# Patient Record
Sex: MALE | Race: WHITE | NOT HISPANIC OR LATINO | Employment: OTHER | URBAN - METROPOLITAN AREA
[De-identification: names, ages, dates, MRNs, and addresses within clinical notes are randomized per-mention and may not be internally consistent; named-entity substitution may affect disease eponyms.]

---

## 2017-01-12 ENCOUNTER — OFFICE VISIT (OUTPATIENT)
Dept: FAMILY MEDICINE CLINIC | Facility: CLINIC | Age: 72
End: 2017-01-12

## 2017-01-12 VITALS
WEIGHT: 187 LBS | TEMPERATURE: 97.9 F | OXYGEN SATURATION: 97 % | SYSTOLIC BLOOD PRESSURE: 112 MMHG | BODY MASS INDEX: 28.34 KG/M2 | HEIGHT: 68 IN | HEART RATE: 70 BPM | DIASTOLIC BLOOD PRESSURE: 70 MMHG

## 2017-01-12 DIAGNOSIS — I10 BENIGN ESSENTIAL HYPERTENSION: Primary | ICD-10-CM

## 2017-01-12 DIAGNOSIS — F32.89 OTHER DEPRESSION: ICD-10-CM

## 2017-01-12 DIAGNOSIS — N18.2 CKD (CHRONIC KIDNEY DISEASE) STAGE 2, GFR 60-89 ML/MIN: ICD-10-CM

## 2017-01-12 DIAGNOSIS — L24.2 IRRITANT CONTACT DERMATITIS DUE TO SOLVENT: ICD-10-CM

## 2017-01-12 DIAGNOSIS — E78.5 DYSLIPIDEMIA: ICD-10-CM

## 2017-01-12 DIAGNOSIS — F41.9 ANXIETY: ICD-10-CM

## 2017-01-12 PROCEDURE — 99214 OFFICE O/P EST MOD 30 MIN: CPT | Performed by: FAMILY MEDICINE

## 2017-01-12 RX ORDER — FLUOXETINE 10 MG/1
TABLET, FILM COATED ORAL
Qty: 30 TABLET | Refills: 0 | Status: SHIPPED | OUTPATIENT
Start: 2017-01-12 | End: 2017-03-10

## 2017-01-12 RX ORDER — TRIAMCINOLONE ACETONIDE 1 MG/G
CREAM TOPICAL 2 TIMES DAILY
Qty: 60 G | Refills: 2 | Status: SHIPPED | OUTPATIENT
Start: 2017-01-12 | End: 2017-03-10 | Stop reason: SDUPTHER

## 2017-01-12 RX ORDER — PRAVASTATIN SODIUM 20 MG
20 TABLET ORAL
Qty: 90 TABLET | Refills: 3 | Status: SHIPPED | OUTPATIENT
Start: 2017-01-12 | End: 2018-02-27

## 2017-01-12 RX ORDER — CLONAZEPAM 1 MG/1
TABLET ORAL
Qty: 135 TABLET | Refills: 1 | Status: SHIPPED | OUTPATIENT
Start: 2017-01-12 | End: 2017-03-10 | Stop reason: SDUPTHER

## 2017-01-12 NOTE — PROGRESS NOTES
Subjective   Kp Hernandez is a 71 y.o. male. Presents today for   Chief Complaint   Patient presents with   • Hypertension     pt is here for 6 month med review and labs. he said he has been having dry skin on his hands.       Rash   This is a new problem. Episode onset: 1 month ago. The affected locations include the left hand and right hand. The rash is characterized by dryness. Associated with: exposed to cement recently and fingers started to crack. Pertinent negatives include no fever or shortness of breath. Past treatments include moisturizer. The treatment provided no relief.   Hypertension   This is a chronic problem. The current episode started more than 1 year ago. The problem is unchanged. The problem is controlled. Pertinent negatives include no chest pain, orthopnea, palpitations, peripheral edema, PND or shortness of breath. There are no associated agents to hypertension. Risk factors for coronary artery disease include dyslipidemia and male gender. Past treatments include beta blockers. The current treatment provides moderate improvement. There are no compliance problems.    Hyperlipidemia   This is a chronic problem. The current episode started more than 1 year ago. The problem is controlled. Recent lipid tests were reviewed and are normal. Factors aggravating his hyperlipidemia include beta blockers. Pertinent negatives include no chest pain, focal sensory loss, focal weakness, leg pain, myalgias or shortness of breath. Current antihyperlipidemic treatment includes statins. The current treatment provides moderate improvement of lipids. There are no compliance problems.  Risk factors for coronary artery disease include hypertension, male sex, dyslipidemia and post-menopausal.   Benign Prostatic Hypertrophy   This is a chronic problem. The current episode started more than 1 year ago. The problem is unchanged. Irritative symptoms do not include nocturia. Obstructive symptoms do not include an  intermittent stream, a slower stream or straining. Past treatments include finasteride. The treatment provided significant relief. He has been using treatment for 2 or more years.   Hx of mild CKD, due for recheck;   On finasteride for quite sometime, no issues with wondering about stopping.    On fluoxetine for several years, would like stop as no depression.  Upon further questioning does have anxiety with irritability and is on clonazepam long-term.  WE discussed long-term risks of this medication and recommend go off.  Will try weaning prozac 1st, then clonazepam if able.  We will have follow-up closely and discuss other medications for anxiety.    Review of Systems   Constitutional: Negative for fever.   Respiratory: Negative for shortness of breath.    Cardiovascular: Negative for chest pain, palpitations, orthopnea and PND.   Genitourinary: Negative for nocturia.   Musculoskeletal: Negative for myalgias.   Skin: Positive for rash.   Neurological: Negative for focal weakness.       The following portions of the patient's history were reviewed and updated as appropriate: allergies, current medications, past medical history and problem list.    Patient Active Problem List   Diagnosis   • Benign essential hypertension   • Benign prostatic hypertrophy without urinary obstruction   • CKD (chronic kidney disease) stage 2, GFR 60-89 ml/min   • Chronic obstructive pulmonary disease   • Depression   • Dyslipidemia   • Ganglion   • Gastroesophageal reflux disease without esophagitis   • Hearing loss   • Osteoarthritis   • Impacted cerumen       No Known Allergies    Current Outpatient Prescriptions on File Prior to Visit   Medication Sig Dispense Refill   • clonazePAM (KlonoPIN) 1 MG tablet 1/2 po qam and 1 po qbedtime 135 tablet 1   • finasteride (PROSCAR) 5 MG tablet TAKE ONE TABLET BY MOUTH ONCE DAILY AS DIRECTED 90 tablet 3   • FLUoxetine (PROzac) 20 MG capsule Take 1 capsule by mouth 1 (one) time daily. 90 capsule  "3   • fluticasone (FLONASE) 50 MCG/ACT nasal spray 2 sprays into each nostril daily. 1 each 3   • hydrochlorothiazide (HYDRODIURIL) 12.5 MG tablet Take 1 tablet by mouth daily. 90 tablet 3   • HYDROcodone-acetaminophen (NORCO)  MG per tablet Take 1 tablet by mouth Every 6 (Six) Hours As Needed for moderate pain (4-6) (1 every 6-8 hours). 100 tablet 0   • HYDROcodone-acetaminophen (NORCO)  MG per tablet Take 1 tablet by mouth Every 6 (Six) Hours As Needed for moderate pain (4-6). 100 tablet 0   • metoprolol succinate XL (TOPROL-XL) 25 MG 24 hr tablet Take 1 tablet by mouth daily. 90 tablet 3   • pravastatin (PRAVACHOL) 20 MG tablet Take 20 mg by mouth once daily.     • ranitidine (ZANTAC) 150 MG tablet Take 150 mg by mouth every 12 (twelve) hours.     • SPIRIVA HANDIHALER 18 MCG per inhalation capsule      • [DISCONTINUED] finasteride (PROSCAR) 5 MG tablet Take by mouth daily.     • [DISCONTINUED] INCRUSE ELLIPTA 62.5 MCG/INH aerosol powder         No current facility-administered medications on file prior to visit.        Objective   Vitals:    01/12/17 0946   BP: 112/70   BP Location: Right arm   Patient Position: Sitting   Cuff Size: Large Adult   Pulse: 70   Temp: 97.9 °F (36.6 °C)   TempSrc: Oral   SpO2: 97%   Weight: 187 lb (84.8 kg)   Height: 68\" (172.7 cm)       Physical Exam   Constitutional: He appears well-developed and well-nourished.   HENT:   Head: Normocephalic and atraumatic.   Neck: Neck supple. No JVD present. No thyromegaly present.   Cardiovascular: Normal rate, regular rhythm and normal heart sounds.  Exam reveals no gallop and no friction rub.    No murmur heard.  Pulmonary/Chest: Effort normal and breath sounds normal. No respiratory distress. He has no wheezes. He has no rales.   Abdominal: Soft. Bowel sounds are normal. He exhibits no distension. There is no tenderness. There is no rebound and no guarding.   Musculoskeletal: He exhibits no edema.   Neurological: He is alert. "   Skin: Skin is warm and dry.   B/l hands, finger, peeling and cracking.   Psychiatric: He has a normal mood and affect. His behavior is normal.   Nursing note and vitals reviewed.      Assessment/Plan   Kp was seen today for hypertension.    Diagnoses and all orders for this visit:    Benign essential hypertension  -     Comprehensive Metabolic Panel    CKD (chronic kidney disease) stage 2, GFR 60-89 ml/min  -     Comprehensive Metabolic Panel    Dyslipidemia  -     Comprehensive Metabolic Panel  -     Lipid Panel  -     pravastatin (PRAVACHOL) 20 MG tablet; Take 1 tablet by mouth Daily.    Other depression  -     FLUoxetine (PROzac) 10 MG tablet; 1 po daily x 7 days, 1/2 po daily 7 days, if necessary go to 1/2 every other days 7 to 14 more days.    Anxiety  -     clonazePAM (KlonoPIN) 1 MG tablet; 1/2 po qam and 1 po qbedtime    Irritant contact dermatitis due to solvent  -     triamcinolone (KENALOG) 0.1 % cream; Apply  topically 2 (Two) Times a Day.    -hypertension - controlled, continue medications  -HLD - continue statin, recheck lipids.  -d/w bph and ses of finasteride, has been stable and would like to continue  -as above d/w bzd at length and interested in getting off this medication.  Will wean off of prozac 1st and see how he does.         -Follow up: 2 months       Current Outpatient Prescriptions:   •  clonazePAM (KlonoPIN) 1 MG tablet, 1/2 po qam and 1 po qbedtime, Disp: 135 tablet, Rfl: 1  •  finasteride (PROSCAR) 5 MG tablet, TAKE ONE TABLET BY MOUTH ONCE DAILY AS DIRECTED, Disp: 90 tablet, Rfl: 3  •  FLUoxetine (PROzac) 20 MG capsule, Take 1 capsule by mouth 1 (one) time daily., Disp: 90 capsule, Rfl: 3  •  fluticasone (FLONASE) 50 MCG/ACT nasal spray, 2 sprays into each nostril daily., Disp: 1 each, Rfl: 3  •  hydrochlorothiazide (HYDRODIURIL) 12.5 MG tablet, Take 1 tablet by mouth daily., Disp: 90 tablet, Rfl: 3  •  HYDROcodone-acetaminophen (NORCO)  MG per tablet, Take 1 tablet by  mouth Every 6 (Six) Hours As Needed for moderate pain (4-6) (1 every 6-8 hours)., Disp: 100 tablet, Rfl: 0  •  HYDROcodone-acetaminophen (NORCO)  MG per tablet, Take 1 tablet by mouth Every 6 (Six) Hours As Needed for moderate pain (4-6)., Disp: 100 tablet, Rfl: 0  •  metoprolol succinate XL (TOPROL-XL) 25 MG 24 hr tablet, Take 1 tablet by mouth daily., Disp: 90 tablet, Rfl: 3  •  pravastatin (PRAVACHOL) 20 MG tablet, Take 20 mg by mouth once daily., Disp: , Rfl:   •  ranitidine (ZANTAC) 150 MG tablet, Take 150 mg by mouth every 12 (twelve) hours., Disp: , Rfl:   •  SPIRIVA HANDIHALER 18 MCG per inhalation capsule, , Disp: , Rfl:

## 2017-01-12 NOTE — MR AVS SNAPSHOT
Kp Hernandez   1/12/2017 9:00 AM   Office Visit    Provider:  Murphy Felix DO   Department:  [facility]   Dept Phone:  [phone]                Your Full Care Plan              Today's Medication Changes          These changes are accurate as of: 1/12/17 10:35 AM.  If you have any questions, ask your nurse or doctor.               Medication(s)that have changed:     finasteride 5 MG tablet   Commonly known as:  PROSCAR   TAKE ONE TABLET BY MOUTH ONCE DAILY AS DIRECTED   What changed:  Another medication with the same name was removed. Continue taking this medication, and follow the directions you see here.   Changed by:  Murphy Felix DO         Stop taking medication(s)listed here:     INCRUSE ELLIPTA 62.5 MCG/INH aerosol powder    Generic drug:  Umeclidinium Bromide   Stopped by:  Murphy Felix DO                      Your Updated Medication List          This list is accurate as of: 1/12/17 10:35 AM.  Always use your most recent med list.                clonazePAM 1 MG tablet   Commonly known as:  KlonoPIN   1/2 po qam and 1 po qbedtime       finasteride 5 MG tablet   Commonly known as:  PROSCAR   TAKE ONE TABLET BY MOUTH ONCE DAILY AS DIRECTED       FLUoxetine 20 MG capsule   Commonly known as:  PROzac   Take 1 capsule by mouth 1 (one) time daily.       fluticasone 50 MCG/ACT nasal spray   Commonly known as:  FLONASE   2 sprays into each nostril daily.       hydrochlorothiazide 12.5 MG tablet   Commonly known as:  HYDRODIURIL   Take 1 tablet by mouth daily.       * HYDROcodone-acetaminophen  MG per tablet   Commonly known as:  NORCO   Take 1 tablet by mouth Every 6 (Six) Hours As Needed for moderate pain (4-6) (1 every 6-8 hours).       * HYDROcodone-acetaminophen  MG per tablet   Commonly known as:  NORCO   Take 1 tablet by mouth Every 6 (Six) Hours As Needed for moderate pain (4-6).       metoprolol succinate XL 25 MG 24 hr  "tablet   Commonly known as:  TOPROL-XL   Take 1 tablet by mouth daily.       pravastatin 20 MG tablet   Commonly known as:  PRAVACHOL       raNITIdine 150 MG tablet   Commonly known as:  ZANTAC       SPIRIVA HANDIHALER 18 MCG per inhalation capsule   Generic drug:  tiotropium       * Notice:  This list has 2 medication(s) that are the same as other medications prescribed for you. Read the directions carefully, and ask your doctor or other care provider to review them with you.            You Were Diagnosed With        Codes Comments    Benign essential hypertension    -  Primary ICD-10-CM: I10  ICD-9-CM: 401.1     CKD (chronic kidney disease) stage 2, GFR 60-89 ml/min     ICD-10-CM: N18.2  ICD-9-CM: 585.2     Dyslipidemia     ICD-10-CM: E78.5  ICD-9-CM: 272.4     Other depression     ICD-10-CM: F32.89       Instructions     None    Patient Instructions History      MyChart Signup     Our records indicate that you have declined [employer] signup. If you would like to sign up for [employer], please email [email or web address] or call [phone] to obtain an activation code.             Other Info from Your Visit           Allergies     No Known Allergies      Reason for Visit     Hypertension pt is here for 6 month med review and labs. he said he has been having dry skin on his hands.      Vital Signs     Blood Pressure Pulse Temperature Height Weight Oxygen Saturation    112/70 (BP Location: Right arm, Patient Position: Sitting, Cuff Size: Large Adult) 70 97.9 °F (36.6 °C) (Oral) 68\" (172.7 cm) 187 lb (84.8 kg) 97%    Body Mass Index Smoking Status                28.43 kg/m2 Never Smoker          Problems and Diagnoses Noted     Benign essential hypertension    CKD (chronic kidney disease) stage 2, GFR 60-89 ml/min    Depression    Dyslipidemia      "

## 2017-01-13 LAB
ALBUMIN SERPL-MCNC: 4.4 G/DL (ref 3.5–5.2)
ALBUMIN/GLOB SERPL: 1.7 G/DL
ALP SERPL-CCNC: 58 U/L (ref 39–117)
ALT SERPL-CCNC: 28 U/L (ref 1–41)
AST SERPL-CCNC: 22 U/L (ref 1–40)
BILIRUB SERPL-MCNC: 0.7 MG/DL (ref 0.1–1.2)
BUN SERPL-MCNC: 22 MG/DL (ref 8–23)
BUN/CREAT SERPL: 17.5 (ref 7–25)
CALCIUM SERPL-MCNC: 9.5 MG/DL (ref 8.6–10.5)
CHLORIDE SERPL-SCNC: 102 MMOL/L (ref 98–107)
CHOLEST SERPL-MCNC: 189 MG/DL (ref 0–200)
CO2 SERPL-SCNC: 27.5 MMOL/L (ref 22–29)
CREAT SERPL-MCNC: 1.26 MG/DL (ref 0.76–1.27)
GLOBULIN SER CALC-MCNC: 2.6 GM/DL
GLUCOSE SERPL-MCNC: 89 MG/DL (ref 65–99)
HDLC SERPL-MCNC: 50 MG/DL (ref 40–60)
LDLC SERPL CALC-MCNC: 109 MG/DL (ref 0–100)
POTASSIUM SERPL-SCNC: 4.7 MMOL/L (ref 3.5–5.2)
PROT SERPL-MCNC: 7 G/DL (ref 6–8.5)
SODIUM SERPL-SCNC: 144 MMOL/L (ref 136–145)
TRIGL SERPL-MCNC: 150 MG/DL (ref 0–150)
VLDLC SERPL CALC-MCNC: 30 MG/DL (ref 5–40)

## 2017-02-13 ENCOUNTER — TELEPHONE (OUTPATIENT)
Dept: FAMILY MEDICINE CLINIC | Facility: CLINIC | Age: 72
End: 2017-02-13

## 2017-02-13 RX ORDER — HYDROCODONE BITARTRATE AND ACETAMINOPHEN 10; 325 MG/1; MG/1
1 TABLET ORAL EVERY 6 HOURS PRN
Qty: 100 TABLET | Refills: 0 | Status: SHIPPED | OUTPATIENT
Start: 2017-02-13 | End: 2017-03-28 | Stop reason: SDUPTHER

## 2017-03-10 ENCOUNTER — OFFICE VISIT (OUTPATIENT)
Dept: FAMILY MEDICINE CLINIC | Facility: CLINIC | Age: 72
End: 2017-03-10

## 2017-03-10 VITALS
TEMPERATURE: 97.9 F | BODY MASS INDEX: 28.59 KG/M2 | WEIGHT: 188 LBS | OXYGEN SATURATION: 98 % | DIASTOLIC BLOOD PRESSURE: 70 MMHG | SYSTOLIC BLOOD PRESSURE: 140 MMHG | HEART RATE: 81 BPM

## 2017-03-10 DIAGNOSIS — F32.89 OTHER DEPRESSION: ICD-10-CM

## 2017-03-10 DIAGNOSIS — L24.2 IRRITANT CONTACT DERMATITIS DUE TO SOLVENT: ICD-10-CM

## 2017-03-10 DIAGNOSIS — F41.9 ANXIETY: Primary | ICD-10-CM

## 2017-03-10 PROCEDURE — 99213 OFFICE O/P EST LOW 20 MIN: CPT | Performed by: FAMILY MEDICINE

## 2017-03-10 RX ORDER — BUSPIRONE HYDROCHLORIDE 7.5 MG/1
7.5 TABLET ORAL 2 TIMES DAILY
Qty: 60 TABLET | Refills: 12 | Status: SHIPPED | OUTPATIENT
Start: 2017-03-10 | End: 2017-06-13 | Stop reason: SDUPTHER

## 2017-03-10 RX ORDER — TRIAMCINOLONE ACETONIDE 1 MG/G
CREAM TOPICAL 2 TIMES DAILY
Qty: 60 G | Refills: 5 | Status: SHIPPED | OUTPATIENT
Start: 2017-03-10 | End: 2017-12-18

## 2017-03-10 RX ORDER — TRIAMCINOLONE ACETONIDE 1 MG/G
CREAM TOPICAL 2 TIMES DAILY
Qty: 60 G | Refills: 5 | Status: SHIPPED | OUTPATIENT
Start: 2017-03-10 | End: 2017-03-10 | Stop reason: SDUPTHER

## 2017-03-10 RX ORDER — CLONAZEPAM 0.5 MG/1
TABLET ORAL
Qty: 70 TABLET | Refills: 0 | Status: SHIPPED | OUTPATIENT
Start: 2017-03-10 | End: 2017-05-23

## 2017-03-10 NOTE — PROGRESS NOTES
Subjective   Kp Hernandez is a 71 y.o. male. Presents today for   Chief Complaint   Patient presents with   • Follow-up     weaned off prozac and talking bout clonazepam       Anxiety   Presents for follow-up visit. Symptoms include irritability and nervous/anxious behavior. Patient reports no depressed mood. Primary symptoms comment: Weaned off prozac as no further depression.  Ready to wean off clonazepam, but would like something else for anxiety.. Symptoms occur occasionally. The severity of symptoms is mild.     Compliance with medications is %.     Rash on hands improved, needs refills of TAC.    Review of Systems   Constitutional: Positive for irritability.   Psychiatric/Behavioral: The patient is nervous/anxious.        The following portions of the patient's history were reviewed and updated as appropriate: allergies, current medications, past medical history and problem list.    Patient Active Problem List   Diagnosis   • Benign essential hypertension   • Benign prostatic hypertrophy without urinary obstruction   • CKD (chronic kidney disease) stage 2, GFR 60-89 ml/min   • Chronic obstructive pulmonary disease   • Depression   • Dyslipidemia   • Ganglion   • Gastroesophageal reflux disease without esophagitis   • Hearing loss   • Osteoarthritis   • Impacted cerumen       No Known Allergies    Current Outpatient Prescriptions on File Prior to Visit   Medication Sig Dispense Refill   • finasteride (PROSCAR) 5 MG tablet TAKE ONE TABLET BY MOUTH ONCE DAILY AS DIRECTED 90 tablet 3   • fluticasone (FLONASE) 50 MCG/ACT nasal spray 2 sprays into each nostril daily. 1 each 3   • hydrochlorothiazide (HYDRODIURIL) 12.5 MG tablet Take 1 tablet by mouth daily. 90 tablet 3   • HYDROcodone-acetaminophen (NORCO)  MG per tablet Take 1 tablet by mouth Every 6 (Six) Hours As Needed for moderate pain (4-6) (1 every 6-8 hours). 100 tablet 0   • HYDROcodone-acetaminophen (NORCO)  MG per tablet Take 1 tablet by  mouth Every 6 (Six) Hours As Needed for moderate pain (4-6). 100 tablet 0   • metoprolol succinate XL (TOPROL-XL) 25 MG 24 hr tablet Take 1 tablet by mouth daily. 90 tablet 3   • pravastatin (PRAVACHOL) 20 MG tablet Take 1 tablet by mouth Daily. 90 tablet 3   • ranitidine (ZANTAC) 150 MG tablet Take 150 mg by mouth every 12 (twelve) hours.     • SPIRIVA HANDIHALER 18 MCG per inhalation capsule      • [DISCONTINUED] clonazePAM (KlonoPIN) 1 MG tablet 1/2 po qam and 1 po qbedtime 135 tablet 1   • [DISCONTINUED] FLUoxetine (PROzac) 10 MG tablet 1 po daily x 7 days, 1/2 po daily 7 days, if necessary go to 1/2 every other days 7 to 14 more days. 30 tablet 0   • [DISCONTINUED] FLUoxetine (PROzac) 20 MG capsule Take 1 capsule by mouth 1 (one) time daily. 90 capsule 3   • [DISCONTINUED] triamcinolone (KENALOG) 0.1 % cream Apply  topically 2 (Two) Times a Day. 60 g 2     No current facility-administered medications on file prior to visit.        Objective   Vitals:    03/10/17 0852   BP: 140/70   Pulse: 81   Temp: 97.9 °F (36.6 °C)   SpO2: 98%   Weight: 188 lb (85.3 kg)       Physical Exam   Constitutional: He appears well-developed and well-nourished.   Neck: Neck supple.   Neurological: He is alert.   Skin: Skin is warm and dry.   Psychiatric: He has a normal mood and affect. His behavior is normal.   Nursing note and vitals reviewed.      Assessment/Plan   pK was seen today for follow-up.    Diagnoses and all orders for this visit:    Anxiety  -     busPIRone (BUSPAR) 7.5 MG tablet; Take 1 tablet by mouth 2 (Two) Times a Day.  -     clonazePAM (KlonoPIN) 0.5 MG tablet; 1 po 2x daily x 14 days, 1/2 am and 1 pm x 14 days, 1 po pm x 14 days, then 1/2 po pm x 7 days, then 1/2 every other night x 7 days    Other depression    Irritant contact dermatitis due to solvent  -     triamcinolone (KENALOG) 0.1 % cream; Apply  topically 2 (Two) Times a Day.    -will wean slowly off clonazepam and start buspar as safer  alternative         -Follow up: 3 months       Current Outpatient Prescriptions:   •  clonazePAM (KlonoPIN) 0.5 MG tablet, 1 po 2x daily x 14 days, 1/2 am and 1 pm x 14 days, 1 po pm x 14 days, then 1/2 po pm x 7 days, then 1/2 every other night x 7 days, Disp: 70 tablet, Rfl: 0  •  finasteride (PROSCAR) 5 MG tablet, TAKE ONE TABLET BY MOUTH ONCE DAILY AS DIRECTED, Disp: 90 tablet, Rfl: 3  •  fluticasone (FLONASE) 50 MCG/ACT nasal spray, 2 sprays into each nostril daily., Disp: 1 each, Rfl: 3  •  hydrochlorothiazide (HYDRODIURIL) 12.5 MG tablet, Take 1 tablet by mouth daily., Disp: 90 tablet, Rfl: 3  •  HYDROcodone-acetaminophen (NORCO)  MG per tablet, Take 1 tablet by mouth Every 6 (Six) Hours As Needed for moderate pain (4-6) (1 every 6-8 hours)., Disp: 100 tablet, Rfl: 0  •  HYDROcodone-acetaminophen (NORCO)  MG per tablet, Take 1 tablet by mouth Every 6 (Six) Hours As Needed for moderate pain (4-6)., Disp: 100 tablet, Rfl: 0  •  metoprolol succinate XL (TOPROL-XL) 25 MG 24 hr tablet, Take 1 tablet by mouth daily., Disp: 90 tablet, Rfl: 3  •  pravastatin (PRAVACHOL) 20 MG tablet, Take 1 tablet by mouth Daily., Disp: 90 tablet, Rfl: 3  •  ranitidine (ZANTAC) 150 MG tablet, Take 150 mg by mouth every 12 (twelve) hours., Disp: , Rfl:   •  SPIRIVA HANDIHALER 18 MCG per inhalation capsule, , Disp: , Rfl:   •  triamcinolone (KENALOG) 0.1 % cream, Apply  topically 2 (Two) Times a Day., Disp: 60 g, Rfl: 5  •  busPIRone (BUSPAR) 7.5 MG tablet, Take 1 tablet by mouth 2 (Two) Times a Day., Disp: 60 tablet, Rfl: 12

## 2017-03-13 ENCOUNTER — TELEPHONE (OUTPATIENT)
Dept: FAMILY MEDICINE CLINIC | Facility: CLINIC | Age: 72
End: 2017-03-13

## 2017-03-13 NOTE — TELEPHONE ENCOUNTER
He wanted to know if he should start the buspar now, or wait until he is finished weaning off clonazepam. I told him to go ahead and start the buspar while he is weaning off. If this is not correct, pls let me know.

## 2017-03-29 RX ORDER — HYDROCODONE BITARTRATE AND ACETAMINOPHEN 10; 325 MG/1; MG/1
1 TABLET ORAL EVERY 6 HOURS PRN
Qty: 100 TABLET | Refills: 0 | Status: SHIPPED | OUTPATIENT
Start: 2017-03-29 | End: 2017-06-16 | Stop reason: SDUPTHER

## 2017-04-18 ENCOUNTER — TELEPHONE (OUTPATIENT)
Dept: FAMILY MEDICINE CLINIC | Facility: CLINIC | Age: 72
End: 2017-04-18

## 2017-04-18 DIAGNOSIS — Z79.899 DRUG THERAPY: Primary | ICD-10-CM

## 2017-04-24 LAB
DRUGS UR: NORMAL
REPORT: NORMAL

## 2017-04-24 RX ORDER — FLUTICASONE PROPIONATE 50 MCG
SPRAY, SUSPENSION (ML) NASAL
Qty: 16 G | Refills: 3 | Status: SHIPPED | OUTPATIENT
Start: 2017-04-24 | End: 2017-09-13 | Stop reason: SDUPTHER

## 2017-05-16 RX ORDER — FINASTERIDE 5 MG/1
TABLET, FILM COATED ORAL
Qty: 90 TABLET | Refills: 1 | Status: SHIPPED | OUTPATIENT
Start: 2017-05-16 | End: 2017-11-13 | Stop reason: SDUPTHER

## 2017-05-23 ENCOUNTER — OFFICE VISIT (OUTPATIENT)
Dept: FAMILY MEDICINE CLINIC | Facility: CLINIC | Age: 72
End: 2017-05-23

## 2017-05-23 VITALS
WEIGHT: 185 LBS | OXYGEN SATURATION: 98 % | DIASTOLIC BLOOD PRESSURE: 76 MMHG | BODY MASS INDEX: 28.13 KG/M2 | SYSTOLIC BLOOD PRESSURE: 130 MMHG | TEMPERATURE: 98.3 F | HEART RATE: 85 BPM

## 2017-05-23 DIAGNOSIS — G47.09 OTHER INSOMNIA: ICD-10-CM

## 2017-05-23 DIAGNOSIS — M54.2 NECK PAIN: Primary | ICD-10-CM

## 2017-05-23 PROCEDURE — 99213 OFFICE O/P EST LOW 20 MIN: CPT | Performed by: FAMILY MEDICINE

## 2017-05-23 RX ORDER — TIZANIDINE 4 MG/1
4 TABLET ORAL EVERY 8 HOURS PRN
Qty: 90 TABLET | Refills: 5 | Status: SHIPPED | OUTPATIENT
Start: 2017-05-23 | End: 2018-08-23 | Stop reason: SDUPTHER

## 2017-05-23 RX ORDER — TRAZODONE HYDROCHLORIDE 50 MG/1
50 TABLET ORAL NIGHTLY PRN
Qty: 30 TABLET | Refills: 5 | Status: SHIPPED | OUTPATIENT
Start: 2017-05-23 | End: 2017-08-18

## 2017-06-09 ENCOUNTER — TELEPHONE (OUTPATIENT)
Dept: FAMILY MEDICINE CLINIC | Facility: CLINIC | Age: 72
End: 2017-06-09

## 2017-06-13 ENCOUNTER — OFFICE VISIT (OUTPATIENT)
Dept: FAMILY MEDICINE CLINIC | Facility: CLINIC | Age: 72
End: 2017-06-13

## 2017-06-13 VITALS
HEIGHT: 68 IN | DIASTOLIC BLOOD PRESSURE: 80 MMHG | BODY MASS INDEX: 27.74 KG/M2 | WEIGHT: 183 LBS | SYSTOLIC BLOOD PRESSURE: 140 MMHG | HEART RATE: 76 BPM | TEMPERATURE: 97.6 F | OXYGEN SATURATION: 98 %

## 2017-06-13 DIAGNOSIS — L30.8 OTHER ECZEMA: ICD-10-CM

## 2017-06-13 DIAGNOSIS — I10 BENIGN ESSENTIAL HYPERTENSION: ICD-10-CM

## 2017-06-13 DIAGNOSIS — F41.9 ANXIETY: Primary | ICD-10-CM

## 2017-06-13 PROCEDURE — 99214 OFFICE O/P EST MOD 30 MIN: CPT | Performed by: FAMILY MEDICINE

## 2017-06-13 RX ORDER — BUSPIRONE HYDROCHLORIDE 15 MG/1
15 TABLET ORAL 2 TIMES DAILY
Qty: 60 TABLET | Refills: 12 | Status: SHIPPED | OUTPATIENT
Start: 2017-06-13 | End: 2017-06-26 | Stop reason: SDUPTHER

## 2017-06-13 RX ORDER — SALICYLIC ACID 6 G/100G
AEROSOL, FOAM TOPICAL
Qty: 70 G | Refills: 2 | Status: SHIPPED | OUTPATIENT
Start: 2017-06-13 | End: 2017-12-18
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
## 2017-06-14 ENCOUNTER — TELEPHONE (OUTPATIENT)
Dept: FAMILY MEDICINE CLINIC | Facility: CLINIC | Age: 72
End: 2017-06-14

## 2017-06-16 DIAGNOSIS — M15.9 GENERALIZED OSTEOARTHRITIS OF MULTIPLE SITES: Primary | ICD-10-CM

## 2017-06-16 DIAGNOSIS — R23.4 SKIN THICKENING: Primary | ICD-10-CM

## 2017-06-16 RX ORDER — SALICYLIC ACID 60 MG/G
GEL TOPICAL DAILY
Qty: 100 G | Refills: 5 | Status: SHIPPED | OUTPATIENT
Start: 2017-06-16 | End: 2017-12-18

## 2017-06-17 RX ORDER — HYDROCODONE BITARTRATE AND ACETAMINOPHEN 10; 325 MG/1; MG/1
1 TABLET ORAL EVERY 6 HOURS PRN
Qty: 100 TABLET | Refills: 0 | Status: SHIPPED | OUTPATIENT
Start: 2017-06-17 | End: 2017-06-26

## 2017-06-17 RX ORDER — HYDROCODONE BITARTRATE AND ACETAMINOPHEN 10; 325 MG/1; MG/1
1 TABLET ORAL EVERY 6 HOURS PRN
Qty: 100 TABLET | Refills: 0 | Status: SHIPPED | OUTPATIENT
Start: 2017-06-17 | End: 2017-08-18 | Stop reason: SDUPTHER

## 2017-06-20 NOTE — TELEPHONE ENCOUNTER
He should come off pain meds slowly as will get withdrawal symptoms and pain will rapidly increase until we get him off.  I would suggest start by decreasing hydrocodone to 1 every 8 hours for now and start gabapentin.  Next month we can try twice daily.  But have him keep me posted so we can do this right and not have him feel awful.    RRJ

## 2017-06-26 ENCOUNTER — OFFICE VISIT (OUTPATIENT)
Dept: FAMILY MEDICINE CLINIC | Facility: CLINIC | Age: 72
End: 2017-06-26

## 2017-06-26 VITALS
HEART RATE: 84 BPM | WEIGHT: 180 LBS | OXYGEN SATURATION: 98 % | BODY MASS INDEX: 27.37 KG/M2 | DIASTOLIC BLOOD PRESSURE: 94 MMHG | SYSTOLIC BLOOD PRESSURE: 180 MMHG

## 2017-06-26 DIAGNOSIS — F41.9 ANXIETY: ICD-10-CM

## 2017-06-26 DIAGNOSIS — Z00.00 MEDICARE ANNUAL WELLNESS VISIT, INITIAL: Primary | ICD-10-CM

## 2017-06-26 DIAGNOSIS — E78.5 DYSLIPIDEMIA: ICD-10-CM

## 2017-06-26 DIAGNOSIS — I10 BENIGN ESSENTIAL HYPERTENSION: ICD-10-CM

## 2017-06-26 LAB
ALBUMIN SERPL-MCNC: 4.8 G/DL (ref 3.5–5.2)
ALBUMIN/GLOB SERPL: 1.7 G/DL
ALP SERPL-CCNC: 61 U/L (ref 39–117)
ALT SERPL-CCNC: 28 U/L (ref 1–41)
AST SERPL-CCNC: 25 U/L (ref 1–40)
BILIRUB SERPL-MCNC: 0.9 MG/DL (ref 0.1–1.2)
BUN SERPL-MCNC: 22 MG/DL (ref 8–23)
BUN/CREAT SERPL: 17.6 (ref 7–25)
CALCIUM SERPL-MCNC: 10.3 MG/DL (ref 8.6–10.5)
CHLORIDE SERPL-SCNC: 102 MMOL/L (ref 98–107)
CHOLEST SERPL-MCNC: 177 MG/DL (ref 0–200)
CO2 SERPL-SCNC: 25.9 MMOL/L (ref 22–29)
CREAT SERPL-MCNC: 1.25 MG/DL (ref 0.76–1.27)
GLOBULIN SER CALC-MCNC: 2.9 GM/DL
GLUCOSE SERPL-MCNC: 100 MG/DL (ref 65–99)
HDLC SERPL-MCNC: 58 MG/DL (ref 40–60)
LDLC SERPL CALC-MCNC: 91 MG/DL (ref 0–100)
POTASSIUM SERPL-SCNC: 4.8 MMOL/L (ref 3.5–5.2)
PROT SERPL-MCNC: 7.7 G/DL (ref 6–8.5)
SODIUM SERPL-SCNC: 144 MMOL/L (ref 136–145)
TRIGL SERPL-MCNC: 138 MG/DL (ref 0–150)
VLDLC SERPL CALC-MCNC: 27.6 MG/DL (ref 5–40)

## 2017-06-26 PROCEDURE — G0438 PPPS, INITIAL VISIT: HCPCS | Performed by: FAMILY MEDICINE

## 2017-06-26 RX ORDER — BUSPIRONE HYDROCHLORIDE 15 MG/1
15 TABLET ORAL 3 TIMES DAILY
Qty: 90 TABLET | Refills: 12 | Status: SHIPPED | OUTPATIENT
Start: 2017-06-26 | End: 2017-07-24 | Stop reason: SDUPTHER

## 2017-06-26 NOTE — PATIENT INSTRUCTIONS
Medicare Wellness  Personal Prevention Plan of Service     Date of Office Visit:  2017  Encounter Provider:  Murphy Felix DO  Place of Service:  [facility]  Patient Name: Kp Hernandez  :  1945    As part of the Medicare Wellness portion of your visit today, we are providing you with this personalized preventive plan of services (PPPS). This plan is based upon recommendations of the [place] Preventive Services Task Force (USPSTF) and the Advisory Committee on Immunization Practices (ACIP).    This lists the preventive care services that should be considered, and provides dates of when you are due. Items listed as completed are up-to-date and do not require any further intervention.    Health Maintenance   Topic Date Due   • INFLUENZA VACCINE  2017   • MEDICARE ANNUAL WELLNESS  2018   • COLONOSCOPY  2018   • TDAP/TD VACCINES (3 - Td) 2027   • HEPATITIS C SCREENING  Addressed   • PNEUMOCOCCAL VACCINES (65+ LOW/MEDIUM RISK)  Addressed   • ZOSTER VACCINE  Completed       No orders of the defined types were placed in this encounter.      Return in about 6 months (around 2017), or if symptoms worsen or fail to improve.

## 2017-06-26 NOTE — PROGRESS NOTES
QUICK REFERENCE INFORMATION:  The ABCs of the Annual Wellness Visit    Initial Medicare Wellness Visit    HEALTH RISK ASSESSMENT    1945    Recent Hospitalizations:  No hospitalization(s) within the last year..        Current Medical Providers:  Patient Care Team:  Murphy Felix DO as PCP - General  Murphy Felix DO as PCP - Claims Attributed        Smoking Status:  History   Smoking Status   • Never Smoker   Smokeless Tobacco   • Never Used       Alcohol Consumption:  History   Alcohol Use No       Depression Screen:   PHQ-9 Depression Screening 6/26/2017   Little interest or pleasure in doing things 1   Feeling down, depressed, or hopeless 1   Trouble falling or staying asleep, or sleeping too much 2   Feeling tired or having little energy 0   Poor appetite or overeating 0   Feeling bad about yourself - or that you are a failure or have let yourself or your family down 0   Trouble concentrating on things, such as reading the newspaper or watching television 0   Moving or speaking so slowly that other people could have noticed. Or the opposite - being so fidgety or restless that you have been moving around a lot more than usual 0   Thoughts that you would be better off dead, or of hurting yourself in some way 0   PHQ-9 Total Score 4   If you checked off any problems, how difficult have these problems made it for you to do your work, take care of things at home, or get along with other people? Somewhat difficult       Health Habits and Functional and Cognitive Screening:  Functional & Cognitive Status 6/26/2017   Do you have difficulty preparing food and eating? No   Do you have difficulty bathing yourself? No   Do you have difficulty getting dressed? No   Do you have difficulty using the toilet? No   Do you have difficulty moving around from place to place? No   In the past year have you fallen or experienced a near fall? No   Do you need help using the phone?  No   Are you deaf or do you have serious  difficulty hearing?  No   Do you need help with transportation? No   Do you need help shopping? No   Do you need help preparing meals?  No   Do you need help with housework?  No   Do you need help with laundry? No   Do you need help taking your medications? No   Do you need help managing money? No   Do you have difficulty concentrating, remembering or making decisions? No       Health Habits  Current Diet: Well Balanced Diet  Dental Exam: Up to date  Eye Exam: Up to date  Exercise (times per week): 7 times per week  Current Exercise Activities Include: Weightlifting          Does the patient have evidence of cognitive impairment? No    Asiprin use counseling: Taking ASA appropriately as indicated      Recent Lab Results:    Visual Acuity:  No exam data present    Age-appropriate Screening Schedule:  Refer to the list below for future screening recommendations based on patient's age, sex and/or medical conditions. Orders for these recommended tests are listed in the plan section. The patient has been provided with a written plan.    Health Maintenance   Topic Date Due   • INFLUENZA VACCINE  08/01/2017   • COLONOSCOPY  07/19/2018   • TDAP/TD VACCINES (3 - Td) 01/12/2027   • PNEUMOCOCCAL VACCINES (65+ LOW/MEDIUM RISK)  Addressed   • ZOSTER VACCINE  Completed        Subjective   History of Present Illness    Kp Hernandez is a 71 y.o. male who presents for an Annual Wellness Visit.    The following portions of the patient's history were reviewed and updated as appropriate: allergies, current medications, past family history, past medical history, past social history, past surgical history and problem list.    Outpatient Medications Prior to Visit   Medication Sig Dispense Refill   • busPIRone (BUSPAR) 15 MG tablet Take 1 tablet by mouth 2 (Two) Times a Day. 60 tablet 12   • finasteride (PROSCAR) 5 MG tablet TAKE ONE TABLET BY MOUTH ONCE DAILY AS DIRECTED 90 tablet 1   • fluticasone (FLONASE) 50 MCG/ACT nasal spray USE  TWO SPRAY(S) IN EACH NOSTRIL ONCE DAILY 16 g 3   • hydrochlorothiazide (HYDRODIURIL) 12.5 MG tablet Take 1 tablet by mouth daily. 90 tablet 3   • HYDROcodone-acetaminophen (NORCO)  MG per tablet Take 1 tablet by mouth Every 6 (Six) Hours As Needed for Moderate Pain (4-6). 100 tablet 0   • metoprolol succinate XL (TOPROL-XL) 25 MG 24 hr tablet Take 1 tablet by mouth daily. 90 tablet 3   • pravastatin (PRAVACHOL) 20 MG tablet Take 1 tablet by mouth Daily. 90 tablet 3   • ranitidine (ZANTAC) 150 MG tablet Take 150 mg by mouth every 12 (twelve) hours.     • Salicylic Acid (SALVAX) 6 % foam Apply twice daily to palm of hands 70 g 2   • salicylic acid 6 % gel Apply  topically Daily. To hands 100 g 5   • SPIRIVA HANDIHALER 18 MCG per inhalation capsule      • tiZANidine (ZANAFLEX) 4 MG tablet Take 1 tablet by mouth Every 8 (Eight) Hours As Needed for Muscle Spasms. 90 tablet 5   • traZODone (DESYREL) 50 MG tablet Take 1 tablet by mouth At Night As Needed for Sleep. 30 tablet 5   • triamcinolone (KENALOG) 0.1 % cream Apply  topically 2 (Two) Times a Day. 60 g 5   • HYDROcodone-acetaminophen (NORCO)  MG per tablet Take 1 tablet by mouth Every 6 (Six) Hours As Needed for Moderate Pain (4-6) (1 every 6-8 hours). 100 tablet 0     No facility-administered medications prior to visit.        Patient Active Problem List   Diagnosis   • Benign essential hypertension   • Benign prostatic hypertrophy without urinary obstruction   • CKD (chronic kidney disease) stage 2, GFR 60-89 ml/min   • Chronic obstructive pulmonary disease   • Depression   • Dyslipidemia   • Ganglion   • Gastroesophageal reflux disease without esophagitis   • Hearing loss   • Osteoarthritis   • Impacted cerumen       Advance Care Planning:  has an advance directive - a copy HAS NOT been provided. Have asked the patient to send this to us to add to record.    Identification of Risk Factors:  Risk factors include: Exercises a lot;  D/w mood and mental  health.    Review of Systems   Respiratory: Negative for shortness of breath.    Cardiovascular: Negative for chest pain.   Gastrointestinal: Negative for abdominal pain, nausea and vomiting.   Neurological: Negative for syncope.   Psychiatric/Behavioral: Positive for sleep disturbance (Wakes up with hot flashes.). The patient is nervous/anxious.        Compared to one year ago, the patient feels his physical health is better.  Compared to one year ago, the patient feels his mental health is the same.    Objective     Physical Exam   Constitutional: He appears well-developed and well-nourished.   HENT:   Head: Normocephalic and atraumatic.   Neck: Neck supple. No JVD present. No thyromegaly present.   Cardiovascular: Normal rate, regular rhythm and normal heart sounds.  Exam reveals no gallop and no friction rub.    No murmur heard.  Pulmonary/Chest: Effort normal and breath sounds normal. No respiratory distress. He has no wheezes. He has no rales.   Abdominal: Soft. Bowel sounds are normal. He exhibits no distension. There is no tenderness. There is no rebound and no guarding.   Musculoskeletal: He exhibits no edema.   Neurological: He is alert.   Skin: Skin is warm and dry.   Psychiatric: He has a normal mood and affect. His behavior is normal.   Nursing note and vitals reviewed.      Vitals:    06/26/17 1100   BP: 180/94   Pulse: 84   SpO2: 98%   Weight: 180 lb (81.6 kg)       Body mass index is 27.37 kg/(m^2).  Discussed the patient's BMI with him. The BMI is in the acceptable range.    Assessment/Plan   Patient Self-Management and Personalized Health Advice  The patient has been provided with information about: diet, exercise and doing well, exercisng and losing weight.  d/w mental health issues. and preventive services including:   · Advance directive.    Visit Diagnoses:    ICD-10-CM ICD-9-CM   1. Medicare annual wellness visit, initial Z00.00 V70.0       No orders of the defined types were placed in this  encounter.      Outpatient Encounter Prescriptions as of 6/26/2017   Medication Sig Dispense Refill   • busPIRone (BUSPAR) 15 MG tablet Take 1 tablet by mouth 2 (Two) Times a Day. 60 tablet 12   • finasteride (PROSCAR) 5 MG tablet TAKE ONE TABLET BY MOUTH ONCE DAILY AS DIRECTED 90 tablet 1   • fluticasone (FLONASE) 50 MCG/ACT nasal spray USE TWO SPRAY(S) IN EACH NOSTRIL ONCE DAILY 16 g 3   • hydrochlorothiazide (HYDRODIURIL) 12.5 MG tablet Take 1 tablet by mouth daily. 90 tablet 3   • HYDROcodone-acetaminophen (NORCO)  MG per tablet Take 1 tablet by mouth Every 6 (Six) Hours As Needed for Moderate Pain (4-6). 100 tablet 0   • metoprolol succinate XL (TOPROL-XL) 25 MG 24 hr tablet Take 1 tablet by mouth daily. 90 tablet 3   • pravastatin (PRAVACHOL) 20 MG tablet Take 1 tablet by mouth Daily. 90 tablet 3   • ranitidine (ZANTAC) 150 MG tablet Take 150 mg by mouth every 12 (twelve) hours.     • Salicylic Acid (SALVAX) 6 % foam Apply twice daily to palm of hands 70 g 2   • salicylic acid 6 % gel Apply  topically Daily. To hands 100 g 5   • SPIRIVA HANDIHALER 18 MCG per inhalation capsule      • tiZANidine (ZANAFLEX) 4 MG tablet Take 1 tablet by mouth Every 8 (Eight) Hours As Needed for Muscle Spasms. 90 tablet 5   • traZODone (DESYREL) 50 MG tablet Take 1 tablet by mouth At Night As Needed for Sleep. 30 tablet 5   • triamcinolone (KENALOG) 0.1 % cream Apply  topically 2 (Two) Times a Day. 60 g 5   • [DISCONTINUED] HYDROcodone-acetaminophen (NORCO)  MG per tablet Take 1 tablet by mouth Every 6 (Six) Hours As Needed for Moderate Pain (4-6) (1 every 6-8 hours). 100 tablet 0     No facility-administered encounter medications on file as of 6/26/2017.        Reviewed use of high risk medication in the elderly: yes  Reviewed for potential of harmful drug interactions in the elderly: yes    Follow Up:  No Follow-up on file.     An After Visit Summary and PPPS with all of these plans were given to the patient.   Has  had pneumococcal vaccines per patient.

## 2017-07-24 ENCOUNTER — OFFICE VISIT (OUTPATIENT)
Dept: FAMILY MEDICINE CLINIC | Facility: CLINIC | Age: 72
End: 2017-07-24

## 2017-07-24 VITALS
DIASTOLIC BLOOD PRESSURE: 92 MMHG | SYSTOLIC BLOOD PRESSURE: 148 MMHG | BODY MASS INDEX: 27.98 KG/M2 | HEART RATE: 85 BPM | OXYGEN SATURATION: 99 % | WEIGHT: 184 LBS

## 2017-07-24 DIAGNOSIS — F41.9 ANXIETY: Primary | ICD-10-CM

## 2017-07-24 DIAGNOSIS — R20.2 PARESTHESIAS: ICD-10-CM

## 2017-07-24 DIAGNOSIS — R53.83 OTHER FATIGUE: ICD-10-CM

## 2017-07-24 DIAGNOSIS — F41.0 PANIC DISORDER: ICD-10-CM

## 2017-07-24 DIAGNOSIS — R25.2 MUSCLE CRAMPS: ICD-10-CM

## 2017-07-24 PROCEDURE — 99213 OFFICE O/P EST LOW 20 MIN: CPT | Performed by: FAMILY MEDICINE

## 2017-07-24 RX ORDER — ESCITALOPRAM OXALATE 10 MG/1
TABLET ORAL
Qty: 30 TABLET | Refills: 5 | Status: SHIPPED | OUTPATIENT
Start: 2017-07-24 | End: 2018-01-02 | Stop reason: SDUPTHER

## 2017-07-24 RX ORDER — BUSPIRONE HYDROCHLORIDE 30 MG/1
15 TABLET ORAL 3 TIMES DAILY
Qty: 90 TABLET | Refills: 2 | Status: SHIPPED | OUTPATIENT
Start: 2017-07-24 | End: 2017-08-16 | Stop reason: DRUGHIGH

## 2017-07-24 NOTE — PROGRESS NOTES
Subjective   Kp Hernandez is a 71 y.o. male. Presents today for   Chief Complaint   Patient presents with   • Follow-up     weaning off meds and anxiety getting worse and not sleeping.       Anxiety   Presents for follow-up visit. Symptoms include excessive worry, insomnia, irritability, nervous/anxious behavior, panic and restlessness. Patient reports no chest pain, depressed mood, palpitations, shortness of breath or suicidal ideas. Primary symptoms comment: patient weaned off prozac and clonazepam.  Taking buspar bid with some relief, but by the time 2nd dose notes more anxiety.. Symptoms occur most days. The severity of symptoms is moderate. The quality of sleep is fair. Nighttime awakenings: several.     Compliance with medications is %.     Patient also reports having occly tingling sensation, fatigue as well as occly muscle cramps.      Review of Systems   Constitutional: Positive for fatigue and irritability.   Respiratory: Negative for shortness of breath.    Cardiovascular: Negative for chest pain and palpitations.   Neurological: Positive for numbness.   Psychiatric/Behavioral: Negative for suicidal ideas. The patient is nervous/anxious and has insomnia.        The following portions of the patient's history were reviewed and updated as appropriate: allergies, current medications, past medical history and problem list.    Patient Active Problem List   Diagnosis   • Benign essential hypertension   • Benign prostatic hypertrophy without urinary obstruction   • CKD (chronic kidney disease) stage 2, GFR 60-89 ml/min   • Chronic obstructive pulmonary disease   • Depression   • Dyslipidemia   • Ganglion   • Gastroesophageal reflux disease without esophagitis   • Hearing loss   • Osteoarthritis   • Impacted cerumen       No Known Allergies    Current Outpatient Prescriptions on File Prior to Visit   Medication Sig Dispense Refill   • finasteride (PROSCAR) 5 MG tablet TAKE ONE TABLET BY MOUTH ONCE DAILY  AS DIRECTED 90 tablet 1   • fluticasone (FLONASE) 50 MCG/ACT nasal spray USE TWO SPRAY(S) IN EACH NOSTRIL ONCE DAILY 16 g 3   • hydrochlorothiazide (HYDRODIURIL) 12.5 MG tablet Take 1 tablet by mouth daily. 90 tablet 3   • HYDROcodone-acetaminophen (NORCO)  MG per tablet Take 1 tablet by mouth Every 6 (Six) Hours As Needed for Moderate Pain (4-6). 100 tablet 0   • metoprolol succinate XL (TOPROL-XL) 25 MG 24 hr tablet Take 1 tablet by mouth daily. 90 tablet 3   • pravastatin (PRAVACHOL) 20 MG tablet Take 1 tablet by mouth Daily. 90 tablet 3   • ranitidine (ZANTAC) 150 MG tablet Take 150 mg by mouth every 12 (twelve) hours.     • Salicylic Acid (SALVAX) 6 % foam Apply twice daily to palm of hands 70 g 2   • salicylic acid 6 % gel Apply  topically Daily. To hands 100 g 5   • SPIRIVA HANDIHALER 18 MCG per inhalation capsule      • tiZANidine (ZANAFLEX) 4 MG tablet Take 1 tablet by mouth Every 8 (Eight) Hours As Needed for Muscle Spasms. 90 tablet 5   • traZODone (DESYREL) 50 MG tablet Take 1 tablet by mouth At Night As Needed for Sleep. 30 tablet 5   • triamcinolone (KENALOG) 0.1 % cream Apply  topically 2 (Two) Times a Day. 60 g 5   • [DISCONTINUED] busPIRone (BUSPAR) 15 MG tablet Take 1 tablet by mouth 3 (Three) Times a Day. 90 tablet 12     No current facility-administered medications on file prior to visit.        Objective   Vitals:    07/24/17 1014   BP: 148/92   Pulse: 85   SpO2: 99%   Weight: 184 lb (83.5 kg)       Physical Exam   Constitutional: He appears well-developed and well-nourished.   Neck: Neck supple.   Neurological: He is alert.   Skin: Skin is warm and dry.   Psychiatric: His behavior is normal. His mood appears anxious.   Nursing note and vitals reviewed.      Assessment/Plan   Kp was seen today for follow-up.    Diagnoses and all orders for this visit:    Anxiety  -     escitalopram (LEXAPRO) 10 MG tablet; 1/2 po daily x 7 days then 1 po daily  -     busPIRone (BUSPAR) 30 MG tablet;  Take 0.5 tablets by mouth 3 (Three) Times a Day.    Paresthesias  -     TSH  -     T4, Free  -     T3, Free  -     Vitamin B12    Muscle cramps  -     CBC & Differential  -     Comprehensive Metabolic Panel  -     TSH  -     T4, Free  -     T3, Free  -     Magnesium    Panic disorder    Other fatigue  -     CBC & Differential  -     Comprehensive Metabolic Panel  -     TSH  -     T4, Free  -     T3, Free  -     Vitamin B12    -will increase dose of buspar and start lexapro, warned may notice more anxiety when 1st starts lexapro.  Better off in long-term staying away from BZ[facility]  -will check additional labs as indicated above.         -Follow up: 6 weeks       Current Outpatient Prescriptions:   •  busPIRone (BUSPAR) 30 MG tablet, Take 0.5 tablets by mouth 3 (Three) Times a Day., Disp: 90 tablet, Rfl: 2  •  escitalopram (LEXAPRO) 10 MG tablet, 1/2 po daily x 7 days then 1 po daily, Disp: 30 tablet, Rfl: 5  •  finasteride (PROSCAR) 5 MG tablet, TAKE ONE TABLET BY MOUTH ONCE DAILY AS DIRECTED, Disp: 90 tablet, Rfl: 1  •  fluticasone (FLONASE) 50 MCG/ACT nasal spray, USE TWO SPRAY(S) IN EACH NOSTRIL ONCE DAILY, Disp: 16 g, Rfl: 3  •  hydrochlorothiazide (HYDRODIURIL) 12.5 MG tablet, Take 1 tablet by mouth daily., Disp: 90 tablet, Rfl: 3  •  HYDROcodone-acetaminophen (NORCO)  MG per tablet, Take 1 tablet by mouth Every 6 (Six) Hours As Needed for Moderate Pain (4-6)., Disp: 100 tablet, Rfl: 0  •  metoprolol succinate XL (TOPROL-XL) 25 MG 24 hr tablet, Take 1 tablet by mouth daily., Disp: 90 tablet, Rfl: 3  •  pravastatin (PRAVACHOL) 20 MG tablet, Take 1 tablet by mouth Daily., Disp: 90 tablet, Rfl: 3  •  ranitidine (ZANTAC) 150 MG tablet, Take 150 mg by mouth every 12 (twelve) hours., Disp: , Rfl:   •  Salicylic Acid (SALVAX) 6 % foam, Apply twice daily to palm of hands, Disp: 70 g, Rfl: 2  •  salicylic acid 6 % gel, Apply  topically Daily. To hands, Disp: 100 g, Rfl: 5  •  SPIRIVA HANDIHALER 18 MCG per inhalation  capsule, , Disp: , Rfl:   •  tiZANidine (ZANAFLEX) 4 MG tablet, Take 1 tablet by mouth Every 8 (Eight) Hours As Needed for Muscle Spasms., Disp: 90 tablet, Rfl: 5  •  traZODone (DESYREL) 50 MG tablet, Take 1 tablet by mouth At Night As Needed for Sleep., Disp: 30 tablet, Rfl: 5  •  triamcinolone (KENALOG) 0.1 % cream, Apply  topically 2 (Two) Times a Day., Disp: 60 g, Rfl: 5

## 2017-07-25 LAB
ALBUMIN SERPL-MCNC: 4.9 G/DL (ref 3.5–5.2)
ALBUMIN/GLOB SERPL: 2 G/DL
ALP SERPL-CCNC: 57 U/L (ref 39–117)
ALT SERPL-CCNC: 27 U/L (ref 1–41)
AST SERPL-CCNC: 18 U/L (ref 1–40)
BASOPHILS # BLD AUTO: 0.02 10*3/MM3 (ref 0–0.2)
BASOPHILS NFR BLD AUTO: 0.2 % (ref 0–1.5)
BILIRUB SERPL-MCNC: 0.6 MG/DL (ref 0.1–1.2)
BUN SERPL-MCNC: 20 MG/DL (ref 8–23)
BUN/CREAT SERPL: 16.3 (ref 7–25)
CALCIUM SERPL-MCNC: 9.9 MG/DL (ref 8.6–10.5)
CHLORIDE SERPL-SCNC: 102 MMOL/L (ref 98–107)
CO2 SERPL-SCNC: 25.9 MMOL/L (ref 22–29)
CREAT SERPL-MCNC: 1.23 MG/DL (ref 0.76–1.27)
EOSINOPHIL # BLD AUTO: 0.09 10*3/MM3 (ref 0–0.7)
EOSINOPHIL NFR BLD AUTO: 1 % (ref 0.3–6.2)
ERYTHROCYTE [DISTWIDTH] IN BLOOD BY AUTOMATED COUNT: 13.1 % (ref 11.5–14.5)
GLOBULIN SER CALC-MCNC: 2.5 GM/DL
GLUCOSE SERPL-MCNC: 87 MG/DL (ref 65–99)
HCT VFR BLD AUTO: 46.4 % (ref 40.4–52.2)
HGB BLD-MCNC: 15.6 G/DL (ref 13.7–17.6)
IMM GRANULOCYTES # BLD: 0.03 10*3/MM3 (ref 0–0.03)
IMM GRANULOCYTES NFR BLD: 0.3 % (ref 0–0.5)
LYMPHOCYTES # BLD AUTO: 1.54 10*3/MM3 (ref 0.9–4.8)
LYMPHOCYTES NFR BLD AUTO: 17.2 % (ref 19.6–45.3)
MAGNESIUM SERPL-MCNC: 2.2 MG/DL (ref 1.6–2.4)
MCH RBC QN AUTO: 32.6 PG (ref 27–32.7)
MCHC RBC AUTO-ENTMCNC: 33.6 G/DL (ref 32.6–36.4)
MCV RBC AUTO: 96.9 FL (ref 79.8–96.2)
MONOCYTES # BLD AUTO: 0.96 10*3/MM3 (ref 0.2–1.2)
MONOCYTES NFR BLD AUTO: 10.7 % (ref 5–12)
NEUTROPHILS # BLD AUTO: 6.32 10*3/MM3 (ref 1.9–8.1)
NEUTROPHILS NFR BLD AUTO: 70.6 % (ref 42.7–76)
PLATELET # BLD AUTO: 224 10*3/MM3 (ref 140–500)
POTASSIUM SERPL-SCNC: 4.5 MMOL/L (ref 3.5–5.2)
PROT SERPL-MCNC: 7.4 G/DL (ref 6–8.5)
RBC # BLD AUTO: 4.79 10*6/MM3 (ref 4.6–6)
SODIUM SERPL-SCNC: 144 MMOL/L (ref 136–145)
T3FREE SERPL-MCNC: 3.4 PG/ML (ref 2–4.4)
T4 FREE SERPL-MCNC: 1.3 NG/DL (ref 0.93–1.7)
TSH SERPL DL<=0.005 MIU/L-ACNC: 1.07 MIU/ML (ref 0.27–4.2)
VIT B12 SERPL-MCNC: 671 PG/ML (ref 211–946)
WBC # BLD AUTO: 8.96 10*3/MM3 (ref 4.5–10.7)

## 2017-07-26 NOTE — PROGRESS NOTES
Call and mail copy of results to patient.  Kidney, liver and thyroid function normal  Blood counts normal  B12 normal

## 2017-07-27 ENCOUNTER — TELEPHONE (OUTPATIENT)
Dept: FAMILY MEDICINE CLINIC | Facility: CLINIC | Age: 72
End: 2017-07-27

## 2017-07-27 NOTE — TELEPHONE ENCOUNTER
lexapro should be 10mg 1/2 daily x 7 days then 1 daily  buspar should be 30mg 1 po tid (I increased mg)    rrj

## 2017-08-02 ENCOUNTER — TELEPHONE (OUTPATIENT)
Dept: FAMILY MEDICINE CLINIC | Facility: CLINIC | Age: 72
End: 2017-08-02

## 2017-08-02 NOTE — TELEPHONE ENCOUNTER
He has hit max.  We could temporarily have him try hydroxyzine 10mg 1 po bedtime prn anxiety disp #30 no refills until lexapro kicks in.    RRJ

## 2017-08-02 NOTE — TELEPHONE ENCOUNTER
I started him on lexapro 7/24/17, it can worsen anxiety before helps.  On or around 8/7/17 this should be fading and around 8/24/17 should start noticing an improvement.   Continue and let me know by 8/14 if still anxious, lexapro may not had full affect yet, but could potentially increase further then.

## 2017-08-02 NOTE — TELEPHONE ENCOUNTER
PT WANTS TO KNOW IF YOU CAN INCREASE THE BUSPAR UNTIL [employer] STARTS WORKING. HE SAID AFTER ABOUT 4-5 HOURS IS WHEN THE BUSPAR WEARS OFF, AND HE IS SUPPOSED TO TAKE EVERY 8 HOURS. IT EVEN WAKES HIM FROM HIS SLEEP

## 2017-08-03 RX ORDER — HYDROXYZINE HYDROCHLORIDE 10 MG/1
10 TABLET, FILM COATED ORAL
Qty: 30 TABLET | Refills: 0 | Status: SHIPPED | OUTPATIENT
Start: 2017-08-03 | End: 2017-12-18

## 2017-08-14 ENCOUNTER — TELEPHONE (OUTPATIENT)
Dept: FAMILY MEDICINE CLINIC | Facility: CLINIC | Age: 72
End: 2017-08-14

## 2017-08-15 NOTE — TELEPHONE ENCOUNTER
It actually could be his lexapro we started possibly.  Try going down to 1/2 lexapro daily again and see if feels better.  Unfortunately, could be just opposite and need more.  Try 1/2 daily 1st and let me know if better by 1 week.    RRJ

## 2017-08-15 NOTE — TELEPHONE ENCOUNTER
Pt thinks it's his buspar causing him to feel bad. He said when he takes it, he gets a fever. He took temp after taking buspar yesterday and it was 99.7. Saturday and Sunday he was getting hotflashes all day, about one hour apart. He is worried about decreasing the lexapro, because he knows that it takes several weeks to get therapeutic effect.

## 2017-08-16 RX ORDER — BUSPIRONE HYDROCHLORIDE 10 MG/1
10 TABLET ORAL 3 TIMES DAILY
Qty: 90 TABLET | Refills: 1 | Status: SHIPPED | OUTPATIENT
Start: 2017-08-16 | End: 2017-08-22

## 2017-08-18 DIAGNOSIS — M15.9 GENERALIZED OSTEOARTHRITIS OF MULTIPLE SITES: ICD-10-CM

## 2017-08-18 DIAGNOSIS — G47.09 OTHER INSOMNIA: Primary | ICD-10-CM

## 2017-08-18 RX ORDER — HYDROCODONE BITARTRATE AND ACETAMINOPHEN 10; 325 MG/1; MG/1
1 TABLET ORAL EVERY 6 HOURS PRN
Qty: 100 TABLET | Refills: 0 | Status: SHIPPED | OUTPATIENT
Start: 2017-08-18 | End: 2017-09-07 | Stop reason: SDUPTHER

## 2017-08-18 RX ORDER — MIRTAZAPINE 15 MG/1
15 TABLET, FILM COATED ORAL NIGHTLY
Qty: 30 TABLET | Refills: 3 | Status: SHIPPED | OUTPATIENT
Start: 2017-08-18 | End: 2017-12-04 | Stop reason: SDUPTHER

## 2017-08-18 NOTE — TELEPHONE ENCOUNTER
Ok refill HC;  I called patient back, not sleeping very anxious;  Feels better after going down on buspar, on lexapro just under 3 weeks now, but thinks helping.  Reports tried trazodone for sleep, just one dose but severe nighmares and didn't tolerate.  I discussed and will try adding remeron.  Discussed again lexapro can worsen anxiety when 1st starts, but should start settling and he does note a little difference.  He took his gun and had locked away so no longer access as no thoughts of self harm.  Patient off clonazepam for several weeks, but was on for decades.  Directed call next week to give up date on how doing.    RRJ

## 2017-08-18 NOTE — TELEPHONE ENCOUNTER
Having hot flashes, insomnia and feels horrible.  Waking up confused and took gun out of drawer and doesn't remember doin it.  He took out of night stand away from him.  Still hallucinating and disoriented at night and bearly sleeping .  I told him rx ready for . tatum

## 2017-08-21 RX ORDER — METOPROLOL SUCCINATE 25 MG/1
TABLET, EXTENDED RELEASE ORAL
Qty: 90 TABLET | Refills: 3 | Status: SHIPPED | OUTPATIENT
Start: 2017-08-21 | End: 2018-09-24 | Stop reason: SDUPTHER

## 2017-08-22 ENCOUNTER — TELEPHONE (OUTPATIENT)
Dept: FAMILY MEDICINE CLINIC | Facility: CLINIC | Age: 72
End: 2017-08-22

## 2017-08-22 RX ORDER — BUSPIRONE HYDROCHLORIDE 7.5 MG/1
7.5 TABLET ORAL 3 TIMES DAILY
Qty: 30 TABLET | Refills: 0 | Status: SHIPPED | OUTPATIENT
Start: 2017-08-22 | End: 2017-09-07

## 2017-08-22 NOTE — TELEPHONE ENCOUNTER
Pt informed and taking ranitidine 150 mg bis now and has buspar 15mg and will split the #6 to take 1/2  Tid along with I will send in buspar 7.5mg tid # 33 to make up 2 weeks. jm

## 2017-08-24 ENCOUNTER — TELEPHONE (OUTPATIENT)
Dept: FAMILY MEDICINE CLINIC | Facility: CLINIC | Age: 72
End: 2017-08-24

## 2017-08-24 NOTE — TELEPHONE ENCOUNTER
Pt was to be 1 day short of his buspar 7.5mg and dr harrington said he can take 5mg he has tid until his appt. The next day.  Pt informed jm

## 2017-09-07 ENCOUNTER — OFFICE VISIT (OUTPATIENT)
Dept: FAMILY MEDICINE CLINIC | Facility: CLINIC | Age: 72
End: 2017-09-07

## 2017-09-07 VITALS
WEIGHT: 184 LBS | DIASTOLIC BLOOD PRESSURE: 88 MMHG | BODY MASS INDEX: 27.89 KG/M2 | HEIGHT: 68 IN | SYSTOLIC BLOOD PRESSURE: 152 MMHG | HEART RATE: 81 BPM | OXYGEN SATURATION: 98 % | TEMPERATURE: 98.4 F

## 2017-09-07 DIAGNOSIS — M15.9 GENERALIZED OSTEOARTHRITIS OF MULTIPLE SITES: ICD-10-CM

## 2017-09-07 DIAGNOSIS — F41.9 ANXIETY: Primary | ICD-10-CM

## 2017-09-07 PROCEDURE — 99213 OFFICE O/P EST LOW 20 MIN: CPT | Performed by: FAMILY MEDICINE

## 2017-09-07 RX ORDER — HYDROCODONE BITARTRATE AND ACETAMINOPHEN 10; 325 MG/1; MG/1
1 TABLET ORAL EVERY 6 HOURS PRN
Qty: 120 TABLET | Refills: 0 | Status: SHIPPED | OUTPATIENT
Start: 2017-09-07 | End: 2017-11-09 | Stop reason: SDUPTHER

## 2017-09-07 RX ORDER — BUSPIRONE HYDROCHLORIDE 5 MG/1
TABLET ORAL
Qty: 30 TABLET | Refills: 0 | Status: SHIPPED | OUTPATIENT
Start: 2017-09-07 | End: 2017-12-18

## 2017-09-07 NOTE — PROGRESS NOTES
Subjective   Kp Hernandez is a 71 y.o. male. Presents today for   Chief Complaint   Patient presents with   • Anxiety     PT HERE FOR 6 WEEK FOLLOW UP VISIT.       Anxiety   Presents for follow-up visit. Symptoms include excessive worry, insomnia, nervous/anxious behavior, panic and restlessness. Patient reports no chest pain or suicidal ideas. Primary symptoms comment: FEels like buspirone is causing reactions, as coming down on dose feels better.  started on ssri and does also feel helping.. Symptoms occur most days. The severity of symptoms is severe. The quality of sleep is poor. Nighttime awakenings: several.     Compliance with medications is %.       Review of Systems   Cardiovascular: Negative for chest pain.   Psychiatric/Behavioral: Negative for suicidal ideas. The patient is nervous/anxious and has insomnia.        The following portions of the patient's history were reviewed and updated as appropriate: allergies, current medications, past medical history and problem list.    Patient Active Problem List   Diagnosis   • Benign essential hypertension   • Benign prostatic hypertrophy without urinary obstruction   • CKD (chronic kidney disease) stage 2, GFR 60-89 ml/min   • Chronic obstructive pulmonary disease   • Depression   • Dyslipidemia   • Ganglion   • Gastroesophageal reflux disease without esophagitis   • Hearing loss   • Osteoarthritis   • Impacted cerumen       No Known Allergies    Current Outpatient Prescriptions on File Prior to Visit   Medication Sig Dispense Refill   • escitalopram (LEXAPRO) 10 MG tablet 1/2 po daily x 7 days then 1 po daily 30 tablet 5   • finasteride (PROSCAR) 5 MG tablet TAKE ONE TABLET BY MOUTH ONCE DAILY AS DIRECTED 90 tablet 1   • hydrochlorothiazide (HYDRODIURIL) 12.5 MG tablet Take 1 tablet by mouth daily. 90 tablet 3   • hydrOXYzine (ATARAX) 10 MG tablet Take 1 tablet by mouth every night at bedtime. 30 tablet 0   • metoprolol succinate XL (TOPROL-XL) 25 MG 24  "hr tablet TAKE ONE TABLET BY MOUTH ONCE DAILY 90 tablet 3   • mirtazapine (REMERON) 15 MG tablet Take 1 tablet by mouth Every Night. 30 tablet 3   • pravastatin (PRAVACHOL) 20 MG tablet Take 1 tablet by mouth Daily. 90 tablet 3   • ranitidine (ZANTAC) 150 MG tablet Take 150 mg by mouth every 12 (twelve) hours.     • Salicylic Acid (SALVAX) 6 % foam Apply twice daily to palm of hands 70 g 2   • salicylic acid 6 % gel Apply  topically Daily. To hands 100 g 5   • SPIRIVA HANDIHALER 18 MCG per inhalation capsule      • tiZANidine (ZANAFLEX) 4 MG tablet Take 1 tablet by mouth Every 8 (Eight) Hours As Needed for Muscle Spasms. 90 tablet 5   • triamcinolone (KENALOG) 0.1 % cream Apply  topically 2 (Two) Times a Day. 60 g 5   • [DISCONTINUED] fluticasone (FLONASE) 50 MCG/ACT nasal spray USE TWO SPRAY(S) IN EACH NOSTRIL ONCE DAILY 16 g 3     No current facility-administered medications on file prior to visit.        Objective   Vitals:    09/07/17 1111   BP: 152/88   BP Location: Left arm   Patient Position: Sitting   Cuff Size: Large Adult   Pulse: 81   Temp: 98.4 °F (36.9 °C)   TempSrc: Oral   SpO2: 98%   Weight: 184 lb (83.5 kg)   Height: 68\" (172.7 cm)       Physical Exam   Constitutional: He appears well-developed and well-nourished.   Neck: Neck supple.   Neurological: He is alert.   Skin: Skin is warm and dry.   Psychiatric: His behavior is normal. His mood appears anxious.   Nursing note and vitals reviewed.      Assessment/Plan   Kp was seen today for anxiety.    Diagnoses and all orders for this visit:    Anxiety  -     busPIRone (BUSPAR) 5 MG tablet; 1 po 3 times daily x 5 days, then 1 2 times daily x 5 days, then 1 daily x 5 days then stop.    Generalized osteoarthritis of multiple sites  -     HYDROcodone-acetaminophen (NORCO)  MG per tablet; Take 1 tablet by mouth Every 6 (Six) Hours As Needed for Moderate Pain .    -continue to wean off buspirone.  -Refilled HC, takes for arthritis pain     "     -Follow up: 3 months       Current Outpatient Prescriptions:   •  escitalopram (LEXAPRO) 10 MG tablet, 1/2 po daily x 7 days then 1 po daily, Disp: 30 tablet, Rfl: 5  •  finasteride (PROSCAR) 5 MG tablet, TAKE ONE TABLET BY MOUTH ONCE DAILY AS DIRECTED, Disp: 90 tablet, Rfl: 1  •  hydrochlorothiazide (HYDRODIURIL) 12.5 MG tablet, Take 1 tablet by mouth daily., Disp: 90 tablet, Rfl: 3  •  HYDROcodone-acetaminophen (NORCO)  MG per tablet, Take 1 tablet by mouth Every 6 (Six) Hours As Needed for Moderate Pain ., Disp: 120 tablet, Rfl: 0  •  hydrOXYzine (ATARAX) 10 MG tablet, Take 1 tablet by mouth every night at bedtime., Disp: 30 tablet, Rfl: 0  •  metoprolol succinate XL (TOPROL-XL) 25 MG 24 hr tablet, TAKE ONE TABLET BY MOUTH ONCE DAILY, Disp: 90 tablet, Rfl: 3  •  mirtazapine (REMERON) 15 MG tablet, Take 1 tablet by mouth Every Night., Disp: 30 tablet, Rfl: 3  •  pravastatin (PRAVACHOL) 20 MG tablet, Take 1 tablet by mouth Daily., Disp: 90 tablet, Rfl: 3  •  ranitidine (ZANTAC) 150 MG tablet, Take 150 mg by mouth every 12 (twelve) hours., Disp: , Rfl:   •  Salicylic Acid (SALVAX) 6 % foam, Apply twice daily to palm of hands, Disp: 70 g, Rfl: 2  •  salicylic acid 6 % gel, Apply  topically Daily. To hands, Disp: 100 g, Rfl: 5  •  SPIRIVA HANDIHALER 18 MCG per inhalation capsule, , Disp: , Rfl:   •  tiZANidine (ZANAFLEX) 4 MG tablet, Take 1 tablet by mouth Every 8 (Eight) Hours As Needed for Muscle Spasms., Disp: 90 tablet, Rfl: 5  •  triamcinolone (KENALOG) 0.1 % cream, Apply  topically 2 (Two) Times a Day., Disp: 60 g, Rfl: 5  •  busPIRone (BUSPAR) 5 MG tablet, 1 po 3 times daily x 5 days, then 1 2 times daily x 5 days, then 1 daily x 5 days then stop., Disp: 30 tablet, Rfl: 0  •  fluticasone (FLONASE) 50 MCG/ACT nasal spray, USE TWO SPRAY(S) IN EACH NOSTRIL ONCE DAILY, Disp: 1 bottle, Rfl: 3

## 2017-09-13 RX ORDER — FLUTICASONE PROPIONATE 50 MCG
SPRAY, SUSPENSION (ML) NASAL
Qty: 1 BOTTLE | Refills: 3 | Status: SHIPPED | OUTPATIENT
Start: 2017-09-13 | End: 2018-03-18 | Stop reason: SDUPTHER

## 2017-09-28 RX ORDER — HYDROCHLOROTHIAZIDE 12.5 MG/1
TABLET ORAL
Qty: 90 TABLET | Refills: 3 | Status: SHIPPED | OUTPATIENT
Start: 2017-09-28 | End: 2018-08-06 | Stop reason: SDUPTHER

## 2017-10-10 ENCOUNTER — TELEPHONE (OUTPATIENT)
Dept: FAMILY MEDICINE CLINIC | Facility: CLINIC | Age: 72
End: 2017-10-10

## 2017-11-09 ENCOUNTER — TELEPHONE (OUTPATIENT)
Dept: FAMILY MEDICINE CLINIC | Facility: CLINIC | Age: 72
End: 2017-11-09

## 2017-11-09 DIAGNOSIS — M15.9 GENERALIZED OSTEOARTHRITIS OF MULTIPLE SITES: ICD-10-CM

## 2017-11-09 RX ORDER — HYDROCODONE BITARTRATE AND ACETAMINOPHEN 10; 325 MG/1; MG/1
1 TABLET ORAL EVERY 6 HOURS PRN
Qty: 120 TABLET | Refills: 0 | Status: SHIPPED | OUTPATIENT
Start: 2017-11-09 | End: 2017-12-11 | Stop reason: SDUPTHER

## 2017-11-13 RX ORDER — FINASTERIDE 5 MG/1
TABLET, FILM COATED ORAL
Qty: 90 TABLET | Refills: 1 | Status: SHIPPED | OUTPATIENT
Start: 2017-11-13 | End: 2018-05-20 | Stop reason: SDUPTHER

## 2017-12-04 DIAGNOSIS — G47.09 OTHER INSOMNIA: ICD-10-CM

## 2017-12-04 RX ORDER — MIRTAZAPINE 15 MG/1
TABLET, FILM COATED ORAL
Qty: 30 TABLET | Refills: 3 | Status: SHIPPED | OUTPATIENT
Start: 2017-12-04 | End: 2017-12-18 | Stop reason: SDUPTHER

## 2017-12-11 DIAGNOSIS — M15.9 GENERALIZED OSTEOARTHRITIS OF MULTIPLE SITES: ICD-10-CM

## 2017-12-11 RX ORDER — HYDROCODONE BITARTRATE AND ACETAMINOPHEN 10; 325 MG/1; MG/1
1 TABLET ORAL EVERY 6 HOURS PRN
Qty: 120 TABLET | Refills: 0 | Status: SHIPPED | OUTPATIENT
Start: 2017-12-11 | End: 2018-01-11 | Stop reason: SDUPTHER

## 2017-12-18 ENCOUNTER — OFFICE VISIT (OUTPATIENT)
Dept: FAMILY MEDICINE CLINIC | Facility: CLINIC | Age: 72
End: 2017-12-18

## 2017-12-18 VITALS
HEART RATE: 82 BPM | OXYGEN SATURATION: 98 % | DIASTOLIC BLOOD PRESSURE: 82 MMHG | BODY MASS INDEX: 28.89 KG/M2 | TEMPERATURE: 97.8 F | WEIGHT: 190 LBS | SYSTOLIC BLOOD PRESSURE: 156 MMHG

## 2017-12-18 DIAGNOSIS — G47.09 OTHER INSOMNIA: ICD-10-CM

## 2017-12-18 DIAGNOSIS — E78.5 DYSLIPIDEMIA: ICD-10-CM

## 2017-12-18 DIAGNOSIS — F41.8 DEPRESSION WITH ANXIETY: Primary | ICD-10-CM

## 2017-12-18 DIAGNOSIS — M79.10 MYALGIA: ICD-10-CM

## 2017-12-18 DIAGNOSIS — I10 BENIGN ESSENTIAL HYPERTENSION: ICD-10-CM

## 2017-12-18 LAB
ALBUMIN SERPL-MCNC: 4.6 G/DL (ref 3.5–5.2)
ALBUMIN/GLOB SERPL: 1.6 G/DL
ALP SERPL-CCNC: 71 U/L (ref 39–117)
ALT SERPL-CCNC: 24 U/L (ref 1–41)
AST SERPL-CCNC: 22 U/L (ref 1–40)
BILIRUB SERPL-MCNC: 0.5 MG/DL (ref 0.1–1.2)
BUN SERPL-MCNC: 28 MG/DL (ref 8–23)
BUN/CREAT SERPL: 22.4 (ref 7–25)
CALCIUM SERPL-MCNC: 9.9 MG/DL (ref 8.6–10.5)
CHLORIDE SERPL-SCNC: 99 MMOL/L (ref 98–107)
CHOLEST SERPL-MCNC: 191 MG/DL (ref 0–200)
CO2 SERPL-SCNC: 28.3 MMOL/L (ref 22–29)
CREAT SERPL-MCNC: 1.25 MG/DL (ref 0.76–1.27)
GFR SERPLBLD CREATININE-BSD FMLA CKD-EPI: 57 ML/MIN/1.73
GFR SERPLBLD CREATININE-BSD FMLA CKD-EPI: 69 ML/MIN/1.73
GLOBULIN SER CALC-MCNC: 2.9 GM/DL
GLUCOSE SERPL-MCNC: 89 MG/DL (ref 65–99)
HDLC SERPL-MCNC: 67 MG/DL (ref 40–60)
LDLC SERPL CALC-MCNC: 99 MG/DL (ref 0–100)
POTASSIUM SERPL-SCNC: 4.3 MMOL/L (ref 3.5–5.2)
PROT SERPL-MCNC: 7.5 G/DL (ref 6–8.5)
SODIUM SERPL-SCNC: 141 MMOL/L (ref 136–145)
TRIGL SERPL-MCNC: 125 MG/DL (ref 0–150)
VLDLC SERPL CALC-MCNC: 25 MG/DL (ref 5–40)

## 2017-12-18 PROCEDURE — 99214 OFFICE O/P EST MOD 30 MIN: CPT | Performed by: FAMILY MEDICINE

## 2017-12-18 RX ORDER — MIRTAZAPINE 30 MG/1
30 TABLET, FILM COATED ORAL NIGHTLY
Qty: 90 TABLET | Refills: 3 | Status: SHIPPED | OUTPATIENT
Start: 2017-12-18 | End: 2018-12-15 | Stop reason: SDUPTHER

## 2017-12-18 NOTE — PROGRESS NOTES
Subjective   Kp Hernandez is a 72 y.o. male. Presents today for   Chief Complaint   Patient presents with   • Follow-up     med check and his bilateral great toes hurting in the middle of the night.  Would like to know if his mirtazapine could be increased for sleep.         Anxiety   Presents for follow-up visit. Symptoms include insomnia, muscle tension and nervous/anxious behavior. Patient reports no chest pain, depressed mood, excessive worry, irritability, palpitations, panic or shortness of breath. Primary symptoms comment: Still myalgias nocturnally in legs;  occly arms (has neck issues).  Had normal thyroid, mag, other lytes, cbc as well.  Is on statin.. Symptoms occur occasionally. The quality of sleep is fair (Does wake up 3 to 4 am.). Nighttime awakenings: occasional (wakes up 2ndary to spasms).     Compliance with medications is %.   Hypertension   This is a chronic problem. The current episode started more than 1 year ago. The problem is unchanged. The problem is controlled. Associated symptoms include anxiety. Pertinent negatives include no chest pain, orthopnea, palpitations, peripheral edema, PND or shortness of breath. There are no associated agents to hypertension. Risk factors for coronary artery disease include dyslipidemia. Past treatments include diuretics and beta blockers. The current treatment provides moderate improvement. There are no compliance problems.    Hyperlipidemia   This is a chronic problem. The current episode started more than 1 year ago. The problem is controlled. Recent lipid tests were reviewed and are normal. He has no history of diabetes, hypothyroidism or liver disease. Factors aggravating his hyperlipidemia include beta blockers. Associated symptoms include myalgias. Pertinent negatives include no chest pain, leg pain or shortness of breath. Current antihyperlipidemic treatment includes statins. The current treatment provides moderate improvement of lipids. There  are no compliance problems.  Risk factors for coronary artery disease include dyslipidemia, hypertension and male sex.     Dermatology - saw and gave steroid topical betamethasone;  Has left toe nail white, over nail.  No hx of psoriaiss;  Rash on hands tx by derm started after exposed to cement.  Review of Systems   Constitutional: Negative for irritability.   Respiratory: Negative for shortness of breath.    Cardiovascular: Negative for chest pain, palpitations, orthopnea and PND.   Musculoskeletal: Positive for myalgias.   Psychiatric/Behavioral: The patient is nervous/anxious and has insomnia.        The following portions of the patient's history were reviewed and updated as appropriate: allergies, current medications, past medical history and problem list.    Patient Active Problem List   Diagnosis   • Benign essential hypertension   • Benign prostatic hypertrophy without urinary obstruction   • CKD (chronic kidney disease) stage 2, GFR 60-89 ml/min   • Chronic obstructive pulmonary disease   • Depression with anxiety   • Dyslipidemia   • Ganglion   • Gastroesophageal reflux disease without esophagitis   • Hearing loss   • Osteoarthritis   • Impacted cerumen       No Known Allergies    Current Outpatient Prescriptions on File Prior to Visit   Medication Sig Dispense Refill   • escitalopram (LEXAPRO) 10 MG tablet 1/2 po daily x 7 days then 1 po daily 30 tablet 5   • finasteride (PROSCAR) 5 MG tablet TAKE ONE TABLET BY MOUTH ONCE DAILY AS DIRECTED 90 tablet 1   • fluticasone (FLONASE) 50 MCG/ACT nasal spray USE TWO SPRAY(S) IN EACH NOSTRIL ONCE DAILY 1 bottle 3   • hydrochlorothiazide (HYDRODIURIL) 12.5 MG tablet TAKE ONE TABLET BY MOUTH ONCE DAILY 90 tablet 3   • HYDROcodone-acetaminophen (NORCO)  MG per tablet Take 1 tablet by mouth Every 6 (Six) Hours As Needed for Moderate Pain . 120 tablet 0   • metoprolol succinate XL (TOPROL-XL) 25 MG 24 hr tablet TAKE ONE TABLET BY MOUTH ONCE DAILY 90 tablet 3   •  pravastatin (PRAVACHOL) 20 MG tablet Take 1 tablet by mouth Daily. 90 tablet 3   • ranitidine (ZANTAC) 150 MG tablet Take 150 mg by mouth every 12 (twelve) hours.     • SPIRIVA HANDIHALER 18 MCG per inhalation capsule      • tiZANidine (ZANAFLEX) 4 MG tablet Take 1 tablet by mouth Every 8 (Eight) Hours As Needed for Muscle Spasms. 90 tablet 5   • [DISCONTINUED] mirtazapine (REMERON) 15 MG tablet TAKE ONE TABLET BY MOUTH EVERY NIGHT 30 tablet 3   • [DISCONTINUED] Salicylic Acid (SALVAX) 6 % foam Apply twice daily to palm of hands 70 g 2   • [DISCONTINUED] triamcinolone (KENALOG) 0.1 % cream Apply  topically 2 (Two) Times a Day. 60 g 5   • [DISCONTINUED] busPIRone (BUSPAR) 5 MG tablet 1 po 3 times daily x 5 days, then 1 2 times daily x 5 days, then 1 daily x 5 days then stop. 30 tablet 0   • [DISCONTINUED] hydrOXYzine (ATARAX) 10 MG tablet Take 1 tablet by mouth every night at bedtime. 30 tablet 0   • [DISCONTINUED] salicylic acid 6 % gel Apply  topically Daily. To hands 100 g 5     No current facility-administered medications on file prior to visit.        Objective   Vitals:    12/18/17 0936   BP: 156/82   Pulse: 82   Temp: 97.8 °F (36.6 °C)   SpO2: 98%   Weight: 86.2 kg (190 lb)       Physical Exam   Constitutional: He appears well-developed and well-nourished.   HENT:   Head: Normocephalic and atraumatic.   Neck: Neck supple. No JVD present. No thyromegaly present.   Cardiovascular: Normal rate, regular rhythm and normal heart sounds.  Exam reveals no gallop and no friction rub.    No murmur heard.  Pulmonary/Chest: Effort normal and breath sounds normal. No respiratory distress. He has no wheezes. He has no rales.   Abdominal: Soft. Bowel sounds are normal. He exhibits no distension. There is no tenderness. There is no rebound and no guarding.   Musculoskeletal: He exhibits no edema.   Neurological: He is alert.   Skin: Skin is warm and dry.   Left toenail - white, scaling lateral nail fold, nail.  B/l hands, dry  cracking;   Psychiatric: He has a normal mood and affect. His behavior is normal.   Nursing note and vitals reviewed.      Assessment/Plan   Kp was seen today for follow-up.    Diagnoses and all orders for this visit:    Depression with anxiety    Other insomnia  -     mirtazapine (REMERON) 30 MG tablet; Take 1 tablet by mouth Every Night.    Myalgia    Dyslipidemia  -     Comprehensive Metabolic Panel  -     Lipid Panel    Benign essential hypertension  -     Comprehensive Metabolic Panel  -     Lipid Panel    -mood doing okay - feels like needs higher dose of remeron for mood and sleep; will go up  -myalgias - will have hold statin x 3 weeks see if helps  -on pain meds;  Reviewed bree as due to evaluate  -betamethasone - try on toenail as well as hands  -full labs due for hx of hld and htn  -hypertension - controlled, continue medications             -Follow up: 6 months       Current Outpatient Prescriptions:   •  escitalopram (LEXAPRO) 10 MG tablet, 1/2 po daily x 7 days then 1 po daily, Disp: 30 tablet, Rfl: 5  •  finasteride (PROSCAR) 5 MG tablet, TAKE ONE TABLET BY MOUTH ONCE DAILY AS DIRECTED, Disp: 90 tablet, Rfl: 1  •  fluticasone (FLONASE) 50 MCG/ACT nasal spray, USE TWO SPRAY(S) IN EACH NOSTRIL ONCE DAILY, Disp: 1 bottle, Rfl: 3  •  hydrochlorothiazide (HYDRODIURIL) 12.5 MG tablet, TAKE ONE TABLET BY MOUTH ONCE DAILY, Disp: 90 tablet, Rfl: 3  •  HYDROcodone-acetaminophen (NORCO)  MG per tablet, Take 1 tablet by mouth Every 6 (Six) Hours As Needed for Moderate Pain ., Disp: 120 tablet, Rfl: 0  •  metoprolol succinate XL (TOPROL-XL) 25 MG 24 hr tablet, TAKE ONE TABLET BY MOUTH ONCE DAILY, Disp: 90 tablet, Rfl: 3  •  mirtazapine (REMERON) 30 MG tablet, Take 1 tablet by mouth Every Night., Disp: 90 tablet, Rfl: 3  •  pravastatin (PRAVACHOL) 20 MG tablet, Take 1 tablet by mouth Daily., Disp: 90 tablet, Rfl: 3  •  ranitidine (ZANTAC) 150 MG tablet, Take 150 mg by mouth every 12 (twelve) hours.,  Disp: , Rfl:   •  SPIRIVA HANDIHALER 18 MCG per inhalation capsule, , Disp: , Rfl:   •  tiZANidine (ZANAFLEX) 4 MG tablet, Take 1 tablet by mouth Every 8 (Eight) Hours As Needed for Muscle Spasms., Disp: 90 tablet, Rfl: 5

## 2017-12-25 NOTE — PROGRESS NOTES
Call and mail copy of results to patient.  Cholesterol well controlled  Kidney function stable  Liver function normal

## 2018-01-02 ENCOUNTER — TELEPHONE (OUTPATIENT)
Dept: FAMILY MEDICINE CLINIC | Facility: CLINIC | Age: 73
End: 2018-01-02

## 2018-01-02 DIAGNOSIS — F41.9 ANXIETY: ICD-10-CM

## 2018-01-02 RX ORDER — ESCITALOPRAM OXALATE 10 MG/1
10 TABLET ORAL DAILY
Qty: 90 TABLET | Refills: 1 | Status: SHIPPED | OUTPATIENT
Start: 2018-01-02 | End: 2018-07-09 | Stop reason: SDUPTHER

## 2018-01-11 DIAGNOSIS — M15.9 GENERALIZED OSTEOARTHRITIS OF MULTIPLE SITES: ICD-10-CM

## 2018-01-11 RX ORDER — HYDROCODONE BITARTRATE AND ACETAMINOPHEN 10; 325 MG/1; MG/1
1 TABLET ORAL EVERY 6 HOURS PRN
Qty: 120 TABLET | Refills: 0 | Status: SHIPPED | OUTPATIENT
Start: 2018-01-11 | End: 2018-02-15 | Stop reason: SDUPTHER

## 2018-02-14 ENCOUNTER — TELEPHONE (OUTPATIENT)
Dept: FAMILY MEDICINE CLINIC | Facility: CLINIC | Age: 73
End: 2018-02-14

## 2018-02-15 DIAGNOSIS — M15.9 GENERALIZED OSTEOARTHRITIS OF MULTIPLE SITES: ICD-10-CM

## 2018-02-15 RX ORDER — HYDROCODONE BITARTRATE AND ACETAMINOPHEN 10; 325 MG/1; MG/1
1 TABLET ORAL EVERY 6 HOURS PRN
Qty: 120 TABLET | Refills: 0 | Status: SHIPPED | OUTPATIENT
Start: 2018-02-15 | End: 2018-03-15 | Stop reason: SDUPTHER

## 2018-02-27 ENCOUNTER — OFFICE VISIT (OUTPATIENT)
Dept: FAMILY MEDICINE CLINIC | Facility: CLINIC | Age: 73
End: 2018-02-27

## 2018-02-27 VITALS
TEMPERATURE: 97.9 F | HEART RATE: 89 BPM | SYSTOLIC BLOOD PRESSURE: 160 MMHG | OXYGEN SATURATION: 98 % | DIASTOLIC BLOOD PRESSURE: 90 MMHG | WEIGHT: 194 LBS | BODY MASS INDEX: 29.5 KG/M2

## 2018-02-27 DIAGNOSIS — M25.511 ACUTE PAIN OF RIGHT SHOULDER: Primary | ICD-10-CM

## 2018-02-27 PROCEDURE — 20610 DRAIN/INJ JOINT/BURSA W/O US: CPT | Performed by: FAMILY MEDICINE

## 2018-02-27 RX ORDER — METHYLPREDNISOLONE ACETATE 80 MG/ML
80 INJECTION, SUSPENSION INTRA-ARTICULAR; INTRALESIONAL; INTRAMUSCULAR; SOFT TISSUE ONCE
Status: COMPLETED | OUTPATIENT
Start: 2018-02-27 | End: 2018-02-27

## 2018-02-27 RX ORDER — BUPIVACAINE HYDROCHLORIDE 5 MG/ML
2 INJECTION, SOLUTION EPIDURAL; INTRACAUDAL ONCE
Status: COMPLETED | OUTPATIENT
Start: 2018-02-27 | End: 2018-02-27

## 2018-02-27 RX ADMIN — METHYLPREDNISOLONE ACETATE 80 MG: 80 INJECTION, SUSPENSION INTRA-ARTICULAR; INTRALESIONAL; INTRAMUSCULAR; SOFT TISSUE at 09:38

## 2018-02-27 RX ADMIN — BUPIVACAINE HYDROCHLORIDE 2 ML: 5 INJECTION, SOLUTION EPIDURAL; INTRACAUDAL at 09:38

## 2018-02-27 NOTE — PROGRESS NOTES
Subjective   Kp Hernandez is a 72 y.o. male. Presents today for   Chief Complaint   Patient presents with   • Shoulder Pain     rt shoulder pain after jumping a guard rail       Shoulder Injury    The incident occurred in the street (Jumping over guard rail flung arm out to balance himself and felt pain immediately;  no actual fall or injury). The right shoulder is affected. The incident occurred more than 1 week ago. The injury mechanism was a twisting injury. The pain is moderate. Pertinent negatives include no numbness or tingling. The symptoms are aggravated by movement. He has tried ice and heat (home exercises) for the symptoms. The treatment provided no relief.     Still some muscle spams at night, taking mag and vitamin D;   Stopped statin as directed but no relief.  Review of Systems   Neurological: Negative for tingling and numbness.       The following portions of the patient's history were reviewed and updated as appropriate: allergies, current medications, past medical history and problem list.    Patient Active Problem List   Diagnosis   • Benign essential hypertension   • Benign prostatic hypertrophy without urinary obstruction   • CKD (chronic kidney disease) stage 2, GFR 60-89 ml/min   • Chronic obstructive pulmonary disease   • Depression with anxiety   • Dyslipidemia   • Ganglion   • Gastroesophageal reflux disease without esophagitis   • Hearing loss   • Osteoarthritis   • Impacted cerumen       No Known Allergies    Current Outpatient Prescriptions on File Prior to Visit   Medication Sig Dispense Refill   • escitalopram (LEXAPRO) 10 MG tablet Take 1 tablet by mouth Daily. 90 tablet 1   • finasteride (PROSCAR) 5 MG tablet TAKE ONE TABLET BY MOUTH ONCE DAILY AS DIRECTED 90 tablet 1   • fluticasone (FLONASE) 50 MCG/ACT nasal spray USE TWO SPRAY(S) IN EACH NOSTRIL ONCE DAILY 1 bottle 3   • hydrochlorothiazide (HYDRODIURIL) 12.5 MG tablet TAKE ONE TABLET BY MOUTH ONCE DAILY 90 tablet 3   •  HYDROcodone-acetaminophen (NORCO)  MG per tablet Take 1 tablet by mouth Every 6 (Six) Hours As Needed for Moderate Pain . 120 tablet 0   • metoprolol succinate XL (TOPROL-XL) 25 MG 24 hr tablet TAKE ONE TABLET BY MOUTH ONCE DAILY 90 tablet 3   • mirtazapine (REMERON) 30 MG tablet Take 1 tablet by mouth Every Night. 90 tablet 3   • ranitidine (ZANTAC) 150 MG tablet Take 150 mg by mouth every 12 (twelve) hours.     • SPIRIVA HANDIHALER 18 MCG per inhalation capsule      • tiZANidine (ZANAFLEX) 4 MG tablet Take 1 tablet by mouth Every 8 (Eight) Hours As Needed for Muscle Spasms. 90 tablet 5   • [DISCONTINUED] pravastatin (PRAVACHOL) 20 MG tablet Take 1 tablet by mouth Daily. 90 tablet 3     No current facility-administered medications on file prior to visit.        Objective   Vitals:    02/27/18 0917   BP: 160/90   Pulse: 89   Temp: 97.9 °F (36.6 °C)   SpO2: 98%   Weight: 88 kg (194 lb)       Physical Exam   Constitutional: He appears well-developed and well-nourished.   Neck: Neck supple.   Musculoskeletal:        Right shoulder: He exhibits decreased range of motion and tenderness.   Right shoulder patient hard to assess as stops pushing due to norman nover shoulder;   No drop arm, but will give way with pressure 2ndary to pain.   Neurological: He is alert.   Skin: Skin is warm and dry.   Psychiatric: He has a normal mood and affect. His behavior is normal.   Nursing note and vitals reviewed.  Arthrocentesis  Date/Time: 2/27/2018 9:41 AM  Performed by: KIMBERLY GIBSON  Authorized by: KIMBERLY GIBSON   Consent: Verbal consent obtained.  Risks and benefits: risks, benefits and alternatives were discussed  Consent given by: patient  Patient understanding: patient states understanding of the procedure being performed  Site marked: the operative site was marked  Required items: required blood products, implants, devices, and special equipment available  Patient identity confirmed: verbally with  patient  Indications: pain   Body area: shoulder  Joint: right shoulder  Local anesthesia used: yes    Anesthesia:  Local anesthesia used: yes  Local Anesthetic: topical anesthetic  Preparation: Patient was prepped and draped in the usual sterile fashion.  Needle size: 22 G  Approach: posterior  Methylprednisolone amount: 80 mg  Bupivacaine 0.50% amount: 2 mL  Patient tolerance: Patient tolerated the procedure well with no immediate complications            Assessment/Plan   Kp was seen today for shoulder pain.    Diagnoses and all orders for this visit:    Acute pain of right shoulder  -     methylPREDNISolone acetate (DEPO-medrol) injection 80 mg; Inject 1 mL into the joint 1 (One) Time.  -     bupivacaine (PF) (MARCAINE) 0.5 % injection 2 mL; Inject 2 mL as directed 1 (One) Time.  -     Arthrocentesis    Post-joint injection instructions given, warned may be sore and achy next 2-3 days, recommend ice as directed.         -Follow up: Prn - RTC if worse or no improvement.          Current Outpatient Prescriptions:   •  escitalopram (LEXAPRO) 10 MG tablet, Take 1 tablet by mouth Daily., Disp: 90 tablet, Rfl: 1  •  finasteride (PROSCAR) 5 MG tablet, TAKE ONE TABLET BY MOUTH ONCE DAILY AS DIRECTED, Disp: 90 tablet, Rfl: 1  •  fluticasone (FLONASE) 50 MCG/ACT nasal spray, USE TWO SPRAY(S) IN EACH NOSTRIL ONCE DAILY, Disp: 1 bottle, Rfl: 3  •  hydrochlorothiazide (HYDRODIURIL) 12.5 MG tablet, TAKE ONE TABLET BY MOUTH ONCE DAILY, Disp: 90 tablet, Rfl: 3  •  HYDROcodone-acetaminophen (NORCO)  MG per tablet, Take 1 tablet by mouth Every 6 (Six) Hours As Needed for Moderate Pain ., Disp: 120 tablet, Rfl: 0  •  metoprolol succinate XL (TOPROL-XL) 25 MG 24 hr tablet, TAKE ONE TABLET BY MOUTH ONCE DAILY, Disp: 90 tablet, Rfl: 3  •  mirtazapine (REMERON) 30 MG tablet, Take 1 tablet by mouth Every Night., Disp: 90 tablet, Rfl: 3  •  ranitidine (ZANTAC) 150 MG tablet, Take 150 mg by mouth every 12 (twelve) hours., Disp:  , Rfl:   •  SPIRIVA HANDIHALER 18 MCG per inhalation capsule, , Disp: , Rfl:   •  tiZANidine (ZANAFLEX) 4 MG tablet, Take 1 tablet by mouth Every 8 (Eight) Hours As Needed for Muscle Spasms., Disp: 90 tablet, Rfl: 5  No current facility-administered medications for this visit.

## 2018-03-14 ENCOUNTER — TELEPHONE (OUTPATIENT)
Dept: FAMILY MEDICINE CLINIC | Facility: CLINIC | Age: 73
End: 2018-03-14

## 2018-03-15 DIAGNOSIS — M15.9 GENERALIZED OSTEOARTHRITIS OF MULTIPLE SITES: ICD-10-CM

## 2018-03-15 RX ORDER — HYDROCODONE BITARTRATE AND ACETAMINOPHEN 10; 325 MG/1; MG/1
1 TABLET ORAL EVERY 6 HOURS PRN
Qty: 120 TABLET | Refills: 0 | Status: SHIPPED | OUTPATIENT
Start: 2018-03-15 | End: 2018-04-16 | Stop reason: SDUPTHER

## 2018-03-19 RX ORDER — FLUTICASONE PROPIONATE 50 MCG
SPRAY, SUSPENSION (ML) NASAL
Qty: 16 ML | Refills: 3 | Status: SHIPPED | OUTPATIENT
Start: 2018-03-19 | End: 2020-04-23 | Stop reason: SDUPTHER

## 2018-04-16 ENCOUNTER — TELEPHONE (OUTPATIENT)
Dept: FAMILY MEDICINE CLINIC | Facility: CLINIC | Age: 73
End: 2018-04-16

## 2018-04-16 DIAGNOSIS — M15.9 GENERALIZED OSTEOARTHRITIS OF MULTIPLE SITES: ICD-10-CM

## 2018-04-16 RX ORDER — HYDROCODONE BITARTRATE AND ACETAMINOPHEN 10; 325 MG/1; MG/1
1 TABLET ORAL EVERY 6 HOURS PRN
Qty: 120 TABLET | Refills: 0 | Status: SHIPPED | OUTPATIENT
Start: 2018-04-16 | End: 2018-05-15 | Stop reason: SDUPTHER

## 2018-04-29 DIAGNOSIS — E78.5 DYSLIPIDEMIA: ICD-10-CM

## 2018-04-30 RX ORDER — PRAVASTATIN SODIUM 20 MG
TABLET ORAL
Qty: 90 TABLET | Refills: 3 | Status: SHIPPED | OUTPATIENT
Start: 2018-04-30 | End: 2018-05-03

## 2018-05-03 ENCOUNTER — OFFICE VISIT (OUTPATIENT)
Dept: FAMILY MEDICINE CLINIC | Facility: CLINIC | Age: 73
End: 2018-05-03

## 2018-05-03 VITALS
SYSTOLIC BLOOD PRESSURE: 158 MMHG | BODY MASS INDEX: 30.16 KG/M2 | HEART RATE: 86 BPM | DIASTOLIC BLOOD PRESSURE: 84 MMHG | TEMPERATURE: 98.1 F | OXYGEN SATURATION: 96 % | WEIGHT: 199 LBS | HEIGHT: 68 IN

## 2018-05-03 DIAGNOSIS — M79.605 PAIN IN BOTH LOWER EXTREMITIES: Primary | ICD-10-CM

## 2018-05-03 DIAGNOSIS — M79.604 PAIN IN BOTH LOWER EXTREMITIES: Primary | ICD-10-CM

## 2018-05-03 DIAGNOSIS — M79.10 MYALGIA: ICD-10-CM

## 2018-05-03 LAB
HCT VFR BLDA CALC: 44.9 %
HGB BLDA-MCNC: 15.3 G/DL
MCH, POC: 31.9
MCHC, POC: 34
MCV, POC: 93.6
PLATELET # BLD AUTO: 280 10*3/MM3
PMV BLD: 6.7 FL
RBC, POC: 4.8
RDW, POC: 12.1
WBC # BLD: 11.4 10*3/UL

## 2018-05-03 PROCEDURE — 99213 OFFICE O/P EST LOW 20 MIN: CPT | Performed by: FAMILY MEDICINE

## 2018-05-03 PROCEDURE — 85027 COMPLETE CBC AUTOMATED: CPT | Performed by: FAMILY MEDICINE

## 2018-05-03 RX ORDER — GABAPENTIN 300 MG/1
CAPSULE ORAL
Qty: 60 CAPSULE | Refills: 5 | Status: SHIPPED | OUTPATIENT
Start: 2018-05-03 | End: 2018-08-06 | Stop reason: SDUPTHER

## 2018-05-03 NOTE — PROGRESS NOTES
Subjective   Kp Hernandez is a 72 y.o. male. Presents today for   Chief Complaint   Patient presents with   • Anxiety     pt here for follow up visit.   • Spasms     pt had such a bad dinora horse in his legs that they were sore for 2 days.   • Foot Pain     pt said he is so tired of the daily pain that he sometimes contemplates suicide.       Lower Extremity Issue   This is a chronic problem. The current episode started more than 1 year ago. The problem occurs intermittently. The problem has been gradually worsening. Associated symptoms include arthralgias and myalgias (severe muscle spasms at times). Associated symptoms comments: Has pain in toes feels like in vice. COmes on randomly, is not associated with exertion;  Feels worse when pain meds due.  Does have chronic back pain, but no radiation down legs.. Nothing aggravates the symptoms. Treatments tried: HC - mild relief, MR no relief, tried stopping statin - no relief. The treatment provided mild relief.       Review of Systems   Musculoskeletal: Positive for arthralgias and myalgias (severe muscle spasms at times).       The following portions of the patient's history were reviewed and updated as appropriate: allergies, current medications, past medical history and problem list.    Patient Active Problem List   Diagnosis   • Benign essential hypertension   • Benign prostatic hypertrophy without urinary obstruction   • CKD (chronic kidney disease) stage 2, GFR 60-89 ml/min   • Chronic obstructive pulmonary disease   • Depression with anxiety   • Dyslipidemia   • Ganglion   • Gastroesophageal reflux disease without esophagitis   • Hearing loss   • Osteoarthritis   • Impacted cerumen       No Known Allergies    Current Outpatient Prescriptions on File Prior to Visit   Medication Sig Dispense Refill   • escitalopram (LEXAPRO) 10 MG tablet Take 1 tablet by mouth Daily. 90 tablet 1   • finasteride (PROSCAR) 5 MG tablet TAKE ONE TABLET BY MOUTH ONCE DAILY AS  "DIRECTED 90 tablet 1   • fluticasone (FLONASE) 50 MCG/ACT nasal spray USE TWO SPRAY(S) IN EACH NOSTRIL ONCE DAILY 16 mL 3   • hydrochlorothiazide (HYDRODIURIL) 12.5 MG tablet TAKE ONE TABLET BY MOUTH ONCE DAILY 90 tablet 3   • HYDROcodone-acetaminophen (NORCO)  MG per tablet Take 1 tablet by mouth Every 6 (Six) Hours As Needed for Moderate Pain . 120 tablet 0   • metoprolol succinate XL (TOPROL-XL) 25 MG 24 hr tablet TAKE ONE TABLET BY MOUTH ONCE DAILY 90 tablet 3   • mirtazapine (REMERON) 30 MG tablet Take 1 tablet by mouth Every Night. 90 tablet 3   • ranitidine (ZANTAC) 150 MG tablet Take 150 mg by mouth every 12 (twelve) hours.     • SPIRIVA HANDIHALER 18 MCG per inhalation capsule      • tiZANidine (ZANAFLEX) 4 MG tablet Take 1 tablet by mouth Every 8 (Eight) Hours As Needed for Muscle Spasms. 90 tablet 5   • [DISCONTINUED] pravastatin (PRAVACHOL) 20 MG tablet TAKE ONE TABLET BY MOUTH ONCE DAILY 90 tablet 3     No current facility-administered medications on file prior to visit.        Objective   Vitals:    05/03/18 1503   BP: 158/84   BP Location: Right arm   Patient Position: Sitting   Cuff Size: Large Adult   Pulse: 86   Temp: 98.1 °F (36.7 °C)   TempSrc: Oral   SpO2: 96%   Weight: 90.3 kg (199 lb)   Height: 172.7 cm (67.99\")       Physical Exam   Constitutional: He appears well-developed and well-nourished.   Neck: Neck supple.   Neurological: He is alert.   Skin: Skin is warm and dry.   Psychiatric: He has a normal mood and affect. His behavior is normal.   Nursing note and vitals reviewed.    CBC ordered and reviewed.    Assessment/Plan   Kp was seen today for anxiety, spasms and foot pain.    Diagnoses and all orders for this visit:    Pain in both lower extremities  -     gabapentin (NEURONTIN) 300 MG capsule; 1 po nightly x 7 days, then 1 po bid  -     Comprehensive Metabolic Panel  -     Magnesium  -     POC CBC  -     TSH  -     Vitamin B12    Myalgia  -     gabapentin (NEURONTIN) 300 MG " capsule; 1 po nightly x 7 days, then 1 po bid  -     Comprehensive Metabolic Panel  -     Magnesium  -     POC CBC  -     TSH  -     Vitamin B12    -will check labs and start gabapentin;  Avoid statin for now.         -Follow up: 2 months       Current Outpatient Prescriptions:   •  escitalopram (LEXAPRO) 10 MG tablet, Take 1 tablet by mouth Daily., Disp: 90 tablet, Rfl: 1  •  finasteride (PROSCAR) 5 MG tablet, TAKE ONE TABLET BY MOUTH ONCE DAILY AS DIRECTED, Disp: 90 tablet, Rfl: 1  •  fluticasone (FLONASE) 50 MCG/ACT nasal spray, USE TWO SPRAY(S) IN EACH NOSTRIL ONCE DAILY, Disp: 16 mL, Rfl: 3  •  hydrochlorothiazide (HYDRODIURIL) 12.5 MG tablet, TAKE ONE TABLET BY MOUTH ONCE DAILY, Disp: 90 tablet, Rfl: 3  •  HYDROcodone-acetaminophen (NORCO)  MG per tablet, Take 1 tablet by mouth Every 6 (Six) Hours As Needed for Moderate Pain ., Disp: 120 tablet, Rfl: 0  •  metoprolol succinate XL (TOPROL-XL) 25 MG 24 hr tablet, TAKE ONE TABLET BY MOUTH ONCE DAILY, Disp: 90 tablet, Rfl: 3  •  mirtazapine (REMERON) 30 MG tablet, Take 1 tablet by mouth Every Night., Disp: 90 tablet, Rfl: 3  •  ranitidine (ZANTAC) 150 MG tablet, Take 150 mg by mouth every 12 (twelve) hours., Disp: , Rfl:   •  SPIRIVA HANDIHALER 18 MCG per inhalation capsule, , Disp: , Rfl:   •  tiZANidine (ZANAFLEX) 4 MG tablet, Take 1 tablet by mouth Every 8 (Eight) Hours As Needed for Muscle Spasms., Disp: 90 tablet, Rfl: 5

## 2018-05-04 ENCOUNTER — TELEPHONE (OUTPATIENT)
Dept: FAMILY MEDICINE CLINIC | Facility: CLINIC | Age: 73
End: 2018-05-04

## 2018-05-04 LAB
ALBUMIN SERPL-MCNC: 4.4 G/DL (ref 3.5–4.8)
ALBUMIN/GLOB SERPL: 1.6 {RATIO} (ref 1.2–2.2)
ALP SERPL-CCNC: 66 IU/L (ref 39–117)
ALT SERPL-CCNC: 28 IU/L (ref 0–44)
AST SERPL-CCNC: 27 IU/L (ref 0–40)
BILIRUB SERPL-MCNC: 0.4 MG/DL (ref 0–1.2)
BUN SERPL-MCNC: 16 MG/DL (ref 8–27)
BUN/CREAT SERPL: 11 (ref 10–24)
CALCIUM SERPL-MCNC: 9.5 MG/DL (ref 8.6–10.2)
CHLORIDE SERPL-SCNC: 99 MMOL/L (ref 96–106)
CO2 SERPL-SCNC: 25 MMOL/L (ref 18–29)
CREAT SERPL-MCNC: 1.47 MG/DL (ref 0.76–1.27)
GFR SERPLBLD CREATININE-BSD FMLA CKD-EPI: 47 ML/MIN/1.73
GFR SERPLBLD CREATININE-BSD FMLA CKD-EPI: 55 ML/MIN/1.73
GLOBULIN SER CALC-MCNC: 2.8 G/DL (ref 1.5–4.5)
GLUCOSE SERPL-MCNC: 90 MG/DL (ref 65–99)
MAGNESIUM SERPL-MCNC: 2.3 MG/DL (ref 1.6–2.3)
POTASSIUM SERPL-SCNC: 4.3 MMOL/L (ref 3.5–5.2)
PROT SERPL-MCNC: 7.2 G/DL (ref 6–8.5)
SODIUM SERPL-SCNC: 142 MMOL/L (ref 134–144)
TSH SERPL DL<=0.005 MIU/L-ACNC: 0.97 UIU/ML (ref 0.45–4.5)
VIT B12 SERPL-MCNC: 733 PG/ML (ref 232–1245)

## 2018-05-05 NOTE — PROGRESS NOTES
Call results to patient.  Mild kidney function decline, will monitor  Electrolytes normal  WBC mildly elevated, will recheck at follow-up.  Any fevers?  B12 and thyroid normal.  Gabapentin helping pain?

## 2018-05-15 DIAGNOSIS — M15.9 GENERALIZED OSTEOARTHRITIS OF MULTIPLE SITES: ICD-10-CM

## 2018-05-16 RX ORDER — HYDROCODONE BITARTRATE AND ACETAMINOPHEN 10; 325 MG/1; MG/1
1 TABLET ORAL EVERY 6 HOURS PRN
Qty: 120 TABLET | Refills: 0 | Status: SHIPPED | OUTPATIENT
Start: 2018-05-16 | End: 2018-06-15 | Stop reason: SDUPTHER

## 2018-05-17 DIAGNOSIS — Z79.899 DRUG THERAPY: Primary | ICD-10-CM

## 2018-05-21 LAB — DRUGS UR: NORMAL

## 2018-05-21 RX ORDER — FINASTERIDE 5 MG/1
TABLET, FILM COATED ORAL
Qty: 90 TABLET | Refills: 1 | Status: SHIPPED | OUTPATIENT
Start: 2018-05-21 | End: 2018-11-24 | Stop reason: SDUPTHER

## 2018-06-15 ENCOUNTER — TELEPHONE (OUTPATIENT)
Dept: FAMILY MEDICINE CLINIC | Facility: CLINIC | Age: 73
End: 2018-06-15

## 2018-06-15 DIAGNOSIS — M15.9 GENERALIZED OSTEOARTHRITIS OF MULTIPLE SITES: ICD-10-CM

## 2018-06-17 RX ORDER — HYDROCODONE BITARTRATE AND ACETAMINOPHEN 10; 325 MG/1; MG/1
1 TABLET ORAL EVERY 6 HOURS PRN
Qty: 120 TABLET | Refills: 0 | Status: SHIPPED | OUTPATIENT
Start: 2018-06-17 | End: 2018-07-17 | Stop reason: SDUPTHER

## 2018-07-09 DIAGNOSIS — F41.9 ANXIETY: ICD-10-CM

## 2018-07-10 RX ORDER — ESCITALOPRAM OXALATE 10 MG/1
TABLET ORAL
Qty: 90 TABLET | Refills: 1 | Status: SHIPPED | OUTPATIENT
Start: 2018-07-10 | End: 2018-10-11 | Stop reason: SDUPTHER

## 2018-07-17 ENCOUNTER — TELEPHONE (OUTPATIENT)
Dept: FAMILY MEDICINE CLINIC | Facility: CLINIC | Age: 73
End: 2018-07-17

## 2018-07-17 DIAGNOSIS — M15.9 GENERALIZED OSTEOARTHRITIS OF MULTIPLE SITES: ICD-10-CM

## 2018-07-17 RX ORDER — HYDROCODONE BITARTRATE AND ACETAMINOPHEN 10; 325 MG/1; MG/1
1 TABLET ORAL EVERY 6 HOURS PRN
Qty: 120 TABLET | Refills: 0 | Status: SHIPPED | OUTPATIENT
Start: 2018-07-17 | End: 2018-08-20 | Stop reason: SDUPTHER

## 2018-08-06 ENCOUNTER — OFFICE VISIT (OUTPATIENT)
Dept: FAMILY MEDICINE CLINIC | Facility: CLINIC | Age: 73
End: 2018-08-06

## 2018-08-06 VITALS
TEMPERATURE: 98.6 F | OXYGEN SATURATION: 97 % | DIASTOLIC BLOOD PRESSURE: 85 MMHG | HEART RATE: 82 BPM | SYSTOLIC BLOOD PRESSURE: 150 MMHG | WEIGHT: 214 LBS | BODY MASS INDEX: 32.55 KG/M2

## 2018-08-06 DIAGNOSIS — M15.9 PRIMARY OSTEOARTHRITIS INVOLVING MULTIPLE JOINTS: ICD-10-CM

## 2018-08-06 DIAGNOSIS — M79.604 PAIN IN BOTH LOWER EXTREMITIES: ICD-10-CM

## 2018-08-06 DIAGNOSIS — I10 BENIGN ESSENTIAL HYPERTENSION: Primary | ICD-10-CM

## 2018-08-06 DIAGNOSIS — N18.2 CKD (CHRONIC KIDNEY DISEASE) STAGE 2, GFR 60-89 ML/MIN: ICD-10-CM

## 2018-08-06 DIAGNOSIS — M79.605 PAIN IN BOTH LOWER EXTREMITIES: ICD-10-CM

## 2018-08-06 DIAGNOSIS — E78.5 DYSLIPIDEMIA: ICD-10-CM

## 2018-08-06 DIAGNOSIS — M79.10 MYALGIA: ICD-10-CM

## 2018-08-06 PROCEDURE — 99214 OFFICE O/P EST MOD 30 MIN: CPT | Performed by: FAMILY MEDICINE

## 2018-08-06 RX ORDER — HYDROCHLOROTHIAZIDE 25 MG/1
25 TABLET ORAL DAILY
Qty: 90 TABLET | Refills: 3 | Status: SHIPPED | OUTPATIENT
Start: 2018-08-06 | End: 2019-08-24 | Stop reason: SDUPTHER

## 2018-08-06 RX ORDER — GABAPENTIN 300 MG/1
300 CAPSULE ORAL 3 TIMES DAILY
Qty: 90 CAPSULE | Refills: 5 | Status: SHIPPED | OUTPATIENT
Start: 2018-08-06 | End: 2018-10-23 | Stop reason: SDUPTHER

## 2018-08-06 NOTE — PROGRESS NOTES
Subjective   Kp Hernandez is a 72 y.o. male. Presents today for   Chief Complaint   Patient presents with   • Follow-up     med check blood pressure elevated   • Hypertension   • Hyperlipidemia   • Arthritis   • Chronic Kidney Disease       Hypertension   This is a chronic problem. The current episode started more than 1 year ago. The problem has been waxing and waning since onset. The problem is uncontrolled. Pertinent negatives include no chest pain, orthopnea, palpitations, peripheral edema, PND or shortness of breath. There are no associated agents to hypertension. Risk factors for coronary artery disease include dyslipidemia and male gender. Past treatments include beta blockers and diuretics. Current antihypertension treatment includes diuretics and beta blockers. The current treatment provides moderate improvement. There are no compliance problems.  Hypertensive end-organ damage includes kidney disease (Stage II). Identifiable causes of hypertension include chronic renal disease.   Hyperlipidemia   This is a chronic problem. The current episode started more than 1 year ago. The problem is controlled. Recent lipid tests were reviewed and are normal. Exacerbating diseases include chronic renal disease. Factors aggravating his hyperlipidemia include beta blockers. Pertinent negatives include no chest pain or shortness of breath. Current antihyperlipidemic treatment includes statins (last ov stopped statin as causing myalgias.  Reports restarted and doing okay). The current treatment provides moderate improvement of lipids. Compliance problems include medication side effects.  Risk factors for coronary artery disease include dyslipidemia, hypertension and male sex.   Arthritis   Presents for follow-up visit. He complains of pain. The symptoms have been improving. Affected location: several joints;  pain down right leg and low back;  Associated symptoms include pain while resting. Pertinent negatives include no  weight loss. (Feels gabapentin has helped pain signficantly.) Compliance with total regimen is %. Compliance with medications is %.       Patient's wife having severe back issues, may need surgery.  Review of Systems   Constitutional: Negative for weight loss.   Respiratory: Negative for shortness of breath.    Cardiovascular: Negative for chest pain, palpitations, orthopnea and PND.   Musculoskeletal: Positive for arthritis.       Patient Active Problem List   Diagnosis   • Benign essential hypertension   • Benign prostatic hypertrophy without urinary obstruction   • CKD (chronic kidney disease) stage 2, GFR 60-89 ml/min   • Chronic obstructive pulmonary disease (CMS/HCC)   • Depression with anxiety   • Dyslipidemia   • Ganglion   • Gastroesophageal reflux disease without esophagitis   • Hearing loss   • Osteoarthritis   • Impacted cerumen       Social History     Social History   • Marital status:      Occupational History   • permanent disability      Social History Main Topics   • Smoking status: Never Smoker   • Smokeless tobacco: Never Used   • Alcohol use No   • Drug use: No     Other Topics Concern   • Not on file       No Known Allergies    Current Outpatient Prescriptions on File Prior to Visit   Medication Sig Dispense Refill   • escitalopram (LEXAPRO) 10 MG tablet TAKE ONE TABLET BY MOUTH ONCE DAILY 90 tablet 1   • finasteride (PROSCAR) 5 MG tablet TAKE ONE TABLET BY MOUTH ONCE DAILY AS DIRECTED 90 tablet 1   • fluticasone (FLONASE) 50 MCG/ACT nasal spray USE TWO SPRAY(S) IN EACH NOSTRIL ONCE DAILY 16 mL 3   • gabapentin (NEURONTIN) 300 MG capsule 1 po nightly x 7 days, then 1 po bid 60 capsule 5   • hydrochlorothiazide (HYDRODIURIL) 12.5 MG tablet TAKE ONE TABLET BY MOUTH ONCE DAILY 90 tablet 3   • HYDROcodone-acetaminophen (NORCO)  MG per tablet Take 1 tablet by mouth Every 6 (Six) Hours As Needed for Moderate Pain . 120 tablet 0   • metoprolol succinate XL (TOPROL-XL) 25 MG 24  hr tablet TAKE ONE TABLET BY MOUTH ONCE DAILY 90 tablet 3   • mirtazapine (REMERON) 30 MG tablet Take 1 tablet by mouth Every Night. 90 tablet 3   • ranitidine (ZANTAC) 150 MG tablet Take 150 mg by mouth every 12 (twelve) hours.     • SPIRIVA HANDIHALER 18 MCG per inhalation capsule      • tiZANidine (ZANAFLEX) 4 MG tablet Take 1 tablet by mouth Every 8 (Eight) Hours As Needed for Muscle Spasms. 90 tablet 5     No current facility-administered medications on file prior to visit.        Objective   Vitals:    08/06/18 1355   BP: 170/96   Pulse: 82   Temp: 98.6 °F (37 °C)   SpO2: 97%   Weight: 97.1 kg (214 lb)       Physical Exam   Constitutional: He appears well-developed and well-nourished.   HENT:   Head: Normocephalic and atraumatic.   Neck: Neck supple. No JVD present. No thyromegaly present.   Cardiovascular: Normal rate, regular rhythm and normal heart sounds.  Exam reveals no gallop and no friction rub.    No murmur heard.  Pulmonary/Chest: Effort normal and breath sounds normal. No respiratory distress. He has no wheezes. He has no rales.   Abdominal: Soft. Bowel sounds are normal. He exhibits no distension. There is no tenderness. There is no rebound and no guarding.   Musculoskeletal: He exhibits edema (trace pedal).   Neurological: He is alert.   Skin: Skin is warm and dry.   Psychiatric: He has a normal mood and affect. His behavior is normal.   Nursing note and vitals reviewed.      Assessment/Plan   Problems Addressed this Visit        Cardiovascular and Mediastinum    Benign essential hypertension - Primary    Relevant Orders    Comprehensive Metabolic Panel    Lipid Panel       Musculoskeletal and Integument    Osteoarthritis    Relevant Orders    Comprehensive Metabolic Panel    Lipid Panel       Genitourinary    CKD (chronic kidney disease) stage 2, GFR 60-89 ml/min    Relevant Orders    Comprehensive Metabolic Panel    Lipid Panel       Other    Dyslipidemia    Relevant Orders    Comprehensive  Metabolic Panel    Lipid Panel      Other Visit Diagnoses     Pain in both lower extremities        Relevant Medications    gabapentin (NEURONTIN) 300 MG capsule    Myalgia        Relevant Medications    gabapentin (NEURONTIN) 300 MG capsule        -ckd - tight bp control  -hld - leg cramps better restarted statin  -htn - not goal, will increase hctz to 25mg daily;  MA bp check 6 weeks  -OA with back pain - will increase gabapentin to tid.       -Follow up: 6 months and prn

## 2018-08-07 LAB
ALBUMIN SERPL-MCNC: 4.2 G/DL (ref 3.5–4.8)
ALBUMIN/GLOB SERPL: 1.7 {RATIO} (ref 1.2–2.2)
ALP SERPL-CCNC: 79 IU/L (ref 39–117)
ALT SERPL-CCNC: 28 IU/L (ref 0–44)
AST SERPL-CCNC: 29 IU/L (ref 0–40)
BILIRUB SERPL-MCNC: 0.2 MG/DL (ref 0–1.2)
BUN SERPL-MCNC: 19 MG/DL (ref 8–27)
BUN/CREAT SERPL: 14 (ref 10–24)
CALCIUM SERPL-MCNC: 9.4 MG/DL (ref 8.6–10.2)
CHLORIDE SERPL-SCNC: 104 MMOL/L (ref 96–106)
CHOLEST SERPL-MCNC: 197 MG/DL (ref 100–199)
CO2 SERPL-SCNC: 23 MMOL/L (ref 20–29)
CREAT SERPL-MCNC: 1.33 MG/DL (ref 0.76–1.27)
GLOBULIN SER CALC-MCNC: 2.5 G/DL (ref 1.5–4.5)
GLUCOSE SERPL-MCNC: 91 MG/DL (ref 65–99)
HDLC SERPL-MCNC: 53 MG/DL
LDLC SERPL CALC-MCNC: 102 MG/DL (ref 0–99)
POTASSIUM SERPL-SCNC: 4.2 MMOL/L (ref 3.5–5.2)
PROT SERPL-MCNC: 6.7 G/DL (ref 6–8.5)
SODIUM SERPL-SCNC: 142 MMOL/L (ref 134–144)
TRIGL SERPL-MCNC: 210 MG/DL (ref 0–149)
VLDLC SERPL CALC-MCNC: 42 MG/DL (ref 5–40)

## 2018-08-08 NOTE — PROGRESS NOTES
Call and mail copy of results to patient.  Kidney function decline stable  Liver function normal  Cholesterol adequate control.

## 2018-08-17 ENCOUNTER — TELEPHONE (OUTPATIENT)
Dept: FAMILY MEDICINE CLINIC | Facility: CLINIC | Age: 73
End: 2018-08-17

## 2018-08-20 DIAGNOSIS — M15.9 GENERALIZED OSTEOARTHRITIS OF MULTIPLE SITES: ICD-10-CM

## 2018-08-21 RX ORDER — HYDROCODONE BITARTRATE AND ACETAMINOPHEN 10; 325 MG/1; MG/1
1 TABLET ORAL EVERY 6 HOURS PRN
Qty: 120 TABLET | Refills: 0 | Status: SHIPPED | OUTPATIENT
Start: 2018-08-21 | End: 2018-09-17 | Stop reason: SDUPTHER

## 2018-08-23 ENCOUNTER — TELEPHONE (OUTPATIENT)
Dept: FAMILY MEDICINE CLINIC | Facility: CLINIC | Age: 73
End: 2018-08-23

## 2018-08-23 DIAGNOSIS — M54.2 NECK PAIN: ICD-10-CM

## 2018-08-23 RX ORDER — TIZANIDINE 4 MG/1
4 TABLET ORAL EVERY 8 HOURS PRN
Qty: 90 TABLET | Refills: 5 | Status: SHIPPED | OUTPATIENT
Start: 2018-08-23 | End: 2018-10-23

## 2018-09-17 ENCOUNTER — TELEPHONE (OUTPATIENT)
Dept: FAMILY MEDICINE CLINIC | Facility: CLINIC | Age: 73
End: 2018-09-17

## 2018-09-17 ENCOUNTER — CLINICAL SUPPORT (OUTPATIENT)
Dept: FAMILY MEDICINE CLINIC | Facility: CLINIC | Age: 73
End: 2018-09-17

## 2018-09-17 VITALS
DIASTOLIC BLOOD PRESSURE: 88 MMHG | HEART RATE: 62 BPM | HEIGHT: 67 IN | OXYGEN SATURATION: 96 % | SYSTOLIC BLOOD PRESSURE: 140 MMHG | WEIGHT: 216 LBS | BODY MASS INDEX: 33.9 KG/M2

## 2018-09-17 DIAGNOSIS — M15.9 GENERALIZED OSTEOARTHRITIS OF MULTIPLE SITES: ICD-10-CM

## 2018-09-18 RX ORDER — HYDROCODONE BITARTRATE AND ACETAMINOPHEN 10; 325 MG/1; MG/1
1 TABLET ORAL EVERY 6 HOURS PRN
Qty: 120 TABLET | Refills: 0 | Status: SHIPPED | OUTPATIENT
Start: 2018-09-18 | End: 2018-10-22 | Stop reason: SDUPTHER

## 2018-09-24 RX ORDER — METOPROLOL SUCCINATE 25 MG/1
25 TABLET, EXTENDED RELEASE ORAL DAILY
Qty: 90 TABLET | Refills: 3 | Status: SHIPPED | OUTPATIENT
Start: 2018-09-24 | End: 2019-10-05 | Stop reason: SDUPTHER

## 2018-10-11 DIAGNOSIS — F41.9 ANXIETY: ICD-10-CM

## 2018-10-11 RX ORDER — ESCITALOPRAM OXALATE 10 MG/1
10 TABLET ORAL DAILY
Qty: 90 TABLET | Refills: 1 | Status: SHIPPED | OUTPATIENT
Start: 2018-10-11 | End: 2019-04-20 | Stop reason: SDUPTHER

## 2018-10-19 ENCOUNTER — TELEPHONE (OUTPATIENT)
Dept: FAMILY MEDICINE CLINIC | Facility: CLINIC | Age: 73
End: 2018-10-19

## 2018-10-22 DIAGNOSIS — M15.9 GENERALIZED OSTEOARTHRITIS OF MULTIPLE SITES: ICD-10-CM

## 2018-10-22 RX ORDER — HYDROCODONE BITARTRATE AND ACETAMINOPHEN 10; 325 MG/1; MG/1
1 TABLET ORAL EVERY 6 HOURS PRN
Qty: 120 TABLET | Refills: 0 | Status: SHIPPED | OUTPATIENT
Start: 2018-10-22 | End: 2018-11-20 | Stop reason: SDUPTHER

## 2018-10-23 ENCOUNTER — OFFICE VISIT (OUTPATIENT)
Dept: FAMILY MEDICINE CLINIC | Facility: CLINIC | Age: 73
End: 2018-10-23

## 2018-10-23 VITALS
HEART RATE: 90 BPM | TEMPERATURE: 97.6 F | BODY MASS INDEX: 33.2 KG/M2 | DIASTOLIC BLOOD PRESSURE: 92 MMHG | SYSTOLIC BLOOD PRESSURE: 160 MMHG | OXYGEN SATURATION: 97 % | WEIGHT: 212 LBS

## 2018-10-23 DIAGNOSIS — M79.10 MYALGIA: ICD-10-CM

## 2018-10-23 DIAGNOSIS — M79.605 PAIN IN BOTH LOWER EXTREMITIES: ICD-10-CM

## 2018-10-23 DIAGNOSIS — F41.8 DEPRESSION WITH ANXIETY: ICD-10-CM

## 2018-10-23 DIAGNOSIS — M79.604 PAIN IN BOTH LOWER EXTREMITIES: ICD-10-CM

## 2018-10-23 DIAGNOSIS — M15.9 PRIMARY OSTEOARTHRITIS INVOLVING MULTIPLE JOINTS: Primary | ICD-10-CM

## 2018-10-23 PROCEDURE — 99213 OFFICE O/P EST LOW 20 MIN: CPT | Performed by: FAMILY MEDICINE

## 2018-10-23 RX ORDER — BACLOFEN 10 MG/1
TABLET ORAL
Qty: 90 TABLET | Refills: 3 | Status: SHIPPED | OUTPATIENT
Start: 2018-10-23 | End: 2019-11-26 | Stop reason: SDUPTHER

## 2018-10-23 RX ORDER — GABAPENTIN 300 MG/1
CAPSULE ORAL
Qty: 120 CAPSULE | Refills: 5 | Status: SHIPPED | OUTPATIENT
Start: 2018-10-23 | End: 2019-05-07 | Stop reason: SDUPTHER

## 2018-10-23 NOTE — PROGRESS NOTES
Subjective   Kp Hernandez is a 73 y.o. male. Presents today for   Chief Complaint   Patient presents with   • Follow-up     can't sleep, his legs ache and hurt and his toes and feet cramping.       History of Present Illness  Patient with long standing arthritis, but also lower ext pain especially at night.   Though off medicaiton list, did restart statin.   Gabapentin helps some.  Cannot sleep due to the pain in legs;  Would like try higher dose of gabapentin.      Review of Systems   Musculoskeletal: Positive for arthralgias, back pain and myalgias.       Patient Active Problem List   Diagnosis   • Benign essential hypertension   • Benign prostatic hypertrophy without urinary obstruction   • CKD (chronic kidney disease) stage 2, GFR 60-89 ml/min   • Chronic obstructive pulmonary disease (CMS/HCC)   • Depression with anxiety   • Dyslipidemia   • Ganglion   • Gastroesophageal reflux disease without esophagitis   • Hearing loss   • Osteoarthritis   • Impacted cerumen       Social History     Social History   • Marital status:      Occupational History   • permanent disability      Social History Main Topics   • Smoking status: Never Smoker   • Smokeless tobacco: Never Used   • Alcohol use No   • Drug use: No     Other Topics Concern   • Not on file       No Known Allergies    Current Outpatient Prescriptions on File Prior to Visit   Medication Sig Dispense Refill   • escitalopram (LEXAPRO) 10 MG tablet Take 1 tablet by mouth Daily. 90 tablet 1   • finasteride (PROSCAR) 5 MG tablet TAKE ONE TABLET BY MOUTH ONCE DAILY AS DIRECTED 90 tablet 1   • fluticasone (FLONASE) 50 MCG/ACT nasal spray USE TWO SPRAY(S) IN EACH NOSTRIL ONCE DAILY 16 mL 3   • hydrochlorothiazide (HYDRODIURIL) 25 MG tablet Take 1 tablet by mouth Daily. 90 tablet 3   • HYDROcodone-acetaminophen (NORCO)  MG per tablet Take 1 tablet by mouth Every 6 (Six) Hours As Needed for Moderate Pain . 120 tablet 0   • metoprolol succinate XL  (TOPROL-XL) 25 MG 24 hr tablet Take 1 tablet by mouth Daily. 90 tablet 3   • mirtazapine (REMERON) 30 MG tablet Take 1 tablet by mouth Every Night. 90 tablet 3   • ranitidine (ZANTAC) 150 MG tablet Take 150 mg by mouth every 12 (twelve) hours.     • SPIRIVA HANDIHALER 18 MCG per inhalation capsule      • TWINRIX 720-20 injection      • [DISCONTINUED] gabapentin (NEURONTIN) 300 MG capsule Take 1 capsule by mouth 3 (Three) Times a Day. 1 po nightly x 7 days, then 1 po bid 90 capsule 5   • [DISCONTINUED] tiZANidine (ZANAFLEX) 4 MG tablet Take 1 tablet by mouth Every 8 (Eight) Hours As Needed for Muscle Spasms. 90 tablet 5     No current facility-administered medications on file prior to visit.        Objective   Vitals:    10/23/18 1500   BP: 160/92   Pulse: 90   Temp: 97.6 °F (36.4 °C)   SpO2: 97%   Weight: 96.2 kg (212 lb)       Physical Exam   Constitutional: He appears well-developed and well-nourished.   Neck: Neck supple.   Neurological: He is alert.   Skin: Skin is warm and dry.   Psychiatric: He has a normal mood and affect. His behavior is normal.   Nursing note and vitals reviewed.      Assessment/Plan   Kp was seen today for follow-up.    Diagnoses and all orders for this visit:    Primary osteoarthritis involving multiple joints    Depression with anxiety    Pain in both lower extremities  -     gabapentin (NEURONTIN) 300 MG capsule; 1 po am, 1 noon and 2 nightly  -     baclofen (LIORESAL) 10 MG tablet; 1 po tid prn muscle and leg pain    Myalgia  -     gabapentin (NEURONTIN) 300 MG capsule; 1 po am, 1 noon and 2 nightly  -     baclofen (LIORESAL) 10 MG tablet; 1 po tid prn muscle and leg pain    faxed compound crm  Ok add gabapentin at bedtime and will change tizanidine to baclofen to see if helps         -Follow up: 3 months and prn

## 2018-10-29 ENCOUNTER — OFFICE VISIT (OUTPATIENT)
Dept: FAMILY MEDICINE CLINIC | Facility: CLINIC | Age: 73
End: 2018-10-29

## 2018-10-29 VITALS
TEMPERATURE: 97.5 F | OXYGEN SATURATION: 97 % | DIASTOLIC BLOOD PRESSURE: 76 MMHG | SYSTOLIC BLOOD PRESSURE: 150 MMHG | HEART RATE: 75 BPM

## 2018-10-29 DIAGNOSIS — M10.371 ACUTE GOUT DUE TO RENAL IMPAIRMENT INVOLVING TOE OF RIGHT FOOT: Primary | ICD-10-CM

## 2018-10-29 PROCEDURE — 99213 OFFICE O/P EST LOW 20 MIN: CPT | Performed by: FAMILY MEDICINE

## 2018-10-29 PROCEDURE — 96372 THER/PROPH/DIAG INJ SC/IM: CPT | Performed by: FAMILY MEDICINE

## 2018-10-29 RX ORDER — PREDNISONE 10 MG/1
TABLET ORAL
Qty: 32 TABLET | Refills: 0 | Status: SHIPPED | OUTPATIENT
Start: 2018-10-29 | End: 2019-02-05

## 2018-10-29 RX ORDER — METHYLPREDNISOLONE SODIUM SUCCINATE 125 MG/2ML
62.5 INJECTION, POWDER, LYOPHILIZED, FOR SOLUTION INTRAMUSCULAR; INTRAVENOUS ONCE
Status: COMPLETED | OUTPATIENT
Start: 2018-10-29 | End: 2018-10-29

## 2018-10-29 RX ADMIN — METHYLPREDNISOLONE SODIUM SUCCINATE 62.5 MG: 125 INJECTION, POWDER, LYOPHILIZED, FOR SOLUTION INTRAMUSCULAR; INTRAVENOUS at 15:40

## 2018-10-29 NOTE — PROGRESS NOTES
Subjective   Kp Hernandez is a 73 y.o. male. Presents today for   Chief Complaint   Patient presents with   • Foot Pain     rt foot and toe pain with redness       Lower Extremity Issue   This is a new problem. Episode onset: 3 to 4 days ago right MTP pain, swelling and redness. The problem occurs constantly. The problem has been unchanged. Associated symptoms include arthralgias and joint swelling. The symptoms are aggravated by standing and walking. He has tried acetaminophen for the symptoms. The treatment provided no relief.   Reports brother and father both had gout;  Patient non-etoh.  Father was heavy drinker.  He denies prior gout flares, but relates remembers looking same as dad and brother.    Review of Systems   Musculoskeletal: Positive for arthralgias and joint swelling.       Patient Active Problem List   Diagnosis   • Benign essential hypertension   • Benign prostatic hypertrophy without urinary obstruction   • CKD (chronic kidney disease) stage 2, GFR 60-89 ml/min   • Chronic obstructive pulmonary disease (CMS/HCC)   • Depression with anxiety   • Dyslipidemia   • Ganglion   • Gastroesophageal reflux disease without esophagitis   • Hearing loss   • Osteoarthritis   • Impacted cerumen       Social History     Social History   • Marital status:      Occupational History   • permanent disability      Social History Main Topics   • Smoking status: Never Smoker   • Smokeless tobacco: Never Used   • Alcohol use No   • Drug use: No     Other Topics Concern   • Not on file       No Known Allergies    Current Outpatient Prescriptions on File Prior to Visit   Medication Sig Dispense Refill   • baclofen (LIORESAL) 10 MG tablet 1 po tid prn muscle and leg pain 90 tablet 3   • escitalopram (LEXAPRO) 10 MG tablet Take 1 tablet by mouth Daily. 90 tablet 1   • finasteride (PROSCAR) 5 MG tablet TAKE ONE TABLET BY MOUTH ONCE DAILY AS DIRECTED 90 tablet 1   • fluticasone (FLONASE) 50 MCG/ACT nasal spray USE TWO  SPRAY(S) IN EACH NOSTRIL ONCE DAILY 16 mL 3   • gabapentin (NEURONTIN) 300 MG capsule 1 po am, 1 noon and 2 nightly 120 capsule 5   • hydrochlorothiazide (HYDRODIURIL) 25 MG tablet Take 1 tablet by mouth Daily. 90 tablet 3   • HYDROcodone-acetaminophen (NORCO)  MG per tablet Take 1 tablet by mouth Every 6 (Six) Hours As Needed for Moderate Pain . 120 tablet 0   • metoprolol succinate XL (TOPROL-XL) 25 MG 24 hr tablet Take 1 tablet by mouth Daily. 90 tablet 3   • mirtazapine (REMERON) 30 MG tablet Take 1 tablet by mouth Every Night. 90 tablet 3   • ranitidine (ZANTAC) 150 MG tablet Take 150 mg by mouth every 12 (twelve) hours.     • SPIRIVA HANDIHALER 18 MCG per inhalation capsule      • TWINRIX 720-20 injection        No current facility-administered medications on file prior to visit.        Objective   Vitals:    10/29/18 1504   BP: 150/76   Pulse: 75   Temp: 97.5 °F (36.4 °C)   SpO2: 97%       Physical Exam   Constitutional: He appears well-developed and well-nourished.   Neck: Neck supple.   Musculoskeletal:        Right foot: There is tenderness and swelling.        Neurological: He is alert.   Skin: Skin is warm and dry.   Psychiatric: He has a normal mood and affect. His behavior is normal.   Nursing note and vitals reviewed.      Assessment/Plan   Kp was seen today for foot pain.    Diagnoses and all orders for this visit:    Acute gout due to renal impairment involving toe of right foot  -     methylPREDNISolone sodium succinate (SOLU-Medrol) injection 62.5 mg; Inject 1 mL into the appropriate muscle as directed by prescriber 1 (One) Time.  -     predniSONE (DELTASONE) 10 MG tablet; 6 tabs po qd x 2 days, 4 tabs po qd x 2 days, 3 tabs po qd x 2 days, 2 tabs po qd x 2 days, 1 tab po qd x 2 days  -     Uric Acid    has CKD stage II, will give steroids;  Check uric acid;  D/w allopurinol but would wait as can exacerbate a flare;  Check also uric acid level prior.  Patient in agreement with plan.          -Follow up: Prn - RTC if worse or no improvement.

## 2018-10-30 LAB — URATE SERPL-MCNC: 7.7 MG/DL (ref 3.7–8.6)

## 2018-10-31 DIAGNOSIS — M10.071 ACUTE IDIOPATHIC GOUT INVOLVING TOE OF RIGHT FOOT: Primary | ICD-10-CM

## 2018-10-31 NOTE — PROGRESS NOTES
Call results to patient.  Uric acid level is elevated, in 2 weeks start allopurinol 100mg 1 po daily (wait until flare over).  In 8 weeks check uric acid level dx idiopathic gout.  I placed order.

## 2018-11-01 RX ORDER — ALLOPURINOL 100 MG/1
100 TABLET ORAL DAILY
Qty: 90 TABLET | Refills: 1 | Status: SHIPPED | OUTPATIENT
Start: 2018-11-01 | End: 2019-03-09 | Stop reason: SDUPTHER

## 2018-11-20 ENCOUNTER — TELEPHONE (OUTPATIENT)
Dept: FAMILY MEDICINE CLINIC | Facility: CLINIC | Age: 73
End: 2018-11-20

## 2018-11-20 DIAGNOSIS — M15.9 GENERALIZED OSTEOARTHRITIS OF MULTIPLE SITES: ICD-10-CM

## 2018-11-20 RX ORDER — HYDROCODONE BITARTRATE AND ACETAMINOPHEN 10; 325 MG/1; MG/1
1 TABLET ORAL EVERY 6 HOURS PRN
Qty: 120 TABLET | Refills: 0 | Status: SHIPPED | OUTPATIENT
Start: 2018-11-20 | End: 2018-12-20 | Stop reason: SDUPTHER

## 2018-11-26 RX ORDER — FINASTERIDE 5 MG/1
TABLET, FILM COATED ORAL
Qty: 90 TABLET | Refills: 1 | Status: SHIPPED | OUTPATIENT
Start: 2018-11-26 | End: 2019-05-11 | Stop reason: SDUPTHER

## 2018-12-15 DIAGNOSIS — G47.09 OTHER INSOMNIA: ICD-10-CM

## 2018-12-17 RX ORDER — MIRTAZAPINE 30 MG/1
TABLET, FILM COATED ORAL
Qty: 90 TABLET | Refills: 3 | Status: SHIPPED | OUTPATIENT
Start: 2018-12-17 | End: 2019-12-13 | Stop reason: SDUPTHER

## 2018-12-20 ENCOUNTER — TELEPHONE (OUTPATIENT)
Dept: FAMILY MEDICINE CLINIC | Facility: CLINIC | Age: 73
End: 2018-12-20

## 2018-12-20 DIAGNOSIS — M15.9 GENERALIZED OSTEOARTHRITIS OF MULTIPLE SITES: ICD-10-CM

## 2018-12-21 RX ORDER — HYDROCODONE BITARTRATE AND ACETAMINOPHEN 10; 325 MG/1; MG/1
1 TABLET ORAL EVERY 6 HOURS PRN
Qty: 120 TABLET | Refills: 0 | Status: SHIPPED | OUTPATIENT
Start: 2018-12-21 | End: 2019-01-22 | Stop reason: SDUPTHER

## 2019-01-16 ENCOUNTER — TELEPHONE (OUTPATIENT)
Dept: FAMILY MEDICINE CLINIC | Facility: CLINIC | Age: 74
End: 2019-01-16

## 2019-01-17 RX ORDER — METHYLPREDNISOLONE 4 MG/1
TABLET ORAL
Qty: 21 EACH | Refills: 0 | Status: SHIPPED | OUTPATIENT
Start: 2019-01-17 | End: 2019-02-05

## 2019-01-22 ENCOUNTER — TELEPHONE (OUTPATIENT)
Dept: FAMILY MEDICINE CLINIC | Facility: CLINIC | Age: 74
End: 2019-01-22

## 2019-01-22 DIAGNOSIS — M15.9 GENERALIZED OSTEOARTHRITIS OF MULTIPLE SITES: ICD-10-CM

## 2019-01-22 RX ORDER — HYDROCODONE BITARTRATE AND ACETAMINOPHEN 10; 325 MG/1; MG/1
1 TABLET ORAL EVERY 6 HOURS PRN
Qty: 120 TABLET | Refills: 0 | Status: SHIPPED | OUTPATIENT
Start: 2019-01-22 | End: 2019-02-25 | Stop reason: SDUPTHER

## 2019-02-05 ENCOUNTER — OFFICE VISIT (OUTPATIENT)
Dept: FAMILY MEDICINE CLINIC | Facility: CLINIC | Age: 74
End: 2019-02-05

## 2019-02-05 VITALS
TEMPERATURE: 97.4 F | OXYGEN SATURATION: 96 % | HEART RATE: 72 BPM | SYSTOLIC BLOOD PRESSURE: 140 MMHG | DIASTOLIC BLOOD PRESSURE: 70 MMHG | BODY MASS INDEX: 34.3 KG/M2 | WEIGHT: 219 LBS

## 2019-02-05 DIAGNOSIS — M15.9 PRIMARY OSTEOARTHRITIS INVOLVING MULTIPLE JOINTS: ICD-10-CM

## 2019-02-05 DIAGNOSIS — I10 BENIGN ESSENTIAL HYPERTENSION: ICD-10-CM

## 2019-02-05 DIAGNOSIS — N18.2 CKD (CHRONIC KIDNEY DISEASE) STAGE 2, GFR 60-89 ML/MIN: ICD-10-CM

## 2019-02-05 DIAGNOSIS — E79.0 HYPERURICEMIA: ICD-10-CM

## 2019-02-05 DIAGNOSIS — Z00.00 MEDICARE ANNUAL WELLNESS VISIT, SUBSEQUENT: Primary | ICD-10-CM

## 2019-02-05 PROCEDURE — 99214 OFFICE O/P EST MOD 30 MIN: CPT | Performed by: FAMILY MEDICINE

## 2019-02-05 PROCEDURE — G0439 PPPS, SUBSEQ VISIT: HCPCS | Performed by: FAMILY MEDICINE

## 2019-02-05 NOTE — PROGRESS NOTES
QUICK REFERENCE INFORMATION:  The ABCs of the Annual Wellness Visit    Subsequent Medicare Wellness Visit    HEALTH RISK ASSESSMENT    1945    Recent Hospitalizations:  No hospitalization(s) within the last year..        Current Medical Providers:  Patient Care Team:  Murphy Felix DO as PCP - General  Murphy Felix DO as PCP - Claims Attributed        Smoking Status:  Social History     Tobacco Use   Smoking Status Never Smoker   Smokeless Tobacco Never Used       Alcohol Consumption:  Social History     Substance and Sexual Activity   Alcohol Use No       Depression Screen:   PHQ-2/PHQ-9 Depression Screening 2/5/2019   Little interest or pleasure in doing things 0   Feeling down, depressed, or hopeless 0   Trouble falling or staying asleep, or sleeping too much 1   Feeling tired or having little energy 1   Poor appetite or overeating 0   Feeling bad about yourself - or that you are a failure or have let yourself or your family down 0   Trouble concentrating on things, such as reading the newspaper or watching television 0   Moving or speaking so slowly that other people could have noticed. Or the opposite - being so fidgety or restless that you have been moving around a lot more than usual 0   Thoughts that you would be better off dead, or of hurting yourself in some way 0   Total Score 2   If you checked off any problems, how difficult have these problems made it for you to do your work, take care of things at home, or get along with other people? Somewhat difficult       Health Habits and Functional and Cognitive Screening:  Functional & Cognitive Status 2/5/2019   Do you have difficulty preparing food and eating? No   Do you have difficulty bathing yourself, getting dressed or grooming yourself? No   Do you have difficulty using the toilet? No   Do you have difficulty moving around from place to place? No   Do you have trouble with steps or getting out of a bed or a chair? No   In the past year  have you fallen or experienced a near fall? No   Current Diet Limited Junk Food   Dental Exam Up to date   Eye Exam Up to date   Exercise (times per week) 5 times per week   Current Exercise Activities Include Weightlifting   Do you need help using the phone?  No   Are you deaf or do you have serious difficulty hearing?  No   Do you need help with transportation? No   Do you need help shopping? No   Do you need help preparing meals?  No   Do you need help with housework?  No   Do you need help with laundry? No   Do you need help taking your medications? No   Do you need help managing money? No   Do you ever drive or ride in a car without wearing a seat belt? No   Have you felt unusual stress, anger or loneliness in the last month? No   Who do you live with? Spouse   If you need help, do you have trouble finding someone available to you? No   Have you been bothered in the last four weeks by sexual problems? No   Do you have difficulty concentrating, remembering or making decisions? Yes           Does the patient have evidence of cognitive impairment? No    Aspirin use counseling: Does not need ASA (and currently is not on it)      Recent Lab Results:  CMP:  Lab Results   Component Value Date    GLU 91 08/06/2018    BUN 19 08/06/2018    CREATININE 1.33 (H) 08/06/2018    EGFRIFNONA 53 (L) 08/06/2018    EGFRIFAFRI 62 08/06/2018    BCR 14 08/06/2018     08/06/2018    K 4.2 08/06/2018    CO2 23 08/06/2018    CALCIUM 9.4 08/06/2018    PROTENTOTREF 6.7 08/06/2018    ALBUMIN 4.2 08/06/2018    LABGLOBREF 2.5 08/06/2018    LABIL2 1.7 08/06/2018    BILITOT 0.2 08/06/2018    ALKPHOS 79 08/06/2018    AST 29 08/06/2018    ALT 28 08/06/2018     Lipid Panel:  Lab Results   Component Value Date    TRIG 210 (H) 08/06/2018    HDL 53 08/06/2018    VLDL 42 (H) 08/06/2018     HbA1c:       Visual Acuity:  No exam data present    Age-appropriate Screening Schedule:  Refer to the list below for future screening recommendations based on  patient's age, sex and/or medical conditions. Orders for these recommended tests are listed in the plan section. The patient has been provided with a written plan.    Health Maintenance   Topic Date Due   • ZOSTER VACCINE (2 of 3) 03/16/2015   • COLONOSCOPY  07/19/2018   • TDAP/TD VACCINES (3 - Td) 01/12/2027   • INFLUENZA VACCINE  Completed   • PNEUMOCOCCAL VACCINES (65+ LOW/MEDIUM RISK)  Addressed        Subjective   History of Present Illness    Kp Hernandez is a 73 y.o. male who presents for an Subsequent Wellness Visit.    The following portions of the patient's history were reviewed and updated as appropriate: allergies, current medications, past family history, past medical history, past social history, past surgical history and problem list.    Outpatient Medications Prior to Visit   Medication Sig Dispense Refill   • allopurinol (ZYLOPRIM) 100 MG tablet Take 1 tablet by mouth Daily. 90 tablet 1   • baclofen (LIORESAL) 10 MG tablet 1 po tid prn muscle and leg pain 90 tablet 3   • escitalopram (LEXAPRO) 10 MG tablet Take 1 tablet by mouth Daily. 90 tablet 1   • finasteride (PROSCAR) 5 MG tablet TAKE 1 TABLET BY MOUTH ONCE DAILY AS DIRECTED 90 tablet 1   • fluticasone (FLONASE) 50 MCG/ACT nasal spray USE TWO SPRAY(S) IN EACH NOSTRIL ONCE DAILY 16 mL 3   • gabapentin (NEURONTIN) 300 MG capsule 1 po am, 1 noon and 2 nightly 120 capsule 5   • hydrochlorothiazide (HYDRODIURIL) 25 MG tablet Take 1 tablet by mouth Daily. 90 tablet 3   • HYDROcodone-acetaminophen (NORCO)  MG per tablet Take 1 tablet by mouth Every 6 (Six) Hours As Needed for Moderate Pain . 120 tablet 0   • metoprolol succinate XL (TOPROL-XL) 25 MG 24 hr tablet Take 1 tablet by mouth Daily. 90 tablet 3   • mirtazapine (REMERON) 30 MG tablet TAKE ONE TABLET BY MOUTH EVERY NIGHT 90 tablet 3   • ranitidine (ZANTAC) 150 MG tablet Take 150 mg by mouth every 12 (twelve) hours.     • SPIRIVA HANDIHALER 18 MCG per inhalation capsule      • TWINRIX  720-20 injection      • MethylPREDNISolone (MEDROL, SHUKRI,) 4 MG tablet Take as directed on package instructions. 21 each 0   • predniSONE (DELTASONE) 10 MG tablet 6 tabs po qd x 2 days, 4 tabs po qd x 2 days, 3 tabs po qd x 2 days, 2 tabs po qd x 2 days, 1 tab po qd x 2 days 32 tablet 0     No facility-administered medications prior to visit.        Patient Active Problem List   Diagnosis   • Benign essential hypertension   • Benign prostatic hypertrophy without urinary obstruction   • CKD (chronic kidney disease) stage 2, GFR 60-89 ml/min   • Chronic obstructive pulmonary disease (CMS/HCC)   • Depression with anxiety   • Dyslipidemia   • Ganglion   • Gastroesophageal reflux disease without esophagitis   • Hearing loss   • Osteoarthritis   • Impacted cerumen       Advance Care Planning:  has NO advance directive - information provided to the patient today    Identification of Risk Factors:  Risk factors include: weight .    Review of Systems   Constitutional: Negative for fever.   Respiratory: Negative for shortness of breath.    Cardiovascular: Negative for chest pain, orthopnea and PND.       Compared to one year ago, the patient feels his physical health is the same.  Compared to one year ago, the patient feels his mental health is the same.    Objective     Physical Exam   Constitutional: He appears well-developed and well-nourished.   HENT:   Head: Normocephalic and atraumatic.   Neck: Neck supple. No JVD present. No thyromegaly present.   Cardiovascular: Normal rate, regular rhythm and normal heart sounds. Exam reveals no gallop and no friction rub.   No murmur heard.  Pulmonary/Chest: Effort normal and breath sounds normal. No respiratory distress. He has no wheezes. He has no rales.   Abdominal: Soft. Bowel sounds are normal. He exhibits no distension. There is no tenderness. There is no rebound and no guarding.   Musculoskeletal: He exhibits no edema.   Neurological: He is alert.   Skin: Skin is warm and  dry.   Psychiatric: He has a normal mood and affect. His behavior is normal.   Nursing note and vitals reviewed.      Vitals:    02/05/19 1334   BP: 140/70   Pulse: 72   Temp: 97.4 °F (36.3 °C)   SpO2: 96%   Weight: 99.3 kg (219 lb)       Patient's Body mass index is 34.3 kg/m². BMI is above normal parameters. Recommendations include: educational material.      Assessment/Plan   Patient Self-Management and Personalized Health Advice  The patient has been provided with information about: diet and preventive services including:   · Advance directive.    Visit Diagnoses:    ICD-10-CM ICD-9-CM   1. Medicare annual wellness visit, subsequent Z00.00 V70.0   2. CKD (chronic kidney disease) stage 2, GFR 60-89 ml/min N18.2 585.2   3. Primary osteoarthritis involving multiple joints M15.0 715.09   4. Benign essential hypertension I10 401.1   5. Hyperuricemia E79.0 790.6       Orders Placed This Encounter   Procedures   • Comprehensive Metabolic Panel   • Uric Acid   • CBC & Differential     Order Specific Question:   Manual Differential     Answer:   No       Outpatient Encounter Medications as of 2/5/2019   Medication Sig Dispense Refill   • allopurinol (ZYLOPRIM) 100 MG tablet Take 1 tablet by mouth Daily. 90 tablet 1   • baclofen (LIORESAL) 10 MG tablet 1 po tid prn muscle and leg pain 90 tablet 3   • escitalopram (LEXAPRO) 10 MG tablet Take 1 tablet by mouth Daily. 90 tablet 1   • finasteride (PROSCAR) 5 MG tablet TAKE 1 TABLET BY MOUTH ONCE DAILY AS DIRECTED 90 tablet 1   • fluticasone (FLONASE) 50 MCG/ACT nasal spray USE TWO SPRAY(S) IN EACH NOSTRIL ONCE DAILY 16 mL 3   • gabapentin (NEURONTIN) 300 MG capsule 1 po am, 1 noon and 2 nightly 120 capsule 5   • hydrochlorothiazide (HYDRODIURIL) 25 MG tablet Take 1 tablet by mouth Daily. 90 tablet 3   • HYDROcodone-acetaminophen (NORCO)  MG per tablet Take 1 tablet by mouth Every 6 (Six) Hours As Needed for Moderate Pain . 120 tablet 0   • metoprolol succinate XL  (TOPROL-XL) 25 MG 24 hr tablet Take 1 tablet by mouth Daily. 90 tablet 3   • mirtazapine (REMERON) 30 MG tablet TAKE ONE TABLET BY MOUTH EVERY NIGHT 90 tablet 3   • ranitidine (ZANTAC) 150 MG tablet Take 150 mg by mouth every 12 (twelve) hours.     • SPIRIVA HANDIHALER 18 MCG per inhalation capsule      • TWINRIX 720-20 injection      • [DISCONTINUED] MethylPREDNISolone (MEDROL, SHUKRI,) 4 MG tablet Take as directed on package instructions. 21 each 0   • [DISCONTINUED] predniSONE (DELTASONE) 10 MG tablet 6 tabs po qd x 2 days, 4 tabs po qd x 2 days, 3 tabs po qd x 2 days, 2 tabs po qd x 2 days, 1 tab po qd x 2 days 32 tablet 0     No facility-administered encounter medications on file as of 2/5/2019.        Reviewed use of high risk medication in the elderly: yes  Reviewed for potential of harmful drug interactions in the elderly: yes    Follow Up:  Return in about 6 months (around 8/5/2019), or if symptoms worsen or fail to improve.     An After Visit Summary and PPPS with all of these plans were given to the patient.           ++++++++++++++++++++++++++++++++++++++++++++++++++++++++++++++++++     CC:htn,ckd, hyperuricemia; OA  Hypertension   This is a chronic problem. The current episode started more than 1 year ago. The problem is unchanged. The problem is controlled. Pertinent negatives include no chest pain, orthopnea, peripheral edema, PND or shortness of breath. There are no associated agents to hypertension. Risk factors for coronary artery disease include male gender. Past treatments include beta blockers and diuretics. Current antihypertension treatment includes diuretics and beta blockers. The current treatment provides moderate improvement. There are no compliance problems.  Hypertensive end-organ damage includes kidney disease. Identifiable causes of hypertension include chronic renal disease (stage II).   Osteoarthritis   Presents for follow-up visit. He complains of pain. Symptom course: fluctuating.  Affected location: back and multiple joints. Pertinent negatives include no fever. (Had gout flare, resolved after prednisone.  On allopurinol to suppress uric acid;  Is on hctz which can raise.) His past medical history is significant for osteoarthritis. Compliance with total regimen is %. Compliance with medications is %.       Review of Systems   Constitution: Negative for fever.   Cardiovascular: Negative for chest pain, orthopnea and paroxysmal nocturnal dyspnea.   Respiratory: Negative for shortness of breath.        Social History     Tobacco Use   • Smoking status: Never Smoker   • Smokeless tobacco: Never Used   Substance Use Topics   • Alcohol use: No     O:   Vitals:    02/05/19 1334   BP: 140/70   Pulse: 72   Temp: 97.4 °F (36.3 °C)   SpO2: 96%   Weight: 99.3 kg (219 lb)       Physical Exam   Constitutional: He appears well-developed and well-nourished.   HENT:   Head: Normocephalic and atraumatic.   Neck: Neck supple. No JVD present. No thyromegaly present.   Cardiovascular: Normal rate, regular rhythm and normal heart sounds. Exam reveals no gallop and no friction rub.   No murmur heard.  Pulmonary/Chest: Effort normal and breath sounds normal. No respiratory distress. He has no wheezes. He has no rales.   Abdominal: Soft. Bowel sounds are normal. He exhibits no distension. There is no tenderness. There is no rebound and no guarding.   Musculoskeletal: He exhibits no edema.   Neurological: He is alert.   Skin: Skin is warm and dry.   Psychiatric: He has a normal mood and affect. His behavior is normal.   Nursing note and vitals reviewed.      Kp was seen today for annual exam.    Diagnoses and all orders for this visit:    Medicare annual wellness visit, subsequent    CKD (chronic kidney disease) stage 2, GFR 60-89 ml/min  -     CBC & Differential  -     Comprehensive Metabolic Panel  -     Uric Acid    Primary osteoarthritis involving multiple joints    Benign essential hypertension  -      CBC & Differential  -     Comprehensive Metabolic Panel  -     Uric Acid    Hyperuricemia  -     CBC & Differential  -     Comprehensive Metabolic Panel  -     Uric Acid    -recheck uric acid as high last check and jsut adjusted dose this past fall  -hypertension - controlled, continue medications  -ckd - avoid nsaids  The patient has read and signed the [facility] Controlled Substance Contract.  I will continue to see patient for regular follow up appointments.  They are well controlled on their medication.  EDILIA is updated every 3 months. The patient is aware of the potential for addiction and dependence.      Return in about 6 months (around 8/5/2019), or if symptoms worsen or fail to improve.

## 2019-02-05 NOTE — PATIENT INSTRUCTIONS
Calorie Counting for Weight Loss  Calories are units of energy. Your body needs a certain amount of calories from food to keep you going throughout the day. When you eat more calories than your body needs, your body stores the extra calories as fat. When you eat fewer calories than your body needs, your body burns fat to get the energy it needs.  Calorie counting means keeping track of how many calories you eat and drink each day. Calorie counting can be helpful if you need to lose weight. If you make sure to eat fewer calories than your body needs, you should lose weight. Ask your health care provider what a healthy weight is for you.  For calorie counting to work, you will need to eat the right number of calories in a day in order to lose a healthy amount of weight per week. A dietitian can help you determine how many calories you need in a day and will give you suggestions on how to reach your calorie goal.  · A healthy amount of weight to lose per week is usually 1-2 lb (0.5-0.9 kg). This usually means that your daily calorie intake should be reduced by 500-750 calories.  · Eating 1,200 - 1,500 calories per day can help most women lose weight.  · Eating 1,500 - 1,800 calories per day can help most men lose weight.    What do I need to know about calorie counting?  In order to meet your daily calorie goal, you will need to:  · Find out how many calories are in each food you would like to eat. Try to do this before you eat.  · Decide how much of the food you plan to eat.  · Write down what you ate and how many calories it had. Doing this is called keeping a food log.    To successfully lose weight, it is important to balance calorie counting with a healthy lifestyle that includes regular activity. Aim for 150 minutes of moderate exercise (such as walking) or 75 minutes of vigorous exercise (such as running) each week.  Where do I find calorie information?    The number of calories in a food can be found on a  Nutrition Facts label. If a food does not have a Nutrition Facts label, try to look up the calories online or ask your dietitian for help.  Remember that calories are listed per serving. If you choose to have more than one serving of a food, you will have to multiply the calories per serving by the amount of servings you plan to eat. For example, the label on a package of bread might say that a serving size is 1 slice and that there are 90 calories in a serving. If you eat 1 slice, you will have eaten 90 calories. If you eat 2 slices, you will have eaten 180 calories.  How do I keep a food log?  Immediately after each meal, record the following information in your food log:  · What you ate. Don't forget to include toppings, sauces, and other extras on the food.  · How much you ate. This can be measured in cups, ounces, or number of items.  · How many calories each food and drink had.  · The total number of calories in the meal.    Keep your food log near you, such as in a small notebook in your pocket, or use a mobile sharad or website. Some programs will calculate calories for you and show you how many calories you have left for the day to meet your goal.  What are some calorie counting tips?  · Use your calories on foods and drinks that will fill you up and not leave you hungry:  ? Some examples of foods that fill you up are nuts and nut butters, vegetables, lean proteins, and high-fiber foods like whole grains. High-fiber foods are foods with more than 5 g fiber per serving.  ? Drinks such as sodas, specialty coffee drinks, alcohol, and juices have a lot of calories, yet do not fill you up.  · Eat nutritious foods and avoid empty calories. Empty calories are calories you get from foods or beverages that do not have many vitamins or protein, such as candy, sweets, and soda. It is better to have a nutritious high-calorie food (such as an avocado) than a food with few nutrients (such as a bag of chips).  · Know how  "many calories are in the foods you eat most often. This will help you calculate calorie counts faster.  · Pay attention to calories in drinks. Low-calorie drinks include water and unsweetened drinks.  · Pay attention to nutrition labels for \"low fat\" or \"fat free\" foods. These foods sometimes have the same amount of calories or more calories than the full fat versions. They also often have added sugar, starch, or salt, to make up for flavor that was removed with the fat.  · Find a way of tracking calories that works for you. Get creative. Try different apps or programs if writing down calories does not work for you.  What are some portion control tips?  · Know how many calories are in a serving. This will help you know how many servings of a certain food you can have.  · Use a measuring cup to measure serving sizes. You could also try weighing out portions on a kitchen scale. With time, you will be able to estimate serving sizes for some foods.  · Take some time to put servings of different foods on your favorite plates, bowls, and cups so you know what a serving looks like.  · Try not to eat straight from a bag or box. Doing this can lead to overeating. Put the amount you would like to eat in a cup or on a plate to make sure you are eating the right portion.  · Use smaller plates, glasses, and bowls to prevent overeating.  · Try not to multitask (for example, watch TV or use your computer) while eating. If it is time to eat, sit down at a table and enjoy your food. This will help you to know when you are full. It will also help you to be aware of what you are eating and how much you are eating.  What are tips for following this plan?  Reading food labels  · Check the calorie count compared to the serving size. The serving size may be smaller than what you are used to eating.  · Check the source of the calories. Make sure the food you are eating is high in vitamins and protein and low in saturated and trans " fats.  Shopping  · Read nutrition labels while you shop. This will help you make healthy decisions before you decide to purchase your food.  · Make a grocery list and stick to it.  Cooking  · Try to cook your favorite foods in a healthier way. For example, try baking instead of frying.  · Use low-fat dairy products.  Meal planning  · Use more fruits and vegetables. Half of your plate should be fruits and vegetables.  · Include lean proteins like poultry and fish.  How do I count calories when eating out?  · Ask for smaller portion sizes.  · Consider sharing an entree and sides instead of getting your own entree.  · If you get your own entree, eat only half. Ask for a box at the beginning of your meal and put the rest of your entree in it so you are not tempted to eat it.  · If calories are listed on the menu, choose the lower calorie options.  · Choose dishes that include vegetables, fruits, whole grains, low-fat dairy products, and lean protein.  · Choose items that are boiled, broiled, grilled, or steamed. Stay away from items that are buttered, battered, fried, or served with cream sauce. Items labeled “crispy” are usually fried, unless stated otherwise.  · Choose water, low-fat milk, unsweetened iced tea, or other drinks without added sugar. If you want an alcoholic beverage, choose a lower calorie option such as a glass of wine or light beer.  · Ask for dressings, sauces, and syrups on the side. These are usually high in calories, so you should limit the amount you eat.  · If you want a salad, choose a garden salad and ask for grilled meats. Avoid extra toppings like teresa, cheese, or fried items. Ask for the dressing on the side, or ask for olive oil and vinegar or lemon to use as dressing.  · Estimate how many servings of a food you are given. For example, a serving of cooked rice is ½ cup or about the size of half a baseball. Knowing serving sizes will help you be aware of how much food you are eating at  "restaurants. The list below tells you how big or small some common portion sizes are based on everyday objects:  ? 1 oz--4 stacked dice.  ? 3 oz--1 deck of cards.  ? 1 tsp--1 die.  ? 1 Tbsp--½ a ping-pong ball.  ? 2 Tbsp--1 ping-pong ball.  ? ½ cup--½ baseball.  ? 1 cup--1 baseball.  Summary  · Calorie counting means keeping track of how many calories you eat and drink each day. If you eat fewer calories than your body needs, you should lose weight.  · A healthy amount of weight to lose per week is usually 1-2 lb (0.5-0.9 kg). This usually means reducing your daily calorie intake by 500-750 calories.  · The number of calories in a food can be found on a Nutrition Facts label. If a food does not have a Nutrition Facts label, try to look up the calories online or ask your dietitian for help.  · Use your calories on foods and drinks that will fill you up, and not on foods and drinks that will leave you hungry.  · Use smaller plates, glasses, and bowls to prevent overeating.  This information is not intended to replace advice given to you by your health care provider. Make sure you discuss any questions you have with your health care provider.  Document Released: 12/18/2006 Document Revised: 11/17/2017 Document Reviewed: 11/17/2017  [employer] Interactive Patient Education © 2018 [employer]  Osteoarthritis  Osteoarthritis is a type of arthritis that affects tissue that covers the ends of bones in joints (cartilage). Cartilage acts as a cushion between the bones and helps them move smoothly. Osteoarthritis results when cartilage in the joints gets worn down. Osteoarthritis is sometimes called \"wear and tear\" arthritis.  Osteoarthritis is the most common form of arthritis. It often occurs in older people. It is a condition that gets worse over time (a progressive condition). Joints that are most often affected by this condition are in:  · Fingers.  · Toes.  · Hips.  · Knees.  · Spine, including neck and lower " back.    What are the causes?  This condition is caused by age-related wearing down of cartilage that covers the ends of bones.  What increases the risk?  The following factors may make you more likely to develop this condition:  · Older age.  · Being overweight or obese.  · Overuse of joints, such as in athletes.  · Past injury of a joint.  · Past surgery on a joint.  · Family history of osteoarthritis.    What are the signs or symptoms?  The main symptoms of this condition are pain, swelling, and stiffness in the joint. The joint may lose its shape over time. Small pieces of bone or cartilage may break off and float inside of the joint, which may cause more pain and damage to the joint. Small deposits of bone (osteophytes) may grow on the edges of the joint. Other symptoms may include:  · A grating or scraping feeling inside the joint when you move it.  · Popping or creaking sounds when you move.    Symptoms may affect one or more joints. Osteoarthritis in a major joint, such as your knee or hip, can make it painful to walk or exercise. If you have osteoarthritis in your hands, you might not be able to  items, twist your hand, or control small movements of your hands and fingers (fine motor skills).  How is this diagnosed?  This condition may be diagnosed based on:  · Your medical history.  · A physical exam.  · Your symptoms.  · X-rays of the affected joint(s).  · Blood tests to rule out other types of arthritis.    How is this treated?  There is no cure for this condition, but treatment can help to control pain and improve joint function. Treatment plans may include:  · A prescribed exercise program that allows for rest and joint relief. You may work with a physical therapist.  · A weight control plan.  · Pain relief techniques, such as:  ? Applying heat and cold to the joint.  ? Electric pulses delivered to nerve endings under the skin (transcutaneous electrical nerve stimulation, or  TENS).  ? Massage.  ? Certain nutritional supplements.  · NSAIDs or prescription medicines to help relieve pain.  · Medicine to help relieve pain and inflammation (corticosteroids). This can be given by mouth (orally) or as an injection.  · Assistive devices, such as a brace, wrap, splint, specialized glove, or cane.  · Surgery, such as:  ? An osteotomy. This is done to reposition the bones and relieve pain or to remove loose pieces of bone and cartilage.  ? Joint replacement surgery. You may need this surgery if you have very bad (advanced) osteoarthritis.    Follow these instructions at home:  Activity  · Rest your affected joints as directed by your health care provider.  · Do not drive or use heavy machinery while taking prescription pain medicine.  · Exercise as directed. Your health care provider or physical therapist may recommend specific types of exercise, such as:  ? Strengthening exercises. These are done to strengthen the muscles that support joints that are affected by arthritis. They can be performed with weights or with exercise bands to add resistance.  ? Aerobic activities. These are exercises, such as brisk walking or water aerobics, that get your heart pumping.  ? Range-of-motion activities. These keep your joints easy to move.  ? Balance and agility exercises.  Managing pain, stiffness, and swelling  · If directed, apply heat to the affected area as often as told by your health care provider. Use the heat source that your health care provider recommends, such as a moist heat pack or a heating pad.  ? If you have a removable assistive device, remove it as told by your health care provider.  ? Place a towel between your skin and the heat source. If your health care provider tells you to keep the assistive device on while you apply heat, place a towel between the assistive device and the heat source.  ? Leave the heat on for 20-30 minutes.  ? Remove the heat if your skin turns bright red. This is  especially important if you are unable to feel pain, heat, or cold. You may have a greater risk of getting burned.  · If directed, put ice on the affected joint:  ? If you have a removable assistive device, remove it as told by your health care provider.  ? Put ice in a plastic bag.  ? Place a towel between your skin and the bag. If your health care provider tells you to keep the assistive device on during icing, place a towel between the assistive device and the bag.  ? Leave the ice on for 20 minutes, 2-3 times a day.  General instructions  · Take over-the-counter and prescription medicines only as told by your health care provider.  · Maintain a healthy weight. Follow instructions from your health care provider for weight control. These may include dietary restrictions.  · Do not use any products that contain nicotine or tobacco, such as cigarettes and e-cigarettes. These can delay bone healing. If you need help quitting, ask your health care provider.  · Use assistive devices as directed by your health care provider.  · Keep all follow-up visits as told by your health care provider. This is important.  Where to find more information:  · National [place] of Arthritis and Musculoskeletal and Skin Diseases: www.niams.nih.gov  · National [place] on Aging: www.patrick.nih.gov  · American College of Rheumatology: www.rheumatology.org  Contact a health care provider if:  · Your skin turns red.  · You develop a rash.  · You have pain that gets worse.  · You have a fever along with joint or muscle aches.  Get help right away if:  · You lose a lot of weight.  · You suddenly lose your appetite.  · You have night sweats.  Summary  · Osteoarthritis is a type of arthritis that affects tissue covering the ends of bones in joints (cartilage).  · This condition is caused by age-related wearing down of cartilage that covers the ends of bones.  · The main symptom of this condition is pain, swelling, and stiffness in the  joint.  · There is no cure for this condition, but treatment can help to control pain and improve joint function.  This information is not intended to replace advice given to you by your health care provider. Make sure you discuss any questions you have with your health care provider.  Document Released: 12/18/2006 Document Revised: 08/21/2017 Document Reviewed: 08/21/2017  [employer] Interactive Patient Education © 2018 [employer]  Advance Directive  Advance directives are legal documents that let you make choices ahead of time about your health care and medical treatment in case you become unable to communicate for yourself. Advance directives are a way for you to communicate your wishes to family, friends, and health care providers. This can help convey your decisions about end-of-life care if you become unable to communicate.  Discussing and writing advance directives should happen over time rather than all at once. Advance directives can be changed depending on your situation and what you want, even after you have signed the advance directives.  If you do not have an advance directive, some states assign family decision makers to act on your behalf based on how closely you are related to them. Each state has its own laws regarding advance directives. You may want to check with your health care provider, , or state representative about the laws in your state. There are different types of advance directives, such as:  · Medical power of .  · Living will.  · Do not resuscitate (DNR) or do not attempt resuscitation (DNAR) order.    Health care proxy and medical power of   A health care proxy, also called a health care agent, is a person who is appointed to make medical decisions for you in cases in which you are unable to make the decisions yourself. Generally, people choose someone they know well and trust to represent their preferences. Make sure to ask this person for an agreement to act  as your proxy. A proxy may have to exercise judgment in the event of a medical decision for which your wishes are not known.  A medical power of  is a legal document that names your health care proxy. Depending on the laws in your state, after the document is written, it may also need to be:  · Signed.  · Notarized.  · Dated.  · Copied.  · Witnessed.  · Incorporated into your medical record.    You may also want to appoint someone to manage your financial affairs in a situation in which you are unable to do so. This is called a durable power of  for finances. It is a separate legal document from the durable power of  for health care. You may choose the same person or someone different from your health care proxy to act as your agent in financial matters.  If you do not appoint a proxy, or if there is a concern that the proxy is not acting in your best interests, a court-appointed guardian may be designated to act on your behalf.  Living will  A living will is a set of instructions documenting your wishes about medical care when you cannot express them yourself. Health care providers should keep a copy of your living will in your medical record. You may want to give a copy to family members or friends. To alert caregivers in case of an emergency, you can place a card in your wallet to let them know that you have a living will and where they can find it. A living will is used if you become:  · Terminally ill.  · Incapacitated.  · Unable to communicate or make decisions.    Items to consider in your living will include:  · The use or non-use of life-sustaining equipment, such as dialysis machines and breathing machines (ventilators).  · A DNR or DNAR order, which is the instruction not to use cardiopulmonary resuscitation (CPR) if breathing or heartbeat stops.  · The use or non-use of tube feeding.  · Withholding of food and fluids.  · Comfort (palliative) care when the goal becomes comfort  rather than a cure.  · Organ and tissue donation.    A living will does not give instructions for distributing your money and property if you should pass away. It is recommended that you seek the advice of a  when writing a will. Decisions about taxes, beneficiaries, and asset distribution will be legally binding. This process can relieve your family and friends of any concerns surrounding disputes or questions that may come up about the distribution of your assets.  DNR or DNAR  A DNR or DNAR order is a request not to have CPR in the event that your heart stops beating or you stop breathing. If a DNR or DNAR order has not been made and shared, a health care provider will try to help any patient whose heart has stopped or who has stopped breathing. If you plan to have surgery, talk with your health care provider about how your DNR or DNAR order will be followed if problems occur.  Summary  · Advance directives are the legal documents that allow you to make choices ahead of time about your health care and medical treatment in case you become unable to communicate for yourself.  · The process of discussing and writing advance directives should happen over time. You can change the advance directives, even after you have signed them.  · Advance directives include DNR or DNAR orders, living zendejas, and designating an agent as your medical power of .  This information is not intended to replace advice given to you by your health care provider. Make sure you discuss any questions you have with your health care provider.  Document Released: 03/26/2009 Document Revised: 11/06/2017 Document Reviewed: 11/06/2017  [employer] Interactive Patient Education © 2017 [employer]

## 2019-02-06 LAB
ALBUMIN SERPL-MCNC: 4.2 G/DL (ref 3.5–4.8)
ALBUMIN/GLOB SERPL: 1.8 {RATIO} (ref 1.2–2.2)
ALP SERPL-CCNC: 73 IU/L (ref 39–117)
ALT SERPL-CCNC: 24 IU/L (ref 0–44)
AST SERPL-CCNC: 24 IU/L (ref 0–40)
BASOPHILS # BLD AUTO: 0 X10E3/UL (ref 0–0.2)
BASOPHILS NFR BLD AUTO: 0 %
BILIRUB SERPL-MCNC: 0.3 MG/DL (ref 0–1.2)
BUN SERPL-MCNC: 16 MG/DL (ref 8–27)
BUN/CREAT SERPL: 14 (ref 10–24)
CALCIUM SERPL-MCNC: 9.1 MG/DL (ref 8.6–10.2)
CHLORIDE SERPL-SCNC: 103 MMOL/L (ref 96–106)
CO2 SERPL-SCNC: 25 MMOL/L (ref 20–29)
CREAT SERPL-MCNC: 1.16 MG/DL (ref 0.76–1.27)
EOSINOPHIL # BLD AUTO: 0.6 X10E3/UL (ref 0–0.4)
EOSINOPHIL NFR BLD AUTO: 8 %
ERYTHROCYTE [DISTWIDTH] IN BLOOD BY AUTOMATED COUNT: 14.6 % (ref 12.3–15.4)
GLOBULIN SER CALC-MCNC: 2.3 G/DL (ref 1.5–4.5)
GLUCOSE SERPL-MCNC: 95 MG/DL (ref 65–99)
HCT VFR BLD AUTO: 42.2 % (ref 37.5–51)
HGB BLD-MCNC: 14.4 G/DL (ref 13–17.7)
IMM GRANULOCYTES # BLD AUTO: 0 X10E3/UL (ref 0–0.1)
IMM GRANULOCYTES NFR BLD AUTO: 0 %
LYMPHOCYTES # BLD AUTO: 2.5 X10E3/UL (ref 0.7–3.1)
LYMPHOCYTES NFR BLD AUTO: 37 %
MCH RBC QN AUTO: 31.7 PG (ref 26.6–33)
MCHC RBC AUTO-ENTMCNC: 34.1 G/DL (ref 31.5–35.7)
MCV RBC AUTO: 93 FL (ref 79–97)
MONOCYTES # BLD AUTO: 1 X10E3/UL (ref 0.1–0.9)
MONOCYTES NFR BLD AUTO: 15 %
NEUTROPHILS # BLD AUTO: 2.6 X10E3/UL (ref 1.4–7)
NEUTROPHILS NFR BLD AUTO: 40 %
PLATELET # BLD AUTO: 216 X10E3/UL (ref 150–379)
POTASSIUM SERPL-SCNC: 4.1 MMOL/L (ref 3.5–5.2)
PROT SERPL-MCNC: 6.5 G/DL (ref 6–8.5)
RBC # BLD AUTO: 4.54 X10E6/UL (ref 4.14–5.8)
SODIUM SERPL-SCNC: 143 MMOL/L (ref 134–144)
URATE SERPL-MCNC: 5.8 MG/DL (ref 3.7–8.6)
WBC # BLD AUTO: 6.7 X10E3/UL (ref 3.4–10.8)

## 2019-02-25 ENCOUNTER — TELEPHONE (OUTPATIENT)
Dept: FAMILY MEDICINE CLINIC | Facility: CLINIC | Age: 74
End: 2019-02-25

## 2019-02-25 DIAGNOSIS — M15.9 GENERALIZED OSTEOARTHRITIS OF MULTIPLE SITES: ICD-10-CM

## 2019-02-25 RX ORDER — HYDROCODONE BITARTRATE AND ACETAMINOPHEN 10; 325 MG/1; MG/1
1 TABLET ORAL EVERY 6 HOURS PRN
Qty: 120 TABLET | Refills: 0 | Status: SHIPPED | OUTPATIENT
Start: 2019-02-25 | End: 2019-03-25 | Stop reason: SDUPTHER

## 2019-02-27 ENCOUNTER — TELEPHONE (OUTPATIENT)
Dept: FAMILY MEDICINE CLINIC | Facility: CLINIC | Age: 74
End: 2019-02-27

## 2019-02-28 RX ORDER — PREDNISONE 10 MG/1
TABLET ORAL
Qty: 32 TABLET | Refills: 0 | Status: SHIPPED | OUTPATIENT
Start: 2019-02-28 | End: 2019-07-25

## 2019-03-11 RX ORDER — ALLOPURINOL 100 MG/1
TABLET ORAL
Qty: 90 TABLET | Refills: 1 | Status: SHIPPED | OUTPATIENT
Start: 2019-03-11 | End: 2019-03-13 | Stop reason: SDUPTHER

## 2019-03-13 ENCOUNTER — TELEPHONE (OUTPATIENT)
Dept: FAMILY MEDICINE CLINIC | Facility: CLINIC | Age: 74
End: 2019-03-13

## 2019-03-13 RX ORDER — ALLOPURINOL 100 MG/1
100 TABLET ORAL 2 TIMES DAILY
Qty: 180 TABLET | Refills: 1 | Status: SHIPPED | OUTPATIENT
Start: 2019-03-13 | End: 2019-08-24 | Stop reason: SDUPTHER

## 2019-03-13 NOTE — TELEPHONE ENCOUNTER
Pt said you told him to double up on the allopurinol 100mg and I can't find anything where you told him to do that. Pt said the 100mg daily wasn't doing any good and so he took the allopurinol 100mg BID. I see where you put pt on a steroid pack and pt said he finished that a few days ago. Pt is out of the allopurinol 100mg and his insurance pay for it for a few more weeks. Please adivse what to tell pt.     Pt asked new med be sent to [facility] on his chart.

## 2019-03-25 ENCOUNTER — TELEPHONE (OUTPATIENT)
Dept: FAMILY MEDICINE CLINIC | Facility: CLINIC | Age: 74
End: 2019-03-25

## 2019-03-25 DIAGNOSIS — M15.9 GENERALIZED OSTEOARTHRITIS OF MULTIPLE SITES: ICD-10-CM

## 2019-03-25 RX ORDER — HYDROCODONE BITARTRATE AND ACETAMINOPHEN 10; 325 MG/1; MG/1
1 TABLET ORAL EVERY 6 HOURS PRN
Qty: 120 TABLET | Refills: 0 | Status: SHIPPED | OUTPATIENT
Start: 2019-03-25 | End: 2019-04-26 | Stop reason: SDUPTHER

## 2019-03-26 ENCOUNTER — OFFICE VISIT (OUTPATIENT)
Dept: FAMILY MEDICINE CLINIC | Facility: CLINIC | Age: 74
End: 2019-03-26

## 2019-03-26 VITALS
DIASTOLIC BLOOD PRESSURE: 64 MMHG | HEIGHT: 67 IN | SYSTOLIC BLOOD PRESSURE: 114 MMHG | OXYGEN SATURATION: 98 % | HEART RATE: 74 BPM | WEIGHT: 221 LBS | BODY MASS INDEX: 34.69 KG/M2

## 2019-03-26 DIAGNOSIS — Z48.02 VISIT FOR SUTURE REMOVAL: Primary | ICD-10-CM

## 2019-03-26 PROCEDURE — 99024 POSTOP FOLLOW-UP VISIT: CPT | Performed by: FAMILY MEDICINE

## 2019-03-26 NOTE — PROGRESS NOTES
Subjective   Kp Hernandez is a 73 y.o. male. Presents today for   Chief Complaint   Patient presents with   • Suture / Staple Removal       Suture / Staple Removal   The sutures were placed 7 to 10 days ago. He tried regular soap and water washings since the wound repair. The treatment provided moderate relief. There has been no drainage from the wound. There is no redness present. There is no swelling present. There is no pain present.   Patient laceration on right thumb (#4) and right index finger (#4);  Also left temple (#14).  Fell from ladder and hit head, +LOC with headache;   Seen in ER, reports head imaging normal.  Had headache with photophobia initially but all resolved;  No focal neuro deficits.    Review of Systems   Constitutional: Negative for chills and fever.   Neurological: Negative for dizziness, syncope, facial asymmetry, speech difficulty, weakness, light-headedness and headaches.       Patient Active Problem List   Diagnosis   • Benign essential hypertension   • Benign prostatic hypertrophy without urinary obstruction   • CKD (chronic kidney disease) stage 2, GFR 60-89 ml/min   • Chronic obstructive pulmonary disease (CMS/HCC)   • Depression with anxiety   • Dyslipidemia   • Ganglion   • Gastroesophageal reflux disease without esophagitis   • Hearing loss   • Osteoarthritis   • Impacted cerumen       Social History     Socioeconomic History   • Marital status:      Spouse name: Not on file   • Number of children: Not on file   • Years of education: Not on file   • Highest education level: Not on file   Occupational History   • Occupation: permanent disability   Tobacco Use   • Smoking status: Never Smoker   • Smokeless tobacco: Never Used   Substance and Sexual Activity   • Alcohol use: No   • Drug use: No       No Known Allergies    Current Outpatient Medications on File Prior to Visit   Medication Sig Dispense Refill   • allopurinol (ZYLOPRIM) 100 MG tablet Take 1 tablet by mouth 2  "(Two) Times a Day. 180 tablet 1   • baclofen (LIORESAL) 10 MG tablet 1 po tid prn muscle and leg pain 90 tablet 3   • escitalopram (LEXAPRO) 10 MG tablet Take 1 tablet by mouth Daily. 90 tablet 1   • finasteride (PROSCAR) 5 MG tablet TAKE 1 TABLET BY MOUTH ONCE DAILY AS DIRECTED 90 tablet 1   • fluticasone (FLONASE) 50 MCG/ACT nasal spray USE TWO SPRAY(S) IN EACH NOSTRIL ONCE DAILY 16 mL 3   • gabapentin (NEURONTIN) 300 MG capsule 1 po am, 1 noon and 2 nightly 120 capsule 5   • hydrochlorothiazide (HYDRODIURIL) 25 MG tablet Take 1 tablet by mouth Daily. 90 tablet 3   • HYDROcodone-acetaminophen (NORCO)  MG per tablet Take 1 tablet by mouth Every 6 (Six) Hours As Needed for Moderate Pain . 120 tablet 0   • metoprolol succinate XL (TOPROL-XL) 25 MG 24 hr tablet Take 1 tablet by mouth Daily. 90 tablet 3   • mirtazapine (REMERON) 30 MG tablet TAKE ONE TABLET BY MOUTH EVERY NIGHT 90 tablet 3   • predniSONE (DELTASONE) 10 MG tablet 6 tabs 2d, 4tabs 2d, 3 tabs 2d, 2 tabs 2d, 1 tab 2d 32 tablet 0   • ranitidine (ZANTAC) 150 MG tablet Take 150 mg by mouth every 12 (twelve) hours.     • SPIRIVA HANDIHALER 18 MCG per inhalation capsule      • TWINRIX 720-20 injection        No current facility-administered medications on file prior to visit.        Objective   Vitals:    03/26/19 1357   BP: 114/64   BP Location: Right arm   Patient Position: Sitting   Cuff Size: Large Adult   Pulse: 74   SpO2: 98%   Weight: 100 kg (221 lb)   Height: 170.2 cm (67\")       Physical Exam   Constitutional: He appears well-developed and well-nourished.   Neck: Neck supple.   Neurological: He is alert.   Skin: Skin is warm and dry.   Right index #4 sutures removed, partially coming out. No signs of infection;  Right thumb #4 removed, partially coming out.  Left Caodaism #14 removed, partially coming out. No signs of infection any of the wounds, look good.   Psychiatric: He has a normal mood and affect. His behavior is normal.   Nursing note and " vitals reviewed.      Assessment/Plan   Kp was seen today for suture / staple removal.    Diagnoses and all orders for this visit:    Visit for suture removal        Keep wounds out of direct sunlight  Should heal  Seek care if any new focal neuro deficits.       -Follow up: Prn - RTC if worse or no improvement.

## 2019-04-20 DIAGNOSIS — F41.9 ANXIETY: ICD-10-CM

## 2019-04-20 RX ORDER — ESCITALOPRAM OXALATE 10 MG/1
TABLET ORAL
Qty: 90 TABLET | Refills: 1 | Status: SHIPPED | OUTPATIENT
Start: 2019-04-20 | End: 2019-11-16 | Stop reason: SDUPTHER

## 2019-04-26 DIAGNOSIS — M15.9 GENERALIZED OSTEOARTHRITIS OF MULTIPLE SITES: ICD-10-CM

## 2019-04-26 RX ORDER — HYDROCODONE BITARTRATE AND ACETAMINOPHEN 10; 325 MG/1; MG/1
1 TABLET ORAL EVERY 6 HOURS PRN
Qty: 120 TABLET | Refills: 0 | Status: SHIPPED | OUTPATIENT
Start: 2019-04-26 | End: 2019-05-28 | Stop reason: SDUPTHER

## 2019-05-05 DIAGNOSIS — M79.605 PAIN IN BOTH LOWER EXTREMITIES: ICD-10-CM

## 2019-05-05 DIAGNOSIS — M79.10 MYALGIA: ICD-10-CM

## 2019-05-05 DIAGNOSIS — M79.604 PAIN IN BOTH LOWER EXTREMITIES: ICD-10-CM

## 2019-05-05 RX ORDER — GABAPENTIN 300 MG/1
CAPSULE ORAL
Qty: 120 CAPSULE | Refills: 5 | Status: CANCELLED | OUTPATIENT
Start: 2019-05-05

## 2019-05-07 DIAGNOSIS — M79.10 MYALGIA: ICD-10-CM

## 2019-05-07 DIAGNOSIS — M79.604 PAIN IN BOTH LOWER EXTREMITIES: ICD-10-CM

## 2019-05-07 DIAGNOSIS — M79.605 PAIN IN BOTH LOWER EXTREMITIES: ICD-10-CM

## 2019-05-07 RX ORDER — GABAPENTIN 300 MG/1
CAPSULE ORAL
Qty: 120 CAPSULE | Refills: 5 | Status: SHIPPED | OUTPATIENT
Start: 2019-05-07 | End: 2019-07-25 | Stop reason: SDUPTHER

## 2019-05-13 RX ORDER — FINASTERIDE 5 MG/1
TABLET, FILM COATED ORAL
Qty: 90 TABLET | Refills: 1 | Status: SHIPPED | OUTPATIENT
Start: 2019-05-13 | End: 2019-12-13 | Stop reason: SDUPTHER

## 2019-05-28 ENCOUNTER — TELEPHONE (OUTPATIENT)
Dept: FAMILY MEDICINE CLINIC | Facility: CLINIC | Age: 74
End: 2019-05-28

## 2019-05-28 DIAGNOSIS — M15.9 GENERALIZED OSTEOARTHRITIS OF MULTIPLE SITES: ICD-10-CM

## 2019-05-28 RX ORDER — HYDROCODONE BITARTRATE AND ACETAMINOPHEN 10; 325 MG/1; MG/1
1 TABLET ORAL EVERY 6 HOURS PRN
Qty: 120 TABLET | Refills: 0 | Status: SHIPPED | OUTPATIENT
Start: 2019-05-28 | End: 2019-06-27 | Stop reason: SDUPTHER

## 2019-06-06 ENCOUNTER — TELEPHONE (OUTPATIENT)
Dept: FAMILY MEDICINE CLINIC | Facility: CLINIC | Age: 74
End: 2019-06-06

## 2019-06-26 ENCOUNTER — TELEPHONE (OUTPATIENT)
Dept: FAMILY MEDICINE CLINIC | Facility: CLINIC | Age: 74
End: 2019-06-26

## 2019-06-27 DIAGNOSIS — M15.9 GENERALIZED OSTEOARTHRITIS OF MULTIPLE SITES: ICD-10-CM

## 2019-06-27 RX ORDER — HYDROCODONE BITARTRATE AND ACETAMINOPHEN 10; 325 MG/1; MG/1
1 TABLET ORAL EVERY 6 HOURS PRN
Qty: 120 TABLET | Refills: 0 | Status: SHIPPED | OUTPATIENT
Start: 2019-06-27 | End: 2019-07-30 | Stop reason: SDUPTHER

## 2019-07-25 ENCOUNTER — OFFICE VISIT (OUTPATIENT)
Dept: FAMILY MEDICINE CLINIC | Facility: CLINIC | Age: 74
End: 2019-07-25

## 2019-07-25 VITALS
SYSTOLIC BLOOD PRESSURE: 126 MMHG | BODY MASS INDEX: 32.96 KG/M2 | OXYGEN SATURATION: 93 % | HEIGHT: 67 IN | HEART RATE: 94 BPM | DIASTOLIC BLOOD PRESSURE: 78 MMHG | WEIGHT: 210 LBS

## 2019-07-25 DIAGNOSIS — M25.512 ACUTE PAIN OF LEFT SHOULDER: Primary | ICD-10-CM

## 2019-07-25 DIAGNOSIS — G89.29 CHRONIC BILATERAL LOW BACK PAIN WITH BILATERAL SCIATICA: ICD-10-CM

## 2019-07-25 DIAGNOSIS — M79.605 PAIN IN BOTH LOWER EXTREMITIES: ICD-10-CM

## 2019-07-25 DIAGNOSIS — M79.604 PAIN IN BOTH LOWER EXTREMITIES: ICD-10-CM

## 2019-07-25 DIAGNOSIS — M54.41 CHRONIC BILATERAL LOW BACK PAIN WITH BILATERAL SCIATICA: ICD-10-CM

## 2019-07-25 DIAGNOSIS — M54.42 CHRONIC BILATERAL LOW BACK PAIN WITH BILATERAL SCIATICA: ICD-10-CM

## 2019-07-25 PROCEDURE — 99213 OFFICE O/P EST LOW 20 MIN: CPT | Performed by: FAMILY MEDICINE

## 2019-07-25 PROCEDURE — 20610 DRAIN/INJ JOINT/BURSA W/O US: CPT | Performed by: FAMILY MEDICINE

## 2019-07-25 RX ORDER — METHYLPREDNISOLONE ACETATE 80 MG/ML
80 INJECTION, SUSPENSION INTRA-ARTICULAR; INTRALESIONAL; INTRAMUSCULAR; SOFT TISSUE ONCE
Status: COMPLETED | OUTPATIENT
Start: 2019-07-25 | End: 2019-07-25

## 2019-07-25 RX ORDER — LIDOCAINE HYDROCHLORIDE 20 MG/ML
2 INJECTION, SOLUTION INFILTRATION; PERINEURAL ONCE
Status: COMPLETED | OUTPATIENT
Start: 2019-07-25 | End: 2019-07-25

## 2019-07-25 RX ORDER — GABAPENTIN 300 MG/1
CAPSULE ORAL
Qty: 120 CAPSULE | Refills: 5 | Status: SHIPPED | OUTPATIENT
Start: 2019-07-25 | End: 2020-01-24 | Stop reason: SDUPTHER

## 2019-07-25 RX ADMIN — METHYLPREDNISOLONE ACETATE 80 MG: 80 INJECTION, SUSPENSION INTRA-ARTICULAR; INTRALESIONAL; INTRAMUSCULAR; SOFT TISSUE at 16:34

## 2019-07-25 RX ADMIN — LIDOCAINE HYDROCHLORIDE 2 ML: 20 INJECTION, SOLUTION INFILTRATION; PERINEURAL at 16:34

## 2019-07-25 NOTE — PROGRESS NOTES
Subjective   Kp Hernandez is a 73 y.o. male. Presents today for   Chief Complaint   Patient presents with   • Back Pain     follow up    • Shoulder Pain     follow up - left       Shoulder Injury    The incident occurred at home. The left shoulder is affected. The incident occurred more than 1 week ago. The injury mechanism was a fall. The quality of the pain is described as aching. The pain does not radiate. The pain is moderate. Pertinent negatives include no muscle weakness, numbness or tingling. The symptoms are aggravated by movement, overhead lifting and palpation. Treatments tried: exercises. The treatment provided mild relief.   Back Pain   This is a chronic problem. The current episode started more than 1 year ago. The problem occurs intermittently. The problem has been waxing and waning since onset. The pain is present in the lumbar spine. The quality of the pain is described as aching. The pain radiates to the left thigh and right thigh. The pain is moderate. Pertinent negatives include no numbness or tingling. Treatments tried: going up on gabapentin helped more;  would like refill.         Review of Systems   Musculoskeletal: Positive for back pain.   Neurological: Negative for tingling and numbness.       Patient Active Problem List   Diagnosis   • Benign essential hypertension   • Benign prostatic hypertrophy without urinary obstruction   • CKD (chronic kidney disease) stage 2, GFR 60-89 ml/min   • Chronic obstructive pulmonary disease (CMS/HCC)   • Depression with anxiety   • Dyslipidemia   • Ganglion   • Gastroesophageal reflux disease without esophagitis   • Hearing loss   • Osteoarthritis   • Impacted cerumen       Social History     Socioeconomic History   • Marital status:      Spouse name: Not on file   • Number of children: Not on file   • Years of education: Not on file   • Highest education level: Not on file   Occupational History   • Occupation: permanent disability   Tobacco Use  "  • Smoking status: Never Smoker   • Smokeless tobacco: Never Used   Substance and Sexual Activity   • Alcohol use: No   • Drug use: No       No Known Allergies    Current Outpatient Medications on File Prior to Visit   Medication Sig Dispense Refill   • allopurinol (ZYLOPRIM) 100 MG tablet Take 1 tablet by mouth 2 (Two) Times a Day. 180 tablet 1   • baclofen (LIORESAL) 10 MG tablet 1 po tid prn muscle and leg pain 90 tablet 3   • escitalopram (LEXAPRO) 10 MG tablet TAKE 1 TABLET BY MOUTH ONCE DAILY 90 tablet 1   • finasteride (PROSCAR) 5 MG tablet TAKE 1 TABLET BY MOUTH ONCE DAILY AS DIRECTED 90 tablet 1   • fluticasone (FLONASE) 50 MCG/ACT nasal spray USE TWO SPRAY(S) IN EACH NOSTRIL ONCE DAILY 16 mL 3   • gabapentin (NEURONTIN) 300 MG capsule 1 po am, 1 noon and 2 nightly 120 capsule 5   • hydrochlorothiazide (HYDRODIURIL) 25 MG tablet Take 1 tablet by mouth Daily. 90 tablet 3   • HYDROcodone-acetaminophen (NORCO)  MG per tablet Take 1 tablet by mouth Every 6 (Six) Hours As Needed for Moderate Pain . 120 tablet 0   • metoprolol succinate XL (TOPROL-XL) 25 MG 24 hr tablet Take 1 tablet by mouth Daily. 90 tablet 3   • mirtazapine (REMERON) 30 MG tablet TAKE ONE TABLET BY MOUTH EVERY NIGHT 90 tablet 3   • ranitidine (ZANTAC) 150 MG tablet Take 150 mg by mouth every 12 (twelve) hours.     • SPIRIVA HANDIHALER 18 MCG per inhalation capsule      • [DISCONTINUED] predniSONE (DELTASONE) 10 MG tablet 6 tabs 2d, 4tabs 2d, 3 tabs 2d, 2 tabs 2d, 1 tab 2d 32 tablet 0   • [DISCONTINUED] TWINRIX 720-20 injection        No current facility-administered medications on file prior to visit.        Objective   Vitals:    07/25/19 1526   BP: 126/78   BP Location: Right arm   Patient Position: Sitting   Cuff Size: Adult   Pulse: 94   SpO2: 93%   Weight: 95.3 kg (210 lb)   Height: 170.2 cm (67\")       Physical Exam   Constitutional: He appears well-developed and well-nourished.   Neck: Neck supple.   Musculoskeletal:        Left " shoulder: He exhibits decreased range of motion (to 120 deg;  negative drop arm), tenderness and pain. He exhibits no bony tenderness, no swelling, no effusion, no crepitus, no deformity, normal pulse and normal strength.   Neurological: He is alert.   Skin: Skin is warm and dry.   Psychiatric: He has a normal mood and affect. His behavior is normal.   Nursing note and vitals reviewed.  Arthrocentesis - left shoulder  Date/Time: 7/25/2019 4:42 PM  Performed by: Murphy Felix DO  Authorized by: Murphy Felix DO   Consent: Verbal consent obtained.  Risks and benefits: risks, benefits and alternatives were discussed  Consent given by: patient  Patient understanding: patient states understanding of the procedure being performed  Site marked: the operative site was marked  Required items: required blood products, implants, devices, and special equipment available  Patient identity confirmed: verbally with patient  Indications: pain   Body area: shoulder  Joint: left shoulder  Local anesthesia used: yes    Anesthesia:  Local anesthesia used: yes  Local Anesthetic: topical anesthetic    Sedation:  Patient sedated: no    Preparation: Patient was prepped and draped in the usual sterile fashion.  Needle size: 22 G  Ultrasound guidance: no  Approach: posterior  Patient tolerance: Patient tolerated the procedure well with no immediate complications          Assessment/Plan   Kp was seen today for back pain and shoulder pain.    Diagnoses and all orders for this visit:    Acute pain of left shoulder  -     methylPREDNISolone acetate (DEPO-medrol) injection 80 mg  -     lidocaine (XYLOCAINE) 2% injection 2 mL  -     Arthrocentesis    Pain in both lower extremities  -     gabapentin (NEURONTIN) 300 MG capsule; 1 po am, 1 noon and 2 nightly    Chronic bilateral low back pain with bilateral sciatica  -     gabapentin (NEURONTIN) 300 MG capsule; 1 po am, 1 noon and 2 nightly    -contineu gabpentin as Rx;  Sent  refill  -desires injection as helped on right;  Post-joint injection instructions given, warned may be sore and achy next 2-3 days, recommend ice as directed.  Prn - RTC if worse or no improvement.             -Follow up: 6 months and prn

## 2019-07-29 ENCOUNTER — TELEPHONE (OUTPATIENT)
Dept: FAMILY MEDICINE CLINIC | Facility: CLINIC | Age: 74
End: 2019-07-29

## 2019-07-30 DIAGNOSIS — M15.9 GENERALIZED OSTEOARTHRITIS OF MULTIPLE SITES: ICD-10-CM

## 2019-07-30 RX ORDER — HYDROCODONE BITARTRATE AND ACETAMINOPHEN 10; 325 MG/1; MG/1
1 TABLET ORAL EVERY 6 HOURS PRN
Qty: 120 TABLET | Refills: 0 | Status: SHIPPED | OUTPATIENT
Start: 2019-07-30 | End: 2019-08-29 | Stop reason: SDUPTHER

## 2019-08-26 RX ORDER — HYDROCHLOROTHIAZIDE 25 MG/1
TABLET ORAL
Qty: 90 TABLET | Refills: 3 | Status: SHIPPED | OUTPATIENT
Start: 2019-08-26 | End: 2020-09-10

## 2019-08-26 RX ORDER — ALLOPURINOL 100 MG/1
TABLET ORAL
Qty: 180 TABLET | Refills: 1 | Status: SHIPPED | OUTPATIENT
Start: 2019-08-26 | End: 2020-03-09

## 2019-08-28 ENCOUNTER — TELEPHONE (OUTPATIENT)
Dept: FAMILY MEDICINE CLINIC | Facility: CLINIC | Age: 74
End: 2019-08-28

## 2019-08-29 DIAGNOSIS — M15.9 GENERALIZED OSTEOARTHRITIS OF MULTIPLE SITES: ICD-10-CM

## 2019-08-29 RX ORDER — HYDROCODONE BITARTRATE AND ACETAMINOPHEN 10; 325 MG/1; MG/1
1 TABLET ORAL EVERY 6 HOURS PRN
Qty: 120 TABLET | Refills: 0 | Status: SHIPPED | OUTPATIENT
Start: 2019-08-29 | End: 2019-09-30 | Stop reason: SDUPTHER

## 2019-09-16 ENCOUNTER — OFFICE VISIT (OUTPATIENT)
Dept: FAMILY MEDICINE CLINIC | Facility: CLINIC | Age: 74
End: 2019-09-16

## 2019-09-16 VITALS
HEART RATE: 101 BPM | HEIGHT: 67 IN | OXYGEN SATURATION: 98 % | SYSTOLIC BLOOD PRESSURE: 128 MMHG | TEMPERATURE: 97.9 F | BODY MASS INDEX: 32.8 KG/M2 | WEIGHT: 209 LBS | DIASTOLIC BLOOD PRESSURE: 78 MMHG

## 2019-09-16 DIAGNOSIS — M54.2 CERVICAL PAIN (NECK): Primary | ICD-10-CM

## 2019-09-16 PROCEDURE — 99213 OFFICE O/P EST LOW 20 MIN: CPT | Performed by: NURSE PRACTITIONER

## 2019-09-16 RX ORDER — METHYLPREDNISOLONE 4 MG/1
TABLET ORAL
Qty: 21 TABLET | Refills: 0 | Status: SHIPPED | OUTPATIENT
Start: 2019-09-16 | End: 2020-01-24
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
## 2019-09-30 ENCOUNTER — TELEPHONE (OUTPATIENT)
Dept: FAMILY MEDICINE CLINIC | Facility: CLINIC | Age: 74
End: 2019-09-30

## 2019-09-30 DIAGNOSIS — M15.9 GENERALIZED OSTEOARTHRITIS OF MULTIPLE SITES: ICD-10-CM

## 2019-10-01 RX ORDER — HYDROCODONE BITARTRATE AND ACETAMINOPHEN 10; 325 MG/1; MG/1
1 TABLET ORAL EVERY 6 HOURS PRN
Qty: 120 TABLET | Refills: 0 | Status: SHIPPED | OUTPATIENT
Start: 2019-10-01 | End: 2019-10-31 | Stop reason: SDUPTHER

## 2019-10-07 RX ORDER — METOPROLOL SUCCINATE 25 MG/1
TABLET, EXTENDED RELEASE ORAL
Qty: 90 TABLET | Refills: 1 | Status: SHIPPED | OUTPATIENT
Start: 2019-10-07 | End: 2020-04-08

## 2019-10-29 ENCOUNTER — TELEPHONE (OUTPATIENT)
Dept: FAMILY MEDICINE CLINIC | Facility: CLINIC | Age: 74
End: 2019-10-29

## 2019-10-31 DIAGNOSIS — M15.9 GENERALIZED OSTEOARTHRITIS OF MULTIPLE SITES: ICD-10-CM

## 2019-10-31 RX ORDER — HYDROCODONE BITARTRATE AND ACETAMINOPHEN 10; 325 MG/1; MG/1
1 TABLET ORAL EVERY 6 HOURS PRN
Qty: 120 TABLET | Refills: 0 | Status: SHIPPED | OUTPATIENT
Start: 2019-10-31 | End: 2019-11-26 | Stop reason: SDUPTHER

## 2019-11-16 DIAGNOSIS — F41.9 ANXIETY: ICD-10-CM

## 2019-11-18 RX ORDER — ESCITALOPRAM OXALATE 10 MG/1
TABLET ORAL
Qty: 90 TABLET | Refills: 1 | Status: SHIPPED | OUTPATIENT
Start: 2019-11-18 | End: 2020-06-15

## 2019-11-26 ENCOUNTER — TELEPHONE (OUTPATIENT)
Dept: FAMILY MEDICINE CLINIC | Facility: CLINIC | Age: 74
End: 2019-11-26

## 2019-11-26 DIAGNOSIS — M79.605 PAIN IN BOTH LOWER EXTREMITIES: ICD-10-CM

## 2019-11-26 DIAGNOSIS — M15.9 GENERALIZED OSTEOARTHRITIS OF MULTIPLE SITES: ICD-10-CM

## 2019-11-26 DIAGNOSIS — M79.604 PAIN IN BOTH LOWER EXTREMITIES: ICD-10-CM

## 2019-11-26 DIAGNOSIS — M79.10 MYALGIA: ICD-10-CM

## 2019-11-26 RX ORDER — BACLOFEN 10 MG/1
TABLET ORAL
Qty: 90 TABLET | Refills: 3 | Status: SHIPPED | OUTPATIENT
Start: 2019-11-26 | End: 2020-12-21

## 2019-11-26 NOTE — TELEPHONE ENCOUNTER
Hydrocodone 10mg needs refilled send to the [facility] pharmacy on [address].

## 2019-11-29 RX ORDER — HYDROCODONE BITARTRATE AND ACETAMINOPHEN 10; 325 MG/1; MG/1
1 TABLET ORAL EVERY 6 HOURS PRN
Qty: 120 TABLET | Refills: 0 | Status: SHIPPED | OUTPATIENT
Start: 2019-11-29 | End: 2019-12-31 | Stop reason: SDUPTHER

## 2019-12-13 DIAGNOSIS — G47.09 OTHER INSOMNIA: ICD-10-CM

## 2019-12-16 RX ORDER — FINASTERIDE 5 MG/1
TABLET, FILM COATED ORAL
Qty: 90 TABLET | Refills: 0 | Status: SHIPPED | OUTPATIENT
Start: 2019-12-16 | End: 2020-03-23

## 2019-12-16 RX ORDER — MIRTAZAPINE 30 MG/1
TABLET, FILM COATED ORAL
Qty: 90 TABLET | Refills: 0 | Status: SHIPPED | OUTPATIENT
Start: 2019-12-16 | End: 2020-03-23

## 2019-12-31 DIAGNOSIS — M15.9 GENERALIZED OSTEOARTHRITIS OF MULTIPLE SITES: ICD-10-CM

## 2019-12-31 RX ORDER — HYDROCODONE BITARTRATE AND ACETAMINOPHEN 10; 325 MG/1; MG/1
1 TABLET ORAL EVERY 6 HOURS PRN
Qty: 120 TABLET | Refills: 0 | Status: SHIPPED | OUTPATIENT
Start: 2019-12-31 | End: 2020-01-30 | Stop reason: SDUPTHER

## 2020-01-24 ENCOUNTER — OFFICE VISIT (OUTPATIENT)
Dept: FAMILY MEDICINE CLINIC | Facility: CLINIC | Age: 75
End: 2020-01-24

## 2020-01-24 VITALS
BODY MASS INDEX: 32.49 KG/M2 | HEART RATE: 92 BPM | TEMPERATURE: 98.3 F | DIASTOLIC BLOOD PRESSURE: 78 MMHG | HEIGHT: 67 IN | WEIGHT: 207 LBS | SYSTOLIC BLOOD PRESSURE: 130 MMHG | OXYGEN SATURATION: 95 %

## 2020-01-24 DIAGNOSIS — E79.0 HYPERURICEMIA: ICD-10-CM

## 2020-01-24 DIAGNOSIS — M54.42 CHRONIC BILATERAL LOW BACK PAIN WITH BILATERAL SCIATICA: ICD-10-CM

## 2020-01-24 DIAGNOSIS — I10 BENIGN ESSENTIAL HYPERTENSION: Primary | ICD-10-CM

## 2020-01-24 DIAGNOSIS — L30.1 DYSHIDROTIC ECZEMA: ICD-10-CM

## 2020-01-24 DIAGNOSIS — B35.4 TINEA CORPORIS: ICD-10-CM

## 2020-01-24 DIAGNOSIS — N18.2 CKD (CHRONIC KIDNEY DISEASE) STAGE 2, GFR 60-89 ML/MIN: ICD-10-CM

## 2020-01-24 DIAGNOSIS — G89.29 CHRONIC BILATERAL LOW BACK PAIN WITH BILATERAL SCIATICA: ICD-10-CM

## 2020-01-24 DIAGNOSIS — G25.81 RLS (RESTLESS LEGS SYNDROME): ICD-10-CM

## 2020-01-24 DIAGNOSIS — E78.5 DYSLIPIDEMIA: ICD-10-CM

## 2020-01-24 DIAGNOSIS — M79.605 PAIN IN BOTH LOWER EXTREMITIES: ICD-10-CM

## 2020-01-24 DIAGNOSIS — M54.41 CHRONIC BILATERAL LOW BACK PAIN WITH BILATERAL SCIATICA: ICD-10-CM

## 2020-01-24 DIAGNOSIS — J44.9 COPD MIXED TYPE (HCC): ICD-10-CM

## 2020-01-24 DIAGNOSIS — E66.09 CLASS 1 OBESITY DUE TO EXCESS CALORIES WITH SERIOUS COMORBIDITY AND BODY MASS INDEX (BMI) OF 32.0 TO 32.9 IN ADULT: ICD-10-CM

## 2020-01-24 DIAGNOSIS — M79.604 PAIN IN BOTH LOWER EXTREMITIES: ICD-10-CM

## 2020-01-24 PROCEDURE — 99214 OFFICE O/P EST MOD 30 MIN: CPT | Performed by: FAMILY MEDICINE

## 2020-01-24 RX ORDER — GABAPENTIN 300 MG/1
CAPSULE ORAL
Qty: 120 CAPSULE | Refills: 5 | Status: SHIPPED | OUTPATIENT
Start: 2020-01-24 | End: 2020-03-30 | Stop reason: SDUPTHER

## 2020-01-24 RX ORDER — CLOTRIMAZOLE AND BETAMETHASONE DIPROPIONATE 10; .64 MG/G; MG/G
CREAM TOPICAL 2 TIMES DAILY
Qty: 60 G | Refills: 5 | Status: SHIPPED | OUTPATIENT
Start: 2020-01-24 | End: 2021-02-02

## 2020-01-24 RX ORDER — UREA 40 %
CREAM (GRAM) TOPICAL
Qty: 120 G | Refills: 5 | Status: SHIPPED | OUTPATIENT
Start: 2020-01-24

## 2020-01-24 NOTE — PROGRESS NOTES
Subjective   Kp Hernandez is a 74 y.o. male. Presents today for   Chief Complaint   Patient presents with   • Hypertension     6 month follow up.   • Hyperlipidemia   • COPD   • Gout       Hypertension   This is a chronic problem. The current episode started more than 1 year ago. The problem is unchanged. The problem is controlled. Pertinent negatives include no chest pain, orthopnea, palpitations, peripheral edema, PND or shortness of breath. There are no associated agents to hypertension. Risk factors for coronary artery disease include dyslipidemia and obesity. The current treatment provides moderate improvement. There are no compliance problems.  Hypertensive end-organ damage includes kidney disease. Identifiable causes of hypertension include chronic renal disease (stage II).   Hyperlipidemia   This is a chronic problem. The current episode started more than 1 year ago. The problem is controlled. Recent lipid tests were reviewed and are normal. Exacerbating diseases include chronic renal disease (stage II). Pertinent negatives include no chest pain or shortness of breath.   COPD   There is no cough, frequent throat clearing, shortness of breath or sputum production. This is a chronic problem. The current episode started more than 1 year ago. Pertinent negatives include no chest pain or PND. Relieved by: inhalers. He reports moderate improvement on treatment.   Gout   This is a chronic problem. The current episode started more than 1 year ago. Associated symptoms include arthralgias and a rash. Pertinent negatives include no chest pain, coughing or joint swelling. Treatments tried: on allopurinol, no flares;  needs labs checked as due.   Rash   This is a chronic problem. The current episode started more than 1 month ago. The problem has been waxing and waning since onset. The affected locations include the left hand and right hand. The rash is characterized by dryness, peeling and redness. He was exposed to  nothing. Pertinent negatives include no cough or shortness of breath. Past treatments include moisturizer (has seen derm, ? used steroid). The treatment provided mild relief.     Patient has RLS, does better if takes 2 of the gabapentin at night.    Still working out, has dropped soda and losing weight.    Review of Systems   Respiratory: Negative for cough, sputum production and shortness of breath.    Cardiovascular: Negative for chest pain, palpitations, orthopnea and PND.   Musculoskeletal: Positive for arthralgias and gout. Negative for joint swelling.   Skin: Positive for rash.       Patient Active Problem List   Diagnosis   • Benign essential hypertension   • Benign prostatic hypertrophy without urinary obstruction   • CKD (chronic kidney disease) stage 2, GFR 60-89 ml/min   • COPD mixed type (CMS/HCC)   • Depression with anxiety   • Dyslipidemia   • Ganglion   • Gastroesophageal reflux disease without esophagitis   • Hearing loss   • Osteoarthritis   • Impacted cerumen       Social History     Socioeconomic History   • Marital status:      Spouse name: Not on file   • Number of children: Not on file   • Years of education: Not on file   • Highest education level: Not on file   Occupational History   • Occupation: permanent disability   Tobacco Use   • Smoking status: Never Smoker   • Smokeless tobacco: Never Used   Substance and Sexual Activity   • Alcohol use: No   • Drug use: No       No Known Allergies    Current Outpatient Medications on File Prior to Visit   Medication Sig Dispense Refill   • allopurinol (ZYLOPRIM) 100 MG tablet TAKE 1 TABLET BY MOUTH TWICE DAILY 180 tablet 1   • baclofen (LIORESAL) 10 MG tablet TAKE 1 TABLET BY MOUTH THREE TIMES DAILY AS NEEDED FOR  MUSCLE  AND  LEG  PAIN 90 tablet 3   • escitalopram (LEXAPRO) 10 MG tablet TAKE 1 TABLET BY MOUTH ONCE DAILY 90 tablet 1   • finasteride (PROSCAR) 5 MG tablet TAKE 1 TABLET BY MOUTH ONCE DAILY AS DIRECTED 90 tablet 0   • fluticasone  "(FLONASE) 50 MCG/ACT nasal spray USE TWO SPRAY(S) IN EACH NOSTRIL ONCE DAILY 16 mL 3   • hydrochlorothiazide (HYDRODIURIL) 25 MG tablet TAKE 1 TABLET BY MOUTH ONCE DAILY **INCREASING  DOSE** 90 tablet 3   • HYDROcodone-acetaminophen (NORCO)  MG per tablet Take 1 tablet by mouth Every 6 (Six) Hours As Needed for Moderate Pain . 120 tablet 0   • metoprolol succinate XL (TOPROL-XL) 25 MG 24 hr tablet TAKE 1 TABLET BY MOUTH ONCE DAILY 90 tablet 1   • mirtazapine (REMERON) 30 MG tablet TAKE 1 TABLET BY MOUTH EVERY NIGHT 90 tablet 0   • ranitidine (ZANTAC) 150 MG tablet Take 150 mg by mouth every 12 (twelve) hours.     • SPIRIVA HANDIHALER 18 MCG per inhalation capsule      • [DISCONTINUED] gabapentin (NEURONTIN) 300 MG capsule 1 po am, 1 noon and 2 nightly 120 capsule 5   • [DISCONTINUED] methylPREDNISolone (MEDROL) 4 MG tablet follow package directions 21 tablet 0     No current facility-administered medications on file prior to visit.        Objective   Vitals:    01/24/20 1355   BP: 130/78   BP Location: Right arm   Patient Position: Sitting   Cuff Size: Adult   Pulse: 92   Temp: 98.3 °F (36.8 °C)   TempSrc: Oral   SpO2: 95%   Weight: 93.9 kg (207 lb)   Height: 170.2 cm (67\")   Body mass index is 32.42 kg/m².      Physical Exam   Constitutional: He appears well-developed and well-nourished.   HENT:   Head: Normocephalic and atraumatic.   Neck: Neck supple. No JVD present. No thyromegaly present.   Cardiovascular: Normal rate, regular rhythm and normal heart sounds. Exam reveals no gallop and no friction rub.   No murmur heard.  Pulmonary/Chest: Effort normal and breath sounds normal. No respiratory distress. He has no wheezes. He has no rales.   Abdominal: Soft. Bowel sounds are normal. He exhibits no distension. There is no tenderness. There is no rebound and no guarding.   Musculoskeletal: He exhibits no edema.   Neurological: He is alert.   Skin: Skin is warm and dry.   B/l palms thickened skin, peeling and " cracking.   Psychiatric: He has a normal mood and affect. His behavior is normal.   Nursing note and vitals reviewed.      Assessment/Plan   Kp was seen today for hypertension, hyperlipidemia, copd and gout.    Diagnoses and all orders for this visit:    Benign essential hypertension  -     Comprehensive Metabolic Panel    COPD mixed type (CMS/HCC)  -     CBC & Differential    CKD (chronic kidney disease) stage 2, GFR 60-89 ml/min  -     Comprehensive Metabolic Panel  -     CBC & Differential    Dyslipidemia  -     Comprehensive Metabolic Panel  -     Lipid Panel    Hyperuricemia  -     Uric Acid    Dyshidrotic eczema  -     urea (CARMOL) 40 % cream; Apply  topically to the appropriate area as directed Daily. To palms of both hands  -     clotrimazole-betamethasone (LOTRISONE) 1-0.05 % cream; Apply  topically to the appropriate area as directed 2 (Two) Times a Day. To palms of both hands    Tinea corporis  -     clotrimazole-betamethasone (LOTRISONE) 1-0.05 % cream; Apply  topically to the appropriate area as directed 2 (Two) Times a Day. To palms of both hands    RLS (restless legs syndrome)    Pain in both lower extremities  -     gabapentin (NEURONTIN) 300 MG capsule; 1 po am, 1 noon and 2 nightly    Chronic bilateral low back pain with bilateral sciatica  -     gabapentin (NEURONTIN) 300 MG capsule; 1 po am, 1 noon and 2 nightly    Class 1 obesity due to excess calories with serious comorbidity and body mass index (BMI) of 32.0 to 32.9 in adult    -hypertension - controlled, continue medications  -hld  - diet cotnrolled, due for recheck  -due recheck uric acid  -topicals for hands  -refill gabapentin  -ckd -bp control;  Avoid nsaids  -copd - stable, on spiriva  -continue work on weight loss       -Follow up: 6 months and prn

## 2020-01-25 LAB
ALBUMIN SERPL-MCNC: 4.4 G/DL (ref 3.7–4.7)
ALBUMIN/GLOB SERPL: 1.7 {RATIO} (ref 1.2–2.2)
ALP SERPL-CCNC: 87 IU/L (ref 39–117)
ALT SERPL-CCNC: 34 IU/L (ref 0–44)
AST SERPL-CCNC: 32 IU/L (ref 0–40)
BASOPHILS # BLD AUTO: 0 X10E3/UL (ref 0–0.2)
BASOPHILS NFR BLD AUTO: 0 %
BILIRUB SERPL-MCNC: 0.2 MG/DL (ref 0–1.2)
BUN SERPL-MCNC: 18 MG/DL (ref 8–27)
BUN/CREAT SERPL: 16 (ref 10–24)
CALCIUM SERPL-MCNC: 9.4 MG/DL (ref 8.6–10.2)
CHLORIDE SERPL-SCNC: 100 MMOL/L (ref 96–106)
CHOLEST SERPL-MCNC: 230 MG/DL (ref 100–199)
CO2 SERPL-SCNC: 23 MMOL/L (ref 20–29)
CREAT SERPL-MCNC: 1.16 MG/DL (ref 0.76–1.27)
EOSINOPHIL # BLD AUTO: 0.4 X10E3/UL (ref 0–0.4)
EOSINOPHIL NFR BLD AUTO: 4 %
ERYTHROCYTE [DISTWIDTH] IN BLOOD BY AUTOMATED COUNT: 13.8 % (ref 11.6–15.4)
GLOBULIN SER CALC-MCNC: 2.6 G/DL (ref 1.5–4.5)
GLUCOSE SERPL-MCNC: 100 MG/DL (ref 65–99)
HCT VFR BLD AUTO: 43.7 % (ref 37.5–51)
HDLC SERPL-MCNC: 42 MG/DL
HGB BLD-MCNC: 15.2 G/DL (ref 13–17.7)
IMM GRANULOCYTES # BLD AUTO: 0 X10E3/UL (ref 0–0.1)
IMM GRANULOCYTES NFR BLD AUTO: 0 %
LDLC SERPL CALC-MCNC: 120 MG/DL (ref 0–99)
LYMPHOCYTES # BLD AUTO: 2.5 X10E3/UL (ref 0.7–3.1)
LYMPHOCYTES NFR BLD AUTO: 26 %
MCH RBC QN AUTO: 31.8 PG (ref 26.6–33)
MCHC RBC AUTO-ENTMCNC: 34.8 G/DL (ref 31.5–35.7)
MCV RBC AUTO: 91 FL (ref 79–97)
MONOCYTES # BLD AUTO: 0.8 X10E3/UL (ref 0.1–0.9)
MONOCYTES NFR BLD AUTO: 8 %
NEUTROPHILS # BLD AUTO: 5.9 X10E3/UL (ref 1.4–7)
NEUTROPHILS NFR BLD AUTO: 62 %
PLATELET # BLD AUTO: 243 X10E3/UL (ref 150–450)
POTASSIUM SERPL-SCNC: 4.2 MMOL/L (ref 3.5–5.2)
PROT SERPL-MCNC: 7 G/DL (ref 6–8.5)
RBC # BLD AUTO: 4.78 X10E6/UL (ref 4.14–5.8)
SODIUM SERPL-SCNC: 142 MMOL/L (ref 134–144)
TRIGL SERPL-MCNC: 342 MG/DL (ref 0–149)
URATE SERPL-MCNC: 6 MG/DL (ref 3.7–8.6)
VLDLC SERPL CALC-MCNC: 68 MG/DL (ref 5–40)
WBC # BLD AUTO: 9.7 X10E3/UL (ref 3.4–10.8)

## 2020-01-30 ENCOUNTER — TELEPHONE (OUTPATIENT)
Dept: FAMILY MEDICINE CLINIC | Facility: CLINIC | Age: 75
End: 2020-01-30

## 2020-01-30 DIAGNOSIS — M15.9 GENERALIZED OSTEOARTHRITIS OF MULTIPLE SITES: ICD-10-CM

## 2020-01-30 RX ORDER — HYDROCODONE BITARTRATE AND ACETAMINOPHEN 10; 325 MG/1; MG/1
1 TABLET ORAL EVERY 6 HOURS PRN
Qty: 120 TABLET | Refills: 0 | Status: SHIPPED | OUTPATIENT
Start: 2020-01-30 | End: 2020-02-28 | Stop reason: SDUPTHER

## 2020-02-28 DIAGNOSIS — Z79.899 HIGH RISK MEDICATION USE: Primary | ICD-10-CM

## 2020-02-28 DIAGNOSIS — M15.9 GENERALIZED OSTEOARTHRITIS OF MULTIPLE SITES: ICD-10-CM

## 2020-02-28 RX ORDER — HYDROCODONE BITARTRATE AND ACETAMINOPHEN 10; 325 MG/1; MG/1
1 TABLET ORAL EVERY 6 HOURS PRN
Qty: 120 TABLET | Refills: 0 | Status: SHIPPED | OUTPATIENT
Start: 2020-02-28 | End: 2020-03-30 | Stop reason: SDUPTHER

## 2020-03-09 RX ORDER — ALLOPURINOL 100 MG/1
TABLET ORAL
Qty: 180 TABLET | Refills: 0 | Status: SHIPPED | OUTPATIENT
Start: 2020-03-09 | End: 2020-06-15

## 2020-03-10 LAB — DRUGS UR: NORMAL

## 2020-03-21 DIAGNOSIS — G47.09 OTHER INSOMNIA: ICD-10-CM

## 2020-03-23 RX ORDER — FINASTERIDE 5 MG/1
TABLET, FILM COATED ORAL
Qty: 90 TABLET | Refills: 1 | Status: SHIPPED | OUTPATIENT
Start: 2020-03-23 | End: 2020-09-10

## 2020-03-23 RX ORDER — MIRTAZAPINE 30 MG/1
TABLET, FILM COATED ORAL
Qty: 90 TABLET | Refills: 1 | Status: SHIPPED | OUTPATIENT
Start: 2020-03-23 | End: 2020-09-10

## 2020-03-27 ENCOUNTER — TELEPHONE (OUTPATIENT)
Dept: FAMILY MEDICINE CLINIC | Facility: CLINIC | Age: 75
End: 2020-03-27

## 2020-03-30 ENCOUNTER — OFFICE VISIT (OUTPATIENT)
Dept: FAMILY MEDICINE CLINIC | Facility: CLINIC | Age: 75
End: 2020-03-30

## 2020-03-30 VITALS
SYSTOLIC BLOOD PRESSURE: 140 MMHG | TEMPERATURE: 97.5 F | WEIGHT: 204 LBS | BODY MASS INDEX: 31.95 KG/M2 | DIASTOLIC BLOOD PRESSURE: 70 MMHG

## 2020-03-30 DIAGNOSIS — M79.604 PAIN IN BOTH LOWER EXTREMITIES: ICD-10-CM

## 2020-03-30 DIAGNOSIS — M79.605 PAIN IN BOTH LOWER EXTREMITIES: ICD-10-CM

## 2020-03-30 DIAGNOSIS — M15.9 GENERALIZED OSTEOARTHRITIS OF MULTIPLE SITES: ICD-10-CM

## 2020-03-30 DIAGNOSIS — M54.42 CHRONIC BILATERAL LOW BACK PAIN WITH BILATERAL SCIATICA: ICD-10-CM

## 2020-03-30 DIAGNOSIS — H00.025 HORDEOLUM INTERNUM OF LEFT LOWER EYELID: Primary | ICD-10-CM

## 2020-03-30 DIAGNOSIS — G89.29 CHRONIC BILATERAL LOW BACK PAIN WITH BILATERAL SCIATICA: ICD-10-CM

## 2020-03-30 DIAGNOSIS — M54.41 CHRONIC BILATERAL LOW BACK PAIN WITH BILATERAL SCIATICA: ICD-10-CM

## 2020-03-30 PROCEDURE — 99213 OFFICE O/P EST LOW 20 MIN: CPT | Performed by: FAMILY MEDICINE

## 2020-03-30 RX ORDER — TOBRAMYCIN 3 MG/ML
2 SOLUTION/ DROPS OPHTHALMIC EVERY 6 HOURS SCHEDULED
Qty: 5 ML | Refills: 0 | Status: SHIPPED | OUTPATIENT
Start: 2020-03-30 | End: 2021-11-22

## 2020-03-30 RX ORDER — HYDROCODONE BITARTRATE AND ACETAMINOPHEN 10; 325 MG/1; MG/1
1 TABLET ORAL EVERY 6 HOURS PRN
Qty: 120 TABLET | Refills: 0 | Status: SHIPPED | OUTPATIENT
Start: 2020-03-30 | End: 2020-04-28 | Stop reason: SDUPTHER

## 2020-03-30 RX ORDER — GABAPENTIN 300 MG/1
CAPSULE ORAL
Qty: 120 CAPSULE | Refills: 5 | Status: SHIPPED | OUTPATIENT
Start: 2020-03-30 | End: 2020-09-28

## 2020-03-30 RX ORDER — TOBRAMYCIN 3 MG/ML
2 SOLUTION/ DROPS OPHTHALMIC EVERY 6 HOURS SCHEDULED
Qty: 5 ML | Refills: 0 | Status: SHIPPED | OUTPATIENT
Start: 2020-03-30 | End: 2020-03-30 | Stop reason: SDUPTHER

## 2020-03-30 NOTE — PROGRESS NOTES
Subjective   Kp Hernandez is a 74 y.o. male. Presents today for   Chief Complaint   Patient presents with   • Eye Pain     has a stye in eye x 1 week lt eye       Eye Pain    The left eye is affected. This is a new problem. The current episode started in the past 7 days. The problem occurs constantly. The problem has been waxing and waning. There was no injury mechanism. The pain is mild. There is no known exposure to pink eye. He does not wear contacts. Associated symptoms include an eye discharge. Pertinent negatives include no blurred vision, double vision, eye redness, fever, foreign body sensation, itching, recent URI or vomiting. Treatments tried: warm compress. The treatment provided mild (draining now) relief.       Due for gabapenin and hc refill while here;  Kimo reviewed every 3 months;  Has never asked for early and has functioned well on medications so left alone.    Review of Systems   Constitutional: Negative for fever.   Eyes: Positive for pain and discharge. Negative for blurred vision, double vision, redness and itching.   Gastrointestinal: Negative for vomiting.       Patient Active Problem List   Diagnosis   • Benign essential hypertension   • Benign prostatic hypertrophy without urinary obstruction   • CKD (chronic kidney disease) stage 2, GFR 60-89 ml/min   • COPD mixed type (CMS/HCC)   • Depression with anxiety   • Dyslipidemia   • Ganglion   • Gastroesophageal reflux disease without esophagitis   • Hearing loss   • Osteoarthritis   • Impacted cerumen       Social History     Socioeconomic History   • Marital status:      Spouse name: Not on file   • Number of children: Not on file   • Years of education: Not on file   • Highest education level: Not on file   Occupational History   • Occupation: permanent disability   Tobacco Use   • Smoking status: Never Smoker   • Smokeless tobacco: Never Used   Substance and Sexual Activity   • Alcohol use: No   • Drug use: No       No Known  Allergies    Current Outpatient Medications on File Prior to Visit   Medication Sig Dispense Refill   • allopurinol (ZYLOPRIM) 100 MG tablet Take 1 tablet by mouth twice daily 180 tablet 0   • baclofen (LIORESAL) 10 MG tablet TAKE 1 TABLET BY MOUTH THREE TIMES DAILY AS NEEDED FOR  MUSCLE  AND  LEG  PAIN 90 tablet 3   • clotrimazole-betamethasone (LOTRISONE) 1-0.05 % cream Apply  topically to the appropriate area as directed 2 (Two) Times a Day. To palms of both hands 60 g 5   • escitalopram (LEXAPRO) 10 MG tablet TAKE 1 TABLET BY MOUTH ONCE DAILY 90 tablet 1   • finasteride (PROSCAR) 5 MG tablet TAKE 1 TABLET BY MOUTH ONCE DAILY AS DIRECTED 90 tablet 1   • fluticasone (FLONASE) 50 MCG/ACT nasal spray USE TWO SPRAY(S) IN EACH NOSTRIL ONCE DAILY 16 mL 3   • hydrochlorothiazide (HYDRODIURIL) 25 MG tablet TAKE 1 TABLET BY MOUTH ONCE DAILY **INCREASING  DOSE** 90 tablet 3   • metoprolol succinate XL (TOPROL-XL) 25 MG 24 hr tablet TAKE 1 TABLET BY MOUTH ONCE DAILY 90 tablet 1   • mirtazapine (REMERON) 30 MG tablet TAKE 1 TABLET BY MOUTH ONCE DAILY AT NIGHT 90 tablet 1   • ranitidine (ZANTAC) 150 MG tablet Take 150 mg by mouth every 12 (twelve) hours.     • SPIRIVA HANDIHALER 18 MCG per inhalation capsule      • urea (CARMOL) 40 % cream Apply  topically to the appropriate area as directed Daily. To palms of both hands 120 g 5   • [DISCONTINUED] gabapentin (NEURONTIN) 300 MG capsule 1 po am, 1 noon and 2 nightly 120 capsule 5   • [DISCONTINUED] HYDROcodone-acetaminophen (NORCO)  MG per tablet Take 1 tablet by mouth Every 6 (Six) Hours As Needed for Moderate Pain . 120 tablet 0     No current facility-administered medications on file prior to visit.        Objective   Vitals:    03/30/20 1351   BP: 140/70   Temp: 97.5 °F (36.4 °C)   Weight: 92.5 kg (204 lb)     Body mass index is 31.95 kg/m².    Physical Exam   Constitutional: He appears well-developed and well-nourished.   Eyes: EOM are normal. Left eye exhibits  hordeolum. Left conjunctiva is not injected.       Neck: Neck supple.   Neurological: He is alert.   Skin: Skin is warm and dry.   Psychiatric: He has a normal mood and affect. His behavior is normal.   Nursing note and vitals reviewed.      Assessment/Plan   Kp was seen today for eye pain.    Diagnoses and all orders for this visit:    Hordeolum internum of left lower eyelid  -     tobramycin (Tobrex) 0.3 % solution ophthalmic solution; Administer 2 drops into the left eye Every 6 (Six) Hours.    Generalized osteoarthritis of multiple sites  -     HYDROcodone-acetaminophen (NORCO)  MG per tablet; Take 1 tablet by mouth Every 6 (Six) Hours As Needed for Moderate Pain .    Pain in both lower extremities  -     gabapentin (NEURONTIN) 300 MG capsule; 1 po am, 1 noon and 2 nightly    Chronic bilateral low back pain with bilateral sciatica  -     gabapentin (NEURONTIN) 300 MG capsule; 1 po am, 1 noon and 2 nightly      Continue warm compress, drops as directed  The patient has read and signed the [facility] Controlled Substance Contract.  I will continue to see patient for regular follow up appointments.  They are well controlled on their medication.  EDILIA is updated every 3 months. The patient is aware of the potential for addiction and dependence.         -Follow up: regular check up and Prn - RTC if worse or no improvement.

## 2020-04-08 RX ORDER — METOPROLOL SUCCINATE 25 MG/1
TABLET, EXTENDED RELEASE ORAL
Qty: 90 TABLET | Refills: 1 | Status: SHIPPED | OUTPATIENT
Start: 2020-04-08 | End: 2020-10-06

## 2020-04-23 RX ORDER — FLUTICASONE PROPIONATE 50 MCG
2 SPRAY, SUSPENSION (ML) NASAL DAILY
Qty: 16 ML | Refills: 3 | Status: SHIPPED | OUTPATIENT
Start: 2020-04-23 | End: 2020-09-21

## 2020-04-28 ENCOUNTER — TELEPHONE (OUTPATIENT)
Dept: FAMILY MEDICINE CLINIC | Facility: CLINIC | Age: 75
End: 2020-04-28

## 2020-04-28 DIAGNOSIS — M15.9 GENERALIZED OSTEOARTHRITIS OF MULTIPLE SITES: ICD-10-CM

## 2020-04-28 RX ORDER — HYDROCODONE BITARTRATE AND ACETAMINOPHEN 10; 325 MG/1; MG/1
1 TABLET ORAL EVERY 6 HOURS PRN
Qty: 120 TABLET | Refills: 0 | Status: CANCELLED | OUTPATIENT
Start: 2020-04-28

## 2020-04-28 RX ORDER — HYDROCODONE BITARTRATE AND ACETAMINOPHEN 10; 325 MG/1; MG/1
1 TABLET ORAL EVERY 6 HOURS PRN
Qty: 120 TABLET | Refills: 0 | Status: SHIPPED | OUTPATIENT
Start: 2020-04-28 | End: 2020-05-27 | Stop reason: SDUPTHER

## 2020-04-28 NOTE — TELEPHONE ENCOUNTER
PATIENT CALLED TO REQUEST A REFILL ON HYDROcodone-acetaminophen (NORCO)  MG per tablet    PLEASE SEND RX TO [facility] [address] [place] [address] ([place], [place] - [address] [facility] [phone] [facility] [phone] FX

## 2020-05-27 DIAGNOSIS — M15.9 GENERALIZED OSTEOARTHRITIS OF MULTIPLE SITES: ICD-10-CM

## 2020-05-28 RX ORDER — HYDROCODONE BITARTRATE AND ACETAMINOPHEN 10; 325 MG/1; MG/1
1 TABLET ORAL EVERY 6 HOURS PRN
Qty: 120 TABLET | Refills: 0 | Status: SHIPPED | OUTPATIENT
Start: 2020-05-28 | End: 2020-06-29 | Stop reason: SDUPTHER

## 2020-06-13 DIAGNOSIS — F41.9 ANXIETY: ICD-10-CM

## 2020-06-15 RX ORDER — ESCITALOPRAM OXALATE 10 MG/1
TABLET ORAL
Qty: 90 TABLET | Refills: 0 | Status: SHIPPED | OUTPATIENT
Start: 2020-06-15 | End: 2020-09-10

## 2020-06-15 RX ORDER — ALLOPURINOL 100 MG/1
TABLET ORAL
Qty: 180 TABLET | Refills: 0 | Status: SHIPPED | OUTPATIENT
Start: 2020-06-15 | End: 2020-09-10

## 2020-06-29 DIAGNOSIS — M15.9 GENERALIZED OSTEOARTHRITIS OF MULTIPLE SITES: ICD-10-CM

## 2020-06-29 RX ORDER — HYDROCODONE BITARTRATE AND ACETAMINOPHEN 10; 325 MG/1; MG/1
1 TABLET ORAL EVERY 6 HOURS PRN
Qty: 120 TABLET | Refills: 0 | Status: SHIPPED | OUTPATIENT
Start: 2020-06-29 | End: 2020-07-29 | Stop reason: SDUPTHER

## 2020-07-07 ENCOUNTER — TELEPHONE (OUTPATIENT)
Dept: FAMILY MEDICINE CLINIC | Facility: CLINIC | Age: 75
End: 2020-07-07

## 2020-07-29 DIAGNOSIS — M15.9 GENERALIZED OSTEOARTHRITIS OF MULTIPLE SITES: ICD-10-CM

## 2020-07-30 ENCOUNTER — OFFICE VISIT (OUTPATIENT)
Dept: FAMILY MEDICINE CLINIC | Facility: CLINIC | Age: 75
End: 2020-07-30

## 2020-07-30 VITALS
DIASTOLIC BLOOD PRESSURE: 78 MMHG | WEIGHT: 200 LBS | SYSTOLIC BLOOD PRESSURE: 128 MMHG | HEART RATE: 78 BPM | HEIGHT: 67 IN | OXYGEN SATURATION: 94 % | BODY MASS INDEX: 31.39 KG/M2

## 2020-07-30 DIAGNOSIS — Z00.00 MEDICARE ANNUAL WELLNESS VISIT, SUBSEQUENT: Primary | ICD-10-CM

## 2020-07-30 DIAGNOSIS — I10 BENIGN ESSENTIAL HYPERTENSION: ICD-10-CM

## 2020-07-30 DIAGNOSIS — E78.5 DYSLIPIDEMIA: ICD-10-CM

## 2020-07-30 DIAGNOSIS — M15.9 PRIMARY OSTEOARTHRITIS INVOLVING MULTIPLE JOINTS: ICD-10-CM

## 2020-07-30 DIAGNOSIS — N18.2 CKD (CHRONIC KIDNEY DISEASE) STAGE 2, GFR 60-89 ML/MIN: ICD-10-CM

## 2020-07-30 DIAGNOSIS — J44.9 COPD MIXED TYPE (HCC): ICD-10-CM

## 2020-07-30 PROCEDURE — 99214 OFFICE O/P EST MOD 30 MIN: CPT | Performed by: FAMILY MEDICINE

## 2020-07-30 PROCEDURE — G0439 PPPS, SUBSEQ VISIT: HCPCS | Performed by: FAMILY MEDICINE

## 2020-07-30 RX ORDER — HYDROCODONE BITARTRATE AND ACETAMINOPHEN 10; 325 MG/1; MG/1
1 TABLET ORAL EVERY 6 HOURS PRN
Qty: 120 TABLET | Refills: 0 | Status: SHIPPED | OUTPATIENT
Start: 2020-07-30 | End: 2020-08-28 | Stop reason: SDUPTHER

## 2020-07-30 RX ORDER — PSEUDOEPHEDRINE HYDROCHLORIDE 60 MG/1
60 TABLET, FILM COATED ORAL EVERY 12 HOURS PRN
Qty: 60 TABLET | Refills: 5 | Status: SHIPPED | OUTPATIENT
Start: 2020-07-30

## 2020-07-30 NOTE — PROGRESS NOTES
QUICK REFERENCE INFORMATION:  The ABCs of the Annual Wellness Visit    Subsequent Medicare Wellness Visit    HEALTH RISK ASSESSMENT    1945    Recent Hospitalizations:  No hospitalization(s) within the last year..        Current Medical Providers:  Patient Care Team:  Murphy Felix DO as PCP - General  Murphy Felix DO as PCP - Claims Attributed        Smoking Status:  Social History     Tobacco Use   Smoking Status Never Smoker   Smokeless Tobacco Never Used       Alcohol Consumption:  Social History     Substance and Sexual Activity   Alcohol Use No       Depression Screen:   PHQ-2/PHQ-9 Depression Screening 7/30/2020   Little interest or pleasure in doing things 0   Feeling down, depressed, or hopeless 0   Trouble falling or staying asleep, or sleeping too much 0   Feeling tired or having little energy 0   Poor appetite or overeating 0   Feeling bad about yourself - or that you are a failure or have let yourself or your family down 0   Trouble concentrating on things, such as reading the newspaper or watching television 0   Moving or speaking so slowly that other people could have noticed. Or the opposite - being so fidgety or restless that you have been moving around a lot more than usual 0   Thoughts that you would be better off dead, or of hurting yourself in some way 0   Total Score 0   If you checked off any problems, how difficult have these problems made it for you to do your work, take care of things at home, or get along with other people? Not difficult at all       Health Habits and Functional and Cognitive Screening:  Functional & Cognitive Status 7/30/2020   Do you have difficulty preparing food and eating? No   Do you have difficulty bathing yourself, getting dressed or grooming yourself? No   Do you have difficulty using the toilet? No   Do you have difficulty moving around from place to place? No   Do you have trouble with steps or getting out of a bed or a chair? No   Current Diet  Well Balanced Diet   Dental Exam Up to date   Eye Exam Up to date   Exercise (times per week) 7 times per week   Current Exercise Activities Include Walking   Do you need help using the phone?  No   Are you deaf or do you have serious difficulty hearing?  No   Do you need help with transportation? No   Do you need help shopping? No   Do you need help preparing meals?  No   Do you need help with housework?  No   Do you need help with laundry? No   Do you need help taking your medications? No   Do you need help managing money? No   Do you ever drive or ride in a car without wearing a seat belt? No   Have you felt unusual stress, anger or loneliness in the last month? No   Who do you live with? Spouse   If you need help, do you have trouble finding someone available to you? No   Have you been bothered in the last four weeks by sexual problems? No   Do you have difficulty concentrating, remembering or making decisions? No           Does the patient have evidence of cognitive impairment? No    Aspirin use counseling: Does not need ASA (and currently is not on it)      Recent Lab Results:  CMP:  Lab Results   Component Value Date     (H) 01/24/2020    BUN 18 01/24/2020    CREATININE 1.16 01/24/2020    EGFRIFNONA 62 01/24/2020    EGFRIFAFRI 72 01/24/2020    BCR 16 01/24/2020     01/24/2020    K 4.2 01/24/2020    CO2 23 01/24/2020    CALCIUM 9.4 01/24/2020    PROTENTOTREF 7.0 01/24/2020    ALBUMIN 4.4 01/24/2020    LABGLOBREF 2.6 01/24/2020    LABIL2 1.7 01/24/2020    BILITOT 0.2 01/24/2020    ALKPHOS 87 01/24/2020    AST 32 01/24/2020    ALT 34 01/24/2020     Lipid Panel:  Lab Results   Component Value Date    TRIG 342 (H) 01/24/2020    HDL 42 01/24/2020    VLDL 68 (H) 01/24/2020     HbA1c:       Visual Acuity:  No exam data present    Age-appropriate Screening Schedule:  Refer to the list below for future screening recommendations based on patient's age, sex and/or medical conditions. Orders for these  recommended tests are listed in the plan section. The patient has been provided with a written plan.    Health Maintenance   Topic Date Due   • COLONOSCOPY  07/19/2018   • INFLUENZA VACCINE  08/01/2020   • TDAP/TD VACCINES (5 - Td) 03/15/2029        Subjective   History of Present Illness    Kp Hernandez is a 74 y.o. male who presents for an Subsequent Wellness Visit.    The following portions of the patient's history were reviewed and updated as appropriate: allergies, current medications, past family history, past medical history, past social history, past surgical history and problem list.    Outpatient Medications Prior to Visit   Medication Sig Dispense Refill   • allopurinol (ZYLOPRIM) 100 MG tablet Take 1 tablet by mouth twice daily 180 tablet 0   • baclofen (LIORESAL) 10 MG tablet TAKE 1 TABLET BY MOUTH THREE TIMES DAILY AS NEEDED FOR  MUSCLE  AND  LEG  PAIN 90 tablet 3   • clotrimazole-betamethasone (LOTRISONE) 1-0.05 % cream Apply  topically to the appropriate area as directed 2 (Two) Times a Day. To palms of both hands 60 g 5   • escitalopram (LEXAPRO) 10 MG tablet Take 1 tablet by mouth once daily 90 tablet 0   • finasteride (PROSCAR) 5 MG tablet TAKE 1 TABLET BY MOUTH ONCE DAILY AS DIRECTED 90 tablet 1   • fluticasone (FLONASE) 50 MCG/ACT nasal spray 2 sprays by Each Nare route Daily. 16 mL 3   • gabapentin (NEURONTIN) 300 MG capsule 1 po am, 1 noon and 2 nightly 120 capsule 5   • hydrochlorothiazide (HYDRODIURIL) 25 MG tablet TAKE 1 TABLET BY MOUTH ONCE DAILY **INCREASING  DOSE** 90 tablet 3   • HYDROcodone-acetaminophen (NORCO)  MG per tablet Take 1 tablet by mouth Every 6 (Six) Hours As Needed for Moderate Pain . 120 tablet 0   • metoprolol succinate XL (TOPROL-XL) 25 MG 24 hr tablet Take 1 tablet by mouth once daily 90 tablet 1   • mirtazapine (REMERON) 30 MG tablet TAKE 1 TABLET BY MOUTH ONCE DAILY AT NIGHT 90 tablet 1   • SPIRIVA HANDIHALER 18 MCG per inhalation capsule      • tobramycin  (Tobrex) 0.3 % solution ophthalmic solution Administer 2 drops into the left eye Every 6 (Six) Hours. 5 mL 0   • urea (CARMOL) 40 % cream Apply  topically to the appropriate area as directed Daily. To palms of both hands 120 g 5   • ranitidine (ZANTAC) 150 MG tablet Take 150 mg by mouth every 12 (twelve) hours.       No facility-administered medications prior to visit.        Patient Active Problem List   Diagnosis   • Benign essential hypertension   • Benign prostatic hypertrophy without urinary obstruction   • CKD (chronic kidney disease) stage 2, GFR 60-89 ml/min   • COPD mixed type (CMS/HCC)   • Depression with anxiety   • Dyslipidemia   • Ganglion   • Gastroesophageal reflux disease without esophagitis   • Hearing loss   • Osteoarthritis   • Impacted cerumen       Advance Care Planning:  ACP discussion was held with the patient during this visit. Patient does not have an advance directive, information provided.    Identification of Risk Factors:  Risk factors include: Advance Directive Discussion.    Review of Systems   Respiratory: Positive for cough and wheezing. Negative for sputum production and shortness of breath.    Cardiovascular: Negative for chest pain, palpitations, orthopnea and PND.   Musculoskeletal: Positive for back pain.       Compared to one year ago, the patient feels his physical health is the same.  Compared to one year ago, the patient feels his mental health is the same.    Objective     Physical Exam   Constitutional: He appears well-developed and well-nourished.   HENT:   Head: Normocephalic and atraumatic.   Neck: Neck supple. No JVD present. No thyromegaly present.   Cardiovascular: Normal rate, regular rhythm and normal heart sounds. Exam reveals no gallop and no friction rub.   No murmur heard.  Pulmonary/Chest: Effort normal and breath sounds normal. No respiratory distress. He has no wheezes. He has no rales.   Abdominal: Soft. Bowel sounds are normal. He exhibits no distension.  "There is no tenderness. There is no rebound and no guarding.   Musculoskeletal: He exhibits no edema.   Neurological: He is alert.   Skin: Skin is warm and dry.   Psychiatric: He has a normal mood and affect. His behavior is normal.   Nursing note and vitals reviewed.      Vitals:    07/30/20 1259   BP: 128/78   BP Location: Right arm   Patient Position: Sitting   Cuff Size: Adult   Pulse: 78   SpO2: 94%   Weight: 90.7 kg (200 lb)   Height: 170.2 cm (67\")       Patient's Body mass index is 31.32 kg/m². BMI is above normal parameters. Recommendations include: educational material.      Assessment/Plan   Patient Self-Management and Personalized Health Advice  The patient has been provided with information about: diet and exercise and preventive services including:   · Annual Wellness Visit (AWV).    Visit Diagnoses:    ICD-10-CM ICD-9-CM   1. Medicare annual wellness visit, subsequent Z00.00 V70.0   2. Benign essential hypertension I10 401.1   3. COPD mixed type (CMS/[facility]) J44.9 496   4. CKD (chronic kidney disease) stage 2, GFR 60-89 ml/min N18.2 585.2   5. Dyslipidemia E78.5 272.4   6. Primary osteoarthritis involving multiple joints M89.49 715.98       Orders Placed This Encounter   Procedures   • Comprehensive Metabolic Panel   • Lipid Panel   • Uric Acid   • CBC & Differential     Order Specific Question:   Manual Differential     Answer:   No       Outpatient Encounter Medications as of 7/30/2020   Medication Sig Dispense Refill   • allopurinol (ZYLOPRIM) 100 MG tablet Take 1 tablet by mouth twice daily 180 tablet 0   • baclofen (LIORESAL) 10 MG tablet TAKE 1 TABLET BY MOUTH THREE TIMES DAILY AS NEEDED FOR  MUSCLE  AND  LEG  PAIN 90 tablet 3   • clotrimazole-betamethasone (LOTRISONE) 1-0.05 % cream Apply  topically to the appropriate area as directed 2 (Two) Times a Day. To palms of both hands 60 g 5   • escitalopram (LEXAPRO) 10 MG tablet Take 1 tablet by mouth once daily 90 tablet 0   • finasteride (PROSCAR) 5 " MG tablet TAKE 1 TABLET BY MOUTH ONCE DAILY AS DIRECTED 90 tablet 1   • fluticasone (FLONASE) 50 MCG/ACT nasal spray 2 sprays by Each Nare route Daily. 16 mL 3   • gabapentin (NEURONTIN) 300 MG capsule 1 po am, 1 noon and 2 nightly 120 capsule 5   • hydrochlorothiazide (HYDRODIURIL) 25 MG tablet TAKE 1 TABLET BY MOUTH ONCE DAILY **INCREASING  DOSE** 90 tablet 3   • HYDROcodone-acetaminophen (NORCO)  MG per tablet Take 1 tablet by mouth Every 6 (Six) Hours As Needed for Moderate Pain . 120 tablet 0   • metoprolol succinate XL (TOPROL-XL) 25 MG 24 hr tablet Take 1 tablet by mouth once daily 90 tablet 1   • mirtazapine (REMERON) 30 MG tablet TAKE 1 TABLET BY MOUTH ONCE DAILY AT NIGHT 90 tablet 1   • SPIRIVA HANDIHALER 18 MCG per inhalation capsule      • tobramycin (Tobrex) 0.3 % solution ophthalmic solution Administer 2 drops into the left eye Every 6 (Six) Hours. 5 mL 0   • urea (CARMOL) 40 % cream Apply  topically to the appropriate area as directed Daily. To palms of both hands 120 g 5   • [DISCONTINUED] ranitidine (ZANTAC) 150 MG tablet Take 150 mg by mouth every 12 (twelve) hours.       No facility-administered encounter medications on file as of 7/30/2020.        Reviewed use of high risk medication in the elderly: yes  Reviewed for potential of harmful drug interactions in the elderly: yes    Follow Up:  Return in about 6 months (around 1/30/2021).     An After Visit Summary and PPPS with all of these plans were given to the patient.           ++++++++++++++++++++++++++++++++++++++++++++++++++++++++++++++++++     Chief Complaint   Patient presents with   • Annual Exam     medicare   • Hypertension   • COPD   • Hyperlipidemia   • Back Pain   Due for evaluation of above in addition to AWV today.    Hypertension   This is a chronic problem. The current episode started more than 1 year ago. The problem is unchanged. The problem is controlled. Pertinent negatives include no chest pain, orthopnea, palpitations,  peripheral edema, PND or shortness of breath. There are no associated agents to hypertension. The current treatment provides moderate improvement. Hypertensive end-organ damage includes kidney disease. Identifiable causes of hypertension include chronic renal disease (stage II).   COPD   He complains of cough and wheezing. There is no shortness of breath or sputum production. This is a chronic problem. The current episode started more than 1 year ago. The problem occurs intermittently. The problem has been waxing and waning. Pertinent negatives include no chest pain, orthopnea or PND. Relieved by: spiriva. He reports significant (Does well as long as uses spiriva) improvement on treatment.   Hyperlipidemia   This is a chronic problem. The current episode started more than 1 year ago. The problem is controlled. Recent lipid tests were reviewed and are normal. Exacerbating diseases include chronic renal disease (stage II) and obesity. Pertinent negatives include no chest pain or shortness of breath. Current antihyperlipidemic treatment includes statins. The current treatment provides moderate improvement of lipids. There are no compliance problems.  Risk factors for coronary artery disease include dyslipidemia, hypertension and male sex.   Back Pain   This is a chronic problem. The current episode started more than 1 year ago. The problem occurs intermittently. The problem has been waxing and waning since onset. The pain is present in the lumbar spine (has artrhitsi multiple joints). Pertinent negatives include no chest pain. He has tried analgesics for the symptoms.       Review of Systems   Cardiovascular: Negative for chest pain, orthopnea, palpitations and paroxysmal nocturnal dyspnea.   Respiratory: Positive for cough and wheezing. Negative for shortness of breath and sputum production.    Musculoskeletal: Positive for back pain.       Social History     Tobacco Use   • Smoking status: Never Smoker   • Smokeless  "tobacco: Never Used   Substance Use Topics   • Alcohol use: No     O:   Vitals:    07/30/20 1259   BP: 128/78   BP Location: Right arm   Patient Position: Sitting   Cuff Size: Adult   Pulse: 78   SpO2: 94%   Weight: 90.7 kg (200 lb)   Height: 170.2 cm (67\")     Body mass index is 31.32 kg/m².  Physical Exam   Constitutional: He appears well-developed and well-nourished.   HENT:   Head: Normocephalic and atraumatic.   Neck: Neck supple. No JVD present. No thyromegaly present.   Cardiovascular: Normal rate, regular rhythm and normal heart sounds. Exam reveals no gallop and no friction rub.   No murmur heard.  Pulmonary/Chest: Effort normal and breath sounds normal. No respiratory distress. He has no wheezes. He has no rales.   Abdominal: Soft. Bowel sounds are normal. He exhibits no distension. There is no tenderness. There is no rebound and no guarding.   Musculoskeletal: He exhibits no edema.   Neurological: He is alert.   Skin: Skin is warm and dry.   Psychiatric: He has a normal mood and affect. His behavior is normal.   Nursing note and vitals reviewed.      Kp was seen today for annual exam, hypertension, copd, hyperlipidemia and back pain.    Diagnoses and all orders for this visit:    Medicare annual wellness visit, subsequent    Benign essential hypertension  -     Comprehensive Metabolic Panel    COPD mixed type (CMS/[facility])    CKD (chronic kidney disease) stage 2, GFR 60-89 ml/min  -     CBC & Differential  -     Comprehensive Metabolic Panel  -     Uric Acid    Dyslipidemia  -     Comprehensive Metabolic Panel  -     Lipid Panel    Primary osteoarthritis involving multiple joints    -copd - spiriva as directed  -hypertension - controlled, continue medications  -ckd - avoid nsaids, bp control  -no gout attacks per patient, due today for recheck of uric acid  -HLD - continue statin, recheck lipids due today  The patient has read and signed the [facility] Controlled Substance Contract.  I will continue " to see patient for regular follow up appointments.  They are well controlled on their medication.  EDILIA is updated every 3 months. The patient is aware of the potential for addiction and dependence.    Wants sudafed as helps congestion;  D/w can raise bp and monitor closely;  Is not taking often or more than once daily.    Return in about 6 months (around 1/30/2021).

## 2020-07-30 NOTE — PATIENT INSTRUCTIONS
Advance Directive    Advance directives are legal documents that let you make choices ahead of time about your health care and medical treatment in case you become unable to communicate for yourself. Advance directives are a way for you to communicate your wishes to family, friends, and health care providers. This can help convey your decisions about end-of-life care if you become unable to communicate.  Discussing and writing advance directives should happen over time rather than all at once. Advance directives can be changed depending on your situation and what you want, even after you have signed the advance directives.  If you do not have an advance directive, some states assign family decision makers to act on your behalf based on how closely you are related to them. Each state has its own laws regarding advance directives. You may want to check with your health care provider, , or state representative about the laws in your state. There are different types of advance directives, such as:  · Medical power of .  · Living will.  · Do not resuscitate (DNR) or do not attempt resuscitation (DNAR) order.  Health care proxy and medical power of   A health care proxy, also called a health care agent, is a person who is appointed to make medical decisions for you in cases in which you are unable to make the decisions yourself. Generally, people choose someone they know well and trust to represent their preferences. Make sure to ask this person for an agreement to act as your proxy. A proxy may have to exercise judgment in the event of a medical decision for which your wishes are not known.  A medical power of  is a legal document that names your health care proxy. Depending on the laws in your state, after the document is written, it may also need to be:  · Signed.  · Notarized.  · Dated.  · Copied.  · Witnessed.  · Incorporated into your medical record.  You may also want to appoint  someone to manage your financial affairs in a situation in which you are unable to do so. This is called a durable power of  for finances. It is a separate legal document from the durable power of  for health care. You may choose the same person or someone different from your health care proxy to act as your agent in financial matters.  If you do not appoint a proxy, or if there is a concern that the proxy is not acting in your best interests, a court-appointed guardian may be designated to act on your behalf.  Living will  A living will is a set of instructions documenting your wishes about medical care when you cannot express them yourself. Health care providers should keep a copy of your living will in your medical record. You may want to give a copy to family members or friends. To alert caregivers in case of an emergency, you can place a card in your wallet to let them know that you have a living will and where they can find it. A living will is used if you become:  · Terminally ill.  · Incapacitated.  · Unable to communicate or make decisions.  Items to consider in your living will include:  · The use or non-use of life-sustaining equipment, such as dialysis machines and breathing machines (ventilators).  · A DNR or DNAR order, which is the instruction not to use cardiopulmonary resuscitation (CPR) if breathing or heartbeat stops.  · The use or non-use of tube feeding.  · Withholding of food and fluids.  · Comfort (palliative) care when the goal becomes comfort rather than a cure.  · Organ and tissue donation.  A living will does not give instructions for distributing your money and property if you should pass away. It is recommended that you seek the advice of a  when writing a will. Decisions about taxes, beneficiaries, and asset distribution will be legally binding. This process can relieve your family and friends of any concerns surrounding disputes or questions that may come up about  the distribution of your assets.  DNR or DNAR  A DNR or DNAR order is a request not to have CPR in the event that your heart stops beating or you stop breathing. If a DNR or DNAR order has not been made and shared, a health care provider will try to help any patient whose heart has stopped or who has stopped breathing. If you plan to have surgery, talk with your health care provider about how your DNR or DNAR order will be followed if problems occur.  Summary  · Advance directives are the legal documents that allow you to make choices ahead of time about your health care and medical treatment in case you become unable to communicate for yourself.  · The process of discussing and writing advance directives should happen over time. You can change the advance directives, even after you have signed them.  · Advance directives include DNR or DNAR orders, living zendejas, and designating an agent as your medical power of .  This information is not intended to replace advice given to you by your health care provider. Make sure you discuss any questions you have with your health care provider.  Document Released: 03/26/2009 Document Revised: 01/22/2020 Document Reviewed: 11/06/2017  Elsevier Patient Education © 2020 [employer]      Calorie Counting for Weight Loss  Calories are units of energy. Your body needs a certain amount of calories from food to keep you going throughout the day. When you eat more calories than your body needs, your body stores the extra calories as fat. When you eat fewer calories than your body needs, your body burns fat to get the energy it needs.  Calorie counting means keeping track of how many calories you eat and drink each day. Calorie counting can be helpful if you need to lose weight. If you make sure to eat fewer calories than your body needs, you should lose weight. Ask your health care provider what a healthy weight is for you.  For calorie counting to work, you will need to eat  the right number of calories in a day in order to lose a healthy amount of weight per week. A dietitian can help you determine how many calories you need in a day and will give you suggestions on how to reach your calorie goal.  · A healthy amount of weight to lose per week is usually 1-2 lb (0.5-0.9 kg). This usually means that your daily calorie intake should be reduced by 500-750 calories.  · Eating 1,200 - 1,500 calories per day can help most women lose weight.  · Eating 1,500 - 1,800 calories per day can help most men lose weight.  What is my plan?  My goal is to have __________ calories per day.  If I have this many calories per day, I should lose around __________ pounds per week.  What do I need to know about calorie counting?  In order to meet your daily calorie goal, you will need to:  · Find out how many calories are in each food you would like to eat. Try to do this before you eat.  · Decide how much of the food you plan to eat.  · Write down what you ate and how many calories it had. Doing this is called keeping a food log.  To successfully lose weight, it is important to balance calorie counting with a healthy lifestyle that includes regular activity. Aim for 150 minutes of moderate exercise (such as walking) or 75 minutes of vigorous exercise (such as running) each week.  Where do I find calorie information?    The number of calories in a food can be found on a Nutrition Facts label. If a food does not have a Nutrition Facts label, try to look up the calories online or ask your dietitian for help.  Remember that calories are listed per serving. If you choose to have more than one serving of a food, you will have to multiply the calories per serving by the amount of servings you plan to eat. For example, the label on a package of bread might say that a serving size is 1 slice and that there are 90 calories in a serving. If you eat 1 slice, you will have eaten 90 calories. If you eat 2 slices, you will  "have eaten 180 calories.  How do I keep a food log?  Immediately after each meal, record the following information in your food log:  · What you ate. Don't forget to include toppings, sauces, and other extras on the food.  · How much you ate. This can be measured in cups, ounces, or number of items.  · How many calories each food and drink had.  · The total number of calories in the meal.  Keep your food log near you, such as in a small notebook in your pocket, or use a mobile sharad or website. Some programs will calculate calories for you and show you how many calories you have left for the day to meet your goal.  What are some calorie counting tips?    · Use your calories on foods and drinks that will fill you up and not leave you hungry:  ? Some examples of foods that fill you up are nuts and nut butters, vegetables, lean proteins, and high-fiber foods like whole grains. High-fiber foods are foods with more than 5 g fiber per serving.  ? Drinks such as sodas, specialty coffee drinks, alcohol, and juices have a lot of calories, yet do not fill you up.  · Eat nutritious foods and avoid empty calories. Empty calories are calories you get from foods or beverages that do not have many vitamins or protein, such as candy, sweets, and soda. It is better to have a nutritious high-calorie food (such as an avocado) than a food with few nutrients (such as a bag of chips).  · Know how many calories are in the foods you eat most often. This will help you calculate calorie counts faster.  · Pay attention to calories in drinks. Low-calorie drinks include water and unsweetened drinks.  · Pay attention to nutrition labels for \"low fat\" or \"fat free\" foods. These foods sometimes have the same amount of calories or more calories than the full fat versions. They also often have added sugar, starch, or salt, to make up for flavor that was removed with the fat.  · Find a way of tracking calories that works for you. Get creative. Try " different apps or programs if writing down calories does not work for you.  What are some portion control tips?  · Know how many calories are in a serving. This will help you know how many servings of a certain food you can have.  · Use a measuring cup to measure serving sizes. You could also try weighing out portions on a kitchen scale. With time, you will be able to estimate serving sizes for some foods.  · Take some time to put servings of different foods on your favorite plates, bowls, and cups so you know what a serving looks like.  · Try not to eat straight from a bag or box. Doing this can lead to overeating. Put the amount you would like to eat in a cup or on a plate to make sure you are eating the right portion.  · Use smaller plates, glasses, and bowls to prevent overeating.  · Try not to multitask (for example, watch TV or use your computer) while eating. If it is time to eat, sit down at a table and enjoy your food. This will help you to know when you are full. It will also help you to be aware of what you are eating and how much you are eating.  What are tips for following this plan?  Reading food labels  · Check the calorie count compared to the serving size. The serving size may be smaller than what you are used to eating.  · Check the source of the calories. Make sure the food you are eating is high in vitamins and protein and low in saturated and trans fats.  Shopping  · Read nutrition labels while you shop. This will help you make healthy decisions before you decide to purchase your food.  · Make a grocery list and stick to it.  Cooking  · Try to cook your favorite foods in a healthier way. For example, try baking instead of frying.  · Use low-fat dairy products.  Meal planning  · Use more fruits and vegetables. Half of your plate should be fruits and vegetables.  · Include lean proteins like poultry and fish.  How do I count calories when eating out?  · Ask for smaller portion sizes.  · Consider  "sharing an entree and sides instead of getting your own entree.  · If you get your own entree, eat only half. Ask for a box at the beginning of your meal and put the rest of your entree in it so you are not tempted to eat it.  · If calories are listed on the menu, choose the lower calorie options.  · Choose dishes that include vegetables, fruits, whole grains, low-fat dairy products, and lean protein.  · Choose items that are boiled, broiled, grilled, or steamed. Stay away from items that are buttered, battered, fried, or served with cream sauce. Items labeled \"crispy\" are usually fried, unless stated otherwise.  · Choose water, low-fat milk, unsweetened iced tea, or other drinks without added sugar. If you want an alcoholic beverage, choose a lower calorie option such as a glass of wine or light beer.  · Ask for dressings, sauces, and syrups on the side. These are usually high in calories, so you should limit the amount you eat.  · If you want a salad, choose a garden salad and ask for grilled meats. Avoid extra toppings like teresa, cheese, or fried items. Ask for the dressing on the side, or ask for olive oil and vinegar or lemon to use as dressing.  · Estimate how many servings of a food you are given. For example, a serving of cooked rice is ½ cup or about the size of half a baseball. Knowing serving sizes will help you be aware of how much food you are eating at restaurants. The list below tells you how big or small some common portion sizes are based on everyday objects:  ? 1 oz--4 stacked dice.  ? 3 oz--1 deck of cards.  ? 1 tsp--1 die.  ? 1 Tbsp--½ a ping-pong ball.  ? 2 Tbsp--1 ping-pong ball.  ? ½ cup--½ baseball.  ? 1 cup--1 baseball.  Summary  · Calorie counting means keeping track of how many calories you eat and drink each day. If you eat fewer calories than your body needs, you should lose weight.  · A healthy amount of weight to lose per week is usually 1-2 lb (0.5-0.9 kg). This usually means " reducing your daily calorie intake by 500-750 calories.  · The number of calories in a food can be found on a Nutrition Facts label. If a food does not have a Nutrition Facts label, try to look up the calories online or ask your dietitian for help.  · Use your calories on foods and drinks that will fill you up, and not on foods and drinks that will leave you hungry.  · Use smaller plates, glasses, and bowls to prevent overeating.  This information is not intended to replace advice given to you by your health care provider. Make sure you discuss any questions you have with your health care provider.  Document Released: 12/18/2006 Document Revised: 09/06/2019 Document Reviewed: 11/17/2017  [employer] Patient Education © 2020 [employer]      Exercising to Lose Weight  Exercise is structured, repetitive physical activity to improve fitness and health. Getting regular exercise is important for everyone. It is especially important if you are overweight. Being overweight increases your risk of heart disease, stroke, diabetes, high blood pressure, and several types of cancer. Reducing your calorie intake and exercising can help you lose weight.  Exercise is usually categorized as moderate or vigorous intensity. To lose weight, most people need to do a certain amount of moderate-intensity or vigorous-intensity exercise each week.  Moderate-intensity exercise    Moderate-intensity exercise is any activity that gets you moving enough to burn at least three times more energy (calories) than if you were sitting.  Examples of moderate exercise include:  · Walking a mile in 15 minutes.  · Doing light yard work.  · Biking at an easy pace.  Most people should get at least 150 minutes (2 hours and 30 minutes) a week of moderate-intensity exercise to maintain their body weight.  Vigorous-intensity exercise  Vigorous-intensity exercise is any activity that gets you moving enough to burn at least six times more calories than if you  were sitting. When you exercise at this intensity, you should be working hard enough that you are not able to carry on a conversation.  Examples of vigorous exercise include:  · Running.  · Playing a team sport, such as football, basketball, and soccer.  · Jumping rope.  Most people should get at least 75 minutes (1 hour and 15 minutes) a week of vigorous-intensity exercise to maintain their body weight.  How can exercise affect me?  When you exercise enough to burn more calories than you eat, you lose weight. Exercise also reduces body fat and builds muscle. The more muscle you have, the more calories you burn. Exercise also:  · Improves mood.  · Reduces stress and tension.  · Improves your overall fitness, flexibility, and endurance.  · Increases bone strength.  The amount of exercise you need to lose weight depends on:  · Your age.  · The type of exercise.  · Any health conditions you have.  · Your overall physical ability.  Talk to your health care provider about how much exercise you need and what types of activities are safe for you.  What actions can I take to lose weight?  Nutrition    · Make changes to your diet as told by your health care provider or diet and nutrition specialist (dietitian). This may include:  ? Eating fewer calories.  ? Eating more protein.  ? Eating less unhealthy fats.  ? Eating a diet that includes fresh fruits and vegetables, whole grains, low-fat dairy products, and lean protein.  ? Avoiding foods with added fat, salt, and sugar.  · Drink plenty of water while you exercise to prevent dehydration or heat stroke.  Activity  · Choose an activity that you enjoy and set realistic goals. Your health care provider can help you make an exercise plan that works for you.  · Exercise at a moderate or vigorous intensity most days of the week.  ? The intensity of exercise may vary from person to person. You can tell how intense a workout is for you by paying attention to your breathing and  heartbeat. Most people will notice their breathing and heartbeat get faster with more intense exercise.  · Do resistance training twice each week, such as:  ? Push-ups.  ? Sit-ups.  ? Lifting weights.  ? Using resistance bands.  · Getting short amounts of exercise can be just as helpful as long structured periods of exercise. If you have trouble finding time to exercise, try to include exercise in your daily routine.  ? Get up, stretch, and walk around every 30 minutes throughout the day.  ? Go for a walk during your lunch break.  ? Park your car farther away from your destination.  ? If you take public transportation, get off one stop early and walk the rest of the way.  ? Make phone calls while standing up and walking around.  ? Take the stairs instead of elevators or escalators.  · Wear comfortable clothes and shoes with good support.  · Do not exercise so much that you hurt yourself, feel dizzy, or get very short of breath.  Where to find more information  · [place] Department of Health and Human Services: www.hhs.gov  · Centers for Disease Control and Prevention (CDC): www.cdc.gov  Contact a health care provider:  · Before starting a new exercise program.  · If you have questions or concerns about your weight.  · If you have a medical problem that keeps you from exercising.  Get help right away if you have any of the following while exercising:  · Injury.  · Dizziness.  · Difficulty breathing or shortness of breath that does not go away when you stop exercising.  · Chest pain.  · Rapid heartbeat.  Summary  · Being overweight increases your risk of heart disease, stroke, diabetes, high blood pressure, and several types of cancer.  · Losing weight happens when you burn more calories than you eat.  · Reducing the amount of calories you eat in addition to getting regular moderate or vigorous exercise each week helps you lose weight.  This information is not intended to replace advice given to you by your health care  provider. Make sure you discuss any questions you have with your health care provider.  Document Released: 01/20/2012 Document Revised: 12/31/2018 Document Reviewed: 12/31/2018  Elsevier Patient Education © 2020 [employer]

## 2020-07-31 LAB
ALBUMIN SERPL-MCNC: 4.5 G/DL (ref 3.5–5.2)
ALBUMIN/GLOB SERPL: 2.1 G/DL
ALP SERPL-CCNC: 78 U/L (ref 39–117)
ALT SERPL-CCNC: 24 U/L (ref 1–41)
AST SERPL-CCNC: 21 U/L (ref 1–40)
BASOPHILS # BLD AUTO: 0.04 10*3/MM3 (ref 0–0.2)
BASOPHILS NFR BLD AUTO: 0.5 % (ref 0–1.5)
BILIRUB SERPL-MCNC: 0.3 MG/DL (ref 0–1.2)
BUN SERPL-MCNC: 19 MG/DL (ref 8–23)
BUN/CREAT SERPL: 14.4 (ref 7–25)
CALCIUM SERPL-MCNC: 9 MG/DL (ref 8.6–10.5)
CHLORIDE SERPL-SCNC: 99 MMOL/L (ref 98–107)
CHOLEST SERPL-MCNC: 237 MG/DL (ref 0–200)
CO2 SERPL-SCNC: 28.1 MMOL/L (ref 22–29)
CREAT SERPL-MCNC: 1.32 MG/DL (ref 0.76–1.27)
EOSINOPHIL # BLD AUTO: 0.49 10*3/MM3 (ref 0–0.4)
EOSINOPHIL NFR BLD AUTO: 6.2 % (ref 0.3–6.2)
ERYTHROCYTE [DISTWIDTH] IN BLOOD BY AUTOMATED COUNT: 13.1 % (ref 12.3–15.4)
GLOBULIN SER CALC-MCNC: 2.1 GM/DL
GLUCOSE SERPL-MCNC: 103 MG/DL (ref 65–99)
HCT VFR BLD AUTO: 43.5 % (ref 37.5–51)
HDLC SERPL-MCNC: 42 MG/DL (ref 40–60)
HGB BLD-MCNC: 15.3 G/DL (ref 13–17.7)
IMM GRANULOCYTES # BLD AUTO: 0.03 10*3/MM3 (ref 0–0.05)
IMM GRANULOCYTES NFR BLD AUTO: 0.4 % (ref 0–0.5)
LDLC SERPL CALC-MCNC: ABNORMAL MG/DL
LYMPHOCYTES # BLD AUTO: 2.53 10*3/MM3 (ref 0.7–3.1)
LYMPHOCYTES NFR BLD AUTO: 32.1 % (ref 19.6–45.3)
MCH RBC QN AUTO: 32.6 PG (ref 26.6–33)
MCHC RBC AUTO-ENTMCNC: 35.2 G/DL (ref 31.5–35.7)
MCV RBC AUTO: 92.8 FL (ref 79–97)
MONOCYTES # BLD AUTO: 0.9 10*3/MM3 (ref 0.1–0.9)
MONOCYTES NFR BLD AUTO: 11.4 % (ref 5–12)
NEUTROPHILS # BLD AUTO: 3.89 10*3/MM3 (ref 1.7–7)
NEUTROPHILS NFR BLD AUTO: 49.4 % (ref 42.7–76)
NRBC BLD AUTO-RTO: 0 /100 WBC (ref 0–0.2)
PLATELET # BLD AUTO: 251 10*3/MM3 (ref 140–450)
POTASSIUM SERPL-SCNC: 3.8 MMOL/L (ref 3.5–5.2)
PROT SERPL-MCNC: 6.6 G/DL (ref 6–8.5)
RBC # BLD AUTO: 4.69 10*6/MM3 (ref 4.14–5.8)
SODIUM SERPL-SCNC: 138 MMOL/L (ref 136–145)
TRIGL SERPL-MCNC: 561 MG/DL (ref 0–150)
URATE SERPL-MCNC: 4.2 MG/DL (ref 3.4–7)
VLDLC SERPL CALC-MCNC: ABNORMAL MG/DL
WBC # BLD AUTO: 7.88 10*3/MM3 (ref 3.4–10.8)

## 2020-08-12 RX ORDER — ROSUVASTATIN CALCIUM 20 MG/1
20 TABLET, COATED ORAL NIGHTLY
Qty: 30 TABLET | Refills: 5 | Status: SHIPPED | OUTPATIENT
Start: 2020-08-12 | End: 2021-02-01 | Stop reason: SDUPTHER

## 2020-08-28 DIAGNOSIS — M15.9 GENERALIZED OSTEOARTHRITIS OF MULTIPLE SITES: ICD-10-CM

## 2020-08-30 RX ORDER — HYDROCODONE BITARTRATE AND ACETAMINOPHEN 10; 325 MG/1; MG/1
1 TABLET ORAL EVERY 6 HOURS PRN
Qty: 120 TABLET | Refills: 0 | Status: SHIPPED | OUTPATIENT
Start: 2020-08-30 | End: 2020-09-28 | Stop reason: SDUPTHER

## 2020-09-10 DIAGNOSIS — F41.9 ANXIETY: ICD-10-CM

## 2020-09-10 DIAGNOSIS — G47.09 OTHER INSOMNIA: ICD-10-CM

## 2020-09-10 RX ORDER — ESCITALOPRAM OXALATE 10 MG/1
TABLET ORAL
Qty: 90 TABLET | Refills: 1 | Status: SHIPPED | OUTPATIENT
Start: 2020-09-10 | End: 2021-03-31 | Stop reason: SDUPTHER

## 2020-09-10 RX ORDER — ALLOPURINOL 100 MG/1
TABLET ORAL
Qty: 180 TABLET | Refills: 1 | Status: SHIPPED | OUTPATIENT
Start: 2020-09-10 | End: 2021-03-15

## 2020-09-10 RX ORDER — HYDROCHLOROTHIAZIDE 25 MG/1
TABLET ORAL
Qty: 90 TABLET | Refills: 1 | Status: SHIPPED | OUTPATIENT
Start: 2020-09-10 | End: 2021-03-15

## 2020-09-10 RX ORDER — FINASTERIDE 5 MG/1
TABLET, FILM COATED ORAL
Qty: 90 TABLET | Refills: 1 | Status: SHIPPED | OUTPATIENT
Start: 2020-09-10 | End: 2021-03-15

## 2020-09-10 RX ORDER — MIRTAZAPINE 30 MG/1
TABLET, FILM COATED ORAL
Qty: 90 TABLET | Refills: 1 | Status: SHIPPED | OUTPATIENT
Start: 2020-09-10 | End: 2021-03-15

## 2020-09-21 RX ORDER — FLUTICASONE PROPIONATE 50 MCG
SPRAY, SUSPENSION (ML) NASAL
Qty: 48 G | Refills: 5 | Status: SHIPPED | OUTPATIENT
Start: 2020-09-21 | End: 2022-01-05

## 2020-09-27 DIAGNOSIS — M54.42 CHRONIC BILATERAL LOW BACK PAIN WITH BILATERAL SCIATICA: ICD-10-CM

## 2020-09-27 DIAGNOSIS — G89.29 CHRONIC BILATERAL LOW BACK PAIN WITH BILATERAL SCIATICA: ICD-10-CM

## 2020-09-27 DIAGNOSIS — M79.604 PAIN IN BOTH LOWER EXTREMITIES: ICD-10-CM

## 2020-09-27 DIAGNOSIS — M54.41 CHRONIC BILATERAL LOW BACK PAIN WITH BILATERAL SCIATICA: ICD-10-CM

## 2020-09-27 DIAGNOSIS — M79.605 PAIN IN BOTH LOWER EXTREMITIES: ICD-10-CM

## 2020-09-28 DIAGNOSIS — M15.9 GENERALIZED OSTEOARTHRITIS OF MULTIPLE SITES: ICD-10-CM

## 2020-09-28 RX ORDER — HYDROCODONE BITARTRATE AND ACETAMINOPHEN 10; 325 MG/1; MG/1
1 TABLET ORAL EVERY 6 HOURS PRN
Qty: 120 TABLET | Refills: 0 | Status: SHIPPED | OUTPATIENT
Start: 2020-09-28 | End: 2020-10-29 | Stop reason: SDUPTHER

## 2020-09-28 RX ORDER — GABAPENTIN 300 MG/1
CAPSULE ORAL
Qty: 120 CAPSULE | Refills: 5 | Status: SHIPPED | OUTPATIENT
Start: 2020-09-28 | End: 2021-03-25

## 2020-10-06 RX ORDER — METOPROLOL SUCCINATE 25 MG/1
TABLET, EXTENDED RELEASE ORAL
Qty: 90 TABLET | Refills: 0 | Status: SHIPPED | OUTPATIENT
Start: 2020-10-06 | End: 2021-01-05

## 2020-10-29 DIAGNOSIS — M15.9 GENERALIZED OSTEOARTHRITIS OF MULTIPLE SITES: ICD-10-CM

## 2020-10-29 RX ORDER — HYDROCODONE BITARTRATE AND ACETAMINOPHEN 10; 325 MG/1; MG/1
1 TABLET ORAL EVERY 6 HOURS PRN
Qty: 120 TABLET | Refills: 0 | Status: SHIPPED | OUTPATIENT
Start: 2020-10-29 | End: 2020-11-30 | Stop reason: SDUPTHER

## 2020-11-30 ENCOUNTER — TELEPHONE (OUTPATIENT)
Dept: FAMILY MEDICINE CLINIC | Facility: CLINIC | Age: 75
End: 2020-11-30

## 2020-11-30 DIAGNOSIS — M15.9 GENERALIZED OSTEOARTHRITIS OF MULTIPLE SITES: ICD-10-CM

## 2020-11-30 RX ORDER — HYDROCODONE BITARTRATE AND ACETAMINOPHEN 10; 325 MG/1; MG/1
1 TABLET ORAL EVERY 6 HOURS PRN
Qty: 120 TABLET | Refills: 0 | Status: SHIPPED | OUTPATIENT
Start: 2020-11-30 | End: 2020-12-28 | Stop reason: SDUPTHER

## 2020-11-30 NOTE — TELEPHONE ENCOUNTER
Caller: Kp Hernandez    Relationship: Self    Best call back number: [phone]    Medication needed: HYDROcodone-acetaminophen (NORCO)  MG per tablet    When do you need the refill by: ASAP     What details did the patient provide when requesting the medication: patient is out of medication     Does the patient have less than a 3 day supply:  [x] Yes  [] No    What is the patient's preferred pharmacy:[address] ([facility]), [place] - [address] [facility] [phone] [facility] [phone]   [phone]

## 2020-12-19 DIAGNOSIS — M79.10 MYALGIA: ICD-10-CM

## 2020-12-19 DIAGNOSIS — M79.605 PAIN IN BOTH LOWER EXTREMITIES: ICD-10-CM

## 2020-12-19 DIAGNOSIS — M79.604 PAIN IN BOTH LOWER EXTREMITIES: ICD-10-CM

## 2020-12-21 RX ORDER — BACLOFEN 10 MG/1
TABLET ORAL
Qty: 90 TABLET | Refills: 1 | Status: SHIPPED | OUTPATIENT
Start: 2020-12-21 | End: 2021-02-02

## 2020-12-28 DIAGNOSIS — M15.9 GENERALIZED OSTEOARTHRITIS OF MULTIPLE SITES: ICD-10-CM

## 2020-12-28 RX ORDER — HYDROCODONE BITARTRATE AND ACETAMINOPHEN 10; 325 MG/1; MG/1
1 TABLET ORAL EVERY 6 HOURS PRN
Qty: 120 TABLET | Refills: 0 | Status: SHIPPED | OUTPATIENT
Start: 2020-12-28 | End: 2021-01-29 | Stop reason: SDUPTHER

## 2020-12-28 NOTE — TELEPHONE ENCOUNTER
Caller: Kp Hernandez    Relationship: Self    Best call back number: [phone]    Medication needed:   Requested Prescriptions     Pending Prescriptions Disp Refills   • HYDROcodone-acetaminophen (NORCO)  MG per tablet 120 tablet 0     Sig: Take 1 tablet by mouth Every 6 (Six) Hours As Needed for Moderate Pain .       When do you need the refill by: ASAP    What details did the patient provide when requesting the medication: PATIENT HAS 2 DAYS OF MEDICATION LEFT    Does the patient have less than a 3 day supply:  [x] Yes  [] No    What is the patient's preferred pharmacy: [address] ([facility]), [place] - [address] [facility] [phone] [facility] [phone] FX

## 2021-01-05 RX ORDER — METOPROLOL SUCCINATE 25 MG/1
TABLET, EXTENDED RELEASE ORAL
Qty: 90 TABLET | Refills: 1 | Status: SHIPPED | OUTPATIENT
Start: 2021-01-05 | End: 2021-07-15

## 2021-01-07 ENCOUNTER — TELEPHONE (OUTPATIENT)
Dept: FAMILY MEDICINE CLINIC | Facility: CLINIC | Age: 76
End: 2021-01-07

## 2021-01-07 NOTE — TELEPHONE ENCOUNTER
Caller: Kp Hernandez    Relationship: Self    Best call back number: [phone] ([phone]    Medication needed:   Requested Prescriptions      No prescriptions requested or ordered in this encounter       When do you need the refill by: ASAP    What details did the patient provide when requesting the medication: COMPOUNDED CREAM CREATED AT THE PHARMACY WITH INGREDIENTS, PATIENT DOES NOT HAVE THE NAME    Does the patient have less than a 3 day supply:  [x] Yes  [] No    What is the patient's preferred pharmacy: [facility] - [address] [phone] [facility] [phone]

## 2021-01-29 DIAGNOSIS — M15.9 GENERALIZED OSTEOARTHRITIS OF MULTIPLE SITES: ICD-10-CM

## 2021-01-29 RX ORDER — HYDROCODONE BITARTRATE AND ACETAMINOPHEN 10; 325 MG/1; MG/1
1 TABLET ORAL EVERY 6 HOURS PRN
Qty: 120 TABLET | Refills: 0 | Status: SHIPPED | OUTPATIENT
Start: 2021-01-29 | End: 2021-02-26 | Stop reason: SDUPTHER

## 2021-01-29 NOTE — TELEPHONE ENCOUNTER
Caller: Kp Hernandez    Relationship: Self    Best call back number: [phone]  Medication needed:   Requested Prescriptions     Pending Prescriptions Disp Refills   • HYDROcodone-acetaminophen (NORCO)  MG per tablet 120 tablet 0     Sig: Take 1 tablet by mouth Every 6 (Six) Hours As Needed for Moderate Pain .         What details did the patient provide when requesting the medication: HAS 2 DAYS LEFT    Does the patient have less than a 3 day supply:  [x] Yes  [] No    What is the patient's preferred pharmacy: [address] ([facility]), [place] - [address] [facility] [phone] [facility] [phone]

## 2021-02-01 RX ORDER — ROSUVASTATIN CALCIUM 20 MG/1
TABLET, COATED ORAL
Qty: 90 TABLET | Refills: 1 | Status: SHIPPED | OUTPATIENT
Start: 2021-02-01 | End: 2021-07-15

## 2021-02-02 ENCOUNTER — OFFICE VISIT (OUTPATIENT)
Dept: FAMILY MEDICINE CLINIC | Facility: CLINIC | Age: 76
End: 2021-02-02

## 2021-02-02 VITALS
SYSTOLIC BLOOD PRESSURE: 124 MMHG | DIASTOLIC BLOOD PRESSURE: 74 MMHG | HEART RATE: 65 BPM | HEIGHT: 67 IN | OXYGEN SATURATION: 95 % | BODY MASS INDEX: 31.86 KG/M2 | WEIGHT: 203 LBS

## 2021-02-02 DIAGNOSIS — L20.82 FLEXURAL ECZEMA: ICD-10-CM

## 2021-02-02 DIAGNOSIS — Z79.899 HIGH RISK MEDICATION USE: Primary | ICD-10-CM

## 2021-02-02 DIAGNOSIS — G89.29 CHRONIC BILATERAL LOW BACK PAIN WITH BILATERAL SCIATICA: ICD-10-CM

## 2021-02-02 DIAGNOSIS — M54.42 CHRONIC BILATERAL LOW BACK PAIN WITH BILATERAL SCIATICA: ICD-10-CM

## 2021-02-02 DIAGNOSIS — E78.5 DYSLIPIDEMIA: ICD-10-CM

## 2021-02-02 DIAGNOSIS — I10 BENIGN ESSENTIAL HYPERTENSION: Primary | ICD-10-CM

## 2021-02-02 DIAGNOSIS — M54.41 CHRONIC BILATERAL LOW BACK PAIN WITH BILATERAL SCIATICA: ICD-10-CM

## 2021-02-02 DIAGNOSIS — E79.0 HYPERURICEMIA: ICD-10-CM

## 2021-02-02 PROCEDURE — 99214 OFFICE O/P EST MOD 30 MIN: CPT | Performed by: FAMILY MEDICINE

## 2021-02-02 RX ORDER — CLOBETASOL PROPIONATE 0.5 MG/G
CREAM TOPICAL 2 TIMES DAILY
Qty: 60 G | Refills: 2 | Status: SHIPPED | OUTPATIENT
Start: 2021-02-02 | End: 2021-07-12

## 2021-02-02 RX ORDER — METHOCARBAMOL 750 MG/1
750 TABLET, FILM COATED ORAL 2 TIMES DAILY PRN
Qty: 60 TABLET | Refills: 5 | Status: SHIPPED | OUTPATIENT
Start: 2021-02-02 | End: 2021-07-15

## 2021-02-02 NOTE — PATIENT INSTRUCTIONS
TIME TO VACCINATE FOR COVID 19  WEBSITE:  [email or web address]  We are currently in the 1A and 1B phase (see chart). If you are over age 70, you are now eligible for COVID vaccination. You can register directly for an appointment via our healthcare partners [phone]):  [facility]  Call [phone]  [facility]  [email or web address]  [email or web address];  Or you can text [employer] ([id number]) COVID and register via your smart phone.  • Do not sign up via multiple sites. Due to available supply, appointments are limited.  • If you have health insurance, including Medicare, you must bring your insurance card. There is no cost to you, regardless of your insurance status. All sites operate by appointment only and will require proof of age (such as a valid state identification) on arrival.  Sign up for our Vaccine Interest List - This is an information form to keep you in the know about where we are with the vaccine process. This form will not sign you up to receive the vaccine, but it will help you stay informed so you know when you will get it. Sign up now! (GO TO WEBSITE FOR FORM)    [facility] is now scheduling COVID-19 vaccinations for   Phases 1A and 1B of [place].  Phase 1A is meant for healthcare personnel, employed in the state [facility]. In phase 1B we are focusing on those age 70 and over. Because initial supplies are limited, we are prioritizing administration based on guidance from the CDC and [place] state authorities. If you meet the current criteria, you may schedule an appointment to be vaccinated online. COVID Vaccines are only approved for people 16 years of age or older. All minors must be accompanied by a parent or guardian.  You DO NOT need a [employer] account to schedule your appointment. However, having a [employer] account will allow you  flexibility when rescheduling an appointment. Sign up for [employer].    Click this link to scheduled a vaccine:  [email or web address]    Other places to find to schedule vaccine:  [email or web address]

## 2021-02-02 NOTE — PROGRESS NOTES
Subjective   Kp Hernandez is a 75 y.o. male. Presents today for   Chief Complaint   Patient presents with   • Anxiety   • Hyperlipidemia   • Hypertension   • Back Pain     Needs UDS   • Colonoscopy     needs orders       Anxiety  Presents for follow-up visit. Patient reports no chest pain, excessive worry, nervous/anxious behavior or shortness of breath. Symptoms occur occasionally.       Hyperlipidemia  This is a chronic problem. The current episode started more than 1 year ago. The problem is controlled. Recent lipid tests were reviewed and are normal. Pertinent negatives include no chest pain or shortness of breath. Current antihyperlipidemic treatment includes statins.   Hypertension  This is a chronic problem. The current episode started more than 1 year ago. Associated symptoms include anxiety. Pertinent negatives include no chest pain or shortness of breath. The current treatment provides moderate improvement.   Back Pain  This is a chronic problem. The pain is present in the lumbar spine. Pertinent negatives include no chest pain. Treatments tried: on HC tolerates pain and able to exercise.     No gout flares on allopurinol.  Hands crackign and peeling, better but not resolved.  Review of Systems   Respiratory: Negative for shortness of breath.    Cardiovascular: Negative for chest pain.   Musculoskeletal: Positive for back pain.   Psychiatric/Behavioral: The patient is not nervous/anxious.        Patient Active Problem List   Diagnosis   • Benign essential hypertension   • Benign prostatic hypertrophy without urinary obstruction   • CKD (chronic kidney disease) stage 2, GFR 60-89 ml/min   • COPD mixed type (CMS/HCC)   • Depression with anxiety   • Dyslipidemia   • Ganglion   • Gastroesophageal reflux disease without esophagitis   • Hearing loss   • Osteoarthritis   • Impacted cerumen       Social History     Socioeconomic History   • Marital status:      Spouse name: Not on file   • Number of  children: Not on file   • Years of education: Not on file   • Highest education level: Not on file   Occupational History   • Occupation: permanent disability   Tobacco Use   • Smoking status: Never Smoker   • Smokeless tobacco: Never Used   Substance and Sexual Activity   • Alcohol use: No   • Drug use: No       No Known Allergies    Current Outpatient Medications on File Prior to Visit   Medication Sig Dispense Refill   • allopurinol (ZYLOPRIM) 100 MG tablet Take 1 tablet by mouth twice daily 180 tablet 1   • escitalopram (LEXAPRO) 10 MG tablet Take 1 tablet by mouth once daily 90 tablet 1   • finasteride (PROSCAR) 5 MG tablet TAKE 1 TABLET BY MOUTH ONCE DAILY AS DIRECTED 90 tablet 1   • fluticasone (FLONASE) 50 MCG/ACT nasal spray Use 2 spray(s) in each nostril once daily 48 g 5   • gabapentin (NEURONTIN) 300 MG capsule TAKE 1 CAPSULE BY MOUTH IN THE MORNING AND 1 AT NOON AND 2 AT NIGHT 120 capsule 5   • hydroCHLOROthiazide (HYDRODIURIL) 25 MG tablet TAKE 1 TABLET BY MOUTH ONCE DAILY **INCREASING DOSE** 90 tablet 1   • HYDROcodone-acetaminophen (NORCO)  MG per tablet Take 1 tablet by mouth Every 6 (Six) Hours As Needed for Moderate Pain . 120 tablet 0   • metoprolol succinate XL (TOPROL-XL) 25 MG 24 hr tablet Take 1 tablet by mouth once daily 90 tablet 1   • mirtazapine (REMERON) 30 MG tablet TAKE 1 TABLET BY MOUTH ONCE DAILY AT NIGHT 90 tablet 1   • pseudoephedrine (SUDAFED) 60 MG tablet Take 1 tablet by mouth Every 12 (Twelve) Hours As Needed for Congestion (use sparingly as may raise bp). 60 tablet 5   • rosuvastatin (CRESTOR) 20 MG tablet TAKE 1 TABLET BY MOUTH ONCE DAILY AT NIGHT 90 tablet 1   • SPIRIVA HANDIHALER 18 MCG per inhalation capsule      • tobramycin (Tobrex) 0.3 % solution ophthalmic solution Administer 2 drops into the left eye Every 6 (Six) Hours. 5 mL 0   • urea (CARMOL) 40 % cream Apply  topically to the appropriate area as directed Daily. To palms of both hands 120 g 5   •  "[DISCONTINUED] baclofen (LIORESAL) 10 MG tablet TAKE 1 TABLET BY MOUTH THREE TIMES DAILY AS NEEDED FOR  MUSCLE  AND  LEG  PAIN 90 tablet 1   • [DISCONTINUED] clotrimazole-betamethasone (LOTRISONE) 1-0.05 % cream Apply  topically to the appropriate area as directed 2 (Two) Times a Day. To palms of both hands 60 g 5   • [DISCONTINUED] rosuvastatin (Crestor) 20 MG tablet Take 1 tablet by mouth Every Night. 30 tablet 5     No current facility-administered medications on file prior to visit.        Objective   Vitals:    02/02/21 1434   BP: 124/74   Pulse: 65   SpO2: 95%   Weight: 92.1 kg (203 lb)   Height: 170.2 cm (67\")     Body mass index is 31.79 kg/m².    Physical Exam  Vitals signs and nursing note reviewed.   Constitutional:       Appearance: He is well-developed.   HENT:      Head: Normocephalic and atraumatic.   Neck:      Musculoskeletal: Neck supple.      Thyroid: No thyromegaly.      Vascular: No JVD.   Cardiovascular:      Rate and Rhythm: Normal rate and regular rhythm.      Heart sounds: Normal heart sounds. No murmur. No friction rub. No gallop.    Pulmonary:      Effort: Pulmonary effort is normal. No respiratory distress.      Breath sounds: Normal breath sounds. No wheezing or rales.   Abdominal:      General: Bowel sounds are normal. There is no distension.      Palpations: Abdomen is soft.      Tenderness: There is no abdominal tenderness. There is no guarding or rebound.   Skin:     General: Skin is warm and dry.      Comments: hnads cracking and peeling   Neurological:      Mental Status: He is alert.   Psychiatric:         Behavior: Behavior normal.         Assessment/Plan   Diagnoses and all orders for this visit:    1. Benign essential hypertension (Primary)  -     Comprehensive Metabolic Panel    2. Flexural eczema  -     clobetasol (TEMOVATE) 0.05 % cream; Apply  topically to the appropriate area as directed 2 (Two) Times a Day. To hands  Dispense: 60 g; Refill: 2    3. Dyslipidemia  -     " Comprehensive Metabolic Panel  -     Lipid Panel    4. Chronic bilateral low back pain with bilateral sciatica  -     methocarbamol (ROBAXIN) 750 MG tablet; Take 1 tablet by mouth 2 (Two) Times a Day As Needed for Muscle Spasms.  Dispense: 60 tablet; Refill: 5    5. Hyperuricemia  -     Uric Acid    no gout flares, due recheck uric acid  -hypertension - controlled, continue medications  -HLD - continue statin, recheck lipids.  The patient has read and signed the [facility] Controlled Substance Contract.  I will continue to see patient for regular follow up appointments.  They are well controlled on their medication.  EDILIA is updated every 3 months. The patient is aware of the potential for addiction and dependence.  -reports at times severe back spasms;  Baclofen no relief;  Will try methocarbamol    [facility]       -Follow up:  6 months and prn

## 2021-02-03 LAB
ALBUMIN SERPL-MCNC: 4.6 G/DL (ref 3.7–4.7)
ALBUMIN/GLOB SERPL: 1.8 {RATIO} (ref 1.2–2.2)
ALP SERPL-CCNC: 80 IU/L (ref 39–117)
ALT SERPL-CCNC: 33 IU/L (ref 0–44)
AST SERPL-CCNC: 40 IU/L (ref 0–40)
BILIRUB SERPL-MCNC: 0.4 MG/DL (ref 0–1.2)
BUN SERPL-MCNC: 23 MG/DL (ref 8–27)
BUN/CREAT SERPL: 16 (ref 10–24)
CALCIUM SERPL-MCNC: 9.8 MG/DL (ref 8.6–10.2)
CHLORIDE SERPL-SCNC: 100 MMOL/L (ref 96–106)
CHOLEST SERPL-MCNC: 135 MG/DL (ref 100–199)
CO2 SERPL-SCNC: 26 MMOL/L (ref 20–29)
CREAT SERPL-MCNC: 1.48 MG/DL (ref 0.76–1.27)
GLOBULIN SER CALC-MCNC: 2.6 G/DL (ref 1.5–4.5)
GLUCOSE SERPL-MCNC: 102 MG/DL (ref 65–99)
HDLC SERPL-MCNC: 54 MG/DL
LDLC SERPL CALC-MCNC: 56 MG/DL (ref 0–99)
POTASSIUM SERPL-SCNC: 4.8 MMOL/L (ref 3.5–5.2)
PROT SERPL-MCNC: 7.2 G/DL (ref 6–8.5)
SODIUM SERPL-SCNC: 144 MMOL/L (ref 134–144)
TRIGL SERPL-MCNC: 150 MG/DL (ref 0–149)
URATE SERPL-MCNC: 4.9 MG/DL (ref 3.8–8.4)
VLDLC SERPL CALC-MCNC: 25 MG/DL (ref 5–40)

## 2021-02-05 LAB — DRUGS UR: NORMAL

## 2021-02-15 NOTE — PROGRESS NOTES
Call and mail copy of results to patient.  Kidney function decline, avoid nsaids  Cholesterol is well controlled

## 2021-02-26 DIAGNOSIS — M15.9 GENERALIZED OSTEOARTHRITIS OF MULTIPLE SITES: ICD-10-CM

## 2021-02-26 RX ORDER — HYDROCODONE BITARTRATE AND ACETAMINOPHEN 10; 325 MG/1; MG/1
1 TABLET ORAL EVERY 6 HOURS PRN
Qty: 120 TABLET | Refills: 0 | Status: SHIPPED | OUTPATIENT
Start: 2021-02-26 | End: 2021-03-26 | Stop reason: SDUPTHER

## 2021-02-26 NOTE — TELEPHONE ENCOUNTER
Caller: Kp Hernandez    Relationship: Self    Best call back number: [phone]     Medication needed:   Requested Prescriptions     Pending Prescriptions Disp Refills   • HYDROcodone-acetaminophen (NORCO)  MG per tablet 120 tablet 0     Sig: Take 1 tablet by mouth Every 6 (Six) Hours As Needed for Moderate Pain .       When do you need the refill by: 02/26/2021    What details did the patient provide when requesting the medication: HAS LESS THAN 3 DAYS. HE ALSO WOULD LIKE REFILL ON THE SUDAFED HE STATE HE LEFT THE LAST ONE IN CART AT THE STORE AND WENT BACK AND IT WAS GONE.    Does the patient have less than a 3 day supply:  [x] Yes  [] No    What is the patient's preferred pharmacy:    [address] ([facility]), [place] - [address] [facility] [phone] [facility] [phone] FX

## 2021-03-14 DIAGNOSIS — G47.09 OTHER INSOMNIA: ICD-10-CM

## 2021-03-15 RX ORDER — HYDROCHLOROTHIAZIDE 25 MG/1
TABLET ORAL
Qty: 90 TABLET | Refills: 0 | Status: SHIPPED | OUTPATIENT
Start: 2021-03-15 | End: 2021-06-07

## 2021-03-15 RX ORDER — MIRTAZAPINE 30 MG/1
TABLET, FILM COATED ORAL
Qty: 90 TABLET | Refills: 0 | Status: SHIPPED | OUTPATIENT
Start: 2021-03-15 | End: 2021-06-07

## 2021-03-15 RX ORDER — FINASTERIDE 5 MG/1
TABLET, FILM COATED ORAL
Qty: 90 TABLET | Refills: 0 | Status: SHIPPED | OUTPATIENT
Start: 2021-03-15 | End: 2021-06-07

## 2021-03-15 RX ORDER — ALLOPURINOL 100 MG/1
TABLET ORAL
Qty: 180 TABLET | Refills: 0 | Status: SHIPPED | OUTPATIENT
Start: 2021-03-15 | End: 2021-06-07

## 2021-03-25 DIAGNOSIS — G89.29 CHRONIC BILATERAL LOW BACK PAIN WITH BILATERAL SCIATICA: ICD-10-CM

## 2021-03-25 DIAGNOSIS — M79.605 PAIN IN BOTH LOWER EXTREMITIES: ICD-10-CM

## 2021-03-25 DIAGNOSIS — M54.42 CHRONIC BILATERAL LOW BACK PAIN WITH BILATERAL SCIATICA: ICD-10-CM

## 2021-03-25 DIAGNOSIS — M54.41 CHRONIC BILATERAL LOW BACK PAIN WITH BILATERAL SCIATICA: ICD-10-CM

## 2021-03-25 DIAGNOSIS — M79.604 PAIN IN BOTH LOWER EXTREMITIES: ICD-10-CM

## 2021-03-25 RX ORDER — GABAPENTIN 300 MG/1
CAPSULE ORAL
Qty: 120 CAPSULE | Refills: 4 | Status: SHIPPED | OUTPATIENT
Start: 2021-03-25 | End: 2021-08-20

## 2021-03-26 DIAGNOSIS — M15.9 GENERALIZED OSTEOARTHRITIS OF MULTIPLE SITES: ICD-10-CM

## 2021-03-26 NOTE — TELEPHONE ENCOUNTER
Caller: Kp Hernandez    Relationship: Self    Best call back number: [phone]    Medication needed:   Requested Prescriptions     Pending Prescriptions Disp Refills   • HYDROcodone-acetaminophen (NORCO)  MG per tablet 120 tablet 0     Sig: Take 1 tablet by mouth Every 6 (Six) Hours As Needed for Moderate Pain .       When do you need the refill by: 3/26/21    What additional details did the patient provide when requesting the medication: N/A    Does the patient have less than a 3 day supply:  [x] Yes  [] No    What is the patient's preferred pharmacy:    [address] ([facility]), [place] - [address] [facility] [phone] [facility] [phone]   [phone]

## 2021-03-27 RX ORDER — HYDROCODONE BITARTRATE AND ACETAMINOPHEN 10; 325 MG/1; MG/1
1 TABLET ORAL EVERY 6 HOURS PRN
Qty: 120 TABLET | Refills: 0 | Status: SHIPPED | OUTPATIENT
Start: 2021-03-27 | End: 2021-04-28 | Stop reason: SDUPTHER

## 2021-03-31 DIAGNOSIS — F41.9 ANXIETY: ICD-10-CM

## 2021-03-31 RX ORDER — ESCITALOPRAM OXALATE 10 MG/1
TABLET ORAL
Qty: 90 TABLET | Refills: 3 | Status: SHIPPED | OUTPATIENT
Start: 2021-03-31 | End: 2022-04-08

## 2021-04-28 DIAGNOSIS — M15.9 GENERALIZED OSTEOARTHRITIS OF MULTIPLE SITES: ICD-10-CM

## 2021-04-28 RX ORDER — HYDROCODONE BITARTRATE AND ACETAMINOPHEN 10; 325 MG/1; MG/1
1 TABLET ORAL EVERY 6 HOURS PRN
Qty: 120 TABLET | Refills: 0 | Status: SHIPPED | OUTPATIENT
Start: 2021-04-28 | End: 2021-05-26 | Stop reason: SDUPTHER

## 2021-04-28 NOTE — TELEPHONE ENCOUNTER
Caller: Kp Hernandez    Relationship: Self    Best call back number: [phone]    Medication needed:   Requested Prescriptions     Pending Prescriptions Disp Refills   • HYDROcodone-acetaminophen (NORCO)  MG per tablet 120 tablet 0     Sig: Take 1 tablet by mouth Every 6 (Six) Hours As Needed for Moderate Pain .       When do you need the refill by: 04/28/21    Does the patient have less than a 3 day supply:  [x] Yes  [] No    What is the patient's preferred pharmacy: [address] ([facility]), [place] - [address] [facility] [phone] [facility] [phone] FX

## 2021-05-26 DIAGNOSIS — M15.9 GENERALIZED OSTEOARTHRITIS OF MULTIPLE SITES: ICD-10-CM

## 2021-05-26 RX ORDER — HYDROCODONE BITARTRATE AND ACETAMINOPHEN 10; 325 MG/1; MG/1
1 TABLET ORAL EVERY 6 HOURS PRN
Qty: 120 TABLET | Refills: 0 | Status: SHIPPED | OUTPATIENT
Start: 2021-05-26 | End: 2021-06-21 | Stop reason: SDUPTHER

## 2021-06-05 DIAGNOSIS — G47.09 OTHER INSOMNIA: ICD-10-CM

## 2021-06-07 RX ORDER — FINASTERIDE 5 MG/1
TABLET, FILM COATED ORAL
Qty: 90 TABLET | Refills: 0 | Status: SHIPPED | OUTPATIENT
Start: 2021-06-07 | End: 2021-09-07

## 2021-06-07 RX ORDER — HYDROCHLOROTHIAZIDE 25 MG/1
TABLET ORAL
Qty: 90 TABLET | Refills: 0 | Status: SHIPPED | OUTPATIENT
Start: 2021-06-07 | End: 2021-09-07

## 2021-06-07 RX ORDER — ALLOPURINOL 100 MG/1
TABLET ORAL
Qty: 180 TABLET | Refills: 0 | Status: SHIPPED | OUTPATIENT
Start: 2021-06-07 | End: 2021-09-07

## 2021-06-07 RX ORDER — MIRTAZAPINE 30 MG/1
TABLET, FILM COATED ORAL
Qty: 90 TABLET | Refills: 0 | Status: SHIPPED | OUTPATIENT
Start: 2021-06-07 | End: 2021-08-30

## 2021-06-21 DIAGNOSIS — M15.9 GENERALIZED OSTEOARTHRITIS OF MULTIPLE SITES: ICD-10-CM

## 2021-06-21 RX ORDER — HYDROCODONE BITARTRATE AND ACETAMINOPHEN 10; 325 MG/1; MG/1
1 TABLET ORAL EVERY 6 HOURS PRN
Qty: 120 TABLET | Refills: 0 | Status: SHIPPED | OUTPATIENT
Start: 2021-06-21 | End: 2021-07-26 | Stop reason: SDUPTHER

## 2021-06-21 NOTE — TELEPHONE ENCOUNTER
Caller: Kp Hernandez    Relationship: Self    Best call back number: [phone]    Medication needed:   Requested Prescriptions     Pending Prescriptions Disp Refills   • HYDROcodone-acetaminophen (NORCO)  MG per tablet 120 tablet 0     Sig: Take 1 tablet by mouth Every 6 (Six) Hours As Needed for Moderate Pain .       When do you need the refill by: ASAP    Does the patient have less than a 3 day supply:  [x] Yes  [] No    What is the patient's preferred pharmacy: [address] ([facility]), [place] - [address] [facility] [phone] [facility] [phone] FX

## 2021-07-10 DIAGNOSIS — L20.82 FLEXURAL ECZEMA: ICD-10-CM

## 2021-07-12 RX ORDER — CLOBETASOL PROPIONATE 0.5 MG/G
CREAM TOPICAL
Qty: 60 G | Refills: 0 | Status: SHIPPED | OUTPATIENT
Start: 2021-07-12 | End: 2021-10-14

## 2021-07-14 DIAGNOSIS — G89.29 CHRONIC BILATERAL LOW BACK PAIN WITH BILATERAL SCIATICA: ICD-10-CM

## 2021-07-14 DIAGNOSIS — M54.41 CHRONIC BILATERAL LOW BACK PAIN WITH BILATERAL SCIATICA: ICD-10-CM

## 2021-07-14 DIAGNOSIS — M54.42 CHRONIC BILATERAL LOW BACK PAIN WITH BILATERAL SCIATICA: ICD-10-CM

## 2021-07-15 RX ORDER — METOPROLOL SUCCINATE 25 MG/1
TABLET, EXTENDED RELEASE ORAL
Qty: 90 TABLET | Refills: 0 | Status: SHIPPED | OUTPATIENT
Start: 2021-07-15 | End: 2021-10-12

## 2021-07-15 RX ORDER — ROSUVASTATIN CALCIUM 20 MG/1
TABLET, COATED ORAL
Qty: 90 TABLET | Refills: 0 | Status: SHIPPED | OUTPATIENT
Start: 2021-07-15 | End: 2021-10-12

## 2021-07-15 RX ORDER — METHOCARBAMOL 750 MG/1
TABLET, FILM COATED ORAL
Qty: 60 TABLET | Refills: 0 | Status: SHIPPED | OUTPATIENT
Start: 2021-07-15 | End: 2021-08-19

## 2021-07-26 DIAGNOSIS — M15.9 GENERALIZED OSTEOARTHRITIS OF MULTIPLE SITES: ICD-10-CM

## 2021-07-26 RX ORDER — HYDROCODONE BITARTRATE AND ACETAMINOPHEN 10; 325 MG/1; MG/1
1 TABLET ORAL EVERY 6 HOURS PRN
Qty: 120 TABLET | Refills: 0 | Status: SHIPPED | OUTPATIENT
Start: 2021-07-26 | End: 2021-08-23 | Stop reason: SDUPTHER

## 2021-07-26 NOTE — TELEPHONE ENCOUNTER
Caller: Kp Hernandez    Relationship: Self    Best call back number: [phone]    Medication needed:   Requested Prescriptions     Pending Prescriptions Disp Refills   • HYDROcodone-acetaminophen (NORCO)  MG per tablet 120 tablet 0     Sig: Take 1 tablet by mouth Every 6 (Six) Hours As Needed for Moderate Pain .       When do you need the refill by: ASAP    What additional details did the patient provide when requesting the medication: PATIENT HAS 1 PILL LEFT.     Does the patient have less than a 3 day supply:  [x] Yes  [] No    What is the patient's preferred pharmacy: [address] ([facility]), [place] - [address] [facility] [phone] [facility] [phone] FX

## 2021-08-03 ENCOUNTER — OFFICE VISIT (OUTPATIENT)
Dept: FAMILY MEDICINE CLINIC | Facility: CLINIC | Age: 76
End: 2021-08-03

## 2021-08-03 VITALS
WEIGHT: 206 LBS | HEART RATE: 92 BPM | HEIGHT: 67 IN | DIASTOLIC BLOOD PRESSURE: 64 MMHG | SYSTOLIC BLOOD PRESSURE: 124 MMHG | OXYGEN SATURATION: 97 % | BODY MASS INDEX: 32.33 KG/M2

## 2021-08-03 DIAGNOSIS — Z00.00 MEDICARE ANNUAL WELLNESS VISIT, SUBSEQUENT: Primary | ICD-10-CM

## 2021-08-03 DIAGNOSIS — E78.5 DYSLIPIDEMIA: ICD-10-CM

## 2021-08-03 DIAGNOSIS — I10 BENIGN ESSENTIAL HYPERTENSION: ICD-10-CM

## 2021-08-03 DIAGNOSIS — M15.9 PRIMARY OSTEOARTHRITIS INVOLVING MULTIPLE JOINTS: ICD-10-CM

## 2021-08-03 DIAGNOSIS — J44.9 COPD MIXED TYPE (HCC): ICD-10-CM

## 2021-08-03 DIAGNOSIS — N18.2 CKD (CHRONIC KIDNEY DISEASE) STAGE 2, GFR 60-89 ML/MIN: ICD-10-CM

## 2021-08-03 DIAGNOSIS — E79.0 HYPERURICEMIA: ICD-10-CM

## 2021-08-03 PROCEDURE — G0439 PPPS, SUBSEQ VISIT: HCPCS | Performed by: FAMILY MEDICINE

## 2021-08-03 PROCEDURE — 99214 OFFICE O/P EST MOD 30 MIN: CPT | Performed by: FAMILY MEDICINE

## 2021-08-03 NOTE — PROGRESS NOTES
QUICK REFERENCE INFORMATION:  The ABCs of the Annual Wellness Visit    Subsequent Medicare Wellness Visit    HEALTH RISK ASSESSMENT    1945    Recent Hospitalizations:  No hospitalization(s) within the last year..        Current Medical Providers:  Patient Care Team:  Murphy Felix DO as PCP - General        Smoking Status:  Social History     Tobacco Use   Smoking Status Never Smoker   Smokeless Tobacco Never Used       Alcohol Consumption:  Social History     Substance and Sexual Activity   Alcohol Use No       Depression Screen:   PHQ-2/PHQ-9 Depression Screening 8/3/2021   Little interest or pleasure in doing things 0   Feeling down, depressed, or hopeless 0   Trouble falling or staying asleep, or sleeping too much 0   Feeling tired or having little energy 0   Poor appetite or overeating 0   Feeling bad about yourself - or that you are a failure or have let yourself or your family down 0   Trouble concentrating on things, such as reading the newspaper or watching television 0   Moving or speaking so slowly that other people could have noticed. Or the opposite - being so fidgety or restless that you have been moving around a lot more than usual 0   Thoughts that you would be better off dead, or of hurting yourself in some way 0   Total Score 0   If you checked off any problems, how difficult have these problems made it for you to do your work, take care of things at home, or get along with other people? Not difficult at all       Health Habits and Functional and Cognitive Screening:  Functional & Cognitive Status 8/3/2021   Do you have difficulty preparing food and eating? No   Do you have difficulty bathing yourself, getting dressed or grooming yourself? No   Do you have difficulty using the toilet? No   Do you have difficulty moving around from place to place? No   Do you have trouble with steps or getting out of a bed or a chair? No   Current Diet Well Balanced Diet   Dental Exam Up to date   Eye  Exam Not up to date   Exercise (times per week) 0 times per week   Current Exercises Include No Regular Exercise   Current Exercise Activities Include -   Do you need help using the phone?  No   Are you deaf or do you have serious difficulty hearing?  No   Do you need help with transportation? No   Do you need help shopping? No   Do you need help preparing meals?  No   Do you need help with housework?  No   Do you need help with laundry? No   Do you need help taking your medications? No   Do you need help managing money? No   Do you ever drive or ride in a car without wearing a seat belt? No   Have you felt unusual stress, anger or loneliness in the last month? No   Who do you live with? Spouse   If you need help, do you have trouble finding someone available to you? No   Have you been bothered in the last four weeks by sexual problems? No   Do you have difficulty concentrating, remembering or making decisions? No           Does the patient have evidence of cognitive impairment? clock abnormal; 1 of 3 on recall    Aspirin use counseling: Does not need ASA (and currently is not on it)      Recent Lab Results:  CMP:  Lab Results   Component Value Date     (H) 02/02/2021    BUN 23 02/02/2021    CREATININE 1.48 (H) 02/02/2021    EGFRIFNONA 46 (L) 02/02/2021    EGFRIFAFRI 53 (L) 02/02/2021    BCR 16 02/02/2021     02/02/2021    K 4.8 02/02/2021    CO2 26 02/02/2021    CALCIUM 9.8 02/02/2021    PROTENTOTREF 7.2 02/02/2021    ALBUMIN 4.6 02/02/2021    LABGLOBREF 2.6 02/02/2021    LABIL2 1.8 02/02/2021    BILITOT 0.4 02/02/2021    ALKPHOS 80 02/02/2021    AST 40 02/02/2021    ALT 33 02/02/2021     Lipid Panel:  Lab Results   Component Value Date    TRIG 150 (H) 02/02/2021    HDL 54 02/02/2021    VLDL 25 02/02/2021     HbA1c:       Visual Acuity:  No exam data present    Age-appropriate Screening Schedule:  Refer to the list below for future screening recommendations based on patient's age, sex and/or medical  conditions. Orders for these recommended tests are listed in the plan section. The patient has been provided with a written plan.    Health Maintenance   Topic Date Due   • INFLUENZA VACCINE  10/01/2021   • TDAP/TD VACCINES (5 - Td or Tdap) 03/15/2029        Subjective   History of Present Illness    Kp Hernandez is a 75 y.o. male who presents for an Subsequent Wellness Visit.    The following portions of the patient's history were reviewed and updated as appropriate: allergies, current medications, past family history, past medical history, past social history, past surgical history and problem list.    Outpatient Medications Prior to Visit   Medication Sig Dispense Refill   • allopurinol (ZYLOPRIM) 100 MG tablet Take 1 tablet by mouth twice daily 180 tablet 0   • clobetasol (TEMOVATE) 0.05 % cream APPLY  CREAM TOPICALLY TO APPROPRIATE AREA AS DIRECTED TWICE DAILY TO  HANDS 60 g 0   • escitalopram (LEXAPRO) 10 MG tablet Take 1 tablet by mouth once daily 90 tablet 3   • finasteride (PROSCAR) 5 MG tablet TAKE 1 TABLET BY MOUTH ONCE DAILY AS DIRECTED 90 tablet 0   • fluticasone (FLONASE) 50 MCG/ACT nasal spray Use 2 spray(s) in each nostril once daily 48 g 5   • gabapentin (NEURONTIN) 300 MG capsule TAKE 1 CAPSULE BY MOUTH IN THE MORNING, 1 AT NOON, AND 2 AT NIGHT. 120 capsule 4   • hydroCHLOROthiazide (HYDRODIURIL) 25 MG tablet TAKE 1 TABLET BY MOUTH ONCE DAILY **INCREASING  DOSE** 90 tablet 0   • HYDROcodone-acetaminophen (NORCO)  MG per tablet Take 1 tablet by mouth Every 6 (Six) Hours As Needed for Moderate Pain . 120 tablet 0   • methocarbamol (ROBAXIN) 750 MG tablet TAKE 1 TABLET BY MOUTH TWICE DAILY AS NEEDED FOR MUSCLE SPASM 60 tablet 0   • metoprolol succinate XL (TOPROL-XL) 25 MG 24 hr tablet Take 1 tablet by mouth once daily 90 tablet 0   • mirtazapine (REMERON) 30 MG tablet TAKE 1 TABLET BY MOUTH ONCE DAILY AT NIGHT 90 tablet 0   • pseudoephedrine (SUDAFED) 60 MG tablet Take 1 tablet by mouth Every  12 (Twelve) Hours As Needed for Congestion (use sparingly as may raise bp). 60 tablet 5   • rosuvastatin (CRESTOR) 20 MG tablet TAKE 1 TABLET BY MOUTH ONCE DAILY AT NIGHT 90 tablet 0   • SPIRIVA HANDIHALER 18 MCG per inhalation capsule      • tobramycin (Tobrex) 0.3 % solution ophthalmic solution Administer 2 drops into the left eye Every 6 (Six) Hours. 5 mL 0   • urea (CARMOL) 40 % cream Apply  topically to the appropriate area as directed Daily. To palms of both hands 120 g 5     No facility-administered medications prior to visit.       Patient Active Problem List   Diagnosis   • Benign essential hypertension   • Benign prostatic hypertrophy without urinary obstruction   • CKD (chronic kidney disease) stage 2, GFR 60-89 ml/min   • COPD mixed type (CMS/HCC)   • Depression with anxiety   • Dyslipidemia   • Ganglion   • Gastroesophageal reflux disease without esophagitis   • Hearing loss   • Osteoarthritis   • Impacted cerumen       Advance Care Planning:  ACP discussion was held with the patient during this visit. Patient does not have an advance directive, information provided.    Identification of Risk Factors:  Risk factors include: Obesity/Overweight .    Review of Systems   Respiratory: Negative for shortness of breath.    Cardiovascular: Negative for chest pain, palpitations, orthopnea and PND.   Psychiatric/Behavioral: The patient is not nervous/anxious.        Compared to one year ago, the patient feels his physical health is the same.  Compared to one year ago, the patient feels his mental health is the same.    Objective     Physical Exam  Vitals and nursing note reviewed.   Constitutional:       Appearance: He is well-developed.   HENT:      Head: Normocephalic and atraumatic.   Neck:      Thyroid: No thyromegaly.      Vascular: No JVD.   Cardiovascular:      Rate and Rhythm: Normal rate and regular rhythm.      Heart sounds: Normal heart sounds. No murmur heard.   No friction rub. No gallop.   "  Pulmonary:      Effort: Pulmonary effort is normal. No respiratory distress.      Breath sounds: Normal breath sounds. No wheezing or rales.   Abdominal:      General: Bowel sounds are normal. There is no distension.      Palpations: Abdomen is soft.      Tenderness: There is no abdominal tenderness. There is no guarding or rebound.   Musculoskeletal:      Cervical back: Neck supple.   Skin:     General: Skin is warm and dry.   Neurological:      Mental Status: He is alert.   Psychiatric:         Behavior: Behavior normal.         Vitals:    08/03/21 1331   BP: 124/64   Pulse: 92   SpO2: 97%   Weight: 93.4 kg (206 lb)   Height: 170.2 cm (67\")   PainSc: 0-No pain       Patient's Body mass index is 32.26 kg/m². indicating that he is obese (BMI >30). Obesity-related health conditions include the following: hypertension and dyslipidemias. Obesity is unchanged. BMI is is above average; BMI management plan is completed. We discussed portion control and increasing exercise..      Assessment/Plan   Patient Self-Management and Personalized Health Advice  The patient has been provided with information about: diet and exercise and preventive services including:   · Annual Wellness Visit (AWV).    Visit Diagnoses:    ICD-10-CM ICD-9-CM   1. Medicare annual wellness visit, subsequent  Z00.00 V70.0   2. Benign essential hypertension  I10 401.1   3. Primary osteoarthritis involving multiple joints  M89.49 715.98   4. Dyslipidemia  E78.5 272.4   5. CKD (chronic kidney disease) stage 2, GFR 60-89 ml/min  N18.2 585.2   6. COPD mixed type (CMS/[facility])  J44.9 496   7. Hyperuricemia  E79.0 790.6       Orders Placed This Encounter   Procedures   • Comprehensive Metabolic Panel     Order Specific Question:   Release to patient     Answer:   Immediate   • Lipid Panel   • Uric Acid     Order Specific Question:   Release to patient     Answer:   Immediate   • CBC & Differential     Order Specific Question:   Manual Differential     Answer:   " No       Outpatient Encounter Medications as of 8/3/2021   Medication Sig Dispense Refill   • allopurinol (ZYLOPRIM) 100 MG tablet Take 1 tablet by mouth twice daily 180 tablet 0   • clobetasol (TEMOVATE) 0.05 % cream APPLY  CREAM TOPICALLY TO APPROPRIATE AREA AS DIRECTED TWICE DAILY TO  HANDS 60 g 0   • escitalopram (LEXAPRO) 10 MG tablet Take 1 tablet by mouth once daily 90 tablet 3   • finasteride (PROSCAR) 5 MG tablet TAKE 1 TABLET BY MOUTH ONCE DAILY AS DIRECTED 90 tablet 0   • fluticasone (FLONASE) 50 MCG/ACT nasal spray Use 2 spray(s) in each nostril once daily 48 g 5   • gabapentin (NEURONTIN) 300 MG capsule TAKE 1 CAPSULE BY MOUTH IN THE MORNING, 1 AT NOON, AND 2 AT NIGHT. 120 capsule 4   • hydroCHLOROthiazide (HYDRODIURIL) 25 MG tablet TAKE 1 TABLET BY MOUTH ONCE DAILY **INCREASING  DOSE** 90 tablet 0   • HYDROcodone-acetaminophen (NORCO)  MG per tablet Take 1 tablet by mouth Every 6 (Six) Hours As Needed for Moderate Pain . 120 tablet 0   • methocarbamol (ROBAXIN) 750 MG tablet TAKE 1 TABLET BY MOUTH TWICE DAILY AS NEEDED FOR MUSCLE SPASM 60 tablet 0   • metoprolol succinate XL (TOPROL-XL) 25 MG 24 hr tablet Take 1 tablet by mouth once daily 90 tablet 0   • mirtazapine (REMERON) 30 MG tablet TAKE 1 TABLET BY MOUTH ONCE DAILY AT NIGHT 90 tablet 0   • pseudoephedrine (SUDAFED) 60 MG tablet Take 1 tablet by mouth Every 12 (Twelve) Hours As Needed for Congestion (use sparingly as may raise bp). 60 tablet 5   • rosuvastatin (CRESTOR) 20 MG tablet TAKE 1 TABLET BY MOUTH ONCE DAILY AT NIGHT 90 tablet 0   • SPIRIVA HANDIHALER 18 MCG per inhalation capsule      • tobramycin (Tobrex) 0.3 % solution ophthalmic solution Administer 2 drops into the left eye Every 6 (Six) Hours. 5 mL 0   • urea (CARMOL) 40 % cream Apply  topically to the appropriate area as directed Daily. To palms of both hands 120 g 5     No facility-administered encounter medications on file as of 8/3/2021.       Reviewed use of high risk  medication in the elderly: yes  Reviewed for potential of harmful drug interactions in the elderly: yes    Follow Up:  Return in about 6 months (around 2/3/2022), or if symptoms worsen or fail to improve.     An After Visit Summary and PPPS with all of these plans were given to the patient.           ++++++++++++++++++++++++++++++++++++++++++++++++++++++++++++++++++     Chief Complaint   Patient presents with   • Annual Exam     medicare   • Anxiety   • Hypertension   • Hyperlipidemia   • Osteoarthritis   • COPD     Anxiety  Presents for follow-up visit. Patient reports no chest pain, depressed mood, nervous/anxious behavior, palpitations or shortness of breath. Symptoms occur occasionally. The quality of sleep is fair. Nighttime awakenings: occasional.     Compliance with medications is %.   Hypertension  This is a chronic problem. The current episode started more than 1 year ago. The problem is unchanged. The problem is controlled. Associated symptoms include anxiety. Pertinent negatives include no chest pain, orthopnea, palpitations, peripheral edema, PND or shortness of breath. The current treatment provides moderate improvement. Hypertensive end-organ damage includes kidney disease. Identifiable causes of hypertension include chronic renal disease (stage II).   Hyperlipidemia  This is a chronic problem. The current episode started more than 1 year ago. The problem is controlled. Recent lipid tests were reviewed and are normal. Exacerbating diseases include chronic renal disease (stage II) and obesity. Pertinent negatives include no chest pain or shortness of breath. Current antihyperlipidemic treatment includes statins. The current treatment provides moderate improvement of lipids.   Osteoarthritis  Presents for follow-up visit. He complains of pain. Symptom course: fluctuating. Affected location: back and several joints. His past medical history is significant for osteoarthritis.   COPD  There is no  "shortness of breath. This is a chronic problem. The current episode started more than 1 year ago. The problem occurs intermittently. Pertinent negatives include no chest pain or PND. He reports moderate improvement on treatment. His past medical history is significant for COPD.     KEI stable on lexapro.    Review of Systems   Cardiovascular: Negative for chest pain, orthopnea, palpitations and paroxysmal nocturnal dyspnea.   Respiratory: Negative for shortness of breath.    Psychiatric/Behavioral: The patient is not nervous/anxious.        Social History     Tobacco Use   • Smoking status: Never Smoker   • Smokeless tobacco: Never Used   Substance Use Topics   • Alcohol use: No     O:   Vitals:    08/03/21 1331   BP: 124/64   Pulse: 92   SpO2: 97%   Weight: 93.4 kg (206 lb)   Height: 170.2 cm (67\")   PainSc: 0-No pain     Body mass index is 32.26 kg/m².  Vitals and nursing note reviewed.   Constitutional:       Appearance: Well-developed.   HENT:      Head: Normocephalic and atraumatic.   Neck:      Thyroid: No thyromegaly.      Vascular: No JVD.   Pulmonary:      Effort: Pulmonary effort is normal. No respiratory distress.      Breath sounds: Normal breath sounds. No wheezing. No rales.   Cardiovascular:      Normal rate. Regular rhythm. Normal heart sounds.      No gallop. No friction rub.   Abdominal:      General: Bowel sounds are normal. There is no distension.      Palpations: Abdomen is soft.      Tenderness: There is no abdominal tenderness. There is no guarding or rebound.   Musculoskeletal:      Cervical back: Neck supple. Skin:     General: Skin is warm and dry.   Neurological:      Mental Status: Alert.   Psychiatric:         Behavior: Behavior normal.         Diagnoses and all orders for this visit:    1. Medicare annual wellness visit, subsequent (Primary)    2. Benign essential hypertension  -     Comprehensive Metabolic Panel    3. Primary osteoarthritis involving multiple joints    4. " Dyslipidemia  -     Comprehensive Metabolic Panel  -     Lipid Panel    5. CKD (chronic kidney disease) stage 2, GFR 60-89 ml/min  -     Comprehensive Metabolic Panel  -     CBC & Differential    6. COPD mixed type (CMS/HCC)    7. Hyperuricemia  -     Uric Acid    The patient has read and signed the [facility] Controlled Substance Contract.  I will continue to see patient for regular follow up appointments.  They are well controlled on their medication.  EDILIA is updated every 3 months. The patient is aware of the potential for addiction and dependence.  -hypertension - controlled, continue medications  -HLD - continue statin, recheck lipids.  -no gout attacks, due labs  -ckd - avoid nsaids and bp control  -copd - continue inhalers  -still working out 1 hr 40 min daily for 6 days a week    Return in about 6 months (around 2/3/2022), or if symptoms worsen or fail to improve.

## 2021-08-03 NOTE — PATIENT INSTRUCTIONS
Advance Directive    Advance directives are legal documents that let you make choices ahead of time about your health care and medical treatment in case you become unable to communicate for yourself. Advance directives are a way for you to make known your wishes to family, friends, and health care providers. This can let others know about your end-of-life care if you become unable to communicate.  Discussing and writing advance directives should happen over time rather than all at once. Advance directives can be changed depending on your situation and what you want, even after you have signed the advance directives.  There are different types of advance directives, such as:  · Medical power of .  · Living will.  · Do not resuscitate (DNR) or do not attempt resuscitation (DNAR) order.  Health care proxy and medical power of   A health care proxy is also called a health care agent. This is a person who is appointed to make medical decisions for you in cases where you are unable to make the decisions yourself. Generally, people choose someone they know well and trust to represent their preferences. Make sure to ask this person for an agreement to act as your proxy. A proxy may have to exercise judgment in the event of a medical decision for which your wishes are not known.  A medical power of  is a legal document that names your health care proxy. Depending on the laws in your state, after the document is written, it may also need to be:  · Signed.  · Notarized.  · Dated.  · Copied.  · Witnessed.  · Incorporated into your medical record.  You may also want to appoint someone to manage your money in a situation in which you are unable to do so. This is called a durable power of  for finances. It is a separate legal document from the durable power of  for health care. You may choose the same person or someone different from your health care proxy to act as your agent in money  matters.  If you do not appoint a proxy, or if there is a concern that the proxy is not acting in your best interests, a court may appoint a guardian to act on your behalf.  Living will  A living will is a set of instructions that state your wishes about medical care when you cannot express them yourself. Health care providers should keep a copy of your living will in your medical record. You may want to give a copy to family members or friends. To alert caregivers in case of an emergency, you can place a card in your wallet to let them know that you have a living will and where they can find it. A living will is used if you become:  · Terminally ill.  · Disabled.  · Unable to communicate or make decisions.  Items to consider in your living will include:  · To use or not to use life-support equipment, such as dialysis machines and breathing machines (ventilators).  · A DNR or DNAR order. This tells health care providers not to use cardiopulmonary resuscitation (CPR) if breathing or heartbeat stops.  · To use or not to use tube feeding.  · To be given or not to be given food and fluids.  · Comfort (palliative) care when the goal becomes comfort rather than a cure.  · Donation of organs and tissues.  A living will does not give instructions for distributing your money and property if you should pass away.  DNR or DNAR  A DNR or DNAR order is a request not to have CPR in the event that your heart stops beating or you stop breathing. If a DNR or DNAR order has not been made and shared, a health care provider will try to help any patient whose heart has stopped or who has stopped breathing. If you plan to have surgery, talk with your health care provider about how your DNR or DNAR order will be followed if problems occur.  What if I do not have an advance directive?  If you do not have an advance directive, some states assign family decision makers to act on your behalf based on how closely you are related to them. Each  state has its own laws about advance directives. You may want to check with your health care provider, , or state representative about the laws in your state.  Summary  · Advance directives are the legal documents that allow you to make choices ahead of time about your health care and medical treatment in case you become unable to tell others about your care.  · The process of discussing and writing advance directives should happen over time. You can change the advance directives, even after you have signed them.  · Advance directives include DNR or DNAR orders, living zendejas, and designating an agent as your medical power of .  This information is not intended to replace advice given to you by your health care provider. Make sure you discuss any questions you have with your health care provider.  Document Revised: 01/28/2021 Document Reviewed: 07/16/2020  Elsevier Patient Education © 2021 [employer]      Calorie Counting for Weight Loss  Calories are units of energy. Your body needs a certain number of calories from food to keep going throughout the day. When you eat or drink more calories than your body needs, your body stores the extra calories mostly as fat. When you eat or drink fewer calories than your body needs, your body burns fat to get the energy it needs.  Calorie counting means keeping track of how many calories you eat and drink each day. Calorie counting can be helpful if you need to lose weight. If you eat fewer calories than your body needs, you should lose weight. Ask your health care provider what a healthy weight is for you.  For calorie counting to work, you will need to eat the right number of calories each day to lose a healthy amount of weight per week. A dietitian can help you figure out how many calories you need in a day and will suggest ways to reach your calorie goal.  · A healthy amount of weight to lose each week is usually 1-2 lb (0.5-0.9 kg). This usually means that  your daily calorie intake should be reduced by 500-750 calories.  · Eating 1,200-1,500 calories a day can help most women lose weight.  · Eating 1,500-1,800 calories a day can help most men lose weight.  What do I need to know about calorie counting?  Work with your health care provider or dietitian to determine how many calories you should get each day. To meet your daily calorie goal, you will need to:  · Find out how many calories are in each food that you would like to eat. Try to do this before you eat.  · Decide how much of the food you plan to eat.  · Keep a food log. Do this by writing down what you ate and how many calories it had.  To successfully lose weight, it is important to balance calorie counting with a healthy lifestyle that includes regular activity.  Where do I find calorie information?    The number of calories in a food can be found on a Nutrition Facts label. If a food does not have a Nutrition Facts label, try to look up the calories online or ask your dietitian for help.  Remember that calories are listed per serving. If you choose to have more than one serving of a food, you will have to multiply the calories per serving by the number of servings you plan to eat. For example, the label on a package of bread might say that a serving size is 1 slice and that there are 90 calories in a serving. If you eat 1 slice, you will have eaten 90 calories. If you eat 2 slices, you will have eaten 180 calories.  How do I keep a food log?  After each time that you eat, record the following in your food log as soon as possible:  · What you ate. Be sure to include toppings, sauces, and other extras on the food.  · How much you ate. This can be measured in cups, ounces, or number of items.  · How many calories were in each food and drink.  · The total number of calories in the food you ate.  Keep your food log near you, such as in a pocket-sized notebook or on an sharad or website on your mobile phone. Some  programs will calculate calories for you and show you how many calories you have left to meet your daily goal.  What are some portion-control tips?  · Know how many calories are in a serving. This will help you know how many servings you can have of a certain food.  · Use a measuring cup to measure serving sizes. You could also try weighing out portions on a kitchen scale. With time, you will be able to estimate serving sizes for some foods.  · Take time to put servings of different foods on your favorite plates or in your favorite bowls and cups so you know what a serving looks like.  · Try not to eat straight from a food's packaging, such as from a bag or box. Eating straight from the package makes it hard to see how much you are eating and can lead to overeating. Put the amount you would like to eat in a cup or on a plate to make sure you are eating the right portion.  · Use smaller plates, glasses, and bowls for smaller portions and to prevent overeating.  · Try not to multitask. For example, avoid watching TV or using your computer while eating. If it is time to eat, sit down at a table and enjoy your food. This will help you recognize when you are full. It will also help you be more mindful of what and how much you are eating.  What are tips for following this plan?  Reading food labels  · Check the calorie count compared with the serving size. The serving size may be smaller than what you are used to eating.  · Check the source of the calories. Try to choose foods that are high in protein, fiber, and vitamins, and low in saturated fat, trans fat, and sodium.  Shopping  · Read nutrition labels while you shop. This will help you make healthy decisions about which foods to buy.  · Pay attention to nutrition labels for low-fat or fat-free foods. These foods sometimes have the same number of calories or more calories than the full-fat versions. They also often have added sugar, starch, or salt to make up for  flavor that was removed with the fat.  · Make a grocery list of lower-calorie foods and stick to it.  Cooking  · Try to cook your favorite foods in a healthier way. For example, try baking instead of frying.  · Use low-fat dairy products.  Meal planning  · Use more fruits and vegetables. One-half of your plate should be fruits and vegetables.  · Include lean proteins, such as chicken, turkey, and fish.  Lifestyle  Each week, aim to do one of the following:  · 150 minutes of moderate exercise, such as walking.  · 75 minutes of vigorous exercise, such as running.  General information  · Know how many calories are in the foods you eat most often. This will help you calculate calorie counts faster.  · Find a way of tracking calories that works for you. Get creative. Try different apps or programs if writing down calories does not work for you.  What foods should I eat?    · Eat nutritious foods. It is better to have a nutritious, high-calorie food, such as an avocado, than a food with few nutrients, such as a bag of potato chips.  · Use your calories on foods and drinks that will fill you up and will not leave you hungry soon after eating.  ? Examples of foods that fill you up are nuts and nut butters, vegetables, lean proteins, and high-fiber foods such as whole grains. High-fiber foods are foods with more than 5 g of fiber per serving.  · Pay attention to calories in drinks. Low-calorie drinks include water and unsweetened drinks.  The items listed above may not be a complete list of foods and beverages you can eat. Contact a dietitian for more information.  What foods should I limit?  Limit foods or drinks that are not good sources of vitamins, minerals, or protein or that are high in unhealthy fats. These include:  · Candy.  · Other sweets.  · Sodas, specialty coffee drinks, alcohol, and juice.  The items listed above may not be a complete list of foods and beverages you should avoid. Contact a dietitian for more  information.  How do I count calories when eating out?  · Pay attention to portions. Often, portions are much larger when eating out. Try these tips to keep portions smaller:  ? Consider sharing a meal instead of getting your own.  ? If you get your own meal, eat only half of it. Before you start eating, ask for a container and put half of your meal into it.  ? When available, consider ordering smaller portions from the menu instead of full portions.  · Pay attention to your food and drink choices. Knowing the way food is cooked and what is included with the meal can help you eat fewer calories.  ? If calories are listed on the menu, choose the lower-calorie options.  ? Choose dishes that include vegetables, fruits, whole grains, low-fat dairy products, and lean proteins.  ? Choose items that are boiled, broiled, grilled, or steamed. Avoid items that are buttered, battered, fried, or served with cream sauce. Items labeled as crispy are usually fried, unless stated otherwise.  ? Choose water, low-fat milk, unsweetened iced tea, or other drinks without added sugar. If you want an alcoholic beverage, choose a lower-calorie option, such as a glass of wine or light beer.  ? Ask for dressings, sauces, and syrups on the side. These are usually high in calories, so you should limit the amount you eat.  ? If you want a salad, choose a garden salad and ask for grilled meats. Avoid extra toppings such as teresa, cheese, or fried items. Ask for the dressing on the side, or ask for olive oil and vinegar or lemon to use as dressing.  · Estimate how many servings of a food you are given. Knowing serving sizes will help you be aware of how much food you are eating at restaurants.  Where to find more information  · Centers for Disease Control and Prevention: www.cdc.gov  · [place]. Department of Agriculture: myplate.gov  Summary  · Calorie counting means keeping track of how many calories you eat and drink each day. If you eat fewer  calories than your body needs, you should lose weight.  · A healthy amount of weight to lose per week is usually 1-2 lb (0.5-0.9 kg). This usually means reducing your daily calorie intake by 500-750 calories.  · The number of calories in a food can be found on a Nutrition Facts label. If a food does not have a Nutrition Facts label, try to look up the calories online or ask your dietitian for help.  · Use smaller plates, glasses, and bowls for smaller portions and to prevent overeating.  · Use your calories on foods and drinks that will fill you up and not leave you hungry shortly after a meal.  This information is not intended to replace advice given to you by your health care provider. Make sure you discuss any questions you have with your health care provider.  Document Revised: 01/28/2021 Document Reviewed: 01/28/2021  [employer] Patient Education © 2021 [employer]      Exercising to Lose Weight  Exercise is structured, repetitive physical activity to improve fitness and health. Getting regular exercise is important for everyone. It is especially important if you are overweight. Being overweight increases your risk of heart disease, stroke, diabetes, high blood pressure, and several types of cancer. Reducing your calorie intake and exercising can help you lose weight.  Exercise is usually categorized as moderate or vigorous intensity. To lose weight, most people need to do a certain amount of moderate-intensity or vigorous-intensity exercise each week.  Moderate-intensity exercise    Moderate-intensity exercise is any activity that gets you moving enough to burn at least three times more energy (calories) than if you were sitting.  Examples of moderate exercise include:  · Walking a mile in 15 minutes.  · Doing light yard work.  · Biking at an easy pace.  Most people should get at least 150 minutes (2 hours and 30 minutes) a week of moderate-intensity exercise to maintain their body weight.  Vigorous-intensity  exercise  Vigorous-intensity exercise is any activity that gets you moving enough to burn at least six times more calories than if you were sitting. When you exercise at this intensity, you should be working hard enough that you are not able to carry on a conversation.  Examples of vigorous exercise include:  · Running.  · Playing a team sport, such as football, basketball, and soccer.  · Jumping rope.  Most people should get at least 75 minutes (1 hour and 15 minutes) a week of vigorous-intensity exercise to maintain their body weight.  How can exercise affect me?  When you exercise enough to burn more calories than you eat, you lose weight. Exercise also reduces body fat and builds muscle. The more muscle you have, the more calories you burn. Exercise also:  · Improves mood.  · Reduces stress and tension.  · Improves your overall fitness, flexibility, and endurance.  · Increases bone strength.  The amount of exercise you need to lose weight depends on:  · Your age.  · The type of exercise.  · Any health conditions you have.  · Your overall physical ability.  Talk to your health care provider about how much exercise you need and what types of activities are safe for you.  What actions can I take to lose weight?  Nutrition    · Make changes to your diet as told by your health care provider or diet and nutrition specialist (dietitian). This may include:  ? Eating fewer calories.  ? Eating more protein.  ? Eating less unhealthy fats.  ? Eating a diet that includes fresh fruits and vegetables, whole grains, low-fat dairy products, and lean protein.  ? Avoiding foods with added fat, salt, and sugar.  · Drink plenty of water while you exercise to prevent dehydration or heat stroke.  Activity  · Choose an activity that you enjoy and set realistic goals. Your health care provider can help you make an exercise plan that works for you.  · Exercise at a moderate or vigorous intensity most days of the week.  ? The intensity of  exercise may vary from person to person. You can tell how intense a workout is for you by paying attention to your breathing and heartbeat. Most people will notice their breathing and heartbeat get faster with more intense exercise.  · Do resistance training twice each week, such as:  ? Push-ups.  ? Sit-ups.  ? Lifting weights.  ? Using resistance bands.  · Getting short amounts of exercise can be just as helpful as long structured periods of exercise. If you have trouble finding time to exercise, try to include exercise in your daily routine.  ? Get up, stretch, and walk around every 30 minutes throughout the day.  ? Go for a walk during your lunch break.  ? Park your car farther away from your destination.  ? If you take public transportation, get off one stop early and walk the rest of the way.  ? Make phone calls while standing up and walking around.  ? Take the stairs instead of elevators or escalators.  · Wear comfortable clothes and shoes with good support.  · Do not exercise so much that you hurt yourself, feel dizzy, or get very short of breath.  Where to find more information  · [place] Department of Health and Human Services: www.hhs.gov  · Centers for Disease Control and Prevention (CDC): www.cdc.gov  Contact a health care provider:  · Before starting a new exercise program.  · If you have questions or concerns about your weight.  · If you have a medical problem that keeps you from exercising.  Get help right away if you have any of the following while exercising:  · Injury.  · Dizziness.  · Difficulty breathing or shortness of breath that does not go away when you stop exercising.  · Chest pain.  · Rapid heartbeat.  Summary  · Being overweight increases your risk of heart disease, stroke, diabetes, high blood pressure, and several types of cancer.  · Losing weight happens when you burn more calories than you eat.  · Reducing the amount of calories you eat in addition to getting regular moderate or vigorous  exercise each week helps you lose weight.  This information is not intended to replace advice given to you by your health care provider. Make sure you discuss any questions you have with your health care provider.  Document Revised: 04/15/2021 Document Reviewed: 04/15/2021  Elsevier Patient Education © 2021 [employer]

## 2021-08-04 LAB
ALBUMIN SERPL-MCNC: 4.6 G/DL (ref 3.7–4.7)
ALBUMIN/GLOB SERPL: 1.6 {RATIO} (ref 1.2–2.2)
ALP SERPL-CCNC: 82 IU/L (ref 48–121)
ALT SERPL-CCNC: 34 IU/L (ref 0–44)
AST SERPL-CCNC: 47 IU/L (ref 0–40)
BASOPHILS # BLD AUTO: 0 X10E3/UL (ref 0–0.2)
BASOPHILS NFR BLD AUTO: 0 %
BILIRUB SERPL-MCNC: 0.4 MG/DL (ref 0–1.2)
BUN SERPL-MCNC: 19 MG/DL (ref 8–27)
BUN/CREAT SERPL: 13 (ref 10–24)
CALCIUM SERPL-MCNC: 9.8 MG/DL (ref 8.6–10.2)
CHLORIDE SERPL-SCNC: 102 MMOL/L (ref 96–106)
CHOLEST SERPL-MCNC: 137 MG/DL (ref 100–199)
CO2 SERPL-SCNC: 25 MMOL/L (ref 20–29)
CREAT SERPL-MCNC: 1.5 MG/DL (ref 0.76–1.27)
EOSINOPHIL # BLD AUTO: 0.3 X10E3/UL (ref 0–0.4)
EOSINOPHIL NFR BLD AUTO: 3 %
ERYTHROCYTE [DISTWIDTH] IN BLOOD BY AUTOMATED COUNT: 13.2 % (ref 11.6–15.4)
GLOBULIN SER CALC-MCNC: 2.8 G/DL (ref 1.5–4.5)
GLUCOSE SERPL-MCNC: 101 MG/DL (ref 65–99)
HCT VFR BLD AUTO: 44.3 % (ref 37.5–51)
HDLC SERPL-MCNC: 47 MG/DL
HGB BLD-MCNC: 15.1 G/DL (ref 13–17.7)
IMM GRANULOCYTES # BLD AUTO: 0 X10E3/UL (ref 0–0.1)
IMM GRANULOCYTES NFR BLD AUTO: 0 %
LDLC SERPL CALC-MCNC: 48 MG/DL (ref 0–99)
LYMPHOCYTES # BLD AUTO: 2 X10E3/UL (ref 0.7–3.1)
LYMPHOCYTES NFR BLD AUTO: 18 %
MCH RBC QN AUTO: 32.3 PG (ref 26.6–33)
MCHC RBC AUTO-ENTMCNC: 34.1 G/DL (ref 31.5–35.7)
MCV RBC AUTO: 95 FL (ref 79–97)
MONOCYTES # BLD AUTO: 0.9 X10E3/UL (ref 0.1–0.9)
MONOCYTES NFR BLD AUTO: 7 %
NEUTROPHILS # BLD AUTO: 8.2 X10E3/UL (ref 1.4–7)
NEUTROPHILS NFR BLD AUTO: 72 %
PLATELET # BLD AUTO: 239 X10E3/UL (ref 150–450)
POTASSIUM SERPL-SCNC: 4.9 MMOL/L (ref 3.5–5.2)
PROT SERPL-MCNC: 7.4 G/DL (ref 6–8.5)
RBC # BLD AUTO: 4.68 X10E6/UL (ref 4.14–5.8)
SODIUM SERPL-SCNC: 143 MMOL/L (ref 134–144)
TRIGL SERPL-MCNC: 274 MG/DL (ref 0–149)
URATE SERPL-MCNC: 5 MG/DL (ref 3.8–8.4)
VLDLC SERPL CALC-MCNC: 42 MG/DL (ref 5–40)
WBC # BLD AUTO: 11.5 X10E3/UL (ref 3.4–10.8)

## 2021-08-15 NOTE — PROGRESS NOTES
Mail copy of results to patient.  Kidney function decline stable  Blood counts ok, wbc mildly elevated.  Recheck in 6 months  Uric acid in good range to prevent gout.

## 2021-08-19 DIAGNOSIS — G89.29 CHRONIC BILATERAL LOW BACK PAIN WITH BILATERAL SCIATICA: ICD-10-CM

## 2021-08-19 DIAGNOSIS — M54.41 CHRONIC BILATERAL LOW BACK PAIN WITH BILATERAL SCIATICA: ICD-10-CM

## 2021-08-19 DIAGNOSIS — M54.42 CHRONIC BILATERAL LOW BACK PAIN WITH BILATERAL SCIATICA: ICD-10-CM

## 2021-08-19 DIAGNOSIS — M79.604 PAIN IN BOTH LOWER EXTREMITIES: ICD-10-CM

## 2021-08-19 DIAGNOSIS — M79.605 PAIN IN BOTH LOWER EXTREMITIES: ICD-10-CM

## 2021-08-19 RX ORDER — METHOCARBAMOL 750 MG/1
TABLET, FILM COATED ORAL
Qty: 60 TABLET | Refills: 5 | Status: SHIPPED | OUTPATIENT
Start: 2021-08-19 | End: 2022-02-15

## 2021-08-20 RX ORDER — GABAPENTIN 300 MG/1
CAPSULE ORAL
Qty: 120 CAPSULE | Refills: 0 | Status: SHIPPED | OUTPATIENT
Start: 2021-08-20 | End: 2021-09-20

## 2021-08-23 DIAGNOSIS — M15.9 GENERALIZED OSTEOARTHRITIS OF MULTIPLE SITES: ICD-10-CM

## 2021-08-23 RX ORDER — HYDROCODONE BITARTRATE AND ACETAMINOPHEN 10; 325 MG/1; MG/1
1 TABLET ORAL EVERY 6 HOURS PRN
Qty: 120 TABLET | Refills: 0 | Status: SHIPPED | OUTPATIENT
Start: 2021-08-23 | End: 2021-09-22 | Stop reason: SDUPTHER

## 2021-08-23 NOTE — TELEPHONE ENCOUNTER
Caller: Kp Hernandez    Relationship: Self    Best call back number: [phone]    Medication needed:   Requested Prescriptions     Pending Prescriptions Disp Refills   • HYDROcodone-acetaminophen (NORCO)  MG per tablet 120 tablet 0     Sig: Take 1 tablet by mouth Every 6 (Six) Hours As Needed for Moderate Pain .       When do you need the refill by: 08/25    What additional details did the patient provide when requesting the medication:  PATIENT HAS A 2 DAY SUPPLU    Does the patient have less than a 3 day supply:  [x] Yes  [] No    What is the patient's preferred pharmacy: [address] ([facility] [place] - [address] [facility] [phone] [facility] [phone] [facility]

## 2021-08-30 DIAGNOSIS — G47.09 OTHER INSOMNIA: ICD-10-CM

## 2021-08-30 RX ORDER — MIRTAZAPINE 30 MG/1
TABLET, FILM COATED ORAL
Qty: 90 TABLET | Refills: 11 | Status: SHIPPED | OUTPATIENT
Start: 2021-08-30 | End: 2022-11-07

## 2021-09-03 ENCOUNTER — OFFICE VISIT (OUTPATIENT)
Dept: FAMILY MEDICINE CLINIC | Facility: CLINIC | Age: 76
End: 2021-09-03

## 2021-09-03 VITALS
BODY MASS INDEX: 32.96 KG/M2 | WEIGHT: 210 LBS | HEART RATE: 63 BPM | DIASTOLIC BLOOD PRESSURE: 74 MMHG | HEIGHT: 67 IN | OXYGEN SATURATION: 94 % | SYSTOLIC BLOOD PRESSURE: 124 MMHG

## 2021-09-03 DIAGNOSIS — M79.671 RIGHT FOOT PAIN: Primary | ICD-10-CM

## 2021-09-03 DIAGNOSIS — M67.471 GANGLION CYST OF RIGHT FOOT: ICD-10-CM

## 2021-09-03 PROCEDURE — 99212 OFFICE O/P EST SF 10 MIN: CPT | Performed by: FAMILY MEDICINE

## 2021-09-03 PROCEDURE — 73630 X-RAY EXAM OF FOOT: CPT | Performed by: FAMILY MEDICINE

## 2021-09-03 PROCEDURE — 20612 ASPIRATE/INJ GANGLION CYST: CPT | Performed by: FAMILY MEDICINE

## 2021-09-03 RX ORDER — METHYLPREDNISOLONE ACETATE 80 MG/ML
40 INJECTION, SUSPENSION INTRA-ARTICULAR; INTRALESIONAL; INTRAMUSCULAR; SOFT TISSUE ONCE
Status: COMPLETED | OUTPATIENT
Start: 2021-09-03 | End: 2021-09-03

## 2021-09-03 RX ORDER — LIDOCAINE HYDROCHLORIDE 20 MG/ML
1 INJECTION, SOLUTION INFILTRATION; PERINEURAL ONCE
Status: COMPLETED | OUTPATIENT
Start: 2021-09-03 | End: 2021-09-03

## 2021-09-03 RX ADMIN — LIDOCAINE HYDROCHLORIDE 1 ML: 20 INJECTION, SOLUTION INFILTRATION; PERINEURAL at 12:09

## 2021-09-03 RX ADMIN — METHYLPREDNISOLONE ACETATE 40 MG: 80 INJECTION, SUSPENSION INTRA-ARTICULAR; INTRALESIONAL; INTRAMUSCULAR; SOFT TISSUE at 12:10

## 2021-09-03 NOTE — PROGRESS NOTES
Subjective   Kp Hernandez is a 75 y.o. male. Presents today for   Chief Complaint   Patient presents with   • Foot Pain     right   • Cyst     right foot in arch       History of Present Illness  Patient right foot pain, has small cyst;  Paifnu;  Hard to bear weight;  Pain meds minor relief.  No injuries or trauma  Review of Systems   Musculoskeletal: Positive for arthralgias, back pain and gait problem.       Patient Active Problem List   Diagnosis   • Benign essential hypertension   • Benign prostatic hypertrophy without urinary obstruction   • CKD (chronic kidney disease) stage 2, GFR 60-89 ml/min   • COPD mixed type (CMS/HCC)   • Depression with anxiety   • Dyslipidemia   • Ganglion   • Gastroesophageal reflux disease without esophagitis   • Hearing loss   • Osteoarthritis   • Impacted cerumen       Social History     Socioeconomic History   • Marital status:      Spouse name: Not on file   • Number of children: Not on file   • Years of education: Not on file   • Highest education level: Not on file   Tobacco Use   • Smoking status: Never Smoker   • Smokeless tobacco: Never Used   Substance and Sexual Activity   • Alcohol use: No   • Drug use: No       No Known Allergies    Current Outpatient Medications on File Prior to Visit   Medication Sig Dispense Refill   • allopurinol (ZYLOPRIM) 100 MG tablet Take 1 tablet by mouth twice daily 180 tablet 0   • clobetasol (TEMOVATE) 0.05 % cream APPLY  CREAM TOPICALLY TO APPROPRIATE AREA AS DIRECTED TWICE DAILY TO  HANDS 60 g 0   • escitalopram (LEXAPRO) 10 MG tablet Take 1 tablet by mouth once daily 90 tablet 3   • finasteride (PROSCAR) 5 MG tablet TAKE 1 TABLET BY MOUTH ONCE DAILY AS DIRECTED 90 tablet 0   • fluticasone (FLONASE) 50 MCG/ACT nasal spray Use 2 spray(s) in each nostril once daily 48 g 5   • gabapentin (NEURONTIN) 300 MG capsule TAKE 1 CAPSULE BY MOUTH IN THE MORNING AND 1 AT NOON AND 2 AT NIGHT 120 capsule 0   • hydroCHLOROthiazide (HYDRODIURIL) 25  "MG tablet TAKE 1 TABLET BY MOUTH ONCE DAILY **INCREASING  DOSE** 90 tablet 0   • HYDROcodone-acetaminophen (NORCO)  MG per tablet Take 1 tablet by mouth Every 6 (Six) Hours As Needed for Moderate Pain . 120 tablet 0   • methocarbamol (ROBAXIN) 750 MG tablet TAKE 1 TABLET BY MOUTH TWICE DAILY AS NEEDED FOR MUSCLE SPASM 60 tablet 5   • metoprolol succinate XL (TOPROL-XL) 25 MG 24 hr tablet Take 1 tablet by mouth once daily 90 tablet 0   • mirtazapine (REMERON) 30 MG tablet TAKE 1 TABLET BY MOUTH ONCE DAILY AT NIGHT 90 tablet 11   • pseudoephedrine (SUDAFED) 60 MG tablet Take 1 tablet by mouth Every 12 (Twelve) Hours As Needed for Congestion (use sparingly as may raise bp). 60 tablet 5   • rosuvastatin (CRESTOR) 20 MG tablet TAKE 1 TABLET BY MOUTH ONCE DAILY AT NIGHT 90 tablet 0   • SPIRIVA HANDIHALER 18 MCG per inhalation capsule      • tobramycin (Tobrex) 0.3 % solution ophthalmic solution Administer 2 drops into the left eye Every 6 (Six) Hours. 5 mL 0   • urea (CARMOL) 40 % cream Apply  topically to the appropriate area as directed Daily. To palms of both hands 120 g 5     No current facility-administered medications on file prior to visit.       Objective   Vitals:    09/03/21 1042   BP: 124/74   Pulse: 63   SpO2: 94%   Weight: 95.3 kg (210 lb)   Height: 170.2 cm (67\")     Body mass index is 32.89 kg/m².    Physical Exam  Vitals and nursing note reviewed.   Constitutional:       Appearance: He is well-developed.   Musculoskeletal:      Cervical back: Neck supple.        Feet:    Skin:     General: Skin is warm and dry.   Neurological:      Mental Status: He is alert.   Psychiatric:         Behavior: Behavior normal.         Ganglion cyst -aspiration and injection    Date/Time: 9/3/2021 12:00 PM  Performed by: Murphy Felix DO  Authorized by: Murphy Felix DO   Consent: Verbal consent obtained.  Risks and benefits: risks, benefits and alternatives were discussed  Consent given by: patient  Patient " understanding: patient states understanding of the procedure being performed  Site marked: the operative site was marked  Required items: required blood products, implants, devices, and special equipment available  Patient identity confirmed: verbally with patient  Type: cyst  Body area: lower extremity  Location details: right foot  Anesthesia: local infiltration    Anesthesia:  Local Anesthetic: lidocaine 2% without epinephrine  Anesthetic total: 1 mL    Sedation:  Patient sedated: no    Needle gauge: 22  Incision type: usign needle aspirated.  Drainage characteristics: clear fluid.  Drainage amount: 0.25ml.  Wound treatment: sterile compression bandage placed.  Packing material: none  Patient tolerance: Patient tolerated the procedure well with no immediate complications  Comments: Injected 40mg of depo medrol into cyst after aspiration.  Wasted 40mg      XRAY: right foot   INDICATION:  Right foot pain  Wet read:  Djd, no acute  no Comparative data  Imagine independently viewed by myself  Radiology reading pending.      Assessment/Plan   Diagnoses and all orders for this visit:    1. Right foot pain (Primary)  -     XR Foot 3+ View Right    2. Ganglion cyst of right foot  -    Ganglion cyst -cyst aspiration and injection  -     methylPREDNISolone acetate (DEPO-medrol) injection 40 mg  -     lidocaine (XYLOCAINE) 2% injection 1 mL    hc/apap prn pain  Post-joint injection instructions given, warned may be sore and achy next 2-3 days, recommend ice as directed.           -Follow up: Prn - RTC if worse or no improvement.

## 2021-09-07 RX ORDER — HYDROCHLOROTHIAZIDE 25 MG/1
TABLET ORAL
Qty: 90 TABLET | Refills: 3 | Status: SHIPPED | OUTPATIENT
Start: 2021-09-07 | End: 2022-08-03

## 2021-09-07 RX ORDER — FINASTERIDE 5 MG/1
TABLET, FILM COATED ORAL
Qty: 90 TABLET | Refills: 3 | Status: SHIPPED | OUTPATIENT
Start: 2021-09-07 | End: 2022-08-03

## 2021-09-07 RX ORDER — ALLOPURINOL 100 MG/1
TABLET ORAL
Qty: 180 TABLET | Refills: 3 | Status: SHIPPED | OUTPATIENT
Start: 2021-09-07 | End: 2022-08-03

## 2021-09-19 DIAGNOSIS — M54.41 CHRONIC BILATERAL LOW BACK PAIN WITH BILATERAL SCIATICA: ICD-10-CM

## 2021-09-19 DIAGNOSIS — M54.42 CHRONIC BILATERAL LOW BACK PAIN WITH BILATERAL SCIATICA: ICD-10-CM

## 2021-09-19 DIAGNOSIS — M79.605 PAIN IN BOTH LOWER EXTREMITIES: ICD-10-CM

## 2021-09-19 DIAGNOSIS — M79.604 PAIN IN BOTH LOWER EXTREMITIES: ICD-10-CM

## 2021-09-19 DIAGNOSIS — G89.29 CHRONIC BILATERAL LOW BACK PAIN WITH BILATERAL SCIATICA: ICD-10-CM

## 2021-09-20 RX ORDER — GABAPENTIN 300 MG/1
CAPSULE ORAL
Qty: 120 CAPSULE | Refills: 0 | Status: SHIPPED | OUTPATIENT
Start: 2021-09-20 | End: 2021-10-18

## 2021-09-22 DIAGNOSIS — M15.9 GENERALIZED OSTEOARTHRITIS OF MULTIPLE SITES: ICD-10-CM

## 2021-09-22 NOTE — TELEPHONE ENCOUNTER
Caller: Kp Hernandez    Relationship: Self    Medication requested (name and dosage): HYDROcodone-acetaminophen (NORCO)  MG per tablet    Pharmacy where request should be sent:[address] ([facility]), [place] - [address] [facility] [phone] [facility] [phone]   [phone]    Best call back number: [phone]     Does the patient have less than a 3 day supply:  [x] Yes  [] No    Destiny Callahan Rep   09/22/21 15:24 EDT

## 2021-09-23 RX ORDER — HYDROCODONE BITARTRATE AND ACETAMINOPHEN 10; 325 MG/1; MG/1
1 TABLET ORAL EVERY 6 HOURS PRN
Qty: 120 TABLET | Refills: 0 | Status: SHIPPED | OUTPATIENT
Start: 2021-09-23 | End: 2021-10-21 | Stop reason: SDUPTHER

## 2021-10-12 RX ORDER — ROSUVASTATIN CALCIUM 20 MG/1
TABLET, COATED ORAL
Qty: 90 TABLET | Refills: 3 | Status: SHIPPED | OUTPATIENT
Start: 2021-10-12 | End: 2022-09-29

## 2021-10-12 RX ORDER — METOPROLOL SUCCINATE 25 MG/1
TABLET, EXTENDED RELEASE ORAL
Qty: 90 TABLET | Refills: 3 | Status: SHIPPED | OUTPATIENT
Start: 2021-10-12 | End: 2022-09-29

## 2021-10-14 DIAGNOSIS — L20.82 FLEXURAL ECZEMA: ICD-10-CM

## 2021-10-14 RX ORDER — CLOBETASOL PROPIONATE 0.5 MG/G
CREAM TOPICAL
Qty: 60 G | Refills: 0 | Status: SHIPPED | OUTPATIENT
Start: 2021-10-14 | End: 2022-01-17

## 2021-10-18 DIAGNOSIS — M79.605 PAIN IN BOTH LOWER EXTREMITIES: ICD-10-CM

## 2021-10-18 DIAGNOSIS — M79.604 PAIN IN BOTH LOWER EXTREMITIES: ICD-10-CM

## 2021-10-18 DIAGNOSIS — M54.42 CHRONIC BILATERAL LOW BACK PAIN WITH BILATERAL SCIATICA: ICD-10-CM

## 2021-10-18 DIAGNOSIS — G89.29 CHRONIC BILATERAL LOW BACK PAIN WITH BILATERAL SCIATICA: ICD-10-CM

## 2021-10-18 DIAGNOSIS — M54.41 CHRONIC BILATERAL LOW BACK PAIN WITH BILATERAL SCIATICA: ICD-10-CM

## 2021-10-18 RX ORDER — GABAPENTIN 300 MG/1
CAPSULE ORAL
Qty: 120 CAPSULE | Refills: 0 | Status: SHIPPED | OUTPATIENT
Start: 2021-10-18 | End: 2021-11-17

## 2021-10-21 DIAGNOSIS — M15.9 GENERALIZED OSTEOARTHRITIS OF MULTIPLE SITES: ICD-10-CM

## 2021-10-21 NOTE — TELEPHONE ENCOUNTER
Caller: Kp Hernandez    Relationship: Self      Medication requested (name and dosage):     Requested Prescriptions:   Requested Prescriptions     Pending Prescriptions Disp Refills   • HYDROcodone-acetaminophen (NORCO)  MG per tablet 120 tablet 0     Sig: Take 1 tablet by mouth Every 6 (Six) Hours As Needed for Moderate Pain .        Pharmacy where request should be sent: [facility]  [address] [facility]      Additional details provided by patient:     Best call back number:    Does the patient have less than a 3 day supply:  [x] Yes  [] No    Destiny Angel Rep   10/21/21 09:13 EDT

## 2021-10-22 RX ORDER — HYDROCODONE BITARTRATE AND ACETAMINOPHEN 10; 325 MG/1; MG/1
1 TABLET ORAL EVERY 6 HOURS PRN
Qty: 120 TABLET | Refills: 0 | Status: SHIPPED | OUTPATIENT
Start: 2021-10-22 | End: 2021-10-22 | Stop reason: SDUPTHER

## 2021-10-22 RX ORDER — HYDROCODONE BITARTRATE AND ACETAMINOPHEN 10; 325 MG/1; MG/1
1 TABLET ORAL EVERY 6 HOURS PRN
Qty: 120 TABLET | Refills: 0 | Status: SHIPPED | OUTPATIENT
Start: 2021-10-22 | End: 2021-11-19 | Stop reason: SDUPTHER

## 2021-10-22 NOTE — TELEPHONE ENCOUNTER
PATIENT IS CALLING BACK TO CHECK THE STATUS OF THIS MEDICATION. PATIENT STATED THAT HIS PHARMACY IS CLOSED ON THE WEEKENDS AND HE WILL BE OUT BEFORE Monday.

## 2021-11-12 ENCOUNTER — TELEPHONE (OUTPATIENT)
Dept: FAMILY MEDICINE CLINIC | Facility: CLINIC | Age: 76
End: 2021-11-12

## 2021-11-12 NOTE — TELEPHONE ENCOUNTER
I left message agin for pt to call and ask for Maryann and she will schedule appt for next week with MANJEET

## 2021-11-12 NOTE — TELEPHONE ENCOUNTER
Marichuy I need to work in, however we need to just call and say he is due for recheck and not let know about his wifes message.  I will let her know we will keep her concerns private via my chart.  I suspect he is developing dementia.  RRJ      ----- Message from Marichuy Lewis MA sent at 11/11/2021  1:43 PM EST -----  Regarding: FW: Brandon's memory and anger    ----- Message -----  From: Kp Hernandez  Sent: 11/11/2021  12:21 PM EST  To: Aditya Herndon [facility]  Subject: Brandon's memory and anger                         I can't go with Brandon to his appointments anymore because he gets furious at me if I say anything.  This is just to let you know what behavior I see in Brandon more and more.  He hasn't been able to remember how to get  to places that we go to infrequently.  He doesn't always recognize where we are when we are driving somewhere here in town.  He can become furious over the smallest thing.  This happens if he imagines that I have  said something  that insults his ability to do something around the house, or mention that I noticed something on the car might not be working right.  If I offer my opinion about anything when someone else is visiting us, he can become verbally abusive to me.  These periods of fury thankfully  don't last long and minutes later he acts like it didn't happen.       I know that you test him yearly  on memory and cognition, but I don't know if those tests  on these things.  Please, dear God, don't let on that I have mentioned these things.  I just wanted   you to know and this was the only way that I knew to address my concerns.  Thanks,  Roxy Hernandez

## 2021-11-12 NOTE — TELEPHONE ENCOUNTER
Pt wasn't home when I called so I left detailed message with wife that RRJ said it is time for a recheck. Wife said she will let pt know to call and schedule appt for next week.

## 2021-11-17 DIAGNOSIS — G89.29 CHRONIC BILATERAL LOW BACK PAIN WITH BILATERAL SCIATICA: ICD-10-CM

## 2021-11-17 DIAGNOSIS — M54.42 CHRONIC BILATERAL LOW BACK PAIN WITH BILATERAL SCIATICA: ICD-10-CM

## 2021-11-17 DIAGNOSIS — M54.41 CHRONIC BILATERAL LOW BACK PAIN WITH BILATERAL SCIATICA: ICD-10-CM

## 2021-11-17 DIAGNOSIS — M79.605 PAIN IN BOTH LOWER EXTREMITIES: ICD-10-CM

## 2021-11-17 DIAGNOSIS — M79.604 PAIN IN BOTH LOWER EXTREMITIES: ICD-10-CM

## 2021-11-17 RX ORDER — GABAPENTIN 300 MG/1
CAPSULE ORAL
Qty: 120 CAPSULE | Refills: 3 | Status: SHIPPED | OUTPATIENT
Start: 2021-11-17 | End: 2022-03-17

## 2021-11-19 DIAGNOSIS — M15.9 GENERALIZED OSTEOARTHRITIS OF MULTIPLE SITES: ICD-10-CM

## 2021-11-19 RX ORDER — HYDROCODONE BITARTRATE AND ACETAMINOPHEN 10; 325 MG/1; MG/1
1 TABLET ORAL EVERY 6 HOURS PRN
Qty: 120 TABLET | Refills: 0 | Status: SHIPPED | OUTPATIENT
Start: 2021-11-19 | End: 2021-12-17 | Stop reason: SDUPTHER

## 2021-11-19 NOTE — TELEPHONE ENCOUNTER
Caller: Kp Hernandez    Relationship: Self    Best call back number:    Requested Prescriptions:   Requested Prescriptions     Pending Prescriptions Disp Refills   • HYDROcodone-acetaminophen (NORCO)  MG per tablet 120 tablet 0     Sig: Take 1 tablet by mouth Every 6 (Six) Hours As Needed for Moderate Pain .        Pharmacy where request should be sent:  [facility] [address] [place] [address] ([facility], [place] - [address] [facility] [phone] [facility] [phone]   [phone]    Additional details provided by patient: PATIENT HAS ENOUGH UNTIL SUNDAY    Does the patient have less than a 3 day supply:  [] Yes  [x] No    Destiny Guillen Rep   11/19/21 16:31 EST

## 2021-11-22 ENCOUNTER — OFFICE VISIT (OUTPATIENT)
Dept: FAMILY MEDICINE CLINIC | Facility: CLINIC | Age: 76
End: 2021-11-22

## 2021-11-22 VITALS
SYSTOLIC BLOOD PRESSURE: 148 MMHG | DIASTOLIC BLOOD PRESSURE: 88 MMHG | WEIGHT: 210 LBS | HEART RATE: 102 BPM | OXYGEN SATURATION: 98 % | BODY MASS INDEX: 32.96 KG/M2 | HEIGHT: 67 IN

## 2021-11-22 DIAGNOSIS — R41.3 MEMORY LOSS: Primary | ICD-10-CM

## 2021-11-22 PROCEDURE — 99214 OFFICE O/P EST MOD 30 MIN: CPT | Performed by: FAMILY MEDICINE

## 2021-11-22 NOTE — PROGRESS NOTES
Subjective   Kp Hernandez is a 76 y.o. male. Presents today for   Chief Complaint   Patient presents with   • Anxiety       Memory Loss  This is a new problem. The problem occurs intermittently. Progression since onset: wife contacted as concerned about his memory loss and mood changes;  Doesn't want him to know contacted. Pertinent negatives include no headaches. Exacerbated by: no stroke hx;  had remote hx of concussions. He has tried nothing for the symptoms. The treatment provided no relief.       Review of Systems   Neurological: Negative for headaches.       Patient Active Problem List   Diagnosis   • Benign essential hypertension   • Benign prostatic hypertrophy without urinary obstruction   • CKD (chronic kidney disease) stage 2, GFR 60-89 ml/min   • COPD mixed type (HCC)   • Depression with anxiety   • Dyslipidemia   • Ganglion   • Gastroesophageal reflux disease without esophagitis   • Hearing loss   • Osteoarthritis   • Impacted cerumen       Social History     Socioeconomic History   • Marital status:    Tobacco Use   • Smoking status: Never Smoker   • Smokeless tobacco: Never Used   Substance and Sexual Activity   • Alcohol use: No   • Drug use: No       No Known Allergies    Current Outpatient Medications on File Prior to Visit   Medication Sig Dispense Refill   • allopurinol (ZYLOPRIM) 100 MG tablet Take 1 tablet by mouth twice daily 180 tablet 3   • clobetasol (TEMOVATE) 0.05 % cream APPLY  CREAM TOPICALLY TO APPROPRIATE AREA AS DIRECTED TWICE DAILY TO  HANDS 60 g 0   • escitalopram (LEXAPRO) 10 MG tablet Take 1 tablet by mouth once daily 90 tablet 3   • finasteride (PROSCAR) 5 MG tablet TAKE 1 TABLET BY MOUTH ONCE DAILY AS DIRECTED 90 tablet 3   • fluticasone (FLONASE) 50 MCG/ACT nasal spray Use 2 spray(s) in each nostril once daily 48 g 5   • gabapentin (NEURONTIN) 300 MG capsule TAKE 1 CAPSULE BY MOUTH ONCE DAILY IN THE MORNING, 1 AT NOON, AND  2  AT  NIGHT 120 capsule 3   •  "hydroCHLOROthiazide (HYDRODIURIL) 25 MG tablet Take 1 tablet by mouth once daily 90 tablet 3   • HYDROcodone-acetaminophen (NORCO)  MG per tablet Take 1 tablet by mouth Every 6 (Six) Hours As Needed for Moderate Pain . 120 tablet 0   • methocarbamol (ROBAXIN) 750 MG tablet TAKE 1 TABLET BY MOUTH TWICE DAILY AS NEEDED FOR MUSCLE SPASM 60 tablet 5   • metoprolol succinate XL (TOPROL-XL) 25 MG 24 hr tablet Take 1 tablet by mouth once daily 90 tablet 3   • mirtazapine (REMERON) 30 MG tablet TAKE 1 TABLET BY MOUTH ONCE DAILY AT NIGHT 90 tablet 11   • pseudoephedrine (SUDAFED) 60 MG tablet Take 1 tablet by mouth Every 12 (Twelve) Hours As Needed for Congestion (use sparingly as may raise bp). 60 tablet 5   • rosuvastatin (CRESTOR) 20 MG tablet TAKE 1 TABLET BY MOUTH ONCE DAILY AT NIGHT 90 tablet 3   • SPIRIVA HANDIHALER 18 MCG per inhalation capsule      • urea (CARMOL) 40 % cream Apply  topically to the appropriate area as directed Daily. To palms of both hands 120 g 5   • [DISCONTINUED] tobramycin (Tobrex) 0.3 % solution ophthalmic solution Administer 2 drops into the left eye Every 6 (Six) Hours. 5 mL 0     No current facility-administered medications on file prior to visit.       Objective   Vitals:    11/22/21 1510   BP: 148/88   Pulse: 102   SpO2: 98%   Weight: 95.3 kg (210 lb)   Height: 170.2 cm (67\")     Body mass index is 32.89 kg/m².    Physical Exam  Vitals and nursing note reviewed.   Constitutional:       Appearance: He is well-developed.   Musculoskeletal:      Cervical back: Neck supple.   Skin:     General: Skin is warm and dry.   Neurological:      Mental Status: He is alert.   Psychiatric:         Behavior: Behavior normal.         Cognition and Memory: Cognition is impaired. Memory is impaired.      Comments: JOSE G 19/30         Assessment/Plan   Diagnoses and all orders for this visit:    1. Memory loss (Primary)  -     RPR  -     TSH  -     Vitamin B12  -     MRI Brain Without Contrast; " Future  -     Ambulatory Referral to Neuropsychology    paitent abnormal SLUMS, will check imaging of head (gfr <60, no contrast) and labs above;  Will order neuropsych.  Consider aricept, but wait for full evaluation         -Follow up: 3 months and prn

## 2021-11-23 LAB
RPR SER QL: NON REACTIVE
TSH SERPL DL<=0.005 MIU/L-ACNC: 0.95 UIU/ML (ref 0.45–4.5)
VIT B12 SERPL-MCNC: 724 PG/ML (ref 232–1245)

## 2021-11-29 NOTE — PROGRESS NOTES
Call results to patient.  Labs for memory ok  Recommend proceed with MRI of brain and neuropsych exam.  Someone should be calling to schedule MRI.  The patient should call [facility] [employer] associates as they need him to self arrange or family to do so.  Give number again if needs.

## 2021-12-13 DIAGNOSIS — F40.240 CLAUSTROPHOBIA: Primary | ICD-10-CM

## 2021-12-13 RX ORDER — DIAZEPAM 5 MG/1
TABLET ORAL
Qty: 2 TABLET | Refills: 0 | Status: SHIPPED | OUTPATIENT
Start: 2021-12-13 | End: 2022-01-10 | Stop reason: SDUPTHER

## 2021-12-17 DIAGNOSIS — M15.9 GENERALIZED OSTEOARTHRITIS OF MULTIPLE SITES: ICD-10-CM

## 2021-12-17 RX ORDER — HYDROCODONE BITARTRATE AND ACETAMINOPHEN 10; 325 MG/1; MG/1
1 TABLET ORAL EVERY 6 HOURS PRN
Qty: 120 TABLET | Refills: 0 | Status: SHIPPED | OUTPATIENT
Start: 2021-12-17 | End: 2022-01-18 | Stop reason: SDUPTHER

## 2021-12-17 NOTE — TELEPHONE ENCOUNTER
Caller: Kp Hernandez    Relationship: Self        Requested Prescriptions:   Requested Prescriptions     Pending Prescriptions Disp Refills   • HYDROcodone-acetaminophen (NORCO)  MG per tablet 120 tablet 0     Sig: Take 1 tablet by mouth Every 6 (Six) Hours As Needed for Moderate Pain .        Pharmacy where request should be sent: [address] ([facility]), [place] - [address] [facility] [phone] [facility] [phone] FX     Additional details provided by patient:    Does the patient have less than a 3 day supply:  [x] Yes  [] No    Destiny Hernadez Rep   12/17/21 10:16 EST

## 2022-01-03 ENCOUNTER — HOSPITAL ENCOUNTER (OUTPATIENT)
Dept: MRI IMAGING | Facility: HOSPITAL | Age: 77
Discharge: HOME | End: 2022-01-03
Admitting: FAMILY MEDICINE

## 2022-01-03 DIAGNOSIS — R41.3 MEMORY LOSS: ICD-10-CM

## 2022-01-03 PROCEDURE — 70551 MRI BRAIN STEM W/O DYE: CPT

## 2022-01-04 DIAGNOSIS — E23.0 HYPOPITUITARISM: Primary | ICD-10-CM

## 2022-01-04 DIAGNOSIS — D35.2 PITUITARY ADENOMA: ICD-10-CM

## 2022-01-04 NOTE — PROGRESS NOTES
Call patient regarding imaging study.  MRI brain noted an area around pituitary that could be small mass versus just artifact on imaging.  HOwever, if has an issue with pituiatry it controls various hormones throughout body and could have an impact on memory and mood.  I ordered a MRI to look specifically at the pituitary and ordered full lab panel related to pituitary to check.  Please have labs done in am between 8 to 9am if possible.

## 2022-01-05 RX ORDER — FLUTICASONE PROPIONATE 50 MCG
SPRAY, SUSPENSION (ML) NASAL
Qty: 48 G | Refills: 0 | Status: SHIPPED | OUTPATIENT
Start: 2022-01-05 | End: 2022-08-15

## 2022-01-06 LAB
ACTH PLAS-MCNC: 24.9 PG/ML (ref 7.2–63.3)
BUN SERPL-MCNC: 20 MG/DL (ref 8–27)
BUN/CREAT SERPL: 17 (ref 10–24)
CALCIUM SERPL-MCNC: 9.4 MG/DL (ref 8.6–10.2)
CHLORIDE SERPL-SCNC: 101 MMOL/L (ref 96–106)
CO2 SERPL-SCNC: 25 MMOL/L (ref 20–29)
CORTIS AM PEAK SERPL-MCNC: 11.5 UG/DL (ref 6.2–19.4)
CREAT SERPL-MCNC: 1.18 MG/DL (ref 0.76–1.27)
FSH SERPL-ACNC: 10.2 MIU/ML (ref 1.5–12.4)
GLUCOSE SERPL-MCNC: 100 MG/DL (ref 65–99)
LH SERPL-ACNC: 5.4 MIU/ML (ref 1.7–8.6)
POTASSIUM SERPL-SCNC: 3.7 MMOL/L (ref 3.5–5.2)
PROLACTIN SERPL-MCNC: 33.7 NG/ML (ref 4–15.2)
SODIUM SERPL-SCNC: 143 MMOL/L (ref 134–144)
T3FREE SERPL-MCNC: 4.2 PG/ML (ref 2–4.4)
T4 FREE SERPL-MCNC: 1.33 NG/DL (ref 0.82–1.77)
TESTOST SERPL-MCNC: 331 NG/DL (ref 264–916)
TSH SERPL DL<=0.005 MIU/L-ACNC: 2.39 UIU/ML (ref 0.45–4.5)

## 2022-01-10 ENCOUNTER — TELEPHONE (OUTPATIENT)
Dept: FAMILY MEDICINE CLINIC | Facility: CLINIC | Age: 77
End: 2022-01-10

## 2022-01-10 DIAGNOSIS — F40.240 CLAUSTROPHOBIA: ICD-10-CM

## 2022-01-10 DIAGNOSIS — E22.1 HYPERPROLACTINEMIA: Primary | ICD-10-CM

## 2022-01-10 RX ORDER — DIAZEPAM 5 MG/1
TABLET ORAL
Qty: 2 TABLET | Refills: 0 | Status: SHIPPED | OUTPATIENT
Start: 2022-01-10 | End: 2022-02-15 | Stop reason: SDUPTHER

## 2022-01-10 NOTE — TELEPHONE ENCOUNTER
Caller: Kp Hernandez    Relationship: Self    Best call back number: [phone]    What orders are you requesting (i.e. lab or imaging): MRI pituitary wo contrast    Where will you receive your lab/imaging services: PATIENT WOULD LIKE [place]EDUL[place] AND CANT GET BACK IN TILL February 22 AND DOES NOT LIKE THEM.     Additional notes: PATIENT ALSO WOULD LIKE SOME MEDICATION FOR THEM MRI FOR ANXIETY.

## 2022-01-10 NOTE — PROGRESS NOTES
Call results to patient.  Labs look ok done, but does have mildly elevated prolactin level.  Proceed with MRI as planned and I am going to go ahead and refer to Endocrinology.  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  ALso, they have not heard on MRI, please see if being scheduled.

## 2022-01-15 DIAGNOSIS — L20.82 FLEXURAL ECZEMA: ICD-10-CM

## 2022-01-17 RX ORDER — CLOBETASOL PROPIONATE 0.5 MG/G
CREAM TOPICAL
Qty: 60 G | Refills: 0 | Status: SHIPPED | OUTPATIENT
Start: 2022-01-17 | End: 2022-05-12

## 2022-01-18 ENCOUNTER — TELEPHONE (OUTPATIENT)
Dept: FAMILY MEDICINE CLINIC | Facility: CLINIC | Age: 77
End: 2022-01-18

## 2022-01-18 DIAGNOSIS — M15.9 GENERALIZED OSTEOARTHRITIS OF MULTIPLE SITES: ICD-10-CM

## 2022-01-18 NOTE — TELEPHONE ENCOUNTER
Caller: Kp Hernandez    Relationship: Self    Best call back number: [phone]    Requested Prescriptions:   Requested Prescriptions     Pending Prescriptions Disp Refills   • HYDROcodone-acetaminophen (NORCO)  MG per tablet 120 tablet 0     Sig: Take 1 tablet by mouth Every 6 (Six) Hours As Needed for Moderate Pain .        Pharmacy where request should be sent: [facility] [address] [place] [address], [place] - [address] [facility] [phone] [facility] [phone] FX     Additional details provided by patient: HAS 1 DAY OF MEDS     Does the patient have less than a 3 day supply:  [x] Yes  [] No    Destiny Booker Rep   01/18/22 09:05 EST

## 2022-01-19 RX ORDER — HYDROCODONE BITARTRATE AND ACETAMINOPHEN 10; 325 MG/1; MG/1
1 TABLET ORAL EVERY 6 HOURS PRN
Qty: 120 TABLET | Refills: 0 | Status: SHIPPED | OUTPATIENT
Start: 2022-01-19 | End: 2022-02-17 | Stop reason: SDUPTHER

## 2022-02-03 ENCOUNTER — HOSPITAL ENCOUNTER (OUTPATIENT)
Dept: MRI IMAGING | Facility: HOSPITAL | Age: 77
End: 2022-02-03

## 2022-02-15 DIAGNOSIS — M54.42 CHRONIC BILATERAL LOW BACK PAIN WITH BILATERAL SCIATICA: ICD-10-CM

## 2022-02-15 DIAGNOSIS — F40.240 CLAUSTROPHOBIA: ICD-10-CM

## 2022-02-15 DIAGNOSIS — G89.29 CHRONIC BILATERAL LOW BACK PAIN WITH BILATERAL SCIATICA: ICD-10-CM

## 2022-02-15 DIAGNOSIS — M54.41 CHRONIC BILATERAL LOW BACK PAIN WITH BILATERAL SCIATICA: ICD-10-CM

## 2022-02-15 RX ORDER — DIAZEPAM 5 MG/1
TABLET ORAL
Qty: 2 TABLET | Refills: 0 | Status: SHIPPED | OUTPATIENT
Start: 2022-02-15 | End: 2022-05-10

## 2022-02-15 RX ORDER — METHOCARBAMOL 750 MG/1
TABLET, FILM COATED ORAL
Qty: 60 TABLET | Refills: 0 | Status: SHIPPED | OUTPATIENT
Start: 2022-02-15 | End: 2022-03-17

## 2022-02-17 ENCOUNTER — HOSPITAL ENCOUNTER (OUTPATIENT)
Dept: MRI IMAGING | Facility: HOSPITAL | Age: 77
Discharge: HOME | End: 2022-02-17
Admitting: FAMILY MEDICINE

## 2022-02-17 DIAGNOSIS — M15.9 GENERALIZED OSTEOARTHRITIS OF MULTIPLE SITES: ICD-10-CM

## 2022-02-17 DIAGNOSIS — D35.2 PITUITARY ADENOMA: ICD-10-CM

## 2022-02-17 LAB — CREAT BLDA-MCNC: 1.3 MG/DL (ref 0.6–1.3)

## 2022-02-17 PROCEDURE — 0 GADOBENATE DIMEGLUMINE 529 MG/ML SOLUTION: Performed by: FAMILY MEDICINE

## 2022-02-17 PROCEDURE — 70553 MRI BRAIN STEM W/O & W/DYE: CPT

## 2022-02-17 PROCEDURE — A9577 INJ MULTIHANCE: HCPCS | Performed by: FAMILY MEDICINE

## 2022-02-17 PROCEDURE — 82565 ASSAY OF CREATININE: CPT

## 2022-02-17 RX ORDER — HYDROCODONE BITARTRATE AND ACETAMINOPHEN 10; 325 MG/1; MG/1
1 TABLET ORAL EVERY 6 HOURS PRN
Qty: 120 TABLET | Refills: 0 | Status: SHIPPED | OUTPATIENT
Start: 2022-02-17 | End: 2022-03-16 | Stop reason: SDUPTHER

## 2022-02-17 RX ADMIN — GADOBENATE DIMEGLUMINE 20 ML: 529 INJECTION, SOLUTION INTRAVENOUS at 09:28

## 2022-02-17 NOTE — PROGRESS NOTES
Call patient regarding imaging study.  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   Will need to keep an eye on over time.  I would see endocrinology as planned in May.  It is no emergency at this point.  We can send to neurosurgery, but with being so tiny and no mass effect it is typically just watched and it is safe to wait to see what endocrinology says.

## 2022-02-17 NOTE — TELEPHONE ENCOUNTER
Caller: Kp Hernandez    Relationship: Self    Best call back number: [phone]    Requested Prescriptions:   Requested Prescriptions     Pending Prescriptions Disp Refills   • HYDROcodone-acetaminophen (NORCO)  MG per tablet 120 tablet 0     Sig: Take 1 tablet by mouth Every 6 (Six) Hours As Needed for Moderate Pain .        Pharmacy where request should be sent: [address] ([facility], [place] - [address] [facility] [phone] [facility] [phone] FX     Additional details provided by patient: PATIENT ONLY HAS ENOUGH MEDICATION FOR TODAY    Does the patient have less than a 3 day supply:  [x] Yes  [] No    [place]   02/17/22 12:23 EST

## 2022-02-21 ENCOUNTER — OFFICE VISIT (OUTPATIENT)
Dept: FAMILY MEDICINE CLINIC | Facility: CLINIC | Age: 77
End: 2022-02-21

## 2022-02-21 VITALS
BODY MASS INDEX: 32.96 KG/M2 | HEIGHT: 67 IN | OXYGEN SATURATION: 98 % | SYSTOLIC BLOOD PRESSURE: 122 MMHG | WEIGHT: 210 LBS | DIASTOLIC BLOOD PRESSURE: 86 MMHG | HEART RATE: 60 BPM

## 2022-02-21 DIAGNOSIS — D35.2 PITUITARY ADENOMA: ICD-10-CM

## 2022-02-21 DIAGNOSIS — E22.1 HYPERPROLACTINEMIA: Primary | ICD-10-CM

## 2022-02-21 PROCEDURE — 99212 OFFICE O/P EST SF 10 MIN: CPT | Performed by: FAMILY MEDICINE

## 2022-02-21 NOTE — PROGRESS NOTES
Subjective   Kp Hernandez is a 76 y.o. male. Presents today for   Chief Complaint   Patient presents with   • Memory Loss       History of Present Illness  Patient here for f/u of MRI brain;  Had ordered initially due to memory loss, found mildly elevated prolactin and on MRI ? Pituitary adenoma.  Patient just had pituitary MRi noted small adenoma;  Patient denies vision changes and more specificically loss of peripheral vision.  He has had recent eye exam and visual field check normal.    Review of Systems   Eyes: Negative for visual disturbance.   Psychiatric/Behavioral: Positive for confusion and decreased concentration.       Patient Active Problem List   Diagnosis   • Benign essential hypertension   • Benign prostatic hypertrophy without urinary obstruction   • CKD (chronic kidney disease) stage 2, GFR 60-89 ml/min   • COPD mixed type (HCC)   • Depression with anxiety   • Dyslipidemia   • Ganglion   • Gastroesophageal reflux disease without esophagitis   • Hearing loss   • Osteoarthritis   • Impacted cerumen       Social History     Socioeconomic History   • Marital status:    Tobacco Use   • Smoking status: Never Smoker   • Smokeless tobacco: Never Used   Substance and Sexual Activity   • Alcohol use: No   • Drug use: No       No Known Allergies    Current Outpatient Medications on File Prior to Visit   Medication Sig Dispense Refill   • allopurinol (ZYLOPRIM) 100 MG tablet Take 1 tablet by mouth twice daily 180 tablet 3   • clobetasol (TEMOVATE) 0.05 % cream APPLY  CREAM TOPICALLY TO APPROPRIATE AREA AS DIRECTED TWICE DAILY TO  HANDS 60 g 0   • diazePAM (Valium) 5 MG tablet 2 po 1 hour prior to MRI 2 tablet 0   • escitalopram (LEXAPRO) 10 MG tablet Take 1 tablet by mouth once daily 90 tablet 3   • finasteride (PROSCAR) 5 MG tablet TAKE 1 TABLET BY MOUTH ONCE DAILY AS DIRECTED 90 tablet 3   • fluticasone (FLONASE) 50 MCG/ACT nasal spray INSTILL 2 SPRAY(S) IN EACH NOSTRIL ONCE DAILY 48 g 0   •  "gabapentin (NEURONTIN) 300 MG capsule TAKE 1 CAPSULE BY MOUTH ONCE DAILY IN THE MORNING, 1 AT NOON, AND  2  AT  NIGHT 120 capsule 3   • hydroCHLOROthiazide (HYDRODIURIL) 25 MG tablet Take 1 tablet by mouth once daily 90 tablet 3   • HYDROcodone-acetaminophen (NORCO)  MG per tablet Take 1 tablet by mouth Every 6 (Six) Hours As Needed for Moderate Pain . 120 tablet 0   • methocarbamol (ROBAXIN) 750 MG tablet TAKE 1 TABLET BY MOUTH TWICE DAILY AS NEEDED FOR MUSCLE SPASM 60 tablet 0   • metoprolol succinate XL (TOPROL-XL) 25 MG 24 hr tablet Take 1 tablet by mouth once daily 90 tablet 3   • mirtazapine (REMERON) 30 MG tablet TAKE 1 TABLET BY MOUTH ONCE DAILY AT NIGHT 90 tablet 11   • pseudoephedrine (SUDAFED) 60 MG tablet Take 1 tablet by mouth Every 12 (Twelve) Hours As Needed for Congestion (use sparingly as may raise bp). 60 tablet 5   • rosuvastatin (CRESTOR) 20 MG tablet TAKE 1 TABLET BY MOUTH ONCE DAILY AT NIGHT 90 tablet 3   • SPIRIVA HANDIHALER 18 MCG per inhalation capsule      • urea (CARMOL) 40 % cream Apply  topically to the appropriate area as directed Daily. To palms of both hands 120 g 5     No current facility-administered medications on file prior to visit.       Objective   Vitals:    02/21/22 1435   BP: 122/86   Pulse: 60   SpO2: 98%   Weight: 95.3 kg (210 lb)   Height: 170.2 cm (67\")     Body mass index is 32.89 kg/m².    Physical Exam  Vitals and nursing note reviewed.   Constitutional:       Appearance: He is well-developed.   Musculoskeletal:      Cervical back: Neck supple.   Skin:     General: Skin is warm and dry.   Neurological:      Mental Status: He is alert.   Psychiatric:         Behavior: Behavior normal.       Study Result    Narrative & Impression   MRI of the pituitary with and without contrast     CLINICAL HISTORY: Suspected pituitary adenoma.     TECHNIQUE: MRI of the brain is obtained with axial pre and  postgadolinium T1, FLAIR, T2, and diffusion-weighted " images.  Additionally, there are thin cut pre and postgadolinium coronal and  sagittal T1-weighted images, coronal and sagittal T2-weighted images,  and dynamic postgadolinium coronal T1-weighted images through the sella  turcica.     Comparison is made to previous MRI of the brain dated 01/03/2022.     FINDINGS:     Within the left aspect of the anterior pituitary, there is an ovoid area  of T2 hyperintense signal which measures up to 9 mm in greatest  transverse dimensions, 5 to 6 mm in greatest craniocaudad dimensions,  and 8 mm in greatest AP dimensions. There is a relatively uniform  contrast enhancement pattern within the anterior pituitary on the  delayed postcontrast T1-weighted images. Nonetheless, on the basis of  the T2-weighted images, the findings are felt to most likely represent a  pituitary adenoma. Correlation with pituitary laboratory values is  suggested.     There are no abnormal foci of restricted diffusion. Mild diffuse  parenchymal atrophic changes are again appreciated. The major  intracranial flow related signal voids are unremarkable.     IMPRESSION:     Within the left aspect of the anterior pituitary, there is an ovoid  focus of T2 hyperintensity which measures up to approximately 5-6 x 8 x  9 mm in greatest dimensions that is most likely representative of a  pituitary adenoma. Correlation with pituitary laboratory values is  suggested.     This report was finalized on 2/17/2022 11:40 AM by Dr. Navdeep Herrera M.D.     Prolactin  Order: [id number]   Status: Final result     Visible to patient: Yes (not seen)     Next appt: 05/10/2022 at 03:30 PM in Endocrinology (Miguel Whalen MD)     Dx: Hypopituitarism (HCC); Pituitary lynda...    Specimen Information: Blood         2 Result Notes     1 Patient Communication    Component   Ref Range & Units 1 mo ago   Prolactin   4.0 - 15.2 ng/mL 33.7 High     Resulting Agency [facility]             Narrative  Performed by: [facility]  Performed at:  [address]  [id number]   : Jovani Murphy PhD, Phone:  [phone]   Patient Fasting:  Y       Specimen Collected: 01/05/22 08:04 Last Resulted: 01/06/22 16:10                Assessment/Plan   Diagnoses and all orders for this visit:    1. Hyperprolactinemia (HCC) (Primary)    2. Pituitary adenoma (HCC)    I reviewed MRI report with patient and wife, it is small and no mass effect so likely benign and can just monitor over time.  Has borderlien elevated prolactin level, no med needed at this time but would keep appt with endocrinology for an opinion.  Could consider neurosurgery referral, but appears to be non-surgical issue at this point.  Has up comign neuropsych exam, discussed again what this entails.           -Follow up: after neuropsych

## 2022-02-22 ENCOUNTER — APPOINTMENT (OUTPATIENT)
Dept: MRI IMAGING | Facility: HOSPITAL | Age: 77
End: 2022-02-22

## 2022-03-16 DIAGNOSIS — G89.29 CHRONIC BILATERAL LOW BACK PAIN WITH BILATERAL SCIATICA: ICD-10-CM

## 2022-03-16 DIAGNOSIS — M15.9 GENERALIZED OSTEOARTHRITIS OF MULTIPLE SITES: ICD-10-CM

## 2022-03-16 DIAGNOSIS — M79.605 PAIN IN BOTH LOWER EXTREMITIES: ICD-10-CM

## 2022-03-16 DIAGNOSIS — M79.604 PAIN IN BOTH LOWER EXTREMITIES: ICD-10-CM

## 2022-03-16 DIAGNOSIS — M54.41 CHRONIC BILATERAL LOW BACK PAIN WITH BILATERAL SCIATICA: ICD-10-CM

## 2022-03-16 DIAGNOSIS — M54.42 CHRONIC BILATERAL LOW BACK PAIN WITH BILATERAL SCIATICA: ICD-10-CM

## 2022-03-16 NOTE — TELEPHONE ENCOUNTER
Caller: Kp Hernandez    Relationship: Self    Best call back number:     Requested Prescriptions:   Requested Prescriptions     Pending Prescriptions Disp Refills   • HYDROcodone-acetaminophen (NORCO)  MG per tablet 120 tablet 0     Sig: Take 1 tablet by mouth Every 6 (Six) Hours As Needed for Moderate Pain .        Pharmacy where request should be sent:  [facility]  [address] [facility]  [phone]    Additional details provided by patient:     Does the patient have less than a 3 day supply:  [x] Yes  [] No    Destiny Angel Rep   03/16/22 11:40 EDT

## 2022-03-17 RX ORDER — HYDROCODONE BITARTRATE AND ACETAMINOPHEN 10; 325 MG/1; MG/1
1 TABLET ORAL EVERY 6 HOURS PRN
Qty: 120 TABLET | Refills: 0 | Status: SHIPPED | OUTPATIENT
Start: 2022-03-17 | End: 2022-04-16 | Stop reason: SDUPTHER

## 2022-03-17 RX ORDER — GABAPENTIN 300 MG/1
CAPSULE ORAL
Qty: 120 CAPSULE | Refills: 0 | Status: SHIPPED | OUTPATIENT
Start: 2022-03-17 | End: 2022-04-18

## 2022-03-17 RX ORDER — METHOCARBAMOL 750 MG/1
TABLET, FILM COATED ORAL
Qty: 60 TABLET | Refills: 0 | Status: SHIPPED | OUTPATIENT
Start: 2022-03-17 | End: 2022-04-18

## 2022-04-08 DIAGNOSIS — F41.9 ANXIETY: ICD-10-CM

## 2022-04-08 RX ORDER — ESCITALOPRAM OXALATE 10 MG/1
TABLET ORAL
Qty: 90 TABLET | Refills: 2 | Status: SHIPPED | OUTPATIENT
Start: 2022-04-08 | End: 2022-12-14

## 2022-04-15 DIAGNOSIS — M54.41 CHRONIC BILATERAL LOW BACK PAIN WITH BILATERAL SCIATICA: ICD-10-CM

## 2022-04-15 DIAGNOSIS — M54.42 CHRONIC BILATERAL LOW BACK PAIN WITH BILATERAL SCIATICA: ICD-10-CM

## 2022-04-15 DIAGNOSIS — M79.605 PAIN IN BOTH LOWER EXTREMITIES: ICD-10-CM

## 2022-04-15 DIAGNOSIS — G89.29 CHRONIC BILATERAL LOW BACK PAIN WITH BILATERAL SCIATICA: ICD-10-CM

## 2022-04-15 DIAGNOSIS — M79.604 PAIN IN BOTH LOWER EXTREMITIES: ICD-10-CM

## 2022-04-16 DIAGNOSIS — M15.9 GENERALIZED OSTEOARTHRITIS OF MULTIPLE SITES: ICD-10-CM

## 2022-04-16 RX ORDER — HYDROCODONE BITARTRATE AND ACETAMINOPHEN 10; 325 MG/1; MG/1
1 TABLET ORAL EVERY 6 HOURS PRN
Qty: 120 TABLET | Refills: 0 | Status: SHIPPED | OUTPATIENT
Start: 2022-04-16 | End: 2022-05-13 | Stop reason: SDUPTHER

## 2022-04-18 RX ORDER — METHOCARBAMOL 750 MG/1
TABLET, FILM COATED ORAL
Qty: 60 TABLET | Refills: 0 | Status: SHIPPED | OUTPATIENT
Start: 2022-04-18 | End: 2022-05-16

## 2022-04-18 RX ORDER — GABAPENTIN 300 MG/1
CAPSULE ORAL
Qty: 120 CAPSULE | Refills: 0 | Status: SHIPPED | OUTPATIENT
Start: 2022-04-18 | End: 2022-05-18

## 2022-05-09 NOTE — PROGRESS NOTES
Subjective   Kp Hernandez is a 76 y.o. male.     New pt ref by dr Murphy Marvin on for Hyperprolactinemia     Patient is a 76-year-old male who was referred for pituitary evaluation.    In January 2022, he had brain MRI as part of work-up for cognitive impairment.  Incidental to study a pituitary mass was seen.  MRI of the pituitary done in February 2022 showed a 9 mm pituitary adenoma.    Lab studies done in January 2022 as follows: ACTH 24.9 pg/mL.  A.m. cortisol 11.5 mcg per DL.  Normal total testosterone at 331 ng per DL.  LH 5.4.  FSH 10.2.  Elevated prolactin at 33.7 ng/mL.  Normal TSH at 2.39.  Normal free T4 at 1.33 ng per DL.    Denies increased headaches.  He denies changes in shoes or ring size.  He denies heat or cold intolerance.  He denies bowel changes.  He denies changes in his hair, skin or nail not related to aging.  He denies polyuria or nocturia.  He denies muscle weakness.  He denies easy bruising.  He had a normal eye exam less than 1 year ago.  He denies erectile dysfunction.    He has fathered 3 adult children.  He lifts weights for exercise but denies steroid use.  He denies alcohol, tobacco or drug abuse.  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    He has hyperlipidemia and is on Crestor 20 mg/day.  He denies myalgia.  He has no history of diabetes mellitus.    He has history of gout and calcium oxalate stones.  He is on allopurinol.    He has hypertension and is on hydrochlorothiazide 25 mg/day and Toprol-XL 25 mg/day.  He has no history of heart attack or stroke.  He denies chest pain or shortness of breath.    He has chronic neck and low back pain and has been on gabapentin, Robaxin, and hydrocodone-acetaminophen as needed      The following portions of the patient's history were reviewed and updated as appropriate: allergies, current medications, past family history, past medical history, past social history, past surgical history and problem list.    Review of Systems  "  Eyes: Negative for visual disturbance.   Respiratory: Negative for shortness of breath.    Cardiovascular: Negative for chest pain and palpitations.   Gastrointestinal: Negative.    Genitourinary: Negative.    Musculoskeletal: Positive for back pain and neck pain. Negative for joint swelling.   Neurological: Negative for weakness and numbness.     Objective      Vitals:    05/10/22 1631   BP: 128/76   Pulse: 85   SpO2: 96%   Weight: 95.3 kg (210 lb)   Height: 170.2 cm (67.01\")     Physical Exam  Constitutional:       Appearance: Normal appearance.   HENT:      Mouth/Throat:      Pharynx: No oropharyngeal exudate or posterior oropharyngeal erythema.   Eyes:      General: No scleral icterus.        Right eye: No discharge.         Left eye: No discharge.      Extraocular Movements: Extraocular movements intact.      Comments: Full visual fields by confrontation   Neck:      Vascular: No carotid bruit.   Cardiovascular:      Rate and Rhythm: Normal rate and regular rhythm.      Heart sounds: Normal heart sounds.   Pulmonary:      Effort: Pulmonary effort is normal.      Breath sounds: Normal breath sounds. No wheezing or rales.   Abdominal:      General: Bowel sounds are normal.      Palpations: Abdomen is soft.      Tenderness: There is no right CVA tenderness or left CVA tenderness.      Comments: No prominent striae   Genitourinary:     Testes: Normal.      Comments: Testes 5 cm bilaterally  Musculoskeletal:      Cervical back: No tenderness.      Right lower leg: No edema.      Left lower leg: No edema.   Lymphadenopathy:      Cervical: No cervical adenopathy.   Neurological:      Mental Status: He is alert and oriented to person, place, and time.      Comments: Able to get up from a deep squat.       Hospital Outpatient Visit on 02/17/2022   Component Date Value Ref Range Status   • Creatinine 02/17/2022 1.30  0.60 - 1.30 mg/dL Final    Serial Number: [id number]:  MATEUS2     Assessment/Plan   Diagnoses and " all orders for this visit:    1. Pituitary microadenoma (HCC) (Primary)  -     Comprehensive Metabolic Panel; Future  -     Insulin-like Growth Factor; Future  -     Macroprolactin; Future  -     Hemoglobin A1c; Future  -     Growth Hormone; Future    2. Dyslipidemia  -     Comprehensive Metabolic Panel; Future  -     Lipid Panel; Future    3. Benign essential hypertension  -     Comprehensive Metabolic Panel; Future    4. Other specified abnormal findings of blood chemistry   -     Hemoglobin A1c; Future      Patient has pituitary microadenoma with hyperprolactinemia most likely due to stalk compression.  Check IGF-I, macroprolactin, CMP, growth hormone, and hemoglobin A1c.  Patient advised to see his eye physician for baseline perimetry.  Refer to Dr. Guerrero.    Continue Crestor 20 mg/day.  Check lipid panel.    Continue hydrochlorothiazide, and Toprol-XL.  Will defer blood pressure control to Dr. Felix.    Follow-up in 3 to 4 months.    Copy my note sent to Dr. Murphy Felix and Dr. Guerrero.  Additional labs were ordered and will be done at Dr. Felix's office.  Request were placed on chart.  Total time 69 minutes.

## 2022-05-10 ENCOUNTER — OFFICE VISIT (OUTPATIENT)
Dept: ENDOCRINOLOGY | Age: 77
End: 2022-05-10

## 2022-05-10 VITALS
OXYGEN SATURATION: 96 % | HEIGHT: 67 IN | DIASTOLIC BLOOD PRESSURE: 76 MMHG | SYSTOLIC BLOOD PRESSURE: 128 MMHG | HEART RATE: 85 BPM | WEIGHT: 210 LBS | BODY MASS INDEX: 32.96 KG/M2

## 2022-05-10 DIAGNOSIS — I10 BENIGN ESSENTIAL HYPERTENSION: ICD-10-CM

## 2022-05-10 DIAGNOSIS — R79.89 OTHER SPECIFIED ABNORMAL FINDINGS OF BLOOD CHEMISTRY: ICD-10-CM

## 2022-05-10 DIAGNOSIS — E78.5 DYSLIPIDEMIA: ICD-10-CM

## 2022-05-10 DIAGNOSIS — D35.2 PITUITARY MICROADENOMA: Primary | ICD-10-CM

## 2022-05-10 PROCEDURE — 99205 OFFICE O/P NEW HI 60 MIN: CPT | Performed by: INTERNAL MEDICINE

## 2022-05-10 RX ORDER — MULTIPLE VITAMINS W/ MINERALS TAB 9MG-400MCG
1 TAB ORAL DAILY
COMMUNITY

## 2022-05-12 DIAGNOSIS — L20.82 FLEXURAL ECZEMA: ICD-10-CM

## 2022-05-12 RX ORDER — CLOBETASOL PROPIONATE 0.5 MG/G
CREAM TOPICAL
Qty: 60 G | Refills: 0 | Status: SHIPPED | OUTPATIENT
Start: 2022-05-12 | End: 2023-02-20

## 2022-05-13 DIAGNOSIS — M15.9 GENERALIZED OSTEOARTHRITIS OF MULTIPLE SITES: ICD-10-CM

## 2022-05-13 NOTE — TELEPHONE ENCOUNTER
Caller: Kp Hernandez    Relationship: Self    Best call back number: [phone]    Requested Prescriptions:   Requested Prescriptions     Pending Prescriptions Disp Refills   • HYDROcodone-acetaminophen (NORCO)  MG per tablet 120 tablet 0     Sig: Take 1 tablet by mouth Every 6 (Six) Hours As Needed for Moderate Pain .        Pharmacy where request should be sent: [address] ([facility], [place] - [address] [facility] [phone] [facility] [phone] FX     Additional details provided by patient: N/A    Does the patient have less than a 3 day supply:  [x] Yes  [] No    Destiny Jones Rep   05/13/22 15:53 EDT

## 2022-05-16 DIAGNOSIS — M54.41 CHRONIC BILATERAL LOW BACK PAIN WITH BILATERAL SCIATICA: ICD-10-CM

## 2022-05-16 DIAGNOSIS — G89.29 CHRONIC BILATERAL LOW BACK PAIN WITH BILATERAL SCIATICA: ICD-10-CM

## 2022-05-16 DIAGNOSIS — M54.42 CHRONIC BILATERAL LOW BACK PAIN WITH BILATERAL SCIATICA: ICD-10-CM

## 2022-05-16 RX ORDER — HYDROCODONE BITARTRATE AND ACETAMINOPHEN 10; 325 MG/1; MG/1
1 TABLET ORAL EVERY 6 HOURS PRN
Qty: 120 TABLET | Refills: 0 | Status: SHIPPED | OUTPATIENT
Start: 2022-05-16 | End: 2022-06-15 | Stop reason: SDUPTHER

## 2022-05-16 RX ORDER — METHOCARBAMOL 750 MG/1
TABLET, FILM COATED ORAL
Qty: 60 TABLET | Refills: 0 | Status: SHIPPED | OUTPATIENT
Start: 2022-05-16 | End: 2022-06-15

## 2022-05-18 DIAGNOSIS — M54.42 CHRONIC BILATERAL LOW BACK PAIN WITH BILATERAL SCIATICA: ICD-10-CM

## 2022-05-18 DIAGNOSIS — E23.0 HYPOPITUITARISM: ICD-10-CM

## 2022-05-18 DIAGNOSIS — M54.41 CHRONIC BILATERAL LOW BACK PAIN WITH BILATERAL SCIATICA: ICD-10-CM

## 2022-05-18 DIAGNOSIS — M79.604 PAIN IN BOTH LOWER EXTREMITIES: ICD-10-CM

## 2022-05-18 DIAGNOSIS — G89.29 CHRONIC BILATERAL LOW BACK PAIN WITH BILATERAL SCIATICA: ICD-10-CM

## 2022-05-18 DIAGNOSIS — M79.605 PAIN IN BOTH LOWER EXTREMITIES: ICD-10-CM

## 2022-05-18 DIAGNOSIS — E78.5 DYSLIPIDEMIA: Primary | ICD-10-CM

## 2022-05-18 DIAGNOSIS — E22.1 HYPERPROLACTINEMIA: ICD-10-CM

## 2022-05-18 RX ORDER — GABAPENTIN 300 MG/1
CAPSULE ORAL
Qty: 120 CAPSULE | Refills: 0 | Status: SHIPPED | OUTPATIENT
Start: 2022-05-18 | End: 2022-06-15

## 2022-05-19 LAB
ALBUMIN SERPL-MCNC: 4.5 G/DL (ref 3.7–4.7)
ALBUMIN/GLOB SERPL: 1.9 {RATIO} (ref 1.2–2.2)
ALP SERPL-CCNC: 75 IU/L (ref 44–121)
ALT SERPL-CCNC: 39 IU/L (ref 0–44)
AST SERPL-CCNC: 37 IU/L (ref 0–40)
BILIRUB SERPL-MCNC: 0.6 MG/DL (ref 0–1.2)
BUN SERPL-MCNC: 24 MG/DL (ref 8–27)
BUN/CREAT SERPL: 20 (ref 10–24)
CALCIUM SERPL-MCNC: 9.5 MG/DL (ref 8.6–10.2)
CHLORIDE SERPL-SCNC: 99 MMOL/L (ref 96–106)
CHOLEST SERPL-MCNC: 137 MG/DL (ref 100–199)
CO2 SERPL-SCNC: 26 MMOL/L (ref 20–29)
CREAT SERPL-MCNC: 1.2 MG/DL (ref 0.76–1.27)
EGFRCR SERPLBLD CKD-EPI 2021: 63 ML/MIN/1.73
FOLATE SERPL-MCNC: >20 NG/ML
GLOBULIN SER CALC-MCNC: 2.4 G/DL (ref 1.5–4.5)
GLUCOSE SERPL-MCNC: 92 MG/DL (ref 65–99)
HDLC SERPL-MCNC: 44 MG/DL
LDLC SERPL CALC-MCNC: 62 MG/DL (ref 0–99)
LDLC/HDLC SERPL: 1.4 RATIO (ref 0–3.6)
POTASSIUM SERPL-SCNC: 4.2 MMOL/L (ref 3.5–5.2)
PROT SERPL-MCNC: 6.9 G/DL (ref 6–8.5)
RPR SER QL: NON REACTIVE
SODIUM SERPL-SCNC: 141 MMOL/L (ref 134–144)
T3FREE SERPL-MCNC: 3.8 PG/ML (ref 2–4.4)
TESTOST SERPL-MCNC: 312 NG/DL (ref 264–916)
TRIGL SERPL-MCNC: 185 MG/DL (ref 0–149)
TSH SERPL DL<=0.005 MIU/L-ACNC: 1.27 UIU/ML (ref 0.45–4.5)
VIT B12 SERPL-MCNC: 859 PG/ML (ref 232–1245)
VLDLC SERPL CALC-MCNC: 31 MG/DL (ref 5–40)

## 2022-06-15 DIAGNOSIS — M79.604 PAIN IN BOTH LOWER EXTREMITIES: ICD-10-CM

## 2022-06-15 DIAGNOSIS — M79.605 PAIN IN BOTH LOWER EXTREMITIES: ICD-10-CM

## 2022-06-15 DIAGNOSIS — M54.42 CHRONIC BILATERAL LOW BACK PAIN WITH BILATERAL SCIATICA: ICD-10-CM

## 2022-06-15 DIAGNOSIS — M54.41 CHRONIC BILATERAL LOW BACK PAIN WITH BILATERAL SCIATICA: ICD-10-CM

## 2022-06-15 DIAGNOSIS — G89.29 CHRONIC BILATERAL LOW BACK PAIN WITH BILATERAL SCIATICA: ICD-10-CM

## 2022-06-15 DIAGNOSIS — M15.9 GENERALIZED OSTEOARTHRITIS OF MULTIPLE SITES: ICD-10-CM

## 2022-06-15 RX ORDER — HYDROCODONE BITARTRATE AND ACETAMINOPHEN 10; 325 MG/1; MG/1
1 TABLET ORAL EVERY 6 HOURS PRN
Qty: 120 TABLET | Refills: 0 | Status: SHIPPED | OUTPATIENT
Start: 2022-06-15 | End: 2022-07-14 | Stop reason: SDUPTHER

## 2022-06-15 RX ORDER — METHOCARBAMOL 750 MG/1
TABLET, FILM COATED ORAL
Qty: 60 TABLET | Refills: 0 | Status: SHIPPED | OUTPATIENT
Start: 2022-06-15 | End: 2022-07-14

## 2022-06-15 RX ORDER — GABAPENTIN 300 MG/1
CAPSULE ORAL
Qty: 120 CAPSULE | Refills: 5 | Status: SHIPPED | OUTPATIENT
Start: 2022-06-15 | End: 2022-12-12

## 2022-06-15 NOTE — TELEPHONE ENCOUNTER
PATIENT REQUESTED A REFILL ON:HYDROcodone-acetaminophen (NORCO)  MG per tablet    PATIENT CAN BE REACHED ON:[phone]     PHARMACY [place] [id number] [facility] ([facility] [place] - [address] [facility] - [phone] [facility] [phone]   [phone]

## 2022-07-07 NOTE — PROGRESS NOTES
Subjective   History of Present Illness: Kp Hernandez is a 76 y.o. male is being seen for consultation today at the request of Miguel Whalen MD for a pituitary lesion.  MRI pituitary 2/17/22 and MRI brain 1/3/22.  He is doing well today.  He reports that the lesion was found incidentally during work-up for memory difficulty.  He reports that his primary care doctor during annual screening noticed some memory difficulty and ordered the MRI.  He denies any problems today.  He denies any changes in strength or sensation.  Denies any recent changes in vision.  He has no complaints today.    History of Present Illness    The following portions of the patient's history were reviewed and updated as appropriate: allergies, past family history, past medical history, past social history, past surgical history and problem list.    Past Medical History:   Diagnosis Date   • Asbestosis (HCC)    • BPH without urinary obstruction    • CKD (chronic kidney disease)    • COPD (chronic obstructive pulmonary disease) (HCC)    • Depression    • Dyslipidemia    • Ganglion cyst    • GERD without esophagitis    • Hearing loss    • Hypertension    • Osteoarthritis         Past Surgical History:   Procedure Laterality Date   • CERVICAL CHIARI DECOMPRESSION POSTERIOR N/A    • COLONOSCOPY  2000   • ELBOW ARTHROPLASTY Left    • HERNIA REPAIR     • INGUINAL HERNIA REPAIR     • KNEE ACL RECONSTRUCTION Bilateral    • SPINE SURGERY     • TONSILLECTOMY            Current Outpatient Medications:   •  allopurinol (ZYLOPRIM) 100 MG tablet, Take 1 tablet by mouth twice daily, Disp: 180 tablet, Rfl: 3  •  clobetasol (TEMOVATE) 0.05 % cream, APPLY  CREAM TOPICALLY TWICE DAILY AS  DIRECTED  TO  HANDS, Disp: 60 g, Rfl: 0  •  escitalopram (LEXAPRO) 10 MG tablet, Take 1 tablet by mouth once daily, Disp: 90 tablet, Rfl: 2  •  finasteride (PROSCAR) 5 MG tablet, TAKE 1 TABLET BY MOUTH ONCE DAILY AS DIRECTED, Disp: 90 tablet, Rfl: 3  •  fluticasone  (FLONASE) 50 MCG/ACT nasal spray, INSTILL 2 SPRAY(S) IN EACH NOSTRIL ONCE DAILY, Disp: 48 g, Rfl: 0  •  gabapentin (NEURONTIN) 300 MG capsule, TAKE 1 CAPSULE BY MOUTH IN THE MORNING AND 1 AT NOON AND 2 NIGHTLY, Disp: 120 capsule, Rfl: 5  •  hydroCHLOROthiazide (HYDRODIURIL) 25 MG tablet, Take 1 tablet by mouth once daily, Disp: 90 tablet, Rfl: 3  •  HYDROcodone-acetaminophen (NORCO)  MG per tablet, Take 1 tablet by mouth Every 6 (Six) Hours As Needed for Moderate Pain ., Disp: 120 tablet, Rfl: 0  •  methocarbamol (ROBAXIN) 750 MG tablet, TAKE 1 TABLET BY MOUTH TWICE DAILY AS NEEDED FOR MUSCLE SPASM, Disp: 60 tablet, Rfl: 0  •  metoprolol succinate XL (TOPROL-XL) 25 MG 24 hr tablet, Take 1 tablet by mouth once daily, Disp: 90 tablet, Rfl: 3  •  mirtazapine (REMERON) 30 MG tablet, TAKE 1 TABLET BY MOUTH ONCE DAILY AT NIGHT, Disp: 90 tablet, Rfl: 11  •  multivitamin with minerals tablet tablet, Take 1 tablet by mouth Daily., Disp: , Rfl:   •  pseudoephedrine (SUDAFED) 60 MG tablet, Take 1 tablet by mouth Every 12 (Twelve) Hours As Needed for Congestion (use sparingly as may raise bp)., Disp: 60 tablet, Rfl: 5  •  rosuvastatin (CRESTOR) 20 MG tablet, TAKE 1 TABLET BY MOUTH ONCE DAILY AT NIGHT, Disp: 90 tablet, Rfl: 3  •  SPIRIVA HANDIHALER 18 MCG per inhalation capsule, , Disp: , Rfl:   •  urea (CARMOL) 40 % cream, Apply  topically to the appropriate area as directed Daily. To palms of both hands, Disp: 120 g, Rfl: 5  •  Zinc 50 MG capsule, Take  by mouth. 1 chewable daily, Disp: , Rfl:      No Known Allergies     Social History     Socioeconomic History   • Marital status:    Tobacco Use   • Smoking status: Never Smoker   • Smokeless tobacco: Never Used   Substance and Sexual Activity   • Alcohol use: No   • Drug use: No        Family History   Problem Relation Age of Onset   • Hypertension Mother    • Atrial fibrillation Mother    • Alcohol abuse Father    • Brain cancer Sister    • GI problems Brother   "  • COPD Brother         Review of Systems   HENT: Negative for hearing loss and tinnitus.    Eyes: Negative for visual disturbance.   Gastrointestinal: Negative for diarrhea, nausea, rectal pain and vomiting.   Neurological: Negative for dizziness, seizures, speech difficulty, weakness, light-headedness, numbness and headaches.       Objective     Vitals:    22 1123   BP: 150/82   Pulse: 86   Temp: 98.2 °F (36.8 °C)   SpO2: 96%   Weight: 97.8 kg (215 lb 9.6 oz)   Height: 172.7 cm (68\")     Body mass index is 32.78 kg/m².      Physical Exam  Neurologic Exam  Awake, alert, oriented x3  Pupils equal round reactive to light  Extraocular muscles intact  No visual field cuts on confrontation  Face symmetric  Speech is fluent and clear  No pronator drift  Motor exam  Bilateral deltoids 5/5, bilateral biceps 5/5, bilateral triceps 5/5, bilateral wrist extension 5/5 bilateral hand  5/5  Bilateral hip flexion 5/5, bilateral knee extension 5/5, bilateral DF/PF 5/5  No clonus  No Lilian's reflex  Steady normal gait  Able to detect  light touch in all 4 extremities      Assessment & Plan   Independent Review of Radiographic Studies:      I personally reviewed the images from the following studies.  MRI brain from January 3, 2022 and 2022  The MRI images were reviewed there is some fullness of the left side of the sella concerning for a pituitary adenoma.  The lesion measures 5 mm x 8 mm x 9 mm.  There is no significant suprasellar extension and no contact with the optic chiasm.    Medical Decision Makin-year-old male with a incidentally found pituitary lesion  -I reviewed the MRI images and there is no significant suprasellar extension and no optic nerve compression.  We talked about the risks and benefits of Todnem endonasal, endoscopic resection.  Since he has no compression of his optic nerves.  We will plan a follow-up MRI in 6 months to evaluate for any growth  -I recommended that he " follow-up with endocrinology as planned  -I recommended that he follow-up yearly for repeat visual field testing  -I have asked him to call or come back sooner if he develops any new symptoms    Diagnoses and all orders for this visit:    1. Pituitary lesion (HCC) (Primary)  -     MRI Brain With & Without Contrast; Future      Return in about 6 months (around 1/8/2023).    I spent 45 minutes reviewing the medical record, reviewing the MRI images, discussing strategies for pituitary lesions, discussing the risks and benefits of surgical resection.

## 2022-07-08 ENCOUNTER — OFFICE VISIT (OUTPATIENT)
Dept: NEUROSURGERY | Facility: CLINIC | Age: 77
End: 2022-07-08

## 2022-07-08 VITALS
HEART RATE: 86 BPM | TEMPERATURE: 98.2 F | DIASTOLIC BLOOD PRESSURE: 82 MMHG | HEIGHT: 68 IN | OXYGEN SATURATION: 96 % | WEIGHT: 215.6 LBS | BODY MASS INDEX: 32.67 KG/M2 | SYSTOLIC BLOOD PRESSURE: 150 MMHG

## 2022-07-08 DIAGNOSIS — E23.7 PITUITARY LESION: Primary | ICD-10-CM

## 2022-07-08 PROCEDURE — 99204 OFFICE O/P NEW MOD 45 MIN: CPT | Performed by: NEUROLOGICAL SURGERY

## 2022-07-11 ENCOUNTER — TELEPHONE (OUTPATIENT)
Dept: FAMILY MEDICINE CLINIC | Facility: CLINIC | Age: 77
End: 2022-07-11

## 2022-07-11 NOTE — TELEPHONE ENCOUNTER
Caller: Kp Hernandez    Relationship: Self    Best call back number: [phone]    What medication are you requesting: SOMETHING FOR SYMPTOMS    What are your current symptoms: SEVERE COUGHING, YELLOW PHLEGM    How long have you been experiencing symptoms: 7-8 DAYS    Have you had these symptoms before:    [x] Yes  [] No    Have you been treated for these symptoms before:   [x] Yes  [] No    If a prescription is needed, what is your preferred pharmacy and phone number: [address] ([facility]), [place] - [address] [facility] [phone] [facility] [phone] FX     Additional notes:  PATIENT STATED THAT HE DID NOT WANT TO SEE ANYONE BUT DR GIBSON.    PATIENT STATED THAT HIS CHEST IS SORE FROM COUGHING.    PATIENT REQUESTED AN APPT WITH DR GIBSON NOTHING AVAILABLE FOR A COUPLE OF WEEKS.    PATIENT STATED THAT HE HAS BEEN TAKING SUDAFED ONE AT NIGHT AND ONE OR TWO DURING THE DAY.  HE HAS NOT EXCEEDED THE MAXIMUM DOSAGE PER DAY.  MOSTLY TAKING IT AT NIGHT TO HELP HIM SLEEP .

## 2022-07-12 ENCOUNTER — OFFICE VISIT (OUTPATIENT)
Dept: FAMILY MEDICINE CLINIC | Facility: CLINIC | Age: 77
End: 2022-07-12

## 2022-07-12 VITALS
SYSTOLIC BLOOD PRESSURE: 142 MMHG | BODY MASS INDEX: 32.13 KG/M2 | HEIGHT: 68 IN | OXYGEN SATURATION: 94 % | WEIGHT: 212 LBS | HEART RATE: 94 BPM | DIASTOLIC BLOOD PRESSURE: 70 MMHG

## 2022-07-12 DIAGNOSIS — J20.9 ACUTE BRONCHITIS, UNSPECIFIED ORGANISM: ICD-10-CM

## 2022-07-12 DIAGNOSIS — R05.9 COUGH: Primary | ICD-10-CM

## 2022-07-12 LAB
EXPIRATION DATE: NORMAL
FLUAV AG UPPER RESP QL IA.RAPID: NOT DETECTED
FLUBV AG UPPER RESP QL IA.RAPID: NOT DETECTED
INTERNAL CONTROL: NORMAL
Lab: NORMAL
SARS-COV-2 AG UPPER RESP QL IA.RAPID: NOT DETECTED

## 2022-07-12 PROCEDURE — 99213 OFFICE O/P EST LOW 20 MIN: CPT | Performed by: FAMILY MEDICINE

## 2022-07-12 PROCEDURE — 87428 SARSCOV & INF VIR A&B AG IA: CPT | Performed by: FAMILY MEDICINE

## 2022-07-12 RX ORDER — METHYLPREDNISOLONE 4 MG/1
TABLET ORAL
Qty: 21 TABLET | Refills: 0 | Status: SHIPPED | OUTPATIENT
Start: 2022-07-12 | End: 2022-09-14

## 2022-07-12 RX ORDER — DOXYCYCLINE HYCLATE 100 MG/1
100 CAPSULE ORAL 2 TIMES DAILY
Qty: 20 CAPSULE | Refills: 0 | Status: SHIPPED | OUTPATIENT
Start: 2022-07-12 | End: 2022-09-14

## 2022-07-12 NOTE — PROGRESS NOTES
Subjective   Kp Hernandez is a 76 y.o. male. Presents today for   Chief Complaint   Patient presents with   • Cough       Cough  This is a new problem. Episode onset: 2 weeks. The problem has been waxing and waning. The cough is productive of sputum. Associated symptoms include nasal congestion, postnasal drip and wheezing. Pertinent negatives include no chills, fever, hemoptysis or shortness of breath. He has tried OTC cough suppressant for the symptoms. The treatment provided mild relief.       Review of Systems   Constitutional: Positive for fatigue. Negative for chills and fever.   HENT: Positive for postnasal drip.    Respiratory: Positive for cough, chest tightness (pleuritic chest pain) and wheezing. Negative for hemoptysis and shortness of breath.    Gastrointestinal: Negative for abdominal pain, diarrhea, nausea and vomiting.       Patient Active Problem List   Diagnosis   • Benign essential hypertension   • Benign prostatic hypertrophy without urinary obstruction   • CKD (chronic kidney disease) stage 2, GFR 60-89 ml/min   • COPD mixed type (HCC)   • Depression with anxiety   • Dyslipidemia   • Ganglion   • Gastroesophageal reflux disease without esophagitis   • Hearing loss   • Osteoarthritis   • Impacted cerumen   • Pituitary microadenoma (HCC)       Social History     Socioeconomic History   • Marital status:    Tobacco Use   • Smoking status: Never Smoker   • Smokeless tobacco: Never Used   Substance and Sexual Activity   • Alcohol use: No   • Drug use: No       No Known Allergies    Current Outpatient Medications on File Prior to Visit   Medication Sig Dispense Refill   • allopurinol (ZYLOPRIM) 100 MG tablet Take 1 tablet by mouth twice daily 180 tablet 3   • clobetasol (TEMOVATE) 0.05 % cream APPLY  CREAM TOPICALLY TWICE DAILY AS  DIRECTED  TO  HANDS 60 g 0   • escitalopram (LEXAPRO) 10 MG tablet Take 1 tablet by mouth once daily 90 tablet 2   • finasteride (PROSCAR) 5 MG tablet TAKE 1  "TABLET BY MOUTH ONCE DAILY AS DIRECTED 90 tablet 3   • fluticasone (FLONASE) 50 MCG/ACT nasal spray INSTILL 2 SPRAY(S) IN EACH NOSTRIL ONCE DAILY 48 g 0   • gabapentin (NEURONTIN) 300 MG capsule TAKE 1 CAPSULE BY MOUTH IN THE MORNING AND 1 AT NOON AND 2 NIGHTLY 120 capsule 5   • hydroCHLOROthiazide (HYDRODIURIL) 25 MG tablet Take 1 tablet by mouth once daily 90 tablet 3   • HYDROcodone-acetaminophen (NORCO)  MG per tablet Take 1 tablet by mouth Every 6 (Six) Hours As Needed for Moderate Pain . 120 tablet 0   • methocarbamol (ROBAXIN) 750 MG tablet TAKE 1 TABLET BY MOUTH TWICE DAILY AS NEEDED FOR MUSCLE SPASM 60 tablet 0   • metoprolol succinate XL (TOPROL-XL) 25 MG 24 hr tablet Take 1 tablet by mouth once daily 90 tablet 3   • mirtazapine (REMERON) 30 MG tablet TAKE 1 TABLET BY MOUTH ONCE DAILY AT NIGHT 90 tablet 11   • multivitamin with minerals tablet tablet Take 1 tablet by mouth Daily.     • pseudoephedrine (SUDAFED) 60 MG tablet Take 1 tablet by mouth Every 12 (Twelve) Hours As Needed for Congestion (use sparingly as may raise bp). 60 tablet 5   • rosuvastatin (CRESTOR) 20 MG tablet TAKE 1 TABLET BY MOUTH ONCE DAILY AT NIGHT 90 tablet 3   • SPIRIVA HANDIHALER 18 MCG per inhalation capsule      • urea (CARMOL) 40 % cream Apply  topically to the appropriate area as directed Daily. To palms of both hands 120 g 5   • Zinc 50 MG capsule Take  by mouth. 1 chewable daily       No current facility-administered medications on file prior to visit.       Objective   Vitals:    07/12/22 1322   BP: 142/70   Pulse: 94   SpO2: 94%   Weight: 96.2 kg (212 lb)   Height: 172.7 cm (68\")     Body mass index is 32.23 kg/m².    Physical Exam  Vitals and nursing note reviewed.   Constitutional:       Appearance: He is well-developed.   HENT:      Head: Normocephalic and atraumatic.      Right Ear: Tympanic membrane normal.      Left Ear: Tympanic membrane normal.   Neck:      Thyroid: No thyromegaly.      Vascular: No JVD. "   Cardiovascular:      Rate and Rhythm: Normal rate and regular rhythm.      Heart sounds: Normal heart sounds. No murmur heard.    No friction rub. No gallop.   Pulmonary:      Effort: Pulmonary effort is normal. No respiratory distress.      Breath sounds: Decreased air movement present. Examination of the right-lower field reveals rhonchi. Rhonchi present. No wheezing or rales.   Abdominal:      General: Bowel sounds are normal. There is no distension.      Palpations: Abdomen is soft.      Tenderness: There is no abdominal tenderness. There is no guarding or rebound.   Musculoskeletal:      Cervical back: Neck supple.   Skin:     General: Skin is warm and dry.   Neurological:      Mental Status: He is alert.   Psychiatric:         Behavior: Behavior normal.       covid and flu negative poc    Assessment & Plan   Diagnoses and all orders for this visit:    1. Cough (Primary)  -     POCT SARS-CoV-2 Antigen ALESSANDRO    2. Acute bronchitis, unspecified organism  -     doxycycline (VIBRAMYCIN) 100 MG capsule; Take 1 capsule by mouth 2 (Two) Times a Day.  Dispense: 20 capsule; Refill: 0  -     methylPREDNISolone (MEDROL) 4 MG dose pack; Take as directed on package instructions.  Dispense: 21 tablet; Refill: 0    push fluids and rest  bp up but doesn't feel well and just took sudafed, would avoid decongestants         -Follow up: Prn - RTC if worse or no improvement.

## 2022-07-14 DIAGNOSIS — G89.29 CHRONIC BILATERAL LOW BACK PAIN WITH BILATERAL SCIATICA: ICD-10-CM

## 2022-07-14 DIAGNOSIS — M15.9 GENERALIZED OSTEOARTHRITIS OF MULTIPLE SITES: ICD-10-CM

## 2022-07-14 DIAGNOSIS — M54.42 CHRONIC BILATERAL LOW BACK PAIN WITH BILATERAL SCIATICA: ICD-10-CM

## 2022-07-14 DIAGNOSIS — M54.41 CHRONIC BILATERAL LOW BACK PAIN WITH BILATERAL SCIATICA: ICD-10-CM

## 2022-07-14 RX ORDER — METHOCARBAMOL 750 MG/1
TABLET, FILM COATED ORAL
Qty: 60 TABLET | Refills: 5 | Status: SHIPPED | OUTPATIENT
Start: 2022-07-14 | End: 2022-12-18

## 2022-07-14 NOTE — TELEPHONE ENCOUNTER
Caller: Kp Hernandez    Relationship: Self    Best call back number:[phone]     Requested Prescriptions:   Requested Prescriptions     Pending Prescriptions Disp Refills   • HYDROcodone-acetaminophen (NORCO)  MG per tablet 120 tablet 0     Sig: Take 1 tablet by mouth Every 6 (Six) Hours As Needed for Moderate Pain .        Pharmacy where request should be sent: [address] ([facility], [place] - [address] [facility] [phone] [facility] [phone] FX     Additional details provided by patient:     Does the patient have less than a 3 day supply:  [x] Yes  [] No    Destiny Guillen Rep   07/14/22 15:27 EDT

## 2022-07-16 RX ORDER — HYDROCODONE BITARTRATE AND ACETAMINOPHEN 10; 325 MG/1; MG/1
1 TABLET ORAL EVERY 6 HOURS PRN
Qty: 120 TABLET | Refills: 0 | Status: SHIPPED | OUTPATIENT
Start: 2022-07-16 | End: 2022-08-12 | Stop reason: SDUPTHER

## 2022-08-03 RX ORDER — HYDROCHLOROTHIAZIDE 25 MG/1
TABLET ORAL
Qty: 90 TABLET | Refills: 0 | Status: SHIPPED | OUTPATIENT
Start: 2022-08-03 | End: 2022-10-27

## 2022-08-03 RX ORDER — FINASTERIDE 5 MG/1
TABLET, FILM COATED ORAL
Qty: 90 TABLET | Refills: 0 | Status: SHIPPED | OUTPATIENT
Start: 2022-08-03 | End: 2022-10-27

## 2022-08-03 RX ORDER — ALLOPURINOL 100 MG/1
TABLET ORAL
Qty: 180 TABLET | Refills: 0 | Status: SHIPPED | OUTPATIENT
Start: 2022-08-03 | End: 2022-10-27

## 2022-08-12 DIAGNOSIS — M15.9 GENERALIZED OSTEOARTHRITIS OF MULTIPLE SITES: ICD-10-CM

## 2022-08-12 NOTE — TELEPHONE ENCOUNTER
Caller: Kp Hernandez    Relationship: Self    Best call back number: [phone]    Requested Prescriptions:   Requested Prescriptions     Pending Prescriptions Disp Refills   • HYDROcodone-acetaminophen (NORCO)  MG per tablet 120 tablet 0     Sig: Take 1 tablet by mouth Every 6 (Six) Hours As Needed for Moderate Pain .        Pharmacy where request should be sent: [facility] [address] [place] [address], [place] - [address] [facility] [phone] [facility] [phone] FX     Additional details provided by patient: PATIENT STATES HE HAS ENOUGH MEDICATION FOR TODAY BUT WILL BE COMPLETELY OUT BY TOMORROW.     Does the patient have less than a 3 day supply:  [x] Yes  [] No    Destiny Herman Rep   08/12/22 11:25 EDT

## 2022-08-13 RX ORDER — HYDROCODONE BITARTRATE AND ACETAMINOPHEN 10; 325 MG/1; MG/1
1 TABLET ORAL EVERY 6 HOURS PRN
Qty: 120 TABLET | Refills: 0 | Status: SHIPPED | OUTPATIENT
Start: 2022-08-13 | End: 2022-09-12 | Stop reason: SDUPTHER

## 2022-08-15 RX ORDER — FLUTICASONE PROPIONATE 50 MCG
SPRAY, SUSPENSION (ML) NASAL
Qty: 48 G | Refills: 0 | Status: SHIPPED | OUTPATIENT
Start: 2022-08-15 | End: 2023-03-15

## 2022-09-12 DIAGNOSIS — M15.9 GENERALIZED OSTEOARTHRITIS OF MULTIPLE SITES: ICD-10-CM

## 2022-09-12 RX ORDER — HYDROCODONE BITARTRATE AND ACETAMINOPHEN 10; 325 MG/1; MG/1
1 TABLET ORAL EVERY 6 HOURS PRN
Qty: 120 TABLET | Refills: 0 | Status: SHIPPED | OUTPATIENT
Start: 2022-09-12 | End: 2022-10-11 | Stop reason: SDUPTHER

## 2022-09-12 NOTE — TELEPHONE ENCOUNTER
Caller: Kp Hernandez    Relationship: Self    Best call back number: [phone]       Requested Prescriptions:   Requested Prescriptions     Pending Prescriptions Disp Refills   • HYDROcodone-acetaminophen (NORCO)  MG per tablet 120 tablet 0     Sig: Take 1 tablet by mouth Every 6 (Six) Hours As Needed for Moderate Pain.        Pharmacy where request should be sent: [address] ([facility], [place] - [address] [facility] [phone] [facility] [phone] FX     Additional details provided by patient:     Does the patient have less than a 3 day supply:  [x] Yes  [] No    Destiny Tellez Rep   09/12/22 11:38 EDT

## 2022-09-13 NOTE — PROGRESS NOTES
Winter Hernandez is a 76 y.o. male.     F/u for pituitary microadenoma, dyslipidemia, hypertension,          In January 2022, he had brain MRI as part of work-up for cognitive impairment.  Incidental to study a pituitary mass was seen.  MRI of the pituitary done in February 2022 showed a 9 mm pituitary adenoma.  He is being followed by Dr. Guerrero.     Lab studies done in January 2022 as follows: ACTH 24.9 pg/mL.  A.m. cortisol 11.5 mcg per DL.  Normal total testosterone at 331 ng per DL.  LH 5.4.  FSH 10.2.  Elevated prolactin at 33.7 ng/mL.  Normal TSH at 2.39.  Normal free T4 at 1.33 ng per DL.     Denies increased headaches.  He denies changes in shoes or ring size.  He denies heat or cold intolerance.  He denies bowel changes.  He denies changes in his hair, skin or nail not related to aging.  He denies polyuria or nocturia.  He denies muscle weakness.  He denies easy bruising.  He had a normal eye exam in 9/22.  He denies erectile dysfunction.     He has fathered 3 adult children.  He lifts weights for exercise but denies steroid use.  He denies alcohol, tobacco or drug abuse.  He used to work for the [employer] and has asbestosis.     He has hyperlipidemia and is on Crestor 20 mg/day.  He denies myalgia.  He has no history of diabetes mellitus.  His last meal was at 8:30 AM     He has history of gout and calcium oxalate stones.  He is on allopurinol.     He has hypertension and is on hydrochlorothiazide 25 mg/day and Toprol-XL 25 mg/day.  He has no history of heart attack or stroke.  He denies chest pain or shortness of breath.      He has acid reflux disease which is controlled by omeprazole.  He denies heartburn     He has chronic neck and low back pain and has been on gabapentin, Robaxin, and hydrocodone-acetaminophen as needed.    He had 4 Moderna COVID-vaccine.  He had the Pfizer vaccine for omicron yesterday.  He had flu vaccine last week.      The following portions of the patient's history  "were reviewed and updated as appropriate: allergies, current medications, past family history, past medical history, past social history, past surgical history and problem list.    Review of Systems   Eyes: Negative for visual disturbance.   Respiratory: Negative for shortness of breath.    Cardiovascular: Negative for chest pain and palpitations.   Gastrointestinal: Negative.    Genitourinary: Negative.    Musculoskeletal: Negative for myalgias.   Neurological: Negative for numbness and headaches.     Vitals:    09/14/22 1048   BP: 128/72   Pulse: 84   Temp: 97.8 °F (36.6 °C)   SpO2: 99%   Weight: 96.6 kg (213 lb)   Height: 172.7 cm (68\")      Objective   Physical Exam  Constitutional:       General: He is not in acute distress.     Appearance: Normal appearance. He is not ill-appearing, toxic-appearing or diaphoretic.   Eyes:      General: No scleral icterus.        Right eye: No discharge.         Left eye: No discharge.      Conjunctiva/sclera: Conjunctivae normal.   Neck:      Vascular: No carotid bruit.   Cardiovascular:      Rate and Rhythm: Normal rate and regular rhythm.      Heart sounds: Normal heart sounds. No murmur heard.    No friction rub.   Pulmonary:      Effort: Pulmonary effort is normal. No respiratory distress.      Breath sounds: Normal breath sounds. No stridor. No rales.   Chest:      Chest wall: No tenderness.   Abdominal:      General: Bowel sounds are normal.      Palpations: Abdomen is soft.      Tenderness: There is no right CVA tenderness or left CVA tenderness.   Musculoskeletal:         General: Normal range of motion.      Cervical back: No rigidity or tenderness.      Right lower leg: No edema.      Left lower leg: No edema.   Lymphadenopathy:      Cervical: No cervical adenopathy.   Neurological:      General: No focal deficit present.      Mental Status: He is alert.   Psychiatric:         Mood and Affect: Mood normal.         Behavior: Behavior normal.       Office Visit on " 07/12/2022   Component Date Value Ref Range Status   • SARS Antigen 07/12/2022 Not Detected  Not Detected, Presumptive Negative Final   • Influenza A Antigen ALESSANDRO 07/12/2022 Not Detected  Not Detected Final   • Influenza B Antigen ALESSANDRO 07/12/2022 Not Detected  Not Detected Final   • Internal Control 07/12/2022 Passed  Passed Final   • Lot Number 07/12/2022 [id number]   Final   • Expiration Date 07/12/2022 2/20/23   Final     Assessment & Plan   Diagnoses and all orders for this visit:    1. Pituitary microadenoma (HCC) (Primary)  -     Comprehensive Metabolic Panel  -     Growth Hormone  -     Macroprolactin  -     Insulin-like Growth Factor  -     T4, Free  -     TSH    2. Dyslipidemia  -     Comprehensive Metabolic Panel  -     Lipid Panel    3. Gastroesophageal reflux disease without esophagitis    4. Essential hypertension  -     Comprehensive Metabolic Panel    5. History of gout  -     Uric Acid      Continue watching pituitary tumor with Dr. Guerrero.  Continue Crestor 20 mg a day.  Continue allopurinol.  Check uric acid.  Continue hydrochlorothiazide and Toprol-XL.  Continue omeprazole.    Copy of my note sent to Dr. Murphy Felix and Dr. Guerrero    RTC 6 mos.

## 2022-09-14 ENCOUNTER — OFFICE VISIT (OUTPATIENT)
Dept: ENDOCRINOLOGY | Age: 77
End: 2022-09-14

## 2022-09-14 VITALS
BODY MASS INDEX: 32.28 KG/M2 | HEART RATE: 84 BPM | WEIGHT: 213 LBS | OXYGEN SATURATION: 99 % | SYSTOLIC BLOOD PRESSURE: 128 MMHG | HEIGHT: 68 IN | TEMPERATURE: 97.8 F | DIASTOLIC BLOOD PRESSURE: 72 MMHG

## 2022-09-14 DIAGNOSIS — E78.5 DYSLIPIDEMIA: ICD-10-CM

## 2022-09-14 DIAGNOSIS — D35.2 PITUITARY MICROADENOMA: Primary | ICD-10-CM

## 2022-09-14 DIAGNOSIS — Z87.39 HISTORY OF GOUT: ICD-10-CM

## 2022-09-14 DIAGNOSIS — I10 ESSENTIAL HYPERTENSION: ICD-10-CM

## 2022-09-14 DIAGNOSIS — K21.9 GASTROESOPHAGEAL REFLUX DISEASE WITHOUT ESOPHAGITIS: ICD-10-CM

## 2022-09-14 PROCEDURE — 99214 OFFICE O/P EST MOD 30 MIN: CPT | Performed by: INTERNAL MEDICINE

## 2022-09-20 DIAGNOSIS — R13.19 ESOPHAGEAL DYSPHAGIA: Primary | ICD-10-CM

## 2022-09-22 LAB
ALBUMIN SERPL-MCNC: 4.6 G/DL (ref 3.7–4.7)
ALBUMIN/GLOB SERPL: 1.9 {RATIO} (ref 1.2–2.2)
ALP SERPL-CCNC: 84 IU/L (ref 44–121)
ALT SERPL-CCNC: 39 IU/L (ref 0–44)
AST SERPL-CCNC: 33 IU/L (ref 0–40)
BILIRUB SERPL-MCNC: 0.4 MG/DL (ref 0–1.2)
BUN SERPL-MCNC: 27 MG/DL (ref 8–27)
BUN/CREAT SERPL: 22 (ref 10–24)
CALCIUM SERPL-MCNC: 9.5 MG/DL (ref 8.6–10.2)
CHLORIDE SERPL-SCNC: 100 MMOL/L (ref 96–106)
CHOLEST SERPL-MCNC: 141 MG/DL (ref 100–199)
CO2 SERPL-SCNC: 26 MMOL/L (ref 20–29)
CREAT SERPL-MCNC: 1.23 MG/DL (ref 0.76–1.27)
EGFRCR SERPLBLD CKD-EPI 2021: 61 ML/MIN/1.73
GH SERPL-MCNC: 0.5 NG/ML (ref 0–10)
GLOBULIN SER CALC-MCNC: 2.4 G/DL (ref 1.5–4.5)
GLUCOSE SERPL-MCNC: 96 MG/DL (ref 65–99)
HDLC SERPL-MCNC: 49 MG/DL
IGF-I SERPL-MCNC: 97 NG/ML (ref 53–222)
IMP & REVIEW OF LAB RESULTS: NORMAL
LDLC SERPL CALC-MCNC: 62 MG/DL (ref 0–99)
MACROPROLACTIN/PROLACTIN MFR SERPL: 46 %
POTASSIUM SERPL-SCNC: 4.1 MMOL/L (ref 3.5–5.2)
PROLACTIN SERPL-MCNC: 30.2 NG/ML
PROLACTIN.MONOMERIC SERPL-MCNC: 16.2 NG/ML
PROT SERPL-MCNC: 7 G/DL (ref 6–8.5)
SODIUM SERPL-SCNC: 142 MMOL/L (ref 134–144)
T4 FREE SERPL-MCNC: 1.35 NG/DL (ref 0.82–1.77)
TRIGL SERPL-MCNC: 179 MG/DL (ref 0–149)
TSH SERPL DL<=0.005 MIU/L-ACNC: 1.22 UIU/ML (ref 0.45–4.5)
URATE SERPL-MCNC: 5.5 MG/DL (ref 3.8–8.4)
VLDLC SERPL CALC-MCNC: 30 MG/DL (ref 5–40)

## 2022-09-23 NOTE — PROGRESS NOTES
LDL 62.  HDL 49.  Continue Crestor 20 mg/day.  Monomeric prolactin mildly elevated at 16.2 ng/mL with upper limit of normal of 15.2.  Mild prolactin elevation most likely due to stalk compression.  Will watch for now.  Normal growth hormone.  Normal free T4 and TSH.  Normal uric acid.  Copy of labs sent to Dr. Murphy Felix, Dr. Guerrero and to patient through [facility].

## 2022-09-27 ENCOUNTER — PREP FOR SURGERY (OUTPATIENT)
Dept: SURGERY | Facility: SURGERY CENTER | Age: 77
End: 2022-09-27

## 2022-09-27 ENCOUNTER — OFFICE VISIT (OUTPATIENT)
Dept: GASTROENTEROLOGY | Facility: CLINIC | Age: 77
End: 2022-09-27

## 2022-09-27 VITALS
TEMPERATURE: 98.4 F | HEIGHT: 68 IN | HEART RATE: 82 BPM | DIASTOLIC BLOOD PRESSURE: 88 MMHG | SYSTOLIC BLOOD PRESSURE: 138 MMHG | WEIGHT: 215.4 LBS | BODY MASS INDEX: 32.64 KG/M2 | OXYGEN SATURATION: 97 %

## 2022-09-27 DIAGNOSIS — Z12.11 ENCOUNTER FOR SCREENING COLONOSCOPY: ICD-10-CM

## 2022-09-27 DIAGNOSIS — K21.9 GASTROESOPHAGEAL REFLUX DISEASE WITHOUT ESOPHAGITIS: ICD-10-CM

## 2022-09-27 DIAGNOSIS — R13.19 ESOPHAGEAL DYSPHAGIA: Primary | ICD-10-CM

## 2022-09-27 PROCEDURE — 99204 OFFICE O/P NEW MOD 45 MIN: CPT | Performed by: INTERNAL MEDICINE

## 2022-09-27 RX ORDER — SODIUM CHLORIDE 0.9 % (FLUSH) 0.9 %
3 SYRINGE (ML) INJECTION EVERY 12 HOURS SCHEDULED
Status: CANCELLED | OUTPATIENT
Start: 2022-09-27

## 2022-09-27 RX ORDER — SODIUM CHLORIDE 0.9 % (FLUSH) 0.9 %
10 SYRINGE (ML) INJECTION AS NEEDED
Status: CANCELLED | OUTPATIENT
Start: 2022-09-27

## 2022-09-27 RX ORDER — SODIUM CHLORIDE, SODIUM LACTATE, POTASSIUM CHLORIDE, CALCIUM CHLORIDE 600; 310; 30; 20 MG/100ML; MG/100ML; MG/100ML; MG/100ML
30 INJECTION, SOLUTION INTRAVENOUS CONTINUOUS PRN
Status: CANCELLED | OUTPATIENT
Start: 2022-09-27

## 2022-09-27 NOTE — PROGRESS NOTES
"Chief Complaint   Patient presents with   • Difficulty Swallowing           History of Present Illness  Patient here for consultation regarding new and undiagnosed dysphagia.  He presents with concerns of worsening esophageal dysphagia that has recent required forceful regurgitation.    He has a history of a lodge food bolus which required EGD intervention to dislodge bolus.  States that since that time he has ensure adequate mastication of food while eating.     Denies currently taking medication for acid reflux.  Denies nausea, vomiting.     Describes bowel habits as regular without melena or hematochezia.  Last colonoscopy was approximately 10 years ago.      Denies unintentional weight loss     Denies future plans to treat pituitary adenoma at this time.     Denies known family history of colon cancer, colon polyps, or IBD   Result Review :       Comprehensive Metabolic Panel (09/14/2022 12:23)  Progress Notes by Murphy Felix DO (07/12/2022 13:30)  MRI Pituitary With & Without Contrast (02/17/2022 10:09)      Vital Signs:   /88   Pulse 82   Temp 98.4 °F (36.9 °C)   Ht 172.7 cm (68\")   Wt 97.7 kg (215 lb 6.4 oz)   SpO2 97%   BMI 32.75 kg/m²     Body mass index is 32.75 kg/m².     Physical Exam  Vitals reviewed.   Constitutional:       Appearance: Normal appearance.   Abdominal:      General: Bowel sounds are normal. There is no distension.      Palpations: Abdomen is soft. Abdomen is not rigid. There is no mass or pulsatile mass.      Tenderness: There is no abdominal tenderness. There is no guarding or rebound.           Assessment and Plan    Diagnoses and all orders for this visit:    1. Esophageal dysphagia (Primary)         BRIEF SUMMARY  Patient today for consultation on an  Also worsening dysphagia to solids.  We will proceed with an EGD for further evaluation and likely dilation.  He is also due for colonoscopy for screening we will do these both at the same time.    I have reviewed and " confirmed the accuracy of the HPI and Assessment and Plan as documented by the APRN EZIO Sanz        Follow Up   No follow-ups on file.    Patient Instructions   Some modifications to help while you are having trouble swallowing include:     1. Avoiding highly textured foods such as meats and bulky foods such as bagels and breads.   2. Cutting food into smaller pieces.   3. Extensively chew foods.   4. Washing foods down with liquids and eating more slowly.  5. Also purposefully eating which includes avoiding distractions or talking while focusing on chewing completely and purposefully.      Schedule EGD and colonoscopy, orders placed

## 2022-09-27 NOTE — PATIENT INSTRUCTIONS
Some modifications to help while you are having trouble swallowing include:     Avoiding highly textured foods such as meats and bulky foods such as bagels and breads.   Cutting food into smaller pieces.   Extensively chew foods.   Washing foods down with liquids and eating more slowly.  Also purposefully eating which includes avoiding distractions or talking while focusing on chewing completely and purposefully.      Schedule EGD and colonoscopy, orders placed

## 2022-09-29 RX ORDER — METOPROLOL SUCCINATE 25 MG/1
TABLET, EXTENDED RELEASE ORAL
Qty: 90 TABLET | Refills: 0 | Status: SHIPPED | OUTPATIENT
Start: 2022-09-29 | End: 2022-12-26

## 2022-09-29 RX ORDER — ROSUVASTATIN CALCIUM 20 MG/1
TABLET, COATED ORAL
Qty: 90 TABLET | Refills: 0 | Status: SHIPPED | OUTPATIENT
Start: 2022-09-29 | End: 2022-12-23

## 2022-10-04 ENCOUNTER — TELEPHONE (OUTPATIENT)
Dept: GASTROENTEROLOGY | Facility: CLINIC | Age: 77
End: 2022-10-04

## 2022-10-11 DIAGNOSIS — M15.9 GENERALIZED OSTEOARTHRITIS OF MULTIPLE SITES: ICD-10-CM

## 2022-10-11 RX ORDER — HYDROCODONE BITARTRATE AND ACETAMINOPHEN 10; 325 MG/1; MG/1
1 TABLET ORAL EVERY 6 HOURS PRN
Qty: 120 TABLET | Refills: 0 | Status: SHIPPED | OUTPATIENT
Start: 2022-10-11 | End: 2022-11-10 | Stop reason: SDUPTHER

## 2022-10-11 NOTE — TELEPHONE ENCOUNTER
Caller: Kp Hernandez    Relationship: Self    Best call back number: [phone]    Requested Prescriptions:   Requested Prescriptions     Pending Prescriptions Disp Refills   • HYDROcodone-acetaminophen (NORCO)  MG per tablet 120 tablet 0     Sig: Take 1 tablet by mouth Every 6 (Six) Hours As Needed for Moderate Pain.        Pharmacy where request should be sent: [address] ([facility], [place] - [address] [facility] [phone] [facility] [phone] FX     Does the patient have less than a 3 day supply:  [x] Yes  [] No    Destiny Benavidez Rep   10/11/22 09:09 EDT

## 2022-10-18 ENCOUNTER — TELEPHONE (OUTPATIENT)
Dept: GASTROENTEROLOGY | Facility: CLINIC | Age: 77
End: 2022-10-18

## 2022-10-27 RX ORDER — ALLOPURINOL 100 MG/1
TABLET ORAL
Qty: 180 TABLET | Refills: 0 | Status: SHIPPED | OUTPATIENT
Start: 2022-10-27 | End: 2023-03-08

## 2022-10-27 RX ORDER — FINASTERIDE 5 MG/1
TABLET, FILM COATED ORAL
Qty: 90 TABLET | Refills: 0 | Status: SHIPPED | OUTPATIENT
Start: 2022-10-27 | End: 2023-03-08

## 2022-10-27 RX ORDER — HYDROCHLOROTHIAZIDE 25 MG/1
TABLET ORAL
Qty: 90 TABLET | Refills: 0 | Status: SHIPPED | OUTPATIENT
Start: 2022-10-27 | End: 2023-02-04

## 2022-11-04 ENCOUNTER — PREP FOR SURGERY (OUTPATIENT)
Dept: SURGERY | Facility: SURGERY CENTER | Age: 77
End: 2022-11-04

## 2022-11-04 DIAGNOSIS — R13.19 ESOPHAGEAL DYSPHAGIA: ICD-10-CM

## 2022-11-04 DIAGNOSIS — K21.9 GASTROESOPHAGEAL REFLUX DISEASE WITHOUT ESOPHAGITIS: Primary | ICD-10-CM

## 2022-11-04 RX ORDER — SODIUM CHLORIDE, SODIUM LACTATE, POTASSIUM CHLORIDE, CALCIUM CHLORIDE 600; 310; 30; 20 MG/100ML; MG/100ML; MG/100ML; MG/100ML
30 INJECTION, SOLUTION INTRAVENOUS CONTINUOUS PRN
Status: CANCELLED | OUTPATIENT
Start: 2022-11-04

## 2022-11-04 RX ORDER — SODIUM CHLORIDE 0.9 % (FLUSH) 0.9 %
3 SYRINGE (ML) INJECTION EVERY 12 HOURS SCHEDULED
Status: CANCELLED | OUTPATIENT
Start: 2022-11-04

## 2022-11-04 RX ORDER — SODIUM CHLORIDE 0.9 % (FLUSH) 0.9 %
10 SYRINGE (ML) INJECTION AS NEEDED
Status: CANCELLED | OUTPATIENT
Start: 2022-11-04

## 2022-11-07 DIAGNOSIS — G47.09 OTHER INSOMNIA: ICD-10-CM

## 2022-11-07 RX ORDER — MIRTAZAPINE 30 MG/1
TABLET, FILM COATED ORAL
Qty: 90 TABLET | Refills: 0 | Status: SHIPPED | OUTPATIENT
Start: 2022-11-07 | End: 2023-01-31

## 2022-11-10 DIAGNOSIS — M15.9 GENERALIZED OSTEOARTHRITIS OF MULTIPLE SITES: ICD-10-CM

## 2022-11-10 RX ORDER — HYDROCODONE BITARTRATE AND ACETAMINOPHEN 10; 325 MG/1; MG/1
1 TABLET ORAL EVERY 6 HOURS PRN
Qty: 120 TABLET | Refills: 0 | Status: SHIPPED | OUTPATIENT
Start: 2022-11-10 | End: 2022-12-09 | Stop reason: SDUPTHER

## 2022-11-10 NOTE — TELEPHONE ENCOUNTER
Caller: Kp Hernandez    Relationship: Self    Best call back number: [phone]    Requested Prescriptions:   Requested Prescriptions     Pending Prescriptions Disp Refills   • HYDROcodone-acetaminophen (NORCO)  MG per tablet 120 tablet 0     Sig: Take 1 tablet by mouth Every 6 (Six) Hours As Needed for Moderate Pain.        Pharmacy where request should be sent: [facility] [address] [place] [address] ([facility], [place] - [address] [facility] [phone] [facility] [phone] FX     Additional details provided by patient: PATIENT STATED HE HAS ENOUGH FOR TONIGHT, 11-    Does the patient have less than a 3 day supply:  [x] Yes  [] No    Destiny Costello Rep   11/10/22 10:20 EST

## 2022-11-28 ENCOUNTER — ANESTHESIA EVENT (OUTPATIENT)
Dept: SURGERY | Facility: SURGERY CENTER | Age: 77
End: 2022-11-28

## 2022-11-28 ENCOUNTER — HOSPITAL ENCOUNTER (OUTPATIENT)
Facility: SURGERY CENTER | Age: 77
Setting detail: HOSPITAL OUTPATIENT SURGERY
Discharge: HOME | End: 2022-11-28
Attending: INTERNAL MEDICINE | Admitting: INTERNAL MEDICINE

## 2022-11-28 ENCOUNTER — ANESTHESIA (OUTPATIENT)
Dept: SURGERY | Facility: SURGERY CENTER | Age: 77
End: 2022-11-28

## 2022-11-28 VITALS
HEIGHT: 68 IN | DIASTOLIC BLOOD PRESSURE: 82 MMHG | RESPIRATION RATE: 18 BRPM | TEMPERATURE: 98.4 F | BODY MASS INDEX: 31.83 KG/M2 | OXYGEN SATURATION: 94 % | HEART RATE: 80 BPM | SYSTOLIC BLOOD PRESSURE: 135 MMHG | WEIGHT: 210 LBS

## 2022-11-28 DIAGNOSIS — K21.9 GASTROESOPHAGEAL REFLUX DISEASE WITHOUT ESOPHAGITIS: ICD-10-CM

## 2022-11-28 DIAGNOSIS — R13.19 ESOPHAGEAL DYSPHAGIA: ICD-10-CM

## 2022-11-28 DIAGNOSIS — Z12.11 ENCOUNTER FOR SCREENING COLONOSCOPY: ICD-10-CM

## 2022-11-28 PROCEDURE — 43239 EGD BIOPSY SINGLE/MULTIPLE: CPT | Performed by: INTERNAL MEDICINE

## 2022-11-28 PROCEDURE — 43249 ESOPH EGD DILATION <30 MM: CPT | Performed by: INTERNAL MEDICINE

## 2022-11-28 PROCEDURE — 25010000002 PROPOFOL 10 MG/ML EMULSION: Performed by: ANESTHESIOLOGY

## 2022-11-28 PROCEDURE — C1726 CATH, BAL DIL, NON-VASCULAR: HCPCS | Performed by: INTERNAL MEDICINE

## 2022-11-28 RX ORDER — SODIUM CHLORIDE, SODIUM LACTATE, POTASSIUM CHLORIDE, CALCIUM CHLORIDE 600; 310; 30; 20 MG/100ML; MG/100ML; MG/100ML; MG/100ML
30 INJECTION, SOLUTION INTRAVENOUS CONTINUOUS PRN
Status: DISCONTINUED | OUTPATIENT
Start: 2022-11-28 | End: 2022-11-28 | Stop reason: HOSPADM

## 2022-11-28 RX ORDER — SODIUM CHLORIDE 0.9 % (FLUSH) 0.9 %
10 SYRINGE (ML) INJECTION AS NEEDED
Status: DISCONTINUED | OUTPATIENT
Start: 2022-11-28 | End: 2022-11-28 | Stop reason: HOSPADM

## 2022-11-28 RX ORDER — SODIUM CHLORIDE, SODIUM LACTATE, POTASSIUM CHLORIDE, CALCIUM CHLORIDE 600; 310; 30; 20 MG/100ML; MG/100ML; MG/100ML; MG/100ML
INJECTION, SOLUTION INTRAVENOUS CONTINUOUS PRN
Status: DISCONTINUED | OUTPATIENT
Start: 2022-11-28 | End: 2022-11-28 | Stop reason: SURG

## 2022-11-28 RX ORDER — PROPOFOL 10 MG/ML
VIAL (ML) INTRAVENOUS AS NEEDED
Status: DISCONTINUED | OUTPATIENT
Start: 2022-11-28 | End: 2022-11-28 | Stop reason: SURG

## 2022-11-28 RX ORDER — SODIUM CHLORIDE 0.9 % (FLUSH) 0.9 %
3 SYRINGE (ML) INJECTION EVERY 12 HOURS SCHEDULED
Status: DISCONTINUED | OUTPATIENT
Start: 2022-11-28 | End: 2022-11-28 | Stop reason: HOSPADM

## 2022-11-28 RX ORDER — LIDOCAINE HYDROCHLORIDE 20 MG/ML
INJECTION, SOLUTION INFILTRATION; PERINEURAL AS NEEDED
Status: DISCONTINUED | OUTPATIENT
Start: 2022-11-28 | End: 2022-11-28 | Stop reason: SURG

## 2022-11-28 RX ADMIN — SODIUM CHLORIDE, POTASSIUM CHLORIDE, SODIUM LACTATE AND CALCIUM CHLORIDE 30 ML/HR: 600; 310; 30; 20 INJECTION, SOLUTION INTRAVENOUS at 10:45

## 2022-11-28 RX ADMIN — PROPOFOL 160 MCG/KG/MIN: 10 INJECTION, EMULSION INTRAVENOUS at 11:14

## 2022-11-28 RX ADMIN — SODIUM CHLORIDE, SODIUM LACTATE, POTASSIUM CHLORIDE, AND CALCIUM CHLORIDE: .6; .31; .03; .02 INJECTION, SOLUTION INTRAVENOUS at 11:08

## 2022-11-28 RX ADMIN — PROPOFOL 150 MG: 10 INJECTION, EMULSION INTRAVENOUS at 11:14

## 2022-11-28 RX ADMIN — LIDOCAINE HYDROCHLORIDE 60 MG: 20 INJECTION, SOLUTION INFILTRATION; PERINEURAL at 11:14

## 2022-11-28 NOTE — H&P
No chief complaint on file.      HPI  Patient here for an EGD due to dysphagia and history of food bolus.         Problem List:    Patient Active Problem List   Diagnosis   • Essential hypertension   • Benign prostatic hypertrophy without urinary obstruction   • CKD (chronic kidney disease) stage 2, GFR 60-89 ml/min   • COPD mixed type (HCC)   • Depression with anxiety   • Dyslipidemia   • Ganglion   • Gastroesophageal reflux disease without esophagitis   • Hearing loss   • Osteoarthritis   • Impacted cerumen   • Pituitary microadenoma (HCC)   • History of gout   • Esophageal dysphagia   • Encounter for screening colonoscopy       Medical History:    Past Medical History:   Diagnosis Date   • Asbestosis (HCC)    • BPH without urinary obstruction    • CKD (chronic kidney disease)    • COPD (chronic obstructive pulmonary disease) (HCC)    • Depression    • Dyslipidemia    • Ganglion cyst    • GERD without esophagitis    • Hearing loss    • Hypertension    • Osteoarthritis         Social History:    Social History     Socioeconomic History   • Marital status:    Tobacco Use   • Smoking status: Never   • Smokeless tobacco: Never   Substance and Sexual Activity   • Alcohol use: No   • Drug use: No       Family History:   Family History   Problem Relation Age of Onset   • Hypertension Mother    • Atrial fibrillation Mother    • Alcohol abuse Father    • Brain cancer Sister    • GI problems Brother    • COPD Brother        Surgical History:   Past Surgical History:   Procedure Laterality Date   • CERVICAL CHIARI DECOMPRESSION POSTERIOR N/A    • COLONOSCOPY  2000   • ELBOW ARTHROPLASTY Left    • HERNIA REPAIR     • INGUINAL HERNIA REPAIR     • KNEE ACL RECONSTRUCTION Bilateral    • SPINE SURGERY     • TONSILLECTOMY           Current Facility-Administered Medications:   •  lactated ringers infusion, 30 mL/hr, Intravenous, Continuous PRN, Helen Garnica, APRN  •  sodium chloride 0.9 % flush 10 mL, 10 mL, Intravenous,  PRN, Helen Garnica APRN  •  sodium chloride 0.9 % flush 3 mL, 3 mL, Intravenous, Q12H, Helen Garnica APRN    Allergies: No Known Allergies     The following portions of the patient's history were reviewed by me and updated as appropriate: review of systems, allergies, current medications, past family history, past medical history, past social history, past surgical history and problem list.    There were no vitals filed for this visit.    PHYSICAL EXAM:    CONSTITUTIONAL:  today's vital signs reviewed by me  GASTROINTESTINAL: abdomen is soft nontender nondistended with normal active bowel sounds, no masses are appreciated    Assessment/ Plan  We will proceed with EGD with likely dilation.    Risks and benefits as well as alternatives to endoscopic evaluation were explained to the patient and they voiced understanding and wish to proceed.  These risks include but are not limited to the risk of bleeding, perforation, adverse reaction to sedation, and missed lesions.  The patient was given the opportunity to ask questions prior to the endoscopic procedure.

## 2022-11-28 NOTE — ANESTHESIA POSTPROCEDURE EVALUATION
Patient: Kp Hernandez    Procedure Summary     Date: 11/28/22 Room / Location: [facility] OR 06 / [facility]    Anesthesia Start: 1108 Anesthesia Stop: 1126    Procedure: ESOPHAGOGASTRODUODENOSCOPY with balloon dilatation 18mm-20mm Diagnosis:       Gastroesophageal reflux disease without esophagitis      Esophageal dysphagia      (Gastroesophageal reflux disease without esophagitis [K21.9])      (Esophageal dysphagia [R13.19])    Surgeons: Ulysses Eugene MD Provider: John Watkins MD    Anesthesia Type: MAC ASA Status: 2          Anesthesia Type: MAC    Vitals  Vitals Value Taken Time   /82 11/28/22 1154   Temp 36.9 °C (98.4 °F) 11/28/22 1130   Pulse 78 11/28/22 1153   Resp 18 11/28/22 1150   SpO2 94 % 11/28/22 1154   Vitals shown include unvalidated device data.        Post Anesthesia Care and Evaluation    Patient location during evaluation: PHASE II  Patient participation: complete - patient participated  Level of consciousness: awake  Pain management: satisfactory to patient    Airway patency: patent  Anesthetic complications: No anesthetic complications  PONV Status: controlled  Cardiovascular status: acceptable  Respiratory status: acceptable  Hydration status: acceptable

## 2022-11-28 NOTE — ANESTHESIA PREPROCEDURE EVALUATION
Anesthesia Evaluation     Patient summary reviewed and Nursing notes reviewed   NPO Solid Status: > 6 hours  NPO Liquid Status: > 2 hours           Airway   Mallampati: II  TM distance: >3 FB  Neck ROM: full  Dental - normal exam     Pulmonary    (+) COPD,   (-) asthma, sleep apnea, not a smoker  Cardiovascular     (+) hypertension,   (-) valvular problems/murmurs, CAD, dysrhythmias, angina      Neuro/Psych  (+) psychiatric history Depression and Anxiety,    (-) seizures, CVA  GI/Hepatic/Renal/Endo    (+)  GERD well controlled,  renal disease CRI,     Musculoskeletal     Abdominal    Substance History      OB/GYN          Other                        Anesthesia Plan    ASA 2     MAC       Anesthetic plan, risks, benefits, and alternatives have been provided, discussed and informed consent has been obtained with: patient.        CODE STATUS:

## 2022-12-09 DIAGNOSIS — M15.9 GENERALIZED OSTEOARTHRITIS OF MULTIPLE SITES: ICD-10-CM

## 2022-12-09 RX ORDER — HYDROCODONE BITARTRATE AND ACETAMINOPHEN 10; 325 MG/1; MG/1
1 TABLET ORAL EVERY 6 HOURS PRN
Qty: 120 TABLET | Refills: 0 | Status: SHIPPED | OUTPATIENT
Start: 2022-12-09 | End: 2023-01-11 | Stop reason: SDUPTHER

## 2022-12-09 NOTE — TELEPHONE ENCOUNTER
Caller: Kp Hernandez    Relationship: Self    Best call back number: [phone]    Requested Prescriptions:   Requested Prescriptions     Pending Prescriptions Disp Refills   • HYDROcodone-acetaminophen (NORCO)  MG per tablet 120 tablet 0     Sig: Take 1 tablet by mouth Every 6 (Six) Hours As Needed for Moderate Pain.        Pharmacy where request should be sent: [address], [place] - [address] [facility] [phone] [facility] [phone] FX       Does the patient have less than a 3 day supply:  [x] Yes  [] No    Would you like a call back once the refill request has been completed: [x] Yes [] No    If the office needs to give you a call back, can they leave a voicemail: [x] Yes [] No    Destiny Gore Rep   12/09/22 09:02 EST

## 2022-12-09 NOTE — TELEPHONE ENCOUNTER
PATIENT STATED THAT THE ORIGINAL LISTED PHARMACY IS OUT OF STOCK OF THIS MEDICATION.    HE WOULD LIKE IT SENT TO THE FOLLOWING PHARMACY:    [facility] [address] [place], [place] - [address] [facility] - [phone]  - [phone]   [phone]

## 2022-12-10 DIAGNOSIS — M54.42 CHRONIC BILATERAL LOW BACK PAIN WITH BILATERAL SCIATICA: ICD-10-CM

## 2022-12-10 DIAGNOSIS — M79.605 PAIN IN BOTH LOWER EXTREMITIES: ICD-10-CM

## 2022-12-10 DIAGNOSIS — M54.41 CHRONIC BILATERAL LOW BACK PAIN WITH BILATERAL SCIATICA: ICD-10-CM

## 2022-12-10 DIAGNOSIS — G89.29 CHRONIC BILATERAL LOW BACK PAIN WITH BILATERAL SCIATICA: ICD-10-CM

## 2022-12-10 DIAGNOSIS — M79.604 PAIN IN BOTH LOWER EXTREMITIES: ICD-10-CM

## 2022-12-10 RX ORDER — GABAPENTIN 300 MG/1
CAPSULE ORAL
Qty: 120 CAPSULE | Refills: 0 | OUTPATIENT
Start: 2022-12-10

## 2022-12-11 DIAGNOSIS — M79.604 PAIN IN BOTH LOWER EXTREMITIES: ICD-10-CM

## 2022-12-11 DIAGNOSIS — M79.605 PAIN IN BOTH LOWER EXTREMITIES: ICD-10-CM

## 2022-12-11 DIAGNOSIS — G89.29 CHRONIC BILATERAL LOW BACK PAIN WITH BILATERAL SCIATICA: ICD-10-CM

## 2022-12-11 DIAGNOSIS — M54.42 CHRONIC BILATERAL LOW BACK PAIN WITH BILATERAL SCIATICA: ICD-10-CM

## 2022-12-11 DIAGNOSIS — M54.41 CHRONIC BILATERAL LOW BACK PAIN WITH BILATERAL SCIATICA: ICD-10-CM

## 2022-12-12 RX ORDER — GABAPENTIN 300 MG/1
CAPSULE ORAL
Qty: 120 CAPSULE | Refills: 3 | Status: SHIPPED | OUTPATIENT
Start: 2022-12-12

## 2022-12-13 NOTE — PROGRESS NOTES
Subjective   History of Present Illness: Kp Hernandez is a 77 y.o. male is here today for follow-up of a pituitary lesion.  MRI brain done on 12/28/22.  he is doing well today.  No new complaints.  Denies any changes in the frequency or severity of his headaches.  Denies any changes in vision.  Denies any strokelike episodes.  Denies any changes in strength or sensation.      History of Present Illness    The following portions of the patient's history were reviewed and updated as appropriate: allergies, past family history, past medical history, past social history, past surgical history and problem list.    Past Medical History:   Diagnosis Date   • Asbestosis (HCC)    • BPH without urinary obstruction    • CKD (chronic kidney disease)    • COPD (chronic obstructive pulmonary disease) (HCC)    • Depression    • Dyslipidemia    • Ganglion cyst    • GERD without esophagitis    • Hearing loss    • Hyperlipidemia    • Hypertension    • Osteoarthritis         Past Surgical History:   Procedure Laterality Date   • CERVICAL CHIARI DECOMPRESSION POSTERIOR N/A    • COLONOSCOPY  2000   • ELBOW ARTHROPLASTY Left    • ENDOSCOPY N/A 11/28/2022    Procedure: ESOPHAGOGASTRODUODENOSCOPY with balloon dilatation 18mm-20mm;  Surgeon: Ulysses Eugene MD;  Location: [facility] MAIN OR;  Service: Gastroenterology;  Laterality: N/A;  hiatal hernia, s. ring   • HERNIA REPAIR     • INGUINAL HERNIA REPAIR     • KNEE ACL RECONSTRUCTION Bilateral    • SPINE SURGERY     • TONSILLECTOMY            Current Outpatient Medications:   •  allopurinol (ZYLOPRIM) 100 MG tablet, Take 1 tablet by mouth twice daily, Disp: 180 tablet, Rfl: 0  •  clobetasol (TEMOVATE) 0.05 % cream, APPLY  CREAM TOPICALLY TWICE DAILY AS  DIRECTED  TO  HANDS, Disp: 60 g, Rfl: 0  •  diazePAM (VALIUM) 5 MG tablet, 2 TABS PO 1 HR PRIOR TO MRI, Disp: 2 tablet, Rfl: 0  •  escitalopram (LEXAPRO) 10 MG tablet, Take 1 tablet by mouth once daily, Disp: 90 tablet, Rfl: 0  •   finasteride (PROSCAR) 5 MG tablet, TAKE 1 TABLET BY MOUTH ONCE DAILY AS DIRECTED, Disp: 90 tablet, Rfl: 0  •  fluticasone (FLONASE) 50 MCG/ACT nasal spray, Use 2 spray(s) in each nostril once daily, Disp: 48 g, Rfl: 0  •  gabapentin (NEURONTIN) 300 MG capsule, Take 1 capsule by mouth Every Morning AND 1 capsule Daily With Lunch. And 2 nightly. PLEASE CALL THE OFFICE FOR AN APPT, Disp: 120 capsule, Rfl: 3  •  hydroCHLOROthiazide (HYDRODIURIL) 25 MG tablet, Take 1 tablet by mouth once daily, Disp: 90 tablet, Rfl: 0  •  HYDROcodone-acetaminophen (NORCO)  MG per tablet, Take 1 tablet by mouth Every 6 (Six) Hours As Needed for Moderate Pain., Disp: 120 tablet, Rfl: 0  •  methocarbamol (ROBAXIN) 750 MG tablet, TAKE 1 TABLET BY MOUTH TWICE DAILY AS NEEDED FOR MUSCLE SPASM, Disp: 60 tablet, Rfl: 0  •  metoprolol succinate XL (TOPROL-XL) 25 MG 24 hr tablet, Take 1 tablet by mouth once daily, Disp: 90 tablet, Rfl: 0  •  mirtazapine (REMERON) 30 MG tablet, TAKE 1 TABLET BY MOUTH ONCE DAILY AT NIGHT, Disp: 90 tablet, Rfl: 0  •  multivitamin with minerals tablet tablet, Take 1 tablet by mouth Daily., Disp: , Rfl:   •  pseudoephedrine (SUDAFED) 60 MG tablet, Take 1 tablet by mouth Every 12 (Twelve) Hours As Needed for Congestion (use sparingly as may raise bp)., Disp: 60 tablet, Rfl: 5  •  rosuvastatin (CRESTOR) 20 MG tablet, TAKE 1 TABLET BY MOUTH ONCE DAILY AT NIGHT, Disp: 90 tablet, Rfl: 0  •  SPIRIVA HANDIHALER 18 MCG per inhalation capsule, , Disp: , Rfl:   •  urea (CARMOL) 40 % cream, Apply  topically to the appropriate area as directed Daily. To palms of both hands, Disp: 120 g, Rfl: 5  •  Zinc 50 MG capsule, Take  by mouth. 1 chewable daily, Disp: , Rfl:      No Known Allergies     Social History     Socioeconomic History   • Marital status:    Tobacco Use   • Smoking status: Never   • Smokeless tobacco: Never   Vaping Use   • Vaping Use: Never used   Substance and Sexual Activity   • Alcohol use: No   • Drug  use: No        Family History   Problem Relation Age of Onset   • Hypertension Mother    • Atrial fibrillation Mother    • Alcohol abuse Father    • Brain cancer Sister    • GI problems Brother    • COPD Brother         Review of Systems   Eyes: Negative for visual disturbance.   Gastrointestinal: Negative for nausea and vomiting.   Musculoskeletal: Negative for gait problem.   Neurological: Negative for dizziness, seizures, syncope, speech difficulty, light-headedness, numbness and headaches.   Psychiatric/Behavioral: Negative for confusion and decreased concentration.       Objective     Vitals:    23 1350   BP: 142/82   Pulse: 85   Resp: 20   Temp: 97.1 °F (36.2 °C)   SpO2: 96%   Weight: 98 kg (216 lb)   Height: 172.7 cm (68\")     Body mass index is 32.84 kg/m².      Physical Exam  Neurologic Exam  Awake, alert, oriented x3  Pupils equal round reactive to light  Extraocular muscles intact  Face symmetric  Speech is fluent and clear  No pronator drift  Motor exam  Bilateral deltoids 5/5, bilateral biceps 5/5, bilateral triceps 5/5, bilateral wrist extension 5/5 bilateral hand  5/5  Bilateral hip flexion 5/5, bilateral knee extension 5/5, bilateral DF/PF 5/5  No clonus  No Lilian's reflex  Steady normal gait  Able to detect  light touch in all 4 extremities        Assessment & Plan   Independent Review of Radiographic Studies:      I personally reviewed the images from the following studies.  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  There is no compression of the optic chiasm    Medical Decision Makin-year-old male with an incidentally found pituitary lesion  -The follow-up MRI shows no growth of the tumor.  There is no optic nerve compression  -I have asked him to call back or come back sooner if he develops any new symptoms  -I will plan to see him back in 1 year with repeat MRI.   If there is any growth of the tumor we will consider an endonasal transsphenoidal resection of the tumor    Diagnoses and all orders for this visit:    1. Pituitary lesion (HCC) (Primary)  -     MRI Brain With & Without Contrast; Future      Return in about 1 year (around 1/9/2024).

## 2022-12-14 DIAGNOSIS — F41.9 ANXIETY: ICD-10-CM

## 2022-12-14 RX ORDER — ESCITALOPRAM OXALATE 10 MG/1
TABLET ORAL
Qty: 90 TABLET | Refills: 0 | Status: SHIPPED | OUTPATIENT
Start: 2022-12-14 | End: 2023-01-25 | Stop reason: SDUPTHER

## 2022-12-18 DIAGNOSIS — M54.41 CHRONIC BILATERAL LOW BACK PAIN WITH BILATERAL SCIATICA: ICD-10-CM

## 2022-12-18 DIAGNOSIS — G89.29 CHRONIC BILATERAL LOW BACK PAIN WITH BILATERAL SCIATICA: ICD-10-CM

## 2022-12-18 DIAGNOSIS — M54.42 CHRONIC BILATERAL LOW BACK PAIN WITH BILATERAL SCIATICA: ICD-10-CM

## 2022-12-18 RX ORDER — METHOCARBAMOL 750 MG/1
TABLET, FILM COATED ORAL
Qty: 60 TABLET | Refills: 0 | Status: SHIPPED | OUTPATIENT
Start: 2022-12-18 | End: 2023-01-13

## 2022-12-23 RX ORDER — ROSUVASTATIN CALCIUM 20 MG/1
TABLET, COATED ORAL
Qty: 90 TABLET | Refills: 0 | Status: SHIPPED | OUTPATIENT
Start: 2022-12-23 | End: 2023-03-20

## 2022-12-26 RX ORDER — METOPROLOL SUCCINATE 25 MG/1
TABLET, EXTENDED RELEASE ORAL
Qty: 90 TABLET | Refills: 0 | Status: SHIPPED | OUTPATIENT
Start: 2022-12-26 | End: 2023-03-22

## 2022-12-27 DIAGNOSIS — F40.240 CLAUSTROPHOBIA: ICD-10-CM

## 2022-12-27 RX ORDER — DIAZEPAM 5 MG/1
5 TABLET ORAL 2 TIMES DAILY PRN
COMMUNITY
End: 2022-12-27 | Stop reason: SDUPTHER

## 2022-12-27 RX ORDER — DIAZEPAM 5 MG/1
TABLET ORAL
Qty: 2 TABLET | Refills: 0 | Status: SHIPPED | OUTPATIENT
Start: 2022-12-27 | End: 2023-03-21

## 2022-12-28 ENCOUNTER — HOSPITAL ENCOUNTER (OUTPATIENT)
Dept: MRI IMAGING | Facility: HOSPITAL | Age: 77
Discharge: HOME | End: 2022-12-28
Admitting: NEUROLOGICAL SURGERY

## 2022-12-28 DIAGNOSIS — E23.7 PITUITARY LESION: ICD-10-CM

## 2022-12-28 PROCEDURE — 0 GADOBENATE DIMEGLUMINE 529 MG/ML SOLUTION: Performed by: NEUROLOGICAL SURGERY

## 2022-12-28 PROCEDURE — 82565 ASSAY OF CREATININE: CPT

## 2022-12-28 PROCEDURE — 70553 MRI BRAIN STEM W/O & W/DYE: CPT

## 2022-12-28 PROCEDURE — A9577 INJ MULTIHANCE: HCPCS | Performed by: NEUROLOGICAL SURGERY

## 2022-12-28 RX ADMIN — GADOBENATE DIMEGLUMINE 20 ML: 529 INJECTION, SOLUTION INTRAVENOUS at 15:37

## 2022-12-29 LAB — CREAT BLDA-MCNC: 1.3 MG/DL (ref 0.6–1.3)

## 2023-01-06 DIAGNOSIS — M15.9 GENERALIZED OSTEOARTHRITIS OF MULTIPLE SITES: ICD-10-CM

## 2023-01-06 RX ORDER — HYDROCODONE BITARTRATE AND ACETAMINOPHEN 10; 325 MG/1; MG/1
1 TABLET ORAL EVERY 6 HOURS PRN
Qty: 120 TABLET | Refills: 0 | OUTPATIENT
Start: 2023-01-06

## 2023-01-06 NOTE — TELEPHONE ENCOUNTER
Caller: Kp Hernandez    Relationship: Self    Best call back number: [phone]     Requested Prescriptions:   Requested Prescriptions     Pending Prescriptions Disp Refills   • HYDROcodone-acetaminophen (NORCO)  MG per tablet 120 tablet 0     Sig: Take 1 tablet by mouth Every 6 (Six) Hours As Needed for Moderate Pain.        Pharmacy where request should be sent: [address], [place] [address] [facility] [phone] [facility] [phone] FX     Does the patient have less than a 3 day supply:  [x] Yes  [] No    Would you like a call back once the refill request has been completed: [] Yes [x] No    If the office needs to give you a call back, can they leave a voicemail: [] Yes [x] No    Destiny Callahan Rep   01/06/23 15:13 EST

## 2023-01-09 ENCOUNTER — OFFICE VISIT (OUTPATIENT)
Dept: NEUROSURGERY | Facility: CLINIC | Age: 78
End: 2023-01-09
Payer: MEDICARE

## 2023-01-09 VITALS
RESPIRATION RATE: 20 BRPM | BODY MASS INDEX: 32.74 KG/M2 | HEIGHT: 68 IN | OXYGEN SATURATION: 96 % | SYSTOLIC BLOOD PRESSURE: 142 MMHG | DIASTOLIC BLOOD PRESSURE: 82 MMHG | WEIGHT: 216 LBS | TEMPERATURE: 97.1 F | HEART RATE: 85 BPM

## 2023-01-09 DIAGNOSIS — E23.7 PITUITARY LESION: Primary | ICD-10-CM

## 2023-01-09 PROCEDURE — 99214 OFFICE O/P EST MOD 30 MIN: CPT | Performed by: NEUROLOGICAL SURGERY

## 2023-01-10 DIAGNOSIS — M54.41 CHRONIC BILATERAL LOW BACK PAIN WITH BILATERAL SCIATICA: ICD-10-CM

## 2023-01-10 DIAGNOSIS — G89.29 CHRONIC BILATERAL LOW BACK PAIN WITH BILATERAL SCIATICA: ICD-10-CM

## 2023-01-10 DIAGNOSIS — M15.9 GENERALIZED OSTEOARTHRITIS OF MULTIPLE SITES: ICD-10-CM

## 2023-01-10 DIAGNOSIS — M54.42 CHRONIC BILATERAL LOW BACK PAIN WITH BILATERAL SCIATICA: ICD-10-CM

## 2023-01-10 RX ORDER — HYDROCODONE BITARTRATE AND ACETAMINOPHEN 10; 325 MG/1; MG/1
1 TABLET ORAL EVERY 6 HOURS PRN
Qty: 120 TABLET | Refills: 0 | OUTPATIENT
Start: 2023-01-10

## 2023-01-10 RX ORDER — METHOCARBAMOL 750 MG/1
TABLET, FILM COATED ORAL
Qty: 60 TABLET | Refills: 0 | OUTPATIENT
Start: 2023-01-10

## 2023-01-10 NOTE — TELEPHONE ENCOUNTER
Caller: Kp Hernandez    Relationship: Self    Best call back number: [phone]    Requested Prescriptions:   Requested Prescriptions     Pending Prescriptions Disp Refills   • HYDROcodone-acetaminophen (NORCO)  MG per tablet 120 tablet 0     Sig: Take 1 tablet by mouth Every 6 (Six) Hours As Needed for Moderate Pain.        Pharmacy where request should be sent: [facility] [address] [place] [address], [place] [address] [facility] [phone] [facility] [phone] FX     Additional details provided by patient: PATIENT STATES HE IS OUT OF MEDICATION AND WANTING TO KNOW IF HE CAN STILL GET A REFILL UNTIL HIS APPOINTMENT ON 1/25/23     Does the patient have less than a 3 day supply:  [x] Yes  [] No    Would you like a call back once the refill request has been completed: [x] Yes [] No    If the office needs to give you a call back, can they leave a voicemail: [x] Yes [] No    Destiny Triplett Rep   01/10/23 09:17 EST

## 2023-01-11 ENCOUNTER — TELEPHONE (OUTPATIENT)
Dept: FAMILY MEDICINE CLINIC | Facility: CLINIC | Age: 78
End: 2023-01-11
Payer: MEDICARE

## 2023-01-11 DIAGNOSIS — M15.9 GENERALIZED OSTEOARTHRITIS OF MULTIPLE SITES: ICD-10-CM

## 2023-01-11 RX ORDER — HYDROCODONE BITARTRATE AND ACETAMINOPHEN 10; 325 MG/1; MG/1
1 TABLET ORAL EVERY 6 HOURS PRN
Qty: 120 TABLET | Refills: 0 | Status: SHIPPED | OUTPATIENT
Start: 2023-01-11 | End: 2023-02-07 | Stop reason: SDUPTHER

## 2023-01-11 NOTE — TELEPHONE ENCOUNTER
Open 1 slot.  1/25/23 I am off bu twill be here for admin and a meeting via zoom.  Change Ass[facility] appt to 9:30am with me if they can come that time.   Don't open any other slots as need off rest of time to work on some stuff.   Dr. ROGEL

## 2023-01-13 DIAGNOSIS — M54.41 CHRONIC BILATERAL LOW BACK PAIN WITH BILATERAL SCIATICA: ICD-10-CM

## 2023-01-13 DIAGNOSIS — G89.29 CHRONIC BILATERAL LOW BACK PAIN WITH BILATERAL SCIATICA: ICD-10-CM

## 2023-01-13 DIAGNOSIS — M54.42 CHRONIC BILATERAL LOW BACK PAIN WITH BILATERAL SCIATICA: ICD-10-CM

## 2023-01-13 RX ORDER — METHOCARBAMOL 750 MG/1
TABLET, FILM COATED ORAL
Qty: 60 TABLET | Refills: 0 | Status: SHIPPED | OUTPATIENT
Start: 2023-01-13 | End: 2023-02-20

## 2023-01-25 ENCOUNTER — OFFICE VISIT (OUTPATIENT)
Dept: FAMILY MEDICINE CLINIC | Facility: CLINIC | Age: 78
End: 2023-01-25
Payer: MEDICARE

## 2023-01-25 VITALS
OXYGEN SATURATION: 94 % | WEIGHT: 210 LBS | HEIGHT: 68 IN | BODY MASS INDEX: 31.83 KG/M2 | SYSTOLIC BLOOD PRESSURE: 142 MMHG | HEART RATE: 82 BPM | DIASTOLIC BLOOD PRESSURE: 82 MMHG

## 2023-01-25 DIAGNOSIS — Z79.899 DRUG THERAPY: ICD-10-CM

## 2023-01-25 DIAGNOSIS — Z00.00 MEDICARE ANNUAL WELLNESS VISIT, SUBSEQUENT: Primary | ICD-10-CM

## 2023-01-25 DIAGNOSIS — F41.1 GAD (GENERALIZED ANXIETY DISORDER): ICD-10-CM

## 2023-01-25 DIAGNOSIS — M17.12 PRIMARY OSTEOARTHRITIS OF LEFT KNEE: ICD-10-CM

## 2023-01-25 DIAGNOSIS — M15.9 GENERALIZED OSTEOARTHRITIS OF MULTIPLE SITES: ICD-10-CM

## 2023-01-25 DIAGNOSIS — E66.9 OBESITY (BMI 30-39.9): ICD-10-CM

## 2023-01-25 PROCEDURE — 1170F FXNL STATUS ASSESSED: CPT | Performed by: FAMILY MEDICINE

## 2023-01-25 PROCEDURE — 1125F AMNT PAIN NOTED PAIN PRSNT: CPT | Performed by: FAMILY MEDICINE

## 2023-01-25 PROCEDURE — 1159F MED LIST DOCD IN RCRD: CPT | Performed by: FAMILY MEDICINE

## 2023-01-25 PROCEDURE — G0439 PPPS, SUBSEQ VISIT: HCPCS | Performed by: FAMILY MEDICINE

## 2023-01-25 PROCEDURE — 99214 OFFICE O/P EST MOD 30 MIN: CPT | Performed by: FAMILY MEDICINE

## 2023-01-25 PROCEDURE — 20610 DRAIN/INJ JOINT/BURSA W/O US: CPT | Performed by: FAMILY MEDICINE

## 2023-01-25 RX ORDER — LIDOCAINE HYDROCHLORIDE 20 MG/ML
2 INJECTION, SOLUTION INFILTRATION; PERINEURAL ONCE
Status: COMPLETED | OUTPATIENT
Start: 2023-01-25 | End: 2023-01-25

## 2023-01-25 RX ORDER — ESCITALOPRAM OXALATE 20 MG/1
20 TABLET ORAL DAILY
Qty: 90 TABLET | Refills: 3 | Status: SHIPPED | OUTPATIENT
Start: 2023-01-25

## 2023-01-25 RX ORDER — TRIAMCINOLONE ACETONIDE 40 MG/ML
40 INJECTION, SUSPENSION INTRA-ARTICULAR; INTRAMUSCULAR ONCE
Status: COMPLETED | OUTPATIENT
Start: 2023-01-25 | End: 2023-01-25

## 2023-01-25 RX ADMIN — TRIAMCINOLONE ACETONIDE 40 MG: 40 INJECTION, SUSPENSION INTRA-ARTICULAR; INTRAMUSCULAR at 10:10

## 2023-01-25 RX ADMIN — LIDOCAINE HYDROCHLORIDE 2 ML: 20 INJECTION, SOLUTION INFILTRATION; PERINEURAL at 10:09

## 2023-01-25 NOTE — PROGRESS NOTES
QUICK REFERENCE INFORMATION:  The ABCs of the Annual Wellness Visit    Subsequent Medicare Wellness Visit    HEALTH RISK ASSESSMENT    1945    Recent Hospitalizations:  No hospitalization(s) within the last year..        Current Medical Providers:  Patient Care Team:  Murphy Felix DO as PCP - General  Ulysses Eugene MD as Consulting Physician (Gastroenterology)        Smoking Status:  Social History     Tobacco Use   Smoking Status Never   Smokeless Tobacco Never       Alcohol Consumption:  Social History     Substance and Sexual Activity   Alcohol Use No       Depression Screen:   PHQ-2/PHQ-9 Depression Screening 1/25/2023   Retired PHQ-9 Total Score -   Retired Total Score -   Little Interest or Pleasure in Doing Things 0-->not at all   Feeling Down, Depressed or Hopeless 0-->not at all   Trouble Falling or Staying Asleep, or Sleeping Too Much -   Feeling Tired or Having Little Energy -   Poor Appetite or Overeating -   Feeling Bad about Yourself - or that You are a Failure or Have Let Yourself or Your Family Down -   Trouble Concentrating on Things, Such as Reading the Newspaper or Watching Television -   Thoughts that You Would be Better Off Dead or of Hurting Yourself in Some Way -   PHQ-9: Brief Depression Severity Measure Score 0   If You Checked Off Any Problems, How Difficult Have These Problems Made It For You to Do Your Work, Take Care of Things at Home, or Get Along with Other People? -       Health Habits and Functional and Cognitive Screening:  Functional & Cognitive Status 1/25/2023   Do you have difficulty preparing food and eating? No   Do you have difficulty bathing yourself, getting dressed or grooming yourself? No   Do you have difficulty using the toilet? No   Do you have difficulty moving around from place to place? No   Do you have trouble with steps or getting out of a bed or a chair? No   Current Diet Well Balanced Diet   Dental Exam Up to date   Eye Exam Up to date    Exercise (times per week) 5 times per week   Current Exercises Include Weightlifting   Current Exercise Activities Include -   Do you need help using the phone?  No   Are you deaf or do you have serious difficulty hearing?  No   Do you need help with transportation? No   Do you need help shopping? No   Do you need help preparing meals?  No   Do you need help with housework?  No   Do you need help with laundry? No   Do you need help taking your medications? No   Do you need help managing money? No   Do you ever drive or ride in a car without wearing a seat belt? No   Have you felt unusual stress, anger or loneliness in the last month? No   Who do you live with? Spouse   If you need help, do you have trouble finding someone available to you? No   Have you been bothered in the last four weeks by sexual problems? No   Do you have difficulty concentrating, remembering or making decisions? No           Does the patient have evidence of cognitive impairment? yes    Aspirin use counseling: Does not need ASA (and currently is not on it)      Recent Lab Results:  CMP:  Lab Results   Component Value Date    BUN 27 09/14/2022    CREATININE 1.30 12/28/2022    EGFRIFNONA 60 01/05/2022    EGFRIFAFRI 69 01/05/2022    BCR 22 09/14/2022     09/14/2022    K 4.1 09/14/2022    CO2 26 09/14/2022    CALCIUM 9.5 09/14/2022    PROTENTOTREF 7.0 09/14/2022    ALBUMIN 4.6 09/14/2022    LABGLOBREF 2.4 09/14/2022    LABIL2 1.9 09/14/2022    BILITOT 0.4 09/14/2022    ALKPHOS 84 09/14/2022    AST 33 09/14/2022    ALT 39 09/14/2022     Lipid Panel:  Lab Results   Component Value Date    TRIG 179 (H) 09/14/2022    HDL 49 09/14/2022    VLDL 30 09/14/2022    LDLHDL 1.4 05/18/2022     HbA1c:       Visual Acuity:  No results found.    Age-appropriate Screening Schedule:  Refer to the list below for future screening recommendations based on patient's age, sex and/or medical conditions. Orders for these recommended tests are listed in the plan  section. The patient has been provided with a written plan.    Health Maintenance   Topic Date Due   • INFLUENZA VACCINE  08/01/2022   • LIPID PANEL  09/14/2023   • TDAP/TD VACCINES (6 - Td or Tdap) 03/15/2029        Subjective   History of Present Illness    Kp Hernandez is a 77 y.o. male who presents for an Subsequent Wellness Visit.    The following portions of the patient's history were reviewed and updated as appropriate: allergies, current medications, past family history, past medical history, past social history, past surgical history and problem list.    Outpatient Medications Prior to Visit   Medication Sig Dispense Refill   • allopurinol (ZYLOPRIM) 100 MG tablet Take 1 tablet by mouth twice daily 180 tablet 0   • clobetasol (TEMOVATE) 0.05 % cream APPLY  CREAM TOPICALLY TWICE DAILY AS  DIRECTED  TO  HANDS 60 g 0   • diazePAM (VALIUM) 5 MG tablet 2 TABS PO 1 HR PRIOR TO MRI 2 tablet 0   • finasteride (PROSCAR) 5 MG tablet TAKE 1 TABLET BY MOUTH ONCE DAILY AS DIRECTED 90 tablet 0   • fluticasone (FLONASE) 50 MCG/ACT nasal spray Use 2 spray(s) in each nostril once daily 48 g 0   • gabapentin (NEURONTIN) 300 MG capsule Take 1 capsule by mouth Every Morning AND 1 capsule Daily With Lunch. And 2 nightly. PLEASE CALL THE OFFICE FOR AN APPT 120 capsule 3   • hydroCHLOROthiazide (HYDRODIURIL) 25 MG tablet Take 1 tablet by mouth once daily 90 tablet 0   • HYDROcodone-acetaminophen (NORCO)  MG per tablet Take 1 tablet by mouth Every 6 (Six) Hours As Needed for Moderate Pain. 120 tablet 0   • methocarbamol (ROBAXIN) 750 MG tablet TAKE 1 TABLET BY MOUTH TWICE DAILY AS NEEDED FOR MUSCLE SPASM 60 tablet 0   • metoprolol succinate XL (TOPROL-XL) 25 MG 24 hr tablet Take 1 tablet by mouth once daily 90 tablet 0   • mirtazapine (REMERON) 30 MG tablet TAKE 1 TABLET BY MOUTH ONCE DAILY AT NIGHT 90 tablet 0   • multivitamin with minerals tablet tablet Take 1 tablet by mouth Daily.     • pseudoephedrine (SUDAFED) 60 MG  tablet Take 1 tablet by mouth Every 12 (Twelve) Hours As Needed for Congestion (use sparingly as may raise bp). 60 tablet 5   • rosuvastatin (CRESTOR) 20 MG tablet TAKE 1 TABLET BY MOUTH ONCE DAILY AT NIGHT 90 tablet 0   • SPIRIVA HANDIHALER 18 MCG per inhalation capsule      • urea (CARMOL) 40 % cream Apply  topically to the appropriate area as directed Daily. To palms of both hands 120 g 5   • Zinc 50 MG capsule Take  by mouth. 1 chewable daily     • escitalopram (LEXAPRO) 10 MG tablet Take 1 tablet by mouth once daily 90 tablet 0     No facility-administered medications prior to visit.       Patient Active Problem List   Diagnosis   • Essential hypertension   • Benign prostatic hypertrophy without urinary obstruction   • CKD (chronic kidney disease) stage 2, GFR 60-89 ml/min   • COPD mixed type (HCC)   • Depression with anxiety   • Dyslipidemia   • Ganglion   • Gastroesophageal reflux disease without esophagitis   • Hearing loss   • Osteoarthritis   • Impacted cerumen   • Pituitary microadenoma (HCC)   • History of gout   • Esophageal dysphagia   • Encounter for screening colonoscopy       Advance Care Planning:  ACP discussion was held with the patient during this visit. Patient does not have an advance directive, information provided.    Identification of Risk Factors:  Risk factors include: Obesity/Overweight .    Review of Systems   Respiratory: Negative for shortness of breath.    Cardiovascular: Negative for chest pain.   Musculoskeletal: Positive for arthralgias, back pain and gait problem.       Compared to one year ago, the patient feels his physical health is the same.  Compared to one year ago, the patient feels his mental health is the same.    Objective     Physical Exam  Vitals and nursing note reviewed.   Constitutional:       Appearance: He is well-developed.   HENT:      Head: Normocephalic and atraumatic.   Neck:      Thyroid: No thyromegaly.      Vascular: No JVD.   Cardiovascular:      Rate  "and Rhythm: Normal rate and regular rhythm.      Heart sounds: Normal heart sounds. No murmur heard.    No friction rub. No gallop.   Pulmonary:      Effort: Pulmonary effort is normal. No respiratory distress.      Breath sounds: Normal breath sounds. No wheezing or rales.   Abdominal:      General: Bowel sounds are normal. There is no distension.      Palpations: Abdomen is soft.      Tenderness: There is no abdominal tenderness. There is no guarding or rebound.   Musculoskeletal:      Cervical back: Neck supple.      Left knee: Effusion present. Tenderness present.   Skin:     General: Skin is warm and dry.   Neurological:      Mental Status: He is alert.   Psychiatric:         Behavior: Behavior normal.         Vitals:    01/25/23 0913   BP: 142/82   Pulse: 82   SpO2: 94%   Weight: 95.3 kg (210 lb)   Height: 172.7 cm (68\")   PainSc:   2   PainLoc: Knee     Body mass index is 31.93 kg/m².    BMI is >= 30 and <35. (Class 1 Obesity). The following options were offered after discussion;: weight loss educational material (shared in after visit summary)    Study Result    Narrative & Impression   MRI OF THE PITUITARY WITH AND WITHOUT CONTRAST 12/28/2022     CLINICAL HISTORY: Follow-up pituitary lesion.     TECHNIQUE: Axial T1, FLAIR, fat-suppressed T2, axial diffusion and  postcontrast axial fat-suppressed T1-weighted images were obtained of  the entire head. In addition, thin cut sagittal and coronal T1 and T2  and postcontrast T1-weighted images were obtained through the pituitary  gland.     This is correlated to a prior MRI of the pituitary [facility] on 02/17/2022.     FINDINGS: The brain parenchyma is normal in signal intensity. The  ventricles are upper limits of normal in size. I see no mass effect and  no midline shift, no extra-axial fluid collections are identified, and  no abnormal areas of enhancement are seen in the brain. Paranasal  sinuses and the mastoid air cells and middle ear " cavities are clear.  Good flow voids are demonstrated within the cerebral vessels and in the  dural venous sinuses. On the thin cut images through the pituitary  gland, the posterior pituitary bright spot is within normal limits.  There is an ovoid area of T2 high signal that is isointense on the T1  weighted images and demonstrates heterogeneous with slight decreased  enhancement following contrast within the central to left side of the  anterior lobe of the pituitary. It maximally measures 10 x 8 mm medial  lateral and anterior posterior dimension, and 6 mm in craniocaudal  dimension, and is most consistent with a pituitary adenoma, unchanged  since prior MRI 02/17/2022. The contour of the anterior lobe is normal.  There is no suprasellar extension. Cavernous sinuses are normal in  appearance. Posterior optic nerves and optic chiasm are normal in  appearance.     IMPRESSION:  1. No significant change when compared to the prior MRI of the  pituitary, 10 months ago, on 02/17/2022.  2. There is an ovoid area that is T1 isointense, T2 hyperintense and  demonstrates heterogeneous, but slight decrease in enhancement following  contrast within the central to left side of the anterior lobe of the  pituitary. It maximally measures 10 x 8 x 6 mm in medial lateral,  anterior posterior, and craniocaudal dimension. Findings most consistent  with a stable pituitary adenoma and please correlate with pituitary  laboratory values. There is no suprasellar extension. No significant  mass effect. Continued follow-up MRI of the pituitary is recommended to  evaluate for stability in size. The remainder of the MRI of the brain  and pituitary is normal.     This report was finalized on 12/29/2022 8:01 AM by Dr. Murphy Reyes M.D.       Assessment & Plan   Patient Self-Management and Personalized Health Advice  The patient has been provided with information about: diet and exercise and preventive services including:   · Annual Wellness  Visit (AWV).    Visit Diagnoses:    ICD-10-CM ICD-9-CM   1. Medicare annual wellness visit, subsequent  Z00.00 V70.0   2. KEI (generalized anxiety disorder)  F41.1 300.02   3. Obesity (BMI 30-39.9)  E66.9 278.00   4. Primary osteoarthritis of left knee  M17.12 715.16   5. Drug therapy  Z79.899 V58.69   6. Generalized osteoarthritis of multiple sites  M15.9 715.09       Orders Placed This Encounter   Procedures   • Arthrocentesis     This order was created via procedure documentation     Order Specific Question:   Release to patient     Answer:   Routine Release   • ToxASSURE Select 13 (MW) - Urine, Clean Catch     Order Specific Question:   Release to patient     Answer:   Routine Release       Outpatient Encounter Medications as of 1/25/2023   Medication Sig Dispense Refill   • allopurinol (ZYLOPRIM) 100 MG tablet Take 1 tablet by mouth twice daily 180 tablet 0   • clobetasol (TEMOVATE) 0.05 % cream APPLY  CREAM TOPICALLY TWICE DAILY AS  DIRECTED  TO  HANDS 60 g 0   • diazePAM (VALIUM) 5 MG tablet 2 TABS PO 1 HR PRIOR TO MRI 2 tablet 0   • escitalopram (LEXAPRO) 20 MG tablet Take 1 tablet by mouth Daily. 90 tablet 3   • finasteride (PROSCAR) 5 MG tablet TAKE 1 TABLET BY MOUTH ONCE DAILY AS DIRECTED 90 tablet 0   • fluticasone (FLONASE) 50 MCG/ACT nasal spray Use 2 spray(s) in each nostril once daily 48 g 0   • gabapentin (NEURONTIN) 300 MG capsule Take 1 capsule by mouth Every Morning AND 1 capsule Daily With Lunch. And 2 nightly. PLEASE CALL THE OFFICE FOR AN APPT 120 capsule 3   • hydroCHLOROthiazide (HYDRODIURIL) 25 MG tablet Take 1 tablet by mouth once daily 90 tablet 0   • HYDROcodone-acetaminophen (NORCO)  MG per tablet Take 1 tablet by mouth Every 6 (Six) Hours As Needed for Moderate Pain. 120 tablet 0   • methocarbamol (ROBAXIN) 750 MG tablet TAKE 1 TABLET BY MOUTH TWICE DAILY AS NEEDED FOR MUSCLE SPASM 60 tablet 0   • metoprolol succinate XL (TOPROL-XL) 25 MG 24 hr tablet Take 1 tablet by mouth once  daily 90 tablet 0   • mirtazapine (REMERON) 30 MG tablet TAKE 1 TABLET BY MOUTH ONCE DAILY AT NIGHT 90 tablet 0   • multivitamin with minerals tablet tablet Take 1 tablet by mouth Daily.     • pseudoephedrine (SUDAFED) 60 MG tablet Take 1 tablet by mouth Every 12 (Twelve) Hours As Needed for Congestion (use sparingly as may raise bp). 60 tablet 5   • rosuvastatin (CRESTOR) 20 MG tablet TAKE 1 TABLET BY MOUTH ONCE DAILY AT NIGHT 90 tablet 0   • SPIRIVA HANDIHALER 18 MCG per inhalation capsule      • urea (CARMOL) 40 % cream Apply  topically to the appropriate area as directed Daily. To palms of both hands 120 g 5   • Zinc 50 MG capsule Take  by mouth. 1 chewable daily     • [DISCONTINUED] escitalopram (LEXAPRO) 10 MG tablet Take 1 tablet by mouth once daily 90 tablet 0     Facility-Administered Encounter Medications as of 1/25/2023   Medication Dose Route Frequency Provider Last Rate Last Admin   • [COMPLETED] lidocaine (XYLOCAINE) 2% injection 2 mL  2 mL Injection Once Murphy Felix DO   2 mL at 01/25/23 1009   • [COMPLETED] triamcinolone acetonide (KENALOG-40) injection 40 mg  40 mg Intra-articular Once Murphy Felix DO   40 mg at 01/25/23 1010       Reviewed use of high risk medication in the elderly: yes  Reviewed for potential of harmful drug interactions in the elderly: yes    Follow Up:  Return in about 6 months (around 7/25/2023), or if symptoms worsen or fail to improve.     An After Visit Summary and PPPS with all of these plans were given to the patient.           ++++++++++++++++++++++++++++++++++++++++++++++++++++++++++++++++++     Chief Complaint   Patient presents with   • Medicare Wellness-subsequent   • Anxiety   • Depression   • Knee Pain     HPI  Patient 78 y/o male with aron, doing ok on lexapro abut still struggling with some anxiety and irritability;  Can lose temper and yell especailly in evening.   His therapist suggested going up on dose of lexapro.  He does also have chronic knee  "pain, no injuries or trauma recently;  Had both knees lapascopically cleaned out over 20 years ago.  He is also on chronic medication for arthritis pain and has helped. Due for UDS.  He is following with Endocrinology and Neurosurgery for pituitary adenoma which has remained stable.  Saw GI for dysphagia and had dilation of schatzkys ring.   Dysphagia has resolved and no longer has to force himself to vomit when feels food stuck.     Review of Systems   Cardiovascular: Negative for chest pain.   Respiratory: Negative for shortness of breath.    Musculoskeletal: Positive for arthralgias, back pain and gait problem.       Social History     Tobacco Use   • Smoking status: Never   • Smokeless tobacco: Never   Substance Use Topics   • Alcohol use: No     O:   Vitals:    01/25/23 0913   BP: 142/82   Pulse: 82   SpO2: 94%   Weight: 95.3 kg (210 lb)   Height: 172.7 cm (68\")   PainSc:   2   PainLoc: Knee     Body mass index is 31.93 kg/m².  Vitals and nursing note reviewed.   Constitutional:       Appearance: Well-developed.   HENT:      Head: Normocephalic and atraumatic.   Neck:      Thyroid: No thyromegaly.      Vascular: No JVD.   Pulmonary:      Effort: Pulmonary effort is normal. No respiratory distress.      Breath sounds: Normal breath sounds. No wheezing. No rales.   Cardiovascular:      Normal rate. Regular rhythm. Normal heart sounds.      No gallop. No friction rub.   Abdominal:      General: Bowel sounds are normal. There is no distension.      Palpations: Abdomen is soft.      Tenderness: There is no abdominal tenderness. There is no guarding or rebound.   Musculoskeletal:      Cervical back: Neck supple.      Left knee: Effusion present. Tenderness present. Skin:     General: Skin is warm and dry.   Neurological:      Mental Status: Alert.   Psychiatric:         Behavior: Behavior normal.     Arthrocentesis    Date/Time: 1/25/2023 10:13 AM  Performed by: Murphy Felix DO  Authorized by: Murphy Felix " R, DO   Consent: Verbal consent obtained.  Risks and benefits: risks, benefits and alternatives were discussed  Consent given by: patient  Patient understanding: patient states understanding of the procedure being performed  Site marked: the operative site was marked  Required items: required blood products, implants, devices, and special equipment available  Patient identity confirmed: verbally with patient  Indications: pain   Body area: knee  Joint: left knee  Local anesthesia used: yes    Anesthesia:  Local anesthesia used: yes  Local Anesthetic: topical anesthetic    Sedation:  Patient sedated: no    Preparation: Patient was prepped and draped in the usual sterile fashion.  Needle size: 22 G  Approach: lateral  Patient tolerance: Patient tolerated the procedure well with no immediate complications            Diagnoses and all orders for this visit:    1. Medicare annual wellness visit, subsequent (Primary)    2. KEI (generalized anxiety disorder)  -     escitalopram (LEXAPRO) 20 MG tablet; Take 1 tablet by mouth Daily.  Dispense: 90 tablet; Refill: 3    3. Obesity (BMI 30-39.9)    4. Primary osteoarthritis of left knee  -     triamcinolone acetonide (KENALOG-40) injection 40 mg  -     lidocaine (XYLOCAINE) 2% injection 2 mL  -     Arthrocentesis    5. Drug therapy  -     ToxASSURE Select 13 (MW) - Urine, Clean Catch    6. Generalized osteoarthritis of multiple sites    KEI with irritability - will go up on lexapro from 10mg to 20mg to see if helps;  Call or return if no relief and will adjust meds further.  The patient has read and signed the [facility] Controlled Substance Contract.  I will continue to see patient for regular follow up appointments.  They are well controlled on their medication.  EDILIA is updated every 3 months. The patient is aware of the potential for addiction and dependence.  Check UDS as due today  Post-joint injection instructions given, warned may be sore and achy next 2-3 days,  recommend ice as directed.  Monitor bp as up, but in pain today    Return in about 6 months (around 7/25/2023), or if symptoms worsen or fail to improve.

## 2023-01-25 NOTE — PATIENT INSTRUCTIONS
Advance Care Planning and Advance Directives     You make decisions on a daily basis - decisions about where you want to live, your career, your home, your life. Perhaps one of the most important decisions you face is your choice for future medical care. Take time to talk with your family and your healthcare team and start planning today.  Advance Care Planning is a process that can help you:  Understand possible future healthcare decisions in light of your own experiences  Reflect on those decision in light of your goals and values  Discuss your decisions with those closest to you and the healthcare professionals that care for you  Make a plan by creating a document that reflects your wishes    Surrogate Decision Maker  In the event of a medical emergency, which has left you unable to communicate or to make your own decisions, you would need someone to make decisions for you.  It is important to discuss your preferences for medical treatment with this person while you are in good health.     Qualities of a surrogate decision maker:  Willing to take on this role and responsibility  Knows what you want for future medical care  Willing to follow your wishes even if they don't agree with them  Able to make difficult medical decisions under stressful circumstances    Advance Directives  These are legal documents you can create that will guide your healthcare team and decision maker(s) when needed. These documents can be stored in the electronic medical record.    Living Will - a legal document to guide your care if you have a terminal condition or a serious illness and are unable to communicate. States vary by statute in document names/types, but most forms may include one or more of the following:        -  Directions regarding life-prolonging treatments        -  Directions regarding artificially provided nutrition/hydration        -  Choosing a healthcare decision maker        -  Direction regarding organ/tissue  "donation    Durable Power of  for Healthcare - this document names an -in-fact to make medical decisions for you, but it may also allow this person to make personal and financial decisions for you. Please seek the advice of an  if you need this type of document.    **Advance Directives are not required and no one may discriminate against you if you do not sign one.    Medical Orders  Many states allow specific forms/orders signed by your physician to record your wishes for medical treatment in your current state of health. This form, signed in personal communication with your physician, addresses resuscitation and other medical interventions that you may or may not want.      For more information or to schedule a time with a [facility] Advance Care Planning Facilitator contact: [facility] or call [phone] and someone will contact you directly.\"7-8-5-Almost None!\"  Healthy Habits Start Early    EAT 5 OR MORE SERVINGS OF VEGETABLES AND FRUITS EVERY DAY.    Help Kp get three vegetables and two fruits each day. Red, green, yellow, orange...encourage them to try all the colors so they can enjoy different flavors and get more vitamins.    How can I help Kp do this?  ---------------------------------------------  -BE PATIENT WITH Kp, remember it may take 10 times before they start to like new food. So, start with small bites and just keep trying.  -Serve at least one vegetable or fruit at every meal. Even try two. Remember, portions do not have to be as big as you think.  -Encourage eating fruits and vegetables instead of drinking them..it's a better way to get fiber and vitamins..so limit the amount of juice to 1/2 cup per day for children 1-6 years and one cup per day for children 7-18 years of age. Try using 1/2 part water and 1/2 part juice.    Spend less than two hours per day watching television and other screen media. Screen media includes video games, movies " and computer use for entertainment.    How can I help Kp do this?  -Turn off the TV at dinner. Dinner is the best time to hang out with your kids and just talk, learn about their day, and tell them about your day. Your kids have a lot to learn from you and dinner is a great time to share.

## 2023-01-31 DIAGNOSIS — G47.09 OTHER INSOMNIA: ICD-10-CM

## 2023-01-31 RX ORDER — MIRTAZAPINE 30 MG/1
TABLET, FILM COATED ORAL
Qty: 90 TABLET | Refills: 0 | Status: SHIPPED | OUTPATIENT
Start: 2023-01-31

## 2023-02-02 LAB — DRUGS UR: NORMAL

## 2023-02-04 RX ORDER — HYDROCHLOROTHIAZIDE 25 MG/1
TABLET ORAL
Qty: 90 TABLET | Refills: 0 | Status: SHIPPED | OUTPATIENT
Start: 2023-02-04

## 2023-02-07 DIAGNOSIS — M15.9 GENERALIZED OSTEOARTHRITIS OF MULTIPLE SITES: ICD-10-CM

## 2023-02-07 RX ORDER — HYDROCODONE BITARTRATE AND ACETAMINOPHEN 10; 325 MG/1; MG/1
1 TABLET ORAL EVERY 6 HOURS PRN
Qty: 120 TABLET | Refills: 0 | Status: SHIPPED | OUTPATIENT
Start: 2023-02-07 | End: 2023-03-08 | Stop reason: SDUPTHER

## 2023-02-07 NOTE — TELEPHONE ENCOUNTER
Caller: Kp Hernandez    Relationship: Self    Best call back number: [phone]    Requested Prescriptions:   Requested Prescriptions     Pending Prescriptions Disp Refills   • HYDROcodone-acetaminophen (NORCO)  MG per tablet 120 tablet 0     Sig: Take 1 tablet by mouth Every 6 (Six) Hours As Needed for Moderate Pain.        Pharmacy where request should be sent: [address], [place] - [address] [facility] [phone] [facility] [phone] FX        Does the patient have less than a 3 day supply:  [x] Yes  [] No    Would you like a call back once the refill request has been completed: [x] Yes [] No    If the office needs to give you a call back, can they leave a voicemail: [x] Yes [] No    Destiny Benavidez Rep   02/07/23 14:21 EST

## 2023-02-18 DIAGNOSIS — L20.82 FLEXURAL ECZEMA: ICD-10-CM

## 2023-02-18 DIAGNOSIS — M54.42 CHRONIC BILATERAL LOW BACK PAIN WITH BILATERAL SCIATICA: ICD-10-CM

## 2023-02-18 DIAGNOSIS — G89.29 CHRONIC BILATERAL LOW BACK PAIN WITH BILATERAL SCIATICA: ICD-10-CM

## 2023-02-18 DIAGNOSIS — M54.41 CHRONIC BILATERAL LOW BACK PAIN WITH BILATERAL SCIATICA: ICD-10-CM

## 2023-02-20 DIAGNOSIS — L20.82 FLEXURAL ECZEMA: ICD-10-CM

## 2023-02-20 RX ORDER — CLOBETASOL PROPIONATE 0.5 MG/G
CREAM TOPICAL
Qty: 60 G | Refills: 5 | OUTPATIENT
Start: 2023-02-20

## 2023-02-20 RX ORDER — CLOBETASOL PROPIONATE 0.5 MG/G
CREAM TOPICAL
Qty: 60 G | Refills: 5 | Status: SHIPPED | OUTPATIENT
Start: 2023-02-20

## 2023-02-20 RX ORDER — METHOCARBAMOL 750 MG/1
TABLET, FILM COATED ORAL
Qty: 60 TABLET | Refills: 5 | Status: SHIPPED | OUTPATIENT
Start: 2023-02-20

## 2023-03-08 DIAGNOSIS — M15.9 GENERALIZED OSTEOARTHRITIS OF MULTIPLE SITES: ICD-10-CM

## 2023-03-08 RX ORDER — ALLOPURINOL 100 MG/1
TABLET ORAL
Qty: 180 TABLET | Refills: 0 | Status: SHIPPED | OUTPATIENT
Start: 2023-03-08

## 2023-03-08 RX ORDER — HYDROCODONE BITARTRATE AND ACETAMINOPHEN 10; 325 MG/1; MG/1
1 TABLET ORAL EVERY 6 HOURS PRN
Qty: 120 TABLET | Refills: 0 | Status: SHIPPED | OUTPATIENT
Start: 2023-03-08 | End: 2023-04-07 | Stop reason: SDUPTHER

## 2023-03-08 RX ORDER — FINASTERIDE 5 MG/1
TABLET, FILM COATED ORAL
Qty: 90 TABLET | Refills: 0 | Status: SHIPPED | OUTPATIENT
Start: 2023-03-08

## 2023-03-08 NOTE — TELEPHONE ENCOUNTER
"Caller: Kp Hernandez \"HEATH\"    Relationship: Self    Best call back number: [phone]    Requested Prescriptions:   Requested Prescriptions     Pending Prescriptions Disp Refills   • HYDROcodone-acetaminophen (NORCO)  MG per tablet 120 tablet 0     Sig: Take 1 tablet by mouth Every 6 (Six) Hours As Needed for Moderate Pain.        Pharmacy where request should be sent: [address], [place] - [address] [facility] [phone] [facility] [phone] FX     Additional details provided by patient: PATIENT STATES HE HAS ABOUT A DAY LEFT    Does the patient have less than a 3 day supply:  [x] Yes  [] No    Would you like a call back once the refill request has been completed: [] Yes [x] No    If the office needs to give you a call back, can they leave a voicemail: [] Yes [x] No    Destiny Livingston Rep   03/08/23 09:17 EST         "

## 2023-03-09 DIAGNOSIS — F41.9 ANXIETY: ICD-10-CM

## 2023-03-09 RX ORDER — ESCITALOPRAM OXALATE 10 MG/1
TABLET ORAL
Qty: 90 TABLET | Refills: 0 | Status: SHIPPED | OUTPATIENT
Start: 2023-03-09 | End: 2023-03-21 | Stop reason: DRUGHIGH

## 2023-03-15 RX ORDER — FLUTICASONE PROPIONATE 50 MCG
SPRAY, SUSPENSION (ML) NASAL
Qty: 48 G | Refills: 0 | Status: SHIPPED | OUTPATIENT
Start: 2023-03-15

## 2023-03-20 RX ORDER — ROSUVASTATIN CALCIUM 20 MG/1
TABLET, COATED ORAL
Qty: 90 TABLET | Refills: 3 | Status: SHIPPED | OUTPATIENT
Start: 2023-03-20

## 2023-03-21 ENCOUNTER — OFFICE VISIT (OUTPATIENT)
Dept: ENDOCRINOLOGY | Age: 78
End: 2023-03-21
Payer: MEDICARE

## 2023-03-21 VITALS
TEMPERATURE: 97.7 F | HEART RATE: 80 BPM | DIASTOLIC BLOOD PRESSURE: 82 MMHG | HEIGHT: 68 IN | OXYGEN SATURATION: 97 % | SYSTOLIC BLOOD PRESSURE: 136 MMHG | WEIGHT: 210.8 LBS | BODY MASS INDEX: 31.95 KG/M2

## 2023-03-21 DIAGNOSIS — E78.5 DYSLIPIDEMIA: ICD-10-CM

## 2023-03-21 DIAGNOSIS — K21.9 GASTROESOPHAGEAL REFLUX DISEASE WITHOUT ESOPHAGITIS: ICD-10-CM

## 2023-03-21 DIAGNOSIS — D35.2 PITUITARY MICROADENOMA: Primary | ICD-10-CM

## 2023-03-21 DIAGNOSIS — R79.9 ABNORMAL FINDING OF BLOOD CHEMISTRY, UNSPECIFIED: ICD-10-CM

## 2023-03-21 DIAGNOSIS — I10 ESSENTIAL HYPERTENSION: ICD-10-CM

## 2023-03-21 DIAGNOSIS — Z87.39 HISTORY OF GOUT: ICD-10-CM

## 2023-03-21 PROCEDURE — 99214 OFFICE O/P EST MOD 30 MIN: CPT | Performed by: INTERNAL MEDICINE

## 2023-03-21 PROCEDURE — 3079F DIAST BP 80-89 MM HG: CPT | Performed by: INTERNAL MEDICINE

## 2023-03-21 PROCEDURE — 3075F SYST BP GE 130 - 139MM HG: CPT | Performed by: INTERNAL MEDICINE

## 2023-03-21 NOTE — PROGRESS NOTES
Winter Hernandez is a 77 y.o. male.     History of Present Illness        In January 2022, he had brain MRI as part of work-up for cognitive impairment.  Incidental to study a pituitary mass was seen.  MRI of the pituitary done in February 2022 showed a 9 mm pituitary adenoma.  He is being followed by Dr. Guerrero.     Lab studies done in January 2022 as follows: ACTH 24.9 pg/mL.  A.m. cortisol 11.5 mcg per DL.  Normal total testosterone at 331 ng per DL.  LH 5.4.  FSH 10.2.  Elevated prolactin at 33.7 ng/mL.  Normal TSH at 2.39.  Normal free T4 at 1.33 ng per DL.    MRI of the pituitary done in December December 2022 showed no significant change compared to MRI done in February 2022.  Findings most consistent with stable pituitary adenoma.  There is no suprasellar extension.    He had a normal visual field exam in March 2023     He denies increased headaches.  He denies changes in shoes or ring size.  He denies heat or cold intolerance.  He denies bowel changes. He denies polyuria or nocturia.  He denies muscle weakness.  He denies easy bruising.  He denies erectile dysfunction.     He has fathered 3 adult children.  He lifts weights for exercise but denies steroid use.  He denies alcohol, tobacco or drug abuse.  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     He has hyperlipidemia and is on Crestor 20 mg/day.  He denies myalgia.  He has no history of diabetes mellitus.  His last meal was at 8 AM     He has history of gout and calcium oxalate stones.  He is on allopurinol prescribed by Dr. Felix..     He has hypertension and is on hydrochlorothiazide 25 mg/day and Toprol-XL 25 mg/day prescribed by Dr. Felix.  He has no history of heart attack or stroke.  He denies chest pain or shortness of breath.       He has acid reflux disease which is controlled by omeprazole.  He denies heartburn     He has chronic neck and low back pain and has been on gabapentin, Robaxin, and hydrocodone-acetaminophen  "as needed.    He has no personal or family history of diabetes mellitus.       The following portions of the patient's history were reviewed and updated as appropriate: allergies, current medications, past family history, past medical history, past social history, past surgical history and problem list.    Review of Systems   Eyes: Negative for visual disturbance.   Respiratory: Negative for shortness of breath.    Cardiovascular: Negative for chest pain and palpitations.   Gastrointestinal: Negative.    Endocrine: Negative for cold intolerance, heat intolerance and polyuria.   Genitourinary: Negative.    Musculoskeletal: Negative for myalgias.   Neurological: Negative for numbness.     Vitals:    03/21/23 1010   BP: 136/82   Pulse: 80   Temp: 97.7 °F (36.5 °C)   TempSrc: Temporal   SpO2: 97%   Weight: 95.6 kg (210 lb 12.8 oz)   Height: 172.7 cm (67.99\")      Objective   Physical Exam  Office Visit on 01/25/2023   Component Date Value Ref Range Status   • Report Summary 01/25/2023 FINAL   Final    Comment: ====================================================================  TOXASSURE SELECT 13 (MW)  ====================================================================  Test                             Result       Flag       Units  Drug Present    Hydrocodone                    5410                    ng/mg creat    Hydromorphone                  938                     ng/mg creat    Dihydrocodeine                 538                     ng/mg creat    Norhydrocodone                 3175                    ng/mg creat     Sources of hydrocodone include scheduled prescription     medications. Hydromorphone, dihydrocodeine and norhydrocodone are     expected metabolites of hydrocodone. Hydromorphone and     dihydrocodeine are also available as scheduled prescription     medications.  ====================================================================  Test                      Result    Flag   Units      Ref Range   "  Creatinine              105              mg/dL      >=20  =============                           =======================================================  Declared Medications:   Medication list was not provided.  ====================================================================  For clinical consultation, please call [phone].  ====================================================================       Assessment & Plan   Diagnoses and all orders for this visit:    1. Pituitary microadenoma (HCC) (Primary)  -     Macroprolactin  -     Hemoglobin A1c  -     Testosterone, Free / Tot Equilib  -     Luteinizing Hormone  -     Follicle Stimulating Hormone  -     Insulin-like Growth Factor  -     TSH  -     T4, Free    2. Essential hypertension  -     Comprehensive Metabolic Panel    3. Dyslipidemia  -     Comprehensive Metabolic Panel  -     Lipid Panel  -     TSH  -     T4, Free    4. Gastroesophageal reflux disease without esophagitis    5. History of gout    6. Abnormal finding of blood chemistry, unspecified  -     Hemoglobin A1c      Check anterior pituitary function  Including macro prolactin.  Continue Crestor 20 mg/day.  Will defer treatment of hypertension and gout to Dr. Felix.  Will defer treatment of chronic pain to Dr. Felix.    RTC 6 mos.    Copy of my note sent to Dr. Felix and Dr. Guerrero.

## 2023-03-22 RX ORDER — METOPROLOL SUCCINATE 25 MG/1
TABLET, EXTENDED RELEASE ORAL
Qty: 90 TABLET | Refills: 0 | Status: SHIPPED | OUTPATIENT
Start: 2023-03-22

## 2023-03-28 ENCOUNTER — TELEPHONE (OUTPATIENT)
Dept: FAMILY MEDICINE CLINIC | Facility: CLINIC | Age: 78
End: 2023-03-28

## 2023-03-28 NOTE — TELEPHONE ENCOUNTER
"Caller: Kp Hernandez \"HEATH\"    Relationship to patient: Self    Best call back number: [phone]     Chief complaint: RIGHT KNEE PAIN AFTER FALL    Type of visit: OFFICE    Requested date: SOONER THAN JULY     Additional notes:PATIENT WAS BEING TREATED FOR LEFT KNEE PAIN AND WAS GIVEN AN INJECTION BY DR. GIBSON - SHORTLY AFTER THE INJECTION, THE PATIENT FELL AND INJURED THE RIGHT KNEE.        "

## 2023-03-29 DIAGNOSIS — M25.561 ACUTE PAIN OF RIGHT KNEE: Primary | ICD-10-CM

## 2023-03-29 LAB
ALBUMIN SERPL-MCNC: 4.5 G/DL (ref 3.7–4.7)
ALBUMIN/GLOB SERPL: 1.8 {RATIO} (ref 1.2–2.2)
ALP SERPL-CCNC: 73 IU/L (ref 44–121)
ALT SERPL-CCNC: 33 IU/L (ref 0–44)
AST SERPL-CCNC: 36 IU/L (ref 0–40)
BILIRUB SERPL-MCNC: 0.5 MG/DL (ref 0–1.2)
BUN SERPL-MCNC: 19 MG/DL (ref 8–27)
BUN/CREAT SERPL: 15 (ref 10–24)
CALCIUM SERPL-MCNC: 10.1 MG/DL (ref 8.6–10.2)
CHLORIDE SERPL-SCNC: 105 MMOL/L (ref 96–106)
CHOLEST SERPL-MCNC: 162 MG/DL (ref 100–199)
CO2 SERPL-SCNC: 25 MMOL/L (ref 20–29)
CREAT SERPL-MCNC: 1.29 MG/DL (ref 0.76–1.27)
EGFRCR SERPLBLD CKD-EPI 2021: 57 ML/MIN/1.73
FSH SERPL-ACNC: 10.2 MIU/ML (ref 1.5–12.4)
GLOBULIN SER CALC-MCNC: 2.5 G/DL (ref 1.5–4.5)
GLUCOSE SERPL-MCNC: 96 MG/DL (ref 70–99)
HBA1C MFR BLD: 5.7 % (ref 4.8–5.6)
HDLC SERPL-MCNC: 49 MG/DL
IGF-I SERPL-MCNC: 101 NG/ML (ref 45–207)
IMP & REVIEW OF LAB RESULTS: NORMAL
LDLC SERPL CALC-MCNC: 79 MG/DL (ref 0–99)
LH SERPL-ACNC: 5.8 MIU/ML (ref 1.7–8.6)
MACROPROLACTIN/PROLACTIN MFR SERPL: 57 %
POTASSIUM SERPL-SCNC: 4.7 MMOL/L (ref 3.5–5.2)
PROLACTIN SERPL-MCNC: 35.7 NG/ML
PROLACTIN.MONOMERIC SERPL-MCNC: 15.2 NG/ML
PROT SERPL-MCNC: 7 G/DL (ref 6–8.5)
REPORT: NORMAL
SODIUM SERPL-SCNC: 144 MMOL/L (ref 134–144)
T4 FREE SERPL-MCNC: 1.35 NG/DL (ref 0.82–1.77)
TESTOST FREE MFR SERPL: 1.09 % (ref 1.5–4.2)
TESTOST FREE SERPL-MCNC: 2.71 NG/DL (ref 5–21)
TESTOST SERPL-MCNC: 249 NG/DL (ref 264–916)
TRIGL SERPL-MCNC: 202 MG/DL (ref 0–149)
TSH SERPL DL<=0.005 MIU/L-ACNC: 1.62 UIU/ML (ref 0.45–4.5)
VLDLC SERPL CALC-MCNC: 34 MG/DL (ref 5–40)

## 2023-03-29 NOTE — TELEPHONE ENCOUNTER
Spoke with pt aware to go to [facility] to get xray rt knee orders faxed [phone]

## 2023-04-07 DIAGNOSIS — M15.9 GENERALIZED OSTEOARTHRITIS OF MULTIPLE SITES: ICD-10-CM

## 2023-04-07 NOTE — TELEPHONE ENCOUNTER
"Caller: Kp Hernandez \"EHATH\"    Relationship: Self    Best call back number: [phone]    Requested Prescriptions:   Requested Prescriptions     Pending Prescriptions Disp Refills   • HYDROcodone-acetaminophen (NORCO)  MG per tablet 120 tablet 0     Sig: Take 1 tablet by mouth Every 6 (Six) Hours As Needed for Moderate Pain.        Pharmacy where request should be sent: [address] ([facility], [place] - [address] [facility] [phone] [facility] [phone]      Last office visit with prescribing clinician: 1/25/2023   Last telemedicine visit with prescribing clinician: 7/27/2023   Next office visit with prescribing clinician: 7/27/2023     Additional details provided by patient:     Does the patient have less than a 3 day supply:  [x] Yes  [] No    Would you like a call back once the refill request has been completed: [] Yes [x] No    If the office needs to give you a call back, can they leave a voicemail: [] Yes [x] No    Destiny Rodriguez Rep   04/07/23 13:16 EDT       "

## 2023-04-08 DIAGNOSIS — M54.41 CHRONIC BILATERAL LOW BACK PAIN WITH BILATERAL SCIATICA: ICD-10-CM

## 2023-04-08 DIAGNOSIS — G89.29 CHRONIC BILATERAL LOW BACK PAIN WITH BILATERAL SCIATICA: ICD-10-CM

## 2023-04-08 DIAGNOSIS — M79.605 PAIN IN BOTH LOWER EXTREMITIES: ICD-10-CM

## 2023-04-08 DIAGNOSIS — M54.42 CHRONIC BILATERAL LOW BACK PAIN WITH BILATERAL SCIATICA: ICD-10-CM

## 2023-04-08 DIAGNOSIS — M79.604 PAIN IN BOTH LOWER EXTREMITIES: ICD-10-CM

## 2023-04-08 RX ORDER — HYDROCODONE BITARTRATE AND ACETAMINOPHEN 10; 325 MG/1; MG/1
1 TABLET ORAL EVERY 6 HOURS PRN
Qty: 120 TABLET | Refills: 0 | Status: SHIPPED | OUTPATIENT
Start: 2023-04-08

## 2023-04-10 RX ORDER — GABAPENTIN 300 MG/1
CAPSULE ORAL
Qty: 360 CAPSULE | Refills: 1 | Status: SHIPPED | OUTPATIENT
Start: 2023-04-10

## 2023-04-21 DIAGNOSIS — G47.09 OTHER INSOMNIA: ICD-10-CM

## 2023-04-21 RX ORDER — MIRTAZAPINE 30 MG/1
TABLET, FILM COATED ORAL
Qty: 90 TABLET | Refills: 0 | Status: SHIPPED | OUTPATIENT
Start: 2023-04-21

## 2023-05-01 RX ORDER — HYDROCHLOROTHIAZIDE 25 MG/1
TABLET ORAL
Qty: 90 TABLET | Refills: 0 | Status: SHIPPED | OUTPATIENT
Start: 2023-05-01

## 2023-05-08 DIAGNOSIS — M15.9 GENERALIZED OSTEOARTHRITIS OF MULTIPLE SITES: ICD-10-CM

## 2023-05-08 RX ORDER — HYDROCODONE BITARTRATE AND ACETAMINOPHEN 10; 325 MG/1; MG/1
1 TABLET ORAL EVERY 6 HOURS PRN
Qty: 120 TABLET | Refills: 0 | OUTPATIENT
Start: 2023-05-08

## 2023-05-08 NOTE — TELEPHONE ENCOUNTER
PATIENT CALLED FOR MEDICATION REFILL OF  HYDROcodone-acetaminophen (NORCO)  MG per tablet  HE HAS TODAY'S LEFT    [facility] [address] [place] [address] ([facility] [place] [facility] [phone]  - [phone]   [phone]    CALL BACK NUMBER [phone]

## 2023-05-09 DIAGNOSIS — M15.9 GENERALIZED OSTEOARTHRITIS OF MULTIPLE SITES: ICD-10-CM

## 2023-05-10 RX ORDER — HYDROCODONE BITARTRATE AND ACETAMINOPHEN 10; 325 MG/1; MG/1
1 TABLET ORAL EVERY 6 HOURS PRN
Qty: 120 TABLET | Refills: 0 | OUTPATIENT
Start: 2023-05-10

## 2023-05-10 RX ORDER — HYDROCODONE BITARTRATE AND ACETAMINOPHEN 10; 325 MG/1; MG/1
1 TABLET ORAL EVERY 6 HOURS PRN
Qty: 120 TABLET | Refills: 0 | Status: SHIPPED | OUTPATIENT
Start: 2023-05-10

## 2023-05-10 NOTE — TELEPHONE ENCOUNTER
"Caller: Kp Hernandez \"HEATH\"    Relationship: Self    Best call back number: [phone]     What was the call regarding: PATIENT CALLED TO CHECK STATUS OF RECENT REFILL REQUEST AND STATES IT WAS DUE ON 05/09/23.     PLEASE ADVISE.       "

## 2023-05-30 RX ORDER — ALLOPURINOL 100 MG/1
TABLET ORAL
Qty: 180 TABLET | Refills: 0 | Status: SHIPPED | OUTPATIENT
Start: 2023-05-30 | End: 2023-06-05

## 2023-05-30 RX ORDER — FINASTERIDE 5 MG/1
TABLET, FILM COATED ORAL
Qty: 90 TABLET | Refills: 0 | Status: SHIPPED | OUTPATIENT
Start: 2023-05-30 | End: 2023-06-05

## 2023-06-01 RX ORDER — FINASTERIDE 5 MG/1
TABLET, FILM COATED ORAL
Qty: 90 TABLET | Refills: 0 | OUTPATIENT
Start: 2023-06-01

## 2023-06-01 RX ORDER — ALLOPURINOL 100 MG/1
TABLET ORAL
Qty: 180 TABLET | Refills: 0 | OUTPATIENT
Start: 2023-06-01

## 2023-06-05 RX ORDER — ALLOPURINOL 100 MG/1
TABLET ORAL
Qty: 180 TABLET | Refills: 0 | Status: SHIPPED | OUTPATIENT
Start: 2023-06-05

## 2023-06-05 RX ORDER — FINASTERIDE 5 MG/1
TABLET, FILM COATED ORAL
Qty: 90 TABLET | Refills: 0 | Status: SHIPPED | OUTPATIENT
Start: 2023-06-05

## 2023-06-08 DIAGNOSIS — M15.9 GENERALIZED OSTEOARTHRITIS OF MULTIPLE SITES: ICD-10-CM

## 2023-06-08 RX ORDER — FLUTICASONE PROPIONATE 50 MCG
SPRAY, SUSPENSION (ML) NASAL
Qty: 48 G | Refills: 0 | Status: SHIPPED | OUTPATIENT
Start: 2023-06-08

## 2023-06-08 NOTE — TELEPHONE ENCOUNTER
"  Caller: Kp Hernandez \"HEATH\"    Relationship: Self    Best call back number: [phone]    Requested Prescriptions:   Requested Prescriptions     Pending Prescriptions Disp Refills    HYDROcodone-acetaminophen (NORCO)  MG per tablet 120 tablet 0     Sig: Take 1 tablet by mouth Every 6 (Six) Hours As Needed for Moderate Pain.        Pharmacy where request should be sent: [address] ([facility], [place] - [address] [facility] [phone] [facility] [phone]      Last office visit with prescribing clinician: 1/25/2023   Last telemedicine visit with prescribing clinician: Visit date not found   Next office visit with prescribing clinician: 7/27/2023     Additional details provided by patient: PATIENT STATES HE WILL BE OUT OF MEDICATION AFTER TODAY    Does the patient have less than a 3 day supply:  [x] Yes  [] No    Would you like a call back once the refill request has been completed: [] Yes [x] No    If the office needs to give you a call back, can they leave a voicemail: [] Yes [x] No    [place]   06/08/23 10:10 EDT           "

## 2023-06-09 DIAGNOSIS — M15.9 GENERALIZED OSTEOARTHRITIS OF MULTIPLE SITES: ICD-10-CM

## 2023-06-11 RX ORDER — HYDROCODONE BITARTRATE AND ACETAMINOPHEN 10; 325 MG/1; MG/1
1 TABLET ORAL EVERY 6 HOURS PRN
Qty: 120 TABLET | Refills: 0 | Status: SHIPPED | OUTPATIENT
Start: 2023-06-11

## 2023-06-12 DIAGNOSIS — M15.9 GENERALIZED OSTEOARTHRITIS OF MULTIPLE SITES: ICD-10-CM

## 2023-06-12 RX ORDER — METOPROLOL SUCCINATE 25 MG/1
TABLET, EXTENDED RELEASE ORAL
Qty: 90 TABLET | Refills: 0 | Status: SHIPPED | OUTPATIENT
Start: 2023-06-12 | End: 2023-06-15

## 2023-06-12 RX ORDER — HYDROCODONE BITARTRATE AND ACETAMINOPHEN 10; 325 MG/1; MG/1
1 TABLET ORAL EVERY 6 HOURS PRN
Qty: 120 TABLET | Refills: 0 | OUTPATIENT
Start: 2023-06-12

## 2023-06-12 NOTE — TELEPHONE ENCOUNTER
"Caller: Kp Hernandez \"HEATH\"    Relationship: Self    Best call back number: [phone]    Requested Prescriptions:   Requested Prescriptions     Pending Prescriptions Disp Refills    HYDROcodone-acetaminophen (NORCO)  MG per tablet 120 tablet 0     Sig: Take 1 tablet by mouth Every 6 (Six) Hours As Needed for Moderate Pain.        Pharmacy where request should be sent: [address] ([facility], [place] - [address] [facility] [phone] [facility] [phone]      Last office visit with prescribing clinician: 1/25/2023   Last telemedicine visit with prescribing clinician: Visit date not found   Next office visit with prescribing clinician: 7/27/2023     Additional details provided by patient: PT IS OUT.     Does the patient have less than a 3 day supply:  [x] Yes  [] No    Would you like a call back once the refill request has been completed: [x] Yes [] No    If the office needs to give you a call back, can they leave a voicemail: [x] Yes [] No    Destiny Meredith Rep   06/12/23 08:52 EDT     "

## 2023-06-15 RX ORDER — METOPROLOL SUCCINATE 25 MG/1
TABLET, EXTENDED RELEASE ORAL
Qty: 90 TABLET | Refills: 0 | Status: SHIPPED | OUTPATIENT
Start: 2023-06-15

## 2023-07-24 RX ORDER — HYDROCHLOROTHIAZIDE 25 MG/1
TABLET ORAL
Qty: 90 TABLET | Refills: 3 | Status: SHIPPED | OUTPATIENT
Start: 2023-07-24

## 2023-07-27 ENCOUNTER — OFFICE VISIT (OUTPATIENT)
Dept: FAMILY MEDICINE CLINIC | Facility: CLINIC | Age: 78
End: 2023-07-27
Payer: MEDICARE

## 2023-07-27 VITALS
WEIGHT: 213 LBS | HEIGHT: 68 IN | SYSTOLIC BLOOD PRESSURE: 144 MMHG | HEART RATE: 77 BPM | OXYGEN SATURATION: 96 % | BODY MASS INDEX: 32.28 KG/M2 | DIASTOLIC BLOOD PRESSURE: 70 MMHG

## 2023-07-27 DIAGNOSIS — M15.9 GENERALIZED OSTEOARTHRITIS OF MULTIPLE SITES: ICD-10-CM

## 2023-07-27 DIAGNOSIS — R73.03 PREDIABETES: ICD-10-CM

## 2023-07-27 DIAGNOSIS — N18.31 STAGE 3A CHRONIC KIDNEY DISEASE: Primary | ICD-10-CM

## 2023-07-27 PROCEDURE — 3077F SYST BP >= 140 MM HG: CPT | Performed by: FAMILY MEDICINE

## 2023-07-27 PROCEDURE — 3078F DIAST BP <80 MM HG: CPT | Performed by: FAMILY MEDICINE

## 2023-07-27 PROCEDURE — 99214 OFFICE O/P EST MOD 30 MIN: CPT | Performed by: FAMILY MEDICINE

## 2023-07-27 RX ORDER — DAPAGLIFLOZIN 10 MG/1
10 TABLET, FILM COATED ORAL DAILY
Qty: 30 TABLET | Refills: 5 | Status: SHIPPED | OUTPATIENT
Start: 2023-07-27

## 2023-07-27 NOTE — PROGRESS NOTES
Subjective   Kp Hernandez is a 77 y.o. male. Presents today for   Chief Complaint   Patient presents with    Chronic Kidney Disease    Osteoarthritis    Gout       History of Present Illness  Patient here for f/u of OA and ckd;  hx of gout, no attacks;  renal function down;  has changed diet, working out more to remain healthy;       Review of Systems   Respiratory:  Negative for shortness of breath.    Cardiovascular:  Negative for chest pain.   Musculoskeletal:  Positive for arthralgias and back pain.     Patient Active Problem List   Diagnosis    Essential hypertension    Benign prostatic hypertrophy without urinary obstruction    CKD (chronic kidney disease) stage 2, GFR 60-89 ml/min    COPD mixed type    Depression with anxiety    Dyslipidemia    Ganglion    Gastroesophageal reflux disease without esophagitis    Hearing loss    Osteoarthritis    Impacted cerumen    Pituitary microadenoma    History of gout    Esophageal dysphagia    Encounter for screening colonoscopy       Social History     Socioeconomic History    Marital status:    Tobacco Use    Smoking status: Never    Smokeless tobacco: Never   Vaping Use    Vaping Use: Never used   Substance and Sexual Activity    Alcohol use: No    Drug use: No       No Known Allergies    Current Outpatient Medications on File Prior to Visit   Medication Sig Dispense Refill    albuterol sulfate  (90 Base) MCG/ACT inhaler Inhale 2 puffs Every 4 (Four) Hours As Needed for Wheezing. Indications: Asthma, Chronic Obstructive Lung Disease 6.7 g 2    allopurinol (ZYLOPRIM) 100 MG tablet Take 1 tablet by mouth twice daily 180 tablet 0    clobetasol (TEMOVATE) 0.05 % cream APPLY  CREAM TOPICALLY TO APPROPRIATE AREA AS DIRECTED TWICE DAILY TO  HANDS 60 g 5    escitalopram (LEXAPRO) 20 MG tablet Take 1 tablet by mouth Daily. 90 tablet 3    finasteride (PROSCAR) 5 MG tablet TAKE 1 TABLET BY MOUTH ONCE DAILY AS DIRECTED 90 tablet 0    fluticasone (FLONASE) 50  "MCG/ACT nasal spray Use 2 spray(s) in each nostril once daily 48 g 0    gabapentin (NEURONTIN) 300 MG capsule TAKE 1 CAPSULE BY MOUTH IN THE MORNING AND 1 ONCE DAILY AT LUNCH  AND 2 AT NIGHT. CALL OFFICE FOR APPOINTMENT. 360 capsule 1    hydroCHLOROthiazide (HYDRODIURIL) 25 MG tablet Take 1 tablet by mouth once daily 90 tablet 3    HYDROcodone-acetaminophen (NORCO)  MG per tablet Take 1 tablet by mouth Every 6 (Six) Hours As Needed for Moderate Pain. 120 tablet 0    methocarbamol (ROBAXIN) 750 MG tablet TAKE 1 TABLET BY MOUTH TWICE DAILY AS NEEDED FOR MUSCLE SPASM 60 tablet 0    metoprolol succinate XL (TOPROL-XL) 25 MG 24 hr tablet Take 1 tablet by mouth once daily 90 tablet 0    mirtazapine (REMERON) 30 MG tablet TAKE 1 TABLET BY MOUTH ONCE DAILY AT NIGHT 90 tablet 0    multivitamin with minerals tablet tablet Take 1 tablet by mouth Daily.      pseudoephedrine (SUDAFED) 60 MG tablet Take 1 tablet by mouth Every 12 (Twelve) Hours As Needed for Congestion (use sparingly as may raise bp). 60 tablet 5    rosuvastatin (CRESTOR) 20 MG tablet TAKE 1 TABLET BY MOUTH ONCE DAILY AT NIGHT 90 tablet 3    SPIRIVA HANDIHALER 18 MCG per inhalation capsule       urea (CARMOL) 40 % cream Apply  topically to the appropriate area as directed Daily. To palms of both hands 120 g 5    Zinc 50 MG capsule Take  by mouth. 1 chewable daily       No current facility-administered medications on file prior to visit.       Objective   Vitals:    07/27/23 1550   BP: 144/70   Pulse: 77   SpO2: 96%   Weight: 96.6 kg (213 lb)   Height: 172.7 cm (68\")     Body mass index is 32.39 kg/mý.    Physical Exam  Vitals and nursing note reviewed.   Constitutional:       Appearance: He is well-developed.   HENT:      Head: Normocephalic and atraumatic.   Neck:      Thyroid: No thyromegaly.      Vascular: No JVD.   Cardiovascular:      Rate and Rhythm: Normal rate and regular rhythm.      Heart sounds: Normal heart sounds. No murmur heard.    No " friction rub. No gallop.   Pulmonary:      Effort: Pulmonary effort is normal. No respiratory distress.      Breath sounds: Normal breath sounds. No wheezing or rales.   Abdominal:      General: Bowel sounds are normal. There is no distension.      Palpations: Abdomen is soft.      Tenderness: There is no abdominal tenderness. There is no guarding or rebound.   Musculoskeletal:      Cervical back: Neck supple.   Skin:     General: Skin is warm and dry.   Neurological:      Mental Status: He is alert.   Psychiatric:         Behavior: Behavior normal.     Component      Latest Ref Rng 3/21/2023   Glucose      70 - 99 mg/dL 96    BUN      8 - 27 mg/dL 19    Creatinine      0.76 - 1.27 mg/dL 1.29 (H)    EGFR Result      >59 mL/min/1.73 57 (L)    BUN/Creatinine Ratio      10 - 24  15    Sodium      134 - 144 mmol/L 144    Potassium      3.5 - 5.2 mmol/L 4.7    Chloride      96 - 106 mmol/L 105    CO2      20 - 29 mmol/L 25    Calcium      8.6 - 10.2 mg/dL 10.1    Total Protein      6.0 - 8.5 g/dL 7.0    Albumin      3.7 - 4.7 g/dL 4.5    Globulin      1.5 - 4.5 g/dL 2.5    A/G Ratio      1.2 - 2.2  1.8    Total Bilirubin      0.0 - 1.2 mg/dL 0.5    Alkaline Phosphatase      44 - 121 IU/L 73    AST (SGOT)      0 - 40 IU/L 36    ALT (SGPT)      0 - 44 IU/L 33    Total Cholesterol      100 - 199 mg/dL 162    Triglycerides      0 - 149 mg/dL 202 (H)    HDL Cholesterol      >39 mg/dL 49    VLDL Cholesterol Tio      5 - 40 mg/dL 34    LDL Cholesterol       0 - 99 mg/dL 79    Prolactin      ng/mL 35.7 (H)    Prolactin, Monomeric      ng/mL 15.2    Macroprolactin, %      % 57    Testosterone, Total      264 - 916 ng/dL 249 (L)    Testosterone, Free      5.00 - 21.00 ng/dL 2.71 (L)    Testosterone, Free %      1.50 - 4.20 % 1.09 (L)    Hemoglobin A1C      4.8 - 5.6 % 5.7 (H)    LH      1.7 - 8.6 mIU/mL 5.8    FSH      1.5 - 12.4 mIU/mL 10.2    Insulin-Like Growth Factor-1      45 - 207 ng/mL 101    TSH Baseline      0.450 - 4.500  uIU/mL 1.620    Free T4      0.82 - 1.77 ng/dL 1.35    Interpretation Note    Interpretation Note       (H) High  (L) Low  Assessment & Plan   Diagnoses and all orders for this visit:    1. Stage 3a chronic kidney disease (Primary)  -     CBC & Differential  -     Urinalysis With Microscopic - Urine, Clean Catch  -     Microalbumin / Creatinine Urine Ratio - Urine, Clean Catch  -     Uric Acid  -     Phosphorus  -     dapagliflozin Propanediol (Farxiga) 10 MG tablet; Take 10 mg by mouth Daily.  Dispense: 30 tablet; Refill: 5    2. Prediabetes  -     dapagliflozin Propanediol (Farxiga) 10 MG tablet; Take 10 mg by mouth Daily.  Dispense: 30 tablet; Refill: 5    3. Generalized osteoarthritis of multiple sites    -CKD - tight blood pressure, blood sugar control and avoid nsaids.  -has predm and ckd, would greatly benefit from farxiga;  will start;  call if not well covered  The patient has read and signed the [facility] Controlled Substance Contract.  I will continue to see patient for regular follow up appointments.  They are well controlled on their medication.  EDILIA is updated every 3 months. The patient is aware of the potential for addiction and dependence.  On HC for oa pain, no nsaids as ckd                 -Follow up: 6 months and prn

## 2023-07-29 LAB
ALBUMIN/CREAT UR: <4 MG/G CREAT (ref 0–29)
APPEARANCE UR: CLEAR
BACTERIA #/AREA URNS HPF: NORMAL /HPF
BASOPHILS # BLD AUTO: 0.03 10*3/MM3 (ref 0–0.2)
BASOPHILS NFR BLD AUTO: 0.4 % (ref 0–1.5)
BILIRUB UR QL STRIP: NEGATIVE
CASTS URNS MICRO: NORMAL
COLOR UR: YELLOW
CREAT UR-MCNC: 74.1 MG/DL
EOSINOPHIL # BLD AUTO: 0.28 10*3/MM3 (ref 0–0.4)
EOSINOPHIL NFR BLD AUTO: 4.1 % (ref 0.3–6.2)
EPI CELLS #/AREA URNS HPF: NORMAL /HPF
ERYTHROCYTE [DISTWIDTH] IN BLOOD BY AUTOMATED COUNT: 13.4 % (ref 12.3–15.4)
GLUCOSE UR QL STRIP: NEGATIVE
HCT VFR BLD AUTO: 44.4 % (ref 37.5–51)
HGB BLD-MCNC: 15.2 G/DL (ref 13–17.7)
HGB UR QL STRIP: NEGATIVE
IMM GRANULOCYTES # BLD AUTO: 0.02 10*3/MM3 (ref 0–0.05)
IMM GRANULOCYTES NFR BLD AUTO: 0.3 % (ref 0–0.5)
KETONES UR QL STRIP: NEGATIVE
LEUKOCYTE ESTERASE UR QL STRIP: NEGATIVE
LYMPHOCYTES # BLD AUTO: 1.88 10*3/MM3 (ref 0.7–3.1)
LYMPHOCYTES NFR BLD AUTO: 27.8 % (ref 19.6–45.3)
MCH RBC QN AUTO: 32.5 PG (ref 26.6–33)
MCHC RBC AUTO-ENTMCNC: 34.2 G/DL (ref 31.5–35.7)
MCV RBC AUTO: 95.1 FL (ref 79–97)
MICROALBUMIN UR-MCNC: <3 UG/ML
MONOCYTES # BLD AUTO: 0.83 10*3/MM3 (ref 0.1–0.9)
MONOCYTES NFR BLD AUTO: 12.3 % (ref 5–12)
NEUTROPHILS # BLD AUTO: 3.73 10*3/MM3 (ref 1.7–7)
NEUTROPHILS NFR BLD AUTO: 55.1 % (ref 42.7–76)
NITRITE UR QL STRIP: NEGATIVE
NRBC BLD AUTO-RTO: 0 /100 WBC (ref 0–0.2)
PH UR STRIP: 6 [PH] (ref 5–8)
PHOSPHATE SERPL-MCNC: 2.7 MG/DL (ref 2.5–4.5)
PLATELET # BLD AUTO: 221 10*3/MM3 (ref 140–450)
PROT UR QL STRIP: NEGATIVE
RBC # BLD AUTO: 4.67 10*6/MM3 (ref 4.14–5.8)
RBC #/AREA URNS HPF: NORMAL /HPF
SP GR UR STRIP: 1.01 (ref 1–1.03)
URATE SERPL-MCNC: 5.6 MG/DL (ref 3.4–7)
UROBILINOGEN UR STRIP-MCNC: NORMAL MG/DL
WBC # BLD AUTO: 6.77 10*3/MM3 (ref 3.4–10.8)
WBC #/AREA URNS HPF: NORMAL /HPF

## 2023-07-30 NOTE — PROGRESS NOTES
Mail copy of results to patient.  Blood counts normal  Uric acid level in good range  Mag and phos normal  Urine negative

## 2023-08-09 DIAGNOSIS — M15.9 GENERALIZED OSTEOARTHRITIS OF MULTIPLE SITES: ICD-10-CM

## 2023-08-09 RX ORDER — HYDROCODONE BITARTRATE AND ACETAMINOPHEN 10; 325 MG/1; MG/1
1 TABLET ORAL EVERY 6 HOURS PRN
Qty: 120 TABLET | Refills: 0 | Status: SHIPPED | OUTPATIENT
Start: 2023-08-09

## 2023-08-09 NOTE — TELEPHONE ENCOUNTER
"    Caller: Kp Hernandez \"HEATH\"    Relationship: Self    Best call back number: [phone]    Requested Prescriptions:   Requested Prescriptions     Pending Prescriptions Disp Refills    HYDROcodone-acetaminophen (NORCO)  MG per tablet 120 tablet 0     Sig: Take 1 tablet by mouth Every 6 (Six) Hours As Needed for Moderate Pain.        Pharmacy where request should be sent: [address] ([facility], [place] - [address] [facility] [phone] [facility] [phone]      Last office visit with prescribing clinician: 7/27/2023   Last telemedicine visit with prescribing clinician: Visit date not found   Next office visit with prescribing clinician: 1/29/2024     Additional details provided by patient: THE PATIENT ONLY HAS 1 DAY OF THIS MEDICATION LEFT.     Does the patient have less than a 3 day supply:  [x] Yes  [] No    Would you like a call back once the refill request has been completed: [x] Yes [] No    If the office needs to give you a call back, can they leave a voicemail: [x] Yes [] No    Destiny Calloway Rep   08/09/23 08:53 EDT       "

## 2023-08-17 DIAGNOSIS — G47.09 OTHER INSOMNIA: ICD-10-CM

## 2023-08-17 RX ORDER — MIRTAZAPINE 30 MG/1
TABLET, FILM COATED ORAL
Qty: 90 TABLET | Refills: 1 | Status: SHIPPED | OUTPATIENT
Start: 2023-08-17

## 2023-08-20 DIAGNOSIS — G89.29 CHRONIC BILATERAL LOW BACK PAIN WITH BILATERAL SCIATICA: ICD-10-CM

## 2023-08-20 DIAGNOSIS — M54.41 CHRONIC BILATERAL LOW BACK PAIN WITH BILATERAL SCIATICA: ICD-10-CM

## 2023-08-20 DIAGNOSIS — M54.42 CHRONIC BILATERAL LOW BACK PAIN WITH BILATERAL SCIATICA: ICD-10-CM

## 2023-08-21 RX ORDER — METHOCARBAMOL 750 MG/1
TABLET, FILM COATED ORAL
Qty: 60 TABLET | Refills: 5 | Status: SHIPPED | OUTPATIENT
Start: 2023-08-21

## 2023-09-08 DIAGNOSIS — M15.9 GENERALIZED OSTEOARTHRITIS OF MULTIPLE SITES: ICD-10-CM

## 2023-09-08 NOTE — TELEPHONE ENCOUNTER
"Caller: Kp Hernandez \"HEATH\"    Relationship: Self    Best call back number: [phone]    Requested Prescriptions:   Requested Prescriptions     Pending Prescriptions Disp Refills    HYDROcodone-acetaminophen (NORCO)  MG per tablet 120 tablet 0     Sig: Take 1 tablet by mouth Every 6 (Six) Hours As Needed for Moderate Pain.        Pharmacy where request should be sent: [address] ([facility], [place] - [address] [facility] [phone] [facility] [phone]      Last office visit with prescribing clinician: 7/27/2023   Last telemedicine visit with prescribing clinician: Visit date not found   Next office visit with prescribing clinician: 1/29/2024     Additional details provided by patient:     Does the patient have less than a 3 day supply:  [x] Yes  [] No    Would you like a call back once the refill request has been completed: [x] Yes [] No    If the office needs to give you a call back, can they leave a voicemail: [x] Yes [] No    Destiny Maya Rep   09/08/23 13:52 EDT   "

## 2023-09-09 RX ORDER — HYDROCODONE BITARTRATE AND ACETAMINOPHEN 10; 325 MG/1; MG/1
1 TABLET ORAL EVERY 6 HOURS PRN
Qty: 120 TABLET | Refills: 0 | Status: SHIPPED | OUTPATIENT
Start: 2023-09-09

## 2023-09-22 DIAGNOSIS — F41.1 GAD (GENERALIZED ANXIETY DISORDER): ICD-10-CM

## 2023-09-22 RX ORDER — ESCITALOPRAM OXALATE 20 MG/1
20 TABLET ORAL DAILY
Qty: 90 TABLET | Refills: 3 | Status: SHIPPED | OUTPATIENT
Start: 2023-09-22

## 2023-10-09 DIAGNOSIS — M15.9 GENERALIZED OSTEOARTHRITIS OF MULTIPLE SITES: ICD-10-CM

## 2023-10-09 RX ORDER — HYDROCODONE BITARTRATE AND ACETAMINOPHEN 10; 325 MG/1; MG/1
1 TABLET ORAL EVERY 6 HOURS PRN
Qty: 120 TABLET | Refills: 0 | Status: SHIPPED | OUTPATIENT
Start: 2023-10-09

## 2023-10-09 NOTE — TELEPHONE ENCOUNTER
"Caller: Kp Hernandez \"HEATH\"    Relationship: Self    Best call back number: [phone]    Requested Prescriptions:   Requested Prescriptions     Pending Prescriptions Disp Refills    HYDROcodone-acetaminophen (NORCO)  MG per tablet 120 tablet 0     Sig: Take 1 tablet by mouth Every 6 (Six) Hours As Needed for Moderate Pain.        Pharmacy where request should be sent: [address], [place] - [address] [facility] [phone] [facility] [phone]     Last office visit with prescribing clinician: 7/27/2023   Last telemedicine visit with prescribing clinician: Visit date not found   Next office visit with prescribing clinician: 1/29/2024     Does the patient have less than a 3 day supply:  [x] Yes  [] No    Would you like a call back once the refill request has been completed: [] Yes [x] No    If the office needs to give you a call back, can they leave a voicemail: [x] Yes [] No    Destiny Rivas Rep   10/09/23 10:28 EDT       "

## 2023-10-20 DIAGNOSIS — G89.29 CHRONIC BILATERAL LOW BACK PAIN WITH BILATERAL SCIATICA: ICD-10-CM

## 2023-10-20 DIAGNOSIS — M54.41 CHRONIC BILATERAL LOW BACK PAIN WITH BILATERAL SCIATICA: ICD-10-CM

## 2023-10-20 DIAGNOSIS — M79.604 PAIN IN BOTH LOWER EXTREMITIES: ICD-10-CM

## 2023-10-20 DIAGNOSIS — M54.42 CHRONIC BILATERAL LOW BACK PAIN WITH BILATERAL SCIATICA: ICD-10-CM

## 2023-10-20 DIAGNOSIS — M79.605 PAIN IN BOTH LOWER EXTREMITIES: ICD-10-CM

## 2023-10-20 RX ORDER — GABAPENTIN 300 MG/1
CAPSULE ORAL
Qty: 360 CAPSULE | Refills: 0 | Status: SHIPPED | OUTPATIENT
Start: 2023-10-20

## 2023-11-09 DIAGNOSIS — M15.9 GENERALIZED OSTEOARTHRITIS OF MULTIPLE SITES: ICD-10-CM

## 2023-11-09 RX ORDER — HYDROCODONE BITARTRATE AND ACETAMINOPHEN 10; 325 MG/1; MG/1
1 TABLET ORAL EVERY 6 HOURS PRN
Qty: 120 TABLET | Refills: 0 | Status: SHIPPED | OUTPATIENT
Start: 2023-11-09

## 2023-11-09 NOTE — TELEPHONE ENCOUNTER
"Caller: Kp Hernandez \"HEATH\"    Relationship: Self    Best call back number:     [phone] (Home)       Requested Prescriptions:   Requested Prescriptions     Pending Prescriptions Disp Refills    HYDROcodone-acetaminophen (NORCO)  MG per tablet 120 tablet 0     Sig: Take 1 tablet by mouth Every 6 (Six) Hours As Needed for Moderate Pain.        Pharmacy where request should be sent: [address], [place] - [address] [facility] [phone] [facility] [phone]      Last office visit with prescribing clinician: 7/27/2023   Last telemedicine visit with prescribing clinician: Visit date not found   Next office visit with prescribing clinician: 1/29/2024     Additional details provided by patient: WILL RUN OUT TODAY     Does the patient have less than a 3 day supply:  [x] Yes  [] No    Would you like a call back once the refill request has been completed: [] Yes [] No    If the office needs to give you a call back, can they leave a voicemail: [x] Yes [] No    Destiny Wasserman Rep   11/09/23 09:13 EST   "

## 2023-11-12 RX ORDER — ALLOPURINOL 100 MG/1
TABLET ORAL
Qty: 180 TABLET | Refills: 0 | Status: SHIPPED | OUTPATIENT
Start: 2023-11-12

## 2023-11-26 RX ORDER — FINASTERIDE 5 MG/1
TABLET, FILM COATED ORAL
Qty: 90 TABLET | Refills: 3 | Status: SHIPPED | OUTPATIENT
Start: 2023-11-26

## 2023-11-29 RX ORDER — METOPROLOL SUCCINATE 25 MG/1
TABLET, EXTENDED RELEASE ORAL
Qty: 90 TABLET | Refills: 0 | Status: SHIPPED | OUTPATIENT
Start: 2023-11-29

## 2023-12-08 DIAGNOSIS — M15.9 GENERALIZED OSTEOARTHRITIS OF MULTIPLE SITES: ICD-10-CM

## 2023-12-08 RX ORDER — HYDROCODONE BITARTRATE AND ACETAMINOPHEN 10; 325 MG/1; MG/1
1 TABLET ORAL EVERY 6 HOURS PRN
Qty: 120 TABLET | Refills: 0 | Status: SHIPPED | OUTPATIENT
Start: 2023-12-08

## 2023-12-08 NOTE — TELEPHONE ENCOUNTER
"  Caller: Kp Hernandez \"HEATH\"    Relationship: Self    Best call back number: [phone]     Requested Prescriptions:   Requested Prescriptions     Pending Prescriptions Disp Refills    HYDROcodone-acetaminophen (NORCO)  MG per tablet 120 tablet 0     Sig: Take 1 tablet by mouth Every 6 (Six) Hours As Needed for Moderate Pain.        Pharmacy where request should be sent: [address] ([facility], [place] - [address] [facility] [phone] [facility] [phone]      Last office visit with prescribing clinician: 7/27/2023   Last telemedicine visit with prescribing clinician: Visit date not found   Next office visit with prescribing clinician: 1/29/2024     Additional details provided by patient: PATIENT HAS ABOUT A 1 DAY SUPPLY OF MEDICATION     Does the patient have less than a 3 day supply:  [x] Yes  [] No    Would you like a call back once the refill request has been completed: [] Yes [x] No    If the office needs to give you a call back, can they leave a voicemail: [] Yes [x] No    Destiny Torres Rep   12/08/23 09:08 EST     "

## 2023-12-11 ENCOUNTER — TELEPHONE (OUTPATIENT)
Dept: FAMILY MEDICINE CLINIC | Facility: CLINIC | Age: 78
End: 2023-12-11
Payer: MEDICARE

## 2023-12-11 DIAGNOSIS — M15.9 GENERALIZED OSTEOARTHRITIS OF MULTIPLE SITES: Primary | ICD-10-CM

## 2023-12-11 DIAGNOSIS — F40.240 CLAUSTROPHOBIA: Primary | ICD-10-CM

## 2023-12-11 RX ORDER — DIAZEPAM 10 MG/1
10 TABLET ORAL ONCE
Qty: 1 TABLET | Refills: 0 | Status: SHIPPED | OUTPATIENT
Start: 2023-12-11 | End: 2023-12-11

## 2023-12-11 RX ORDER — HYDROCODONE BITARTRATE AND ACETAMINOPHEN 10; 325 MG/1; MG/1
1 TABLET ORAL EVERY 6 HOURS PRN
Qty: 65 TABLET | Refills: 0 | Status: SHIPPED | OUTPATIENT
Start: 2023-12-11

## 2023-12-11 NOTE — TELEPHONE ENCOUNTER
"Caller: Kp Hernandez \"HEATH\"    Relationship: Self    Best call back number: [phone]     What medication are you requesting: VALIUM     What are your current symptoms: NEEDS FOR MRI SCHEDULED 12/12/23    How long have you been experiencing symptoms: N/A    Have you had these symptoms before:    [x] Yes  [] No    Have you been treated for these symptoms before:   [x] Yes  [] No    If a prescription is needed, what is your preferred pharmacy and phone number: [address] ([facility]), [place] - [address] [facility] [phone] [facility] [phone] [facility]     Additional notes: PATIENT IS HAVING MRI DONE ON 12/12/23 AND IS NEEDING THIS PRESCRIPTION DUE TO CLAUSTROPHOBIA     PLEASE CALL TO ADVISE.           "

## 2023-12-11 NOTE — TELEPHONE ENCOUNTER
"Caller: Kp Hernandez \"HEATH\"    Relationship: Self    Best call back number: [phone]     What medication are you requesting: HYDROcodone-acetaminophen (NORCO)  MG per tablet     If a prescription is needed, what is your preferred pharmacy and phone number: [address] ([facility]), [place] - [address] [facility] [phone] [facility] [phone]      Additional notes: PATIENT STATED THE PHARMACY WAS ONLY ABLE TO FILL 55 TABLETS OF HIS PRESCRIPTION.    PHARMACY INFORMED HIM THEY WOULD NEED A PRESCRIPTION SENT FOR THE REMAINDER OF THIS TO BE FILLED.    PLEASE SEND PRESCRIPTION FOR REMAINING QUANTITY TO BE FILLED.  "

## 2023-12-12 ENCOUNTER — HOSPITAL ENCOUNTER (OUTPATIENT)
Dept: MRI IMAGING | Facility: HOSPITAL | Age: 78
Discharge: HOME | End: 2023-12-12
Admitting: NEUROLOGICAL SURGERY
Payer: MEDICARE

## 2023-12-12 DIAGNOSIS — E23.7 PITUITARY LESION: ICD-10-CM

## 2023-12-12 PROCEDURE — 82565 ASSAY OF CREATININE: CPT

## 2023-12-12 PROCEDURE — 70553 MRI BRAIN STEM W/O & W/DYE: CPT

## 2023-12-12 PROCEDURE — A9577 INJ MULTIHANCE: HCPCS | Performed by: NEUROLOGICAL SURGERY

## 2023-12-12 PROCEDURE — 0 GADOBENATE DIMEGLUMINE 529 MG/ML SOLUTION: Performed by: NEUROLOGICAL SURGERY

## 2023-12-12 RX ADMIN — GADOBENATE DIMEGLUMINE 18 ML: 529 INJECTION, SOLUTION INTRAVENOUS at 15:28

## 2023-12-14 LAB — CREAT BLDA-MCNC: 1.4 MG/DL (ref 0.6–1.3)

## 2023-12-21 ENCOUNTER — TELEPHONE (OUTPATIENT)
Dept: FAMILY MEDICINE CLINIC | Facility: CLINIC | Age: 78
End: 2023-12-21

## 2023-12-21 DIAGNOSIS — M54.42 CHRONIC BILATERAL LOW BACK PAIN WITH BILATERAL SCIATICA: ICD-10-CM

## 2023-12-21 DIAGNOSIS — M54.41 CHRONIC BILATERAL LOW BACK PAIN WITH BILATERAL SCIATICA: ICD-10-CM

## 2023-12-21 DIAGNOSIS — M15.9 GENERALIZED OSTEOARTHRITIS OF MULTIPLE SITES: Primary | ICD-10-CM

## 2023-12-21 DIAGNOSIS — G89.29 CHRONIC BILATERAL LOW BACK PAIN WITH BILATERAL SCIATICA: ICD-10-CM

## 2023-12-21 RX ORDER — HYDROCODONE BITARTRATE AND ACETAMINOPHEN 5; 325 MG/1; MG/1
2 TABLET ORAL EVERY 6 HOURS PRN
Qty: 240 TABLET | Refills: 0 | Status: SHIPPED | OUTPATIENT
Start: 2023-12-21

## 2023-12-21 NOTE — TELEPHONE ENCOUNTER
"Caller: Kp Hernandez \"HEATH\"    Relationship: Self    Best call back number: [phone]       What is the best time to reach you: ANY TIME    Who are you requesting to speak with (clinical staff, provider,  specific staff member): CLINICAL STAFF    What was the call regarding: PATIENT STATES THAT HE PICKED UP A PARTIAL FILL OF HIS HYDROCODONE-ACETAMINOPHEN MEDICATION ON 12/8/23 BECAUSE THE PHARMACY IS OUT OF STOCK OF THE  MG TABLETS.  HE SAYS THAT HE IS NOW OUT OF THIS MEDICATION.    HE SAYS THAT HE HAS A 2 DAY SUPPLY REMAINING AND NEEDS TO PICKUP THE REMAINING 65 TABLETS FOR HIS MONTHLY PRESCRIPTION.  HE WANTS TO KNOW IF DR. KIMBERLY GIBSON WANTS TO SEND A PRESCRIPTION OVER TO HIS PHARMACY FOR THE 5 MG TABLETS WHICH THE PHARMACY HAS IN STOCK.     HE REQUESTS A CALLBACK TO DISCUSS.    Is it okay if the provider responds through [employer]:     PLEASE ADVISE.        "

## 2023-12-27 DIAGNOSIS — N18.31 STAGE 3A CHRONIC KIDNEY DISEASE: ICD-10-CM

## 2023-12-27 DIAGNOSIS — R73.03 PREDIABETES: ICD-10-CM

## 2023-12-27 RX ORDER — DAPAGLIFLOZIN 10 MG/1
1 TABLET, FILM COATED ORAL DAILY
Qty: 30 TABLET | Refills: 0 | Status: SHIPPED | OUTPATIENT
Start: 2023-12-27

## 2024-01-13 ENCOUNTER — HOSPITAL ENCOUNTER (EMERGENCY)
Facility: HOSPITAL | Age: 79
Discharge: HOME | End: 2024-01-13
Attending: EMERGENCY MEDICINE
Payer: MEDICARE

## 2024-01-13 VITALS
SYSTOLIC BLOOD PRESSURE: 132 MMHG | RESPIRATION RATE: 18 BRPM | HEIGHT: 68 IN | OXYGEN SATURATION: 94 % | BODY MASS INDEX: 29.7 KG/M2 | WEIGHT: 196 LBS | TEMPERATURE: 97.3 F | DIASTOLIC BLOOD PRESSURE: 74 MMHG | HEART RATE: 59 BPM

## 2024-01-13 DIAGNOSIS — N50.82 SCROTAL PAIN: Primary | ICD-10-CM

## 2024-01-13 LAB
ANION GAP SERPL CALCULATED.3IONS-SCNC: 13.9 MMOL/L (ref 5–15)
BASOPHILS # BLD AUTO: 0.03 10*3/MM3 (ref 0–0.2)
BASOPHILS NFR BLD AUTO: 0.3 % (ref 0–1.5)
BILIRUB UR QL STRIP: NEGATIVE
BUN SERPL-MCNC: 24 MG/DL (ref 8–23)
BUN/CREAT SERPL: 20 (ref 7–25)
CALCIUM SPEC-SCNC: 9.4 MG/DL (ref 8.6–10.5)
CHLORIDE SERPL-SCNC: 105 MMOL/L (ref 98–107)
CLARITY UR: CLEAR
CO2 SERPL-SCNC: 25.1 MMOL/L (ref 22–29)
COLOR UR: YELLOW
CREAT SERPL-MCNC: 1.2 MG/DL (ref 0.76–1.27)
DEPRECATED RDW RBC AUTO: 45.4 FL (ref 37–54)
EGFRCR SERPLBLD CKD-EPI 2021: 62.3 ML/MIN/1.73
EOSINOPHIL # BLD AUTO: 0.39 10*3/MM3 (ref 0–0.4)
EOSINOPHIL NFR BLD AUTO: 4.5 % (ref 0.3–6.2)
ERYTHROCYTE [DISTWIDTH] IN BLOOD BY AUTOMATED COUNT: 13.5 % (ref 12.3–15.4)
GLUCOSE SERPL-MCNC: 103 MG/DL (ref 65–99)
GLUCOSE UR STRIP-MCNC: ABNORMAL MG/DL
HCT VFR BLD AUTO: 45.9 % (ref 37.5–51)
HGB BLD-MCNC: 15.3 G/DL (ref 13–17.7)
HGB UR QL STRIP.AUTO: NEGATIVE
IMM GRANULOCYTES # BLD AUTO: 0.02 10*3/MM3 (ref 0–0.05)
IMM GRANULOCYTES NFR BLD AUTO: 0.2 % (ref 0–0.5)
KETONES UR QL STRIP: NEGATIVE
LEUKOCYTE ESTERASE UR QL STRIP.AUTO: NEGATIVE
LYMPHOCYTES # BLD AUTO: 2.16 10*3/MM3 (ref 0.7–3.1)
LYMPHOCYTES NFR BLD AUTO: 25.1 % (ref 19.6–45.3)
MCH RBC QN AUTO: 30.9 PG (ref 26.6–33)
MCHC RBC AUTO-ENTMCNC: 33.3 G/DL (ref 31.5–35.7)
MCV RBC AUTO: 92.7 FL (ref 79–97)
MONOCYTES # BLD AUTO: 1.04 10*3/MM3 (ref 0.1–0.9)
MONOCYTES NFR BLD AUTO: 12.1 % (ref 5–12)
NEUTROPHILS NFR BLD AUTO: 4.98 10*3/MM3 (ref 1.7–7)
NEUTROPHILS NFR BLD AUTO: 57.8 % (ref 42.7–76)
NITRITE UR QL STRIP: NEGATIVE
NRBC BLD AUTO-RTO: 0 /100 WBC (ref 0–0.2)
PH UR STRIP.AUTO: 5.5 [PH] (ref 5–8)
PLATELET # BLD AUTO: 189 10*3/MM3 (ref 140–450)
PMV BLD AUTO: 9.1 FL (ref 6–12)
POTASSIUM SERPL-SCNC: 3.8 MMOL/L (ref 3.5–5.2)
PROT UR QL STRIP: NEGATIVE
RBC # BLD AUTO: 4.95 10*6/MM3 (ref 4.14–5.8)
SODIUM SERPL-SCNC: 144 MMOL/L (ref 136–145)
SP GR UR STRIP: 1.02 (ref 1–1.03)
UROBILINOGEN UR QL STRIP: ABNORMAL
WBC NRBC COR # BLD AUTO: 8.62 10*3/MM3 (ref 3.4–10.8)

## 2024-01-13 PROCEDURE — 80048 BASIC METABOLIC PNL TOTAL CA: CPT | Performed by: EMERGENCY MEDICINE

## 2024-01-13 PROCEDURE — 99283 EMERGENCY DEPT VISIT LOW MDM: CPT

## 2024-01-13 PROCEDURE — 85025 COMPLETE CBC W/AUTO DIFF WBC: CPT | Performed by: EMERGENCY MEDICINE

## 2024-01-13 PROCEDURE — 81003 URINALYSIS AUTO W/O SCOPE: CPT | Performed by: EMERGENCY MEDICINE

## 2024-01-13 PROCEDURE — 36415 COLL VENOUS BLD VENIPUNCTURE: CPT

## 2024-01-13 NOTE — DISCHARGE INSTRUCTIONS
Continue current medications, stay well-hydrated, close PCP follow-up this week for recheck, ED return for worsening symptoms as needed.

## 2024-01-13 NOTE — ED PROVIDER NOTES
EMERGENCY DEPARTMENT ENCOUNTER    Room Number:  12/12  PCP: Murphy Felix DO  Historian: Patient, wife      HPI:  Chief Complaint: Scrotal pain  A complete HPI/ROS/PMH/PSH/SH/FH are unobtainable due to: None    Context: Kp Hernandez is a 78 y.o. male who presents to the ED via private vehicle c/o acute scrotal discoloration and pain over the last 3 to 4 days, worse this morning.  Patient states he has been on Farxiga and has been having intermittent yeast infections for which she takes miconazole.  Patient had described nonspecific discoloration around his penis and scrotum with increasing and a burning discomfort.  No fevers or chills, no abdominal pain, no nausea or vomiting, no dysuria although he does report some increased urinary frequency.      MEDICAL RECORD REVIEW    External (non-ED) record review: No antibiotic allergies listed on chart review in epic, no anticoagulants noted on chart review in epic              PAST MEDICAL HISTORY  Active Ambulatory Problems     Diagnosis Date Noted    Essential hypertension 01/19/2016    Benign prostatic hypertrophy without urinary obstruction 01/19/2016    CKD (chronic kidney disease) stage 2, GFR 60-89 ml/min 01/19/2016    COPD mixed type 01/19/2016    Depression with anxiety 01/19/2016    Dyslipidemia 01/19/2016    Ganglion 01/19/2016    Gastroesophageal reflux disease without esophagitis 01/19/2016    Hearing loss 01/19/2016    Osteoarthritis 01/19/2016    Impacted cerumen 01/19/2016    Pituitary microadenoma 05/10/2022    History of gout 09/14/2022    Esophageal dysphagia 09/27/2022    Encounter for screening colonoscopy 09/27/2022     Resolved Ambulatory Problems     Diagnosis Date Noted    No Resolved Ambulatory Problems     Past Medical History:   Diagnosis Date    Asbestosis     BPH without urinary obstruction     CKD (chronic kidney disease)     COPD (chronic obstructive pulmonary disease)     Depression     Ganglion cyst     GERD without esophagitis      Hyperlipidemia     Hypertension          PAST SURGICAL HISTORY  Past Surgical History:   Procedure Laterality Date    CERVICAL CHIARI DECOMPRESSION POSTERIOR N/A     COLONOSCOPY  2000    ELBOW ARTHROPLASTY Left     ENDOSCOPY N/A 11/28/2022    Procedure: ESOPHAGOGASTRODUODENOSCOPY with balloon dilatation 18mm-20mm;  Surgeon: Ulysses Eugene MD;  [place] [facility] [place]  Service: Gastroenterology;  Laterality: N/A;  hiatal hernia, s. ring    HERNIA REPAIR      INGUINAL HERNIA REPAIR      KNEE ACL RECONSTRUCTION Bilateral     SPINE SURGERY      TONSILLECTOMY           FAMILY HISTORY  Family History   Problem Relation Age of Onset    Hypertension Mother     Atrial fibrillation Mother     Alcohol abuse Father     Brain cancer Sister     GI problems Brother     COPD Brother          SOCIAL HISTORY  Social History     Socioeconomic History    Marital status:    Tobacco Use    Smoking status: Never    Smokeless tobacco: Never   Vaping Use    Vaping Use: Never used   Substance and Sexual Activity    Alcohol use: No    Drug use: No         ALLERGIES  Patient has no known allergies.        REVIEW OF SYSTEMS  Review of Systems     All systems reviewed and negative except for those discussed in HPI.       PHYSICAL EXAM    I have reviewed the triage vital signs and nursing notes.    ED Triage Vitals [01/13/24 0730]   Temp Heart Rate Resp BP SpO2   97.3 °F (36.3 °C) 77 18 -- 94 %      Temp src Heart Rate Source Patient Position BP Location FiO2 (%)   Tympanic Monitor -- -- --       Physical Exam  General: No acute distress, nontoxic  HEENT: Mucous membranes moist, atraumatic, EOMI  Neck: Full ROM  Pulm: Symmetric chest rise, nonlabored, lungs CTAB  Cardiovascular: Regular rate and rhythm, intact distal pulses  GI: Soft, nontender, nondistended, no rebound, no guarding, bowel sounds present  : Normal-appearing scrotum and penis with no rash, no discoloration, no cellulitis or purulence, nontender testicles  palpation, intact hemostatic reflex bilaterally  MSK: Full ROM, no deformity  Skin: Warm, dry  Neuro: Awake, alert, oriented x 4, GCS 15, moving all extremities, no focal deficits  Psych: Calm, cooperative        LAB RESULTS  Recent Results (from the past 24 hour(s))   Basic Metabolic Panel    Collection Time: 01/13/24  8:00 AM    Specimen: Blood   Result Value Ref Range    Glucose 103 (H) 65 - 99 mg/dL    BUN 24 (H) 8 - 23 mg/dL    Creatinine 1.20 0.76 - 1.27 mg/dL    Sodium 144 136 - 145 mmol/L    Potassium 3.8 3.5 - 5.2 mmol/L    Chloride 105 98 - 107 mmol/L    CO2 25.1 22.0 - 29.0 mmol/L    Calcium 9.4 8.6 - 10.5 mg/dL    BUN/Creatinine Ratio 20.0 7.0 - 25.0    Anion Gap 13.9 5.0 - 15.0 mmol/L    eGFR 62.3 >60.0 mL/min/1.73   CBC Auto Differential    Collection Time: 01/13/24  8:00 AM    Specimen: Blood   Result Value Ref Range    WBC 8.62 3.40 - 10.80 10*3/mm3    RBC 4.95 4.14 - 5.80 10*6/mm3    Hemoglobin 15.3 13.0 - 17.7 g/dL    Hematocrit 45.9 37.5 - 51.0 %    MCV 92.7 79.0 - 97.0 fL    MCH 30.9 26.6 - 33.0 pg    MCHC 33.3 31.5 - 35.7 g/dL    RDW 13.5 12.3 - 15.4 %    RDW-SD 45.4 37.0 - 54.0 fl    MPV 9.1 6.0 - 12.0 fL    Platelets 189 140 - 450 10*3/mm3    Neutrophil % 57.8 42.7 - 76.0 %    Lymphocyte % 25.1 19.6 - 45.3 %    Monocyte % 12.1 (H) 5.0 - 12.0 %    Eosinophil % 4.5 0.3 - 6.2 %    Basophil % 0.3 0.0 - 1.5 %    Immature Grans % 0.2 0.0 - 0.5 %    Neutrophils, Absolute 4.98 1.70 - 7.00 10*3/mm3    Lymphocytes, Absolute 2.16 0.70 - 3.10 10*3/mm3    Monocytes, Absolute 1.04 (H) 0.10 - 0.90 10*3/mm3    Eosinophils, Absolute 0.39 0.00 - 0.40 10*3/mm3    Basophils, Absolute 0.03 0.00 - 0.20 10*3/mm3    Immature Grans, Absolute 0.02 0.00 - 0.05 10*3/mm3    nRBC 0.0 0.0 - 0.2 /100 WBC   Urinalysis With Microscopic If Indicated (No Culture) - Urine, Clean Catch    Collection Time: 01/13/24  9:04 AM    Specimen: Urine, Clean Catch   Result Value Ref Range    Color, UA Yellow Yellow, Straw    Appearance, UA  Clear Clear    pH, UA 5.5 5.0 - 8.0    Specific Gravity, UA 1.018 1.005 - 1.030    Glucose, UA >=1000 mg/dL (3+) (A) Negative    Ketones, UA Negative Negative    Bilirubin, UA Negative Negative    Blood, UA Negative Negative    Protein, UA Negative Negative    Leuk Esterase, UA Negative Negative    Nitrite, UA Negative Negative    Urobilinogen, UA 0.2 E.U./dL 0.2 - 1.0 E.U./dL       Ordered the above labs and independently interpreted results. My findings will be discussed in the medical decision making section below        RADIOLOGY  No Radiology Exams Resulted Within Past 24 Hours    Ordered the above noted radiological studies.  Independently interpreted by me and my independent review of findings can be found in the ED Course.  See dictation for official radiology interpretation.      PROCEDURES    Procedures          MEDICATIONS GIVEN IN ER    Medications - No data to display      PROGRESS, DATA ANALYSIS, CONSULTS, AND MEDICAL DECISION MAKING    Please note that this section constitutes my independent interpretation of clinical data including lab results, radiology, EKG's.  This constitutes my independent professional opinion regarding differential diagnosis and management of this patient.  It may include any factors such as history from outside sources, review of external records, social determinants of health, management of medications, response to those treatments, and discussions with other providers.    Differential Diagnosis and Plan: Initial concern for yeast infection, cellulitis, Jose's gangrene, anasarca, medication side effect, among others.  Plan for labs and urinalysis, and reevaluation with results.  Overall appearance is less concerning for any emergent process as it overall looks normal in appearance.    Additional sources:  - Discussed/ obtained information from independent historians:       - (Social Determinants of Health): None     - Shared decision making:  Patient and spouse at bedside  fully updated on and in agreement with the course and plan moving forward    ED Course as of 01/13/24 0942   Sat Jan 13, 2024   0829 WBC: 8.62 [DC]   0829 Hemoglobin: 15.3 [DC]   0829 Platelets: 189 [DC]   0829 Glucose(!): 103 [DC]   0829 BUN(!): 24 [DC]   0829 Creatinine: 1.20 [DC]   0829 Sodium: 144 [DC]   0829 Potassium: 3.8 [DC]   0936 Nitrite, UA: Negative [DC]   0936 Leukocytes, UA: Negative [DC]   0936 Blood, UA: Negative [DC]   0936 Ketones, UA: Negative [DC]   0939 Patient updated on reassuring workup at this point, I have no acute emergent concerns at this time for infectious process.  Plan for discharge home with outpatient supportive care and PCP follow-up.  ED return for worsening symptoms as needed. [DC]      ED Course User Index  [DC] Massimo Hansen MD       Hospitalization Considered?:     Orders Placed During This Visit:  Orders Placed This Encounter   Procedures    Basic Metabolic Panel    Urinalysis With Microscopic If Indicated (No Culture) - Urine, Clean Catch    CBC Auto Differential    CBC & Differential       Additional orders considered but not placed:      Independent interpretation of labs, radiology studies, and discussions with consultants: See ED Course        AS OF 09:42 EST VITALS:    BP - 168/85  HR - 68  TEMP - 97.3 °F (36.3 °C) (Tympanic)  02 SATS - 92%          DIAGNOSIS  Final diagnoses:   Scrotal pain         DISPOSITION  ED Disposition       ED Disposition   Discharge    Condition   Stable    Comment   --                Please note that portions of this document were completed with a voice recognition program.    Note Disclaimer: At [facility], we believe that sharing information builds trust and better relationships. You are receiving this note because you recently visited [facility]. It is possible you will see health information before a provider has talked with you about it. This kind of information can be easy to misunderstand. To help you fully understand what it  means for your health, we urge you to discuss this note with your provider.                       Massimo Hansen MD  01/13/24 0156

## 2024-01-19 ENCOUNTER — APPOINTMENT (OUTPATIENT)
Dept: CT IMAGING | Facility: HOSPITAL | Age: 79
End: 2024-01-19
Payer: MEDICARE

## 2024-01-19 ENCOUNTER — HOSPITAL ENCOUNTER (INPATIENT)
Facility: HOSPITAL | Age: 79
LOS: 5 days | Discharge: HOME | End: 2024-01-24
Attending: EMERGENCY MEDICINE | Admitting: INTERNAL MEDICINE
Payer: MEDICARE

## 2024-01-19 ENCOUNTER — ANESTHESIA (OUTPATIENT)
Dept: PERIOP | Facility: HOSPITAL | Age: 79
End: 2024-01-19
Payer: MEDICARE

## 2024-01-19 ENCOUNTER — APPOINTMENT (OUTPATIENT)
Dept: GENERAL RADIOLOGY | Facility: HOSPITAL | Age: 79
End: 2024-01-19
Payer: MEDICARE

## 2024-01-19 ENCOUNTER — ANESTHESIA EVENT (OUTPATIENT)
Dept: PERIOP | Facility: HOSPITAL | Age: 79
End: 2024-01-19
Payer: MEDICARE

## 2024-01-19 DIAGNOSIS — L30.1 DYSHIDROTIC ECZEMA: ICD-10-CM

## 2024-01-19 DIAGNOSIS — K81.0 ACUTE CHOLECYSTITIS: Primary | ICD-10-CM

## 2024-01-19 DIAGNOSIS — R53.1 WEAKNESS: ICD-10-CM

## 2024-01-19 DIAGNOSIS — E87.6 HYPOKALEMIA: ICD-10-CM

## 2024-01-19 DIAGNOSIS — A41.9 SEPSIS WITHOUT ACUTE ORGAN DYSFUNCTION, DUE TO UNSPECIFIED ORGANISM: ICD-10-CM
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
## 2024-01-19 PROBLEM — K81.9 CHOLECYSTITIS: Status: ACTIVE | Noted: 2024-01-19

## 2024-01-19 LAB
ALBUMIN SERPL-MCNC: 4.1 G/DL (ref 3.5–5.2)
ALBUMIN/GLOB SERPL: 1.4 G/DL
ALP SERPL-CCNC: 69 U/L (ref 39–117)
ALT SERPL W P-5'-P-CCNC: 27 U/L (ref 1–41)
ANION GAP SERPL CALCULATED.3IONS-SCNC: 14.2 MMOL/L (ref 5–15)
APTT PPP: 32.5 SECONDS (ref 22.7–35.4)
AST SERPL-CCNC: 43 U/L (ref 1–40)
B PARAPERT DNA SPEC QL NAA+PROBE: NOT DETECTED
B PERT DNA SPEC QL NAA+PROBE: NOT DETECTED
BASOPHILS # BLD AUTO: 0.04 10*3/MM3 (ref 0–0.2)
BASOPHILS NFR BLD AUTO: 0.2 % (ref 0–1.5)
BILIRUB SERPL-MCNC: 1 MG/DL (ref 0–1.2)
BUN SERPL-MCNC: 20 MG/DL (ref 8–23)
BUN/CREAT SERPL: 13.6 (ref 7–25)
C PNEUM DNA NPH QL NAA+NON-PROBE: NOT DETECTED
CALCIUM SPEC-SCNC: 9.4 MG/DL (ref 8.6–10.5)
CHLORIDE SERPL-SCNC: 97 MMOL/L (ref 98–107)
CO2 SERPL-SCNC: 28.8 MMOL/L (ref 22–29)
CREAT SERPL-MCNC: 1.47 MG/DL (ref 0.76–1.27)
D-LACTATE SERPL-SCNC: 1.7 MMOL/L (ref 0.5–2)
DEPRECATED RDW RBC AUTO: 46.2 FL (ref 37–54)
EGFRCR SERPLBLD CKD-EPI 2021: 48.8 ML/MIN/1.73
EOSINOPHIL # BLD AUTO: 0.06 10*3/MM3 (ref 0–0.4)
EOSINOPHIL NFR BLD AUTO: 0.2 % (ref 0.3–6.2)
ERYTHROCYTE [DISTWIDTH] IN BLOOD BY AUTOMATED COUNT: 13.5 % (ref 12.3–15.4)
FLUAV SUBTYP SPEC NAA+PROBE: NOT DETECTED
FLUBV RNA ISLT QL NAA+PROBE: NOT DETECTED
GLOBULIN UR ELPH-MCNC: 3 GM/DL
GLUCOSE SERPL-MCNC: 123 MG/DL (ref 65–99)
HADV DNA SPEC NAA+PROBE: NOT DETECTED
HCOV 229E RNA SPEC QL NAA+PROBE: NOT DETECTED
HCOV HKU1 RNA SPEC QL NAA+PROBE: NOT DETECTED
HCOV NL63 RNA SPEC QL NAA+PROBE: NOT DETECTED
HCOV OC43 RNA SPEC QL NAA+PROBE: NOT DETECTED
HCT VFR BLD AUTO: 44.6 % (ref 37.5–51)
HGB BLD-MCNC: 15.3 G/DL (ref 13–17.7)
HMPV RNA NPH QL NAA+NON-PROBE: NOT DETECTED
HPIV1 RNA ISLT QL NAA+PROBE: NOT DETECTED
HPIV2 RNA SPEC QL NAA+PROBE: NOT DETECTED
HPIV3 RNA NPH QL NAA+PROBE: NOT DETECTED
HPIV4 P GENE NPH QL NAA+PROBE: NOT DETECTED
IMM GRANULOCYTES # BLD AUTO: 0.23 10*3/MM3 (ref 0–0.05)
IMM GRANULOCYTES NFR BLD AUTO: 0.9 % (ref 0–0.5)
INR PPP: 1.16 (ref 0.9–1.1)
LIPASE SERPL-CCNC: 12 U/L (ref 13–60)
LYMPHOCYTES # BLD AUTO: 0.99 10*3/MM3 (ref 0.7–3.1)
LYMPHOCYTES NFR BLD AUTO: 4 % (ref 19.6–45.3)
M PNEUMO IGG SER IA-ACNC: NOT DETECTED
MAGNESIUM SERPL-MCNC: 2.1 MG/DL (ref 1.6–2.4)
MCH RBC QN AUTO: 32.2 PG (ref 26.6–33)
MCHC RBC AUTO-ENTMCNC: 34.3 G/DL (ref 31.5–35.7)
MCV RBC AUTO: 93.9 FL (ref 79–97)
MONOCYTES # BLD AUTO: 1.6 10*3/MM3 (ref 0.1–0.9)
MONOCYTES NFR BLD AUTO: 6.5 % (ref 5–12)
NEUTROPHILS NFR BLD AUTO: 21.8 10*3/MM3 (ref 1.7–7)
NEUTROPHILS NFR BLD AUTO: 88.2 % (ref 42.7–76)
NRBC BLD AUTO-RTO: 0 /100 WBC (ref 0–0.2)
PLATELET # BLD AUTO: 180 10*3/MM3 (ref 140–450)
PMV BLD AUTO: 9.1 FL (ref 6–12)
POTASSIUM SERPL-SCNC: 2.9 MMOL/L (ref 3.5–5.2)
PROCALCITONIN SERPL-MCNC: 4.75 NG/ML (ref 0–0.25)
PROT SERPL-MCNC: 7.1 G/DL (ref 6–8.5)
PROTHROMBIN TIME: 15 SECONDS (ref 11.7–14.2)
RBC # BLD AUTO: 4.75 10*6/MM3 (ref 4.14–5.8)
RHINOVIRUS RNA SPEC NAA+PROBE: NOT DETECTED
RSV RNA NPH QL NAA+NON-PROBE: NOT DETECTED
SARS-COV-2 RNA NPH QL NAA+NON-PROBE: NOT DETECTED
SODIUM SERPL-SCNC: 140 MMOL/L (ref 136–145)
WBC NRBC COR # BLD AUTO: 24.72 10*3/MM3 (ref 3.4–10.8)

## 2024-01-19 PROCEDURE — 25010000002 CEFOXITIN PER 1 G: Performed by: SURGERY

## 2024-01-19 PROCEDURE — 0202U NFCT DS 22 TRGT SARS-COV-2: CPT | Performed by: EMERGENCY MEDICINE

## 2024-01-19 PROCEDURE — 25010000002 HYDROMORPHONE PER 4 MG: Performed by: ANESTHESIOLOGY

## 2024-01-19 PROCEDURE — 84132 ASSAY OF SERUM POTASSIUM: CPT | Performed by: INTERNAL MEDICINE

## 2024-01-19 PROCEDURE — 25810000003 SODIUM CHLORIDE PER 500 ML: Performed by: SURGERY

## 2024-01-19 PROCEDURE — BF131ZZ FLUOROSCOPY OF GALLBLADDER AND BILE DUCTS USING LOW OSMOLAR CONTRAST: ICD-10-PCS | Performed by: SURGERY

## 2024-01-19 PROCEDURE — 25010000002 SODIUM CHLORIDE 0.9 % WITH KCL 20 MEQ 20-0.9 MEQ/L-% SOLUTION: Performed by: SURGERY

## 2024-01-19 PROCEDURE — 93010 ELECTROCARDIOGRAM REPORT: CPT | Performed by: INTERNAL MEDICINE

## 2024-01-19 PROCEDURE — 99291 CRITICAL CARE FIRST HOUR: CPT

## 2024-01-19 PROCEDURE — 85025 COMPLETE CBC W/AUTO DIFF WBC: CPT | Performed by: EMERGENCY MEDICINE

## 2024-01-19 PROCEDURE — 25010000002 PIPERACILLIN SOD-TAZOBACTAM PER 1 G: Performed by: INTERNAL MEDICINE

## 2024-01-19 PROCEDURE — 25010000002 PIPERACILLIN SOD-TAZOBACTAM PER 1 G: Performed by: EMERGENCY MEDICINE

## 2024-01-19 PROCEDURE — 0FT44ZZ RESECTION OF GALLBLADDER, PERCUTANEOUS ENDOSCOPIC APPROACH: ICD-10-PCS | Performed by: SURGERY

## 2024-01-19 PROCEDURE — 87040 BLOOD CULTURE FOR BACTERIA: CPT | Performed by: EMERGENCY MEDICINE

## 2024-01-19 PROCEDURE — 25010000002 INDOCYANINE GREEN 25 MG RECONSTITUTED SOLUTION: Performed by: SURGERY

## 2024-01-19 PROCEDURE — 25810000003 LACTATED RINGERS SOLUTION: Performed by: EMERGENCY MEDICINE

## 2024-01-19 PROCEDURE — 25010000002 MORPHINE PER 10 MG: Performed by: EMERGENCY MEDICINE

## 2024-01-19 PROCEDURE — 85610 PROTHROMBIN TIME: CPT | Performed by: EMERGENCY MEDICINE

## 2024-01-19 PROCEDURE — 25010000002 ONDANSETRON PER 1 MG: Performed by: ANESTHESIOLOGY

## 2024-01-19 PROCEDURE — 25010000002 LABETALOL 5 MG/ML SOLUTION: Performed by: ANESTHESIOLOGY

## 2024-01-19 PROCEDURE — 8E0W4CZ ROBOTIC ASSISTED PROCEDURE OF TRUNK REGION, PERCUTANEOUS ENDOSCOPIC APPROACH: ICD-10-PCS | Performed by: SURGERY

## 2024-01-19 PROCEDURE — 25010000002 SUGAMMADEX 200 MG/2ML SOLUTION: Performed by: ANESTHESIOLOGY

## 2024-01-19 PROCEDURE — 80053 COMPREHEN METABOLIC PANEL: CPT | Performed by: EMERGENCY MEDICINE

## 2024-01-19 PROCEDURE — 93005 ELECTROCARDIOGRAM TRACING: CPT | Performed by: EMERGENCY MEDICINE

## 2024-01-19 PROCEDURE — 25810000003 LACTATED RINGERS PER 1000 ML: Performed by: ANESTHESIOLOGY

## 2024-01-19 PROCEDURE — 99223 1ST HOSP IP/OBS HIGH 75: CPT | Performed by: SURGERY

## 2024-01-19 PROCEDURE — 47563 LAPARO CHOLECYSTECTOMY/GRAPH: CPT | Performed by: SURGERY

## 2024-01-19 PROCEDURE — 25010000002 ACETAMINOPHEN 10 MG/ML SOLUTION: Performed by: ANESTHESIOLOGY

## 2024-01-19 PROCEDURE — 71250 CT THORAX DX C-: CPT

## 2024-01-19 PROCEDURE — 88304 TISSUE EXAM BY PATHOLOGIST: CPT | Performed by: SURGERY

## 2024-01-19 PROCEDURE — 74300 X-RAY BILE DUCTS/PANCREAS: CPT

## 2024-01-19 PROCEDURE — 83605 ASSAY OF LACTIC ACID: CPT | Performed by: EMERGENCY MEDICINE

## 2024-01-19 PROCEDURE — 25010000002 PROPOFOL 10 MG/ML EMULSION: Performed by: ANESTHESIOLOGY

## 2024-01-19 PROCEDURE — 47563 LAPARO CHOLECYSTECTOMY/GRAPH: CPT | Performed by: REGISTERED NURSE

## 2024-01-19 PROCEDURE — C1758 CATHETER, URETERAL: HCPCS | Performed by: SURGERY

## 2024-01-19 PROCEDURE — 25010000002 HYDROMORPHONE 1 MG/ML SOLUTION: Performed by: ANESTHESIOLOGY

## 2024-01-19 PROCEDURE — 25010000002 ONDANSETRON PER 1 MG: Performed by: SURGERY

## 2024-01-19 PROCEDURE — 25010000002 PHENYLEPHRINE 10 MG/ML SOLUTION: Performed by: ANESTHESIOLOGY

## 2024-01-19 PROCEDURE — 83690 ASSAY OF LIPASE: CPT | Performed by: EMERGENCY MEDICINE

## 2024-01-19 PROCEDURE — 36415 COLL VENOUS BLD VENIPUNCTURE: CPT

## 2024-01-19 PROCEDURE — 85730 THROMBOPLASTIN TIME PARTIAL: CPT | Performed by: EMERGENCY MEDICINE

## 2024-01-19 PROCEDURE — 84145 PROCALCITONIN (PCT): CPT | Performed by: EMERGENCY MEDICINE

## 2024-01-19 PROCEDURE — 83735 ASSAY OF MAGNESIUM: CPT | Performed by: EMERGENCY MEDICINE

## 2024-01-19 PROCEDURE — S2900 ROBOTIC SURGICAL SYSTEM: HCPCS | Performed by: SURGERY

## 2024-01-19 PROCEDURE — 99285 EMERGENCY DEPT VISIT HI MDM: CPT

## 2024-01-19 PROCEDURE — 25010000002 ONDANSETRON PER 1 MG: Performed by: EMERGENCY MEDICINE

## 2024-01-19 DEVICE — SEAL HEMO SURG ARISTA/AH ABS/PWDR 3GM: Type: IMPLANTABLE DEVICE | Site: ABDOMEN | Status: FUNCTIONAL

## 2024-01-19 DEVICE — CARTRDG CLIP LIGAT VASQCLIP 3TO10MM MD/LG STRL: Type: IMPLANTABLE DEVICE | Site: ABDOMEN | Status: FUNCTIONAL

## 2024-01-19 RX ORDER — CLOBETASOL PROPIONATE 0.5 MG/G
1 CREAM TOPICAL 2 TIMES DAILY
Status: DISCONTINUED | OUTPATIENT
Start: 2024-01-19 | End: 2024-01-24 | Stop reason: HOSPADM

## 2024-01-19 RX ORDER — SODIUM CHLORIDE, SODIUM LACTATE, POTASSIUM CHLORIDE, CALCIUM CHLORIDE 600; 310; 30; 20 MG/100ML; MG/100ML; MG/100ML; MG/100ML
INJECTION, SOLUTION INTRAVENOUS CONTINUOUS PRN
Status: DISCONTINUED | OUTPATIENT
Start: 2024-01-19 | End: 2024-01-19 | Stop reason: SURG

## 2024-01-19 RX ORDER — CELECOXIB 200 MG/1
200 CAPSULE ORAL ONCE
Status: COMPLETED | OUTPATIENT
Start: 2024-01-19 | End: 2024-01-19

## 2024-01-19 RX ORDER — GABAPENTIN 300 MG/1
300 CAPSULE ORAL SEE ADMIN INSTRUCTIONS
Status: DISCONTINUED | OUTPATIENT
Start: 2024-01-19 | End: 2024-01-19

## 2024-01-19 RX ORDER — ALLOPURINOL 100 MG/1
100 TABLET ORAL 2 TIMES DAILY
Status: DISCONTINUED | OUTPATIENT
Start: 2024-01-19 | End: 2024-01-24 | Stop reason: HOSPADM

## 2024-01-19 RX ORDER — ACETAMINOPHEN 10 MG/ML
INJECTION, SOLUTION INTRAVENOUS AS NEEDED
Status: DISCONTINUED | OUTPATIENT
Start: 2024-01-19 | End: 2024-01-19 | Stop reason: SURG

## 2024-01-19 RX ORDER — ONDANSETRON 2 MG/ML
4 INJECTION INTRAMUSCULAR; INTRAVENOUS ONCE
Status: COMPLETED | OUTPATIENT
Start: 2024-01-19 | End: 2024-01-19

## 2024-01-19 RX ORDER — ACETAMINOPHEN 160 MG/5ML
650 SOLUTION ORAL EVERY 4 HOURS PRN
Status: DISCONTINUED | OUTPATIENT
Start: 2024-01-19 | End: 2024-01-19

## 2024-01-19 RX ORDER — ACETAMINOPHEN 500 MG
1000 TABLET ORAL ONCE
Status: COMPLETED | OUTPATIENT
Start: 2024-01-19 | End: 2024-01-19

## 2024-01-19 RX ORDER — PROPOFOL 10 MG/ML
VIAL (ML) INTRAVENOUS AS NEEDED
Status: DISCONTINUED | OUTPATIENT
Start: 2024-01-19 | End: 2024-01-19 | Stop reason: SURG

## 2024-01-19 RX ORDER — PROMETHAZINE HYDROCHLORIDE 25 MG/1
25 SUPPOSITORY RECTAL ONCE AS NEEDED
Status: DISCONTINUED | OUTPATIENT
Start: 2024-01-19 | End: 2024-01-19 | Stop reason: HOSPADM

## 2024-01-19 RX ORDER — SODIUM CHLORIDE, SODIUM LACTATE, POTASSIUM CHLORIDE, CALCIUM CHLORIDE 600; 310; 30; 20 MG/100ML; MG/100ML; MG/100ML; MG/100ML
9 INJECTION, SOLUTION INTRAVENOUS CONTINUOUS
Status: DISCONTINUED | OUTPATIENT
Start: 2024-01-19 | End: 2024-01-19

## 2024-01-19 RX ORDER — ROCURONIUM BROMIDE 10 MG/ML
INJECTION, SOLUTION INTRAVENOUS AS NEEDED
Status: DISCONTINUED | OUTPATIENT
Start: 2024-01-19 | End: 2024-01-19 | Stop reason: SURG

## 2024-01-19 RX ORDER — ALBUTEROL SULFATE 2.5 MG/3ML
2.5 SOLUTION RESPIRATORY (INHALATION) EVERY 4 HOURS PRN
Status: DISCONTINUED | OUTPATIENT
Start: 2024-01-19 | End: 2024-01-24 | Stop reason: HOSPADM

## 2024-01-19 RX ORDER — MAGNESIUM HYDROXIDE 1200 MG/15ML
LIQUID ORAL AS NEEDED
Status: DISCONTINUED | OUTPATIENT
Start: 2024-01-19 | End: 2024-01-19 | Stop reason: HOSPADM

## 2024-01-19 RX ORDER — DIPHENHYDRAMINE HYDROCHLORIDE 50 MG/ML
12.5 INJECTION INTRAMUSCULAR; INTRAVENOUS
Status: DISCONTINUED | OUTPATIENT
Start: 2024-01-19 | End: 2024-01-19 | Stop reason: HOSPADM

## 2024-01-19 RX ORDER — FAMOTIDINE 10 MG/ML
20 INJECTION, SOLUTION INTRAVENOUS ONCE
Status: COMPLETED | OUTPATIENT
Start: 2024-01-19 | End: 2024-01-19

## 2024-01-19 RX ORDER — MIRTAZAPINE 30 MG/1
30 TABLET, FILM COATED ORAL NIGHTLY
Status: DISCONTINUED | OUTPATIENT
Start: 2024-01-19 | End: 2024-01-24 | Stop reason: HOSPADM

## 2024-01-19 RX ORDER — HYDRALAZINE HYDROCHLORIDE 20 MG/ML
5 INJECTION INTRAMUSCULAR; INTRAVENOUS
Status: DISCONTINUED | OUTPATIENT
Start: 2024-01-19 | End: 2024-01-19 | Stop reason: HOSPADM

## 2024-01-19 RX ORDER — FENTANYL CITRATE 50 UG/ML
25 INJECTION, SOLUTION INTRAMUSCULAR; INTRAVENOUS
Status: DISCONTINUED | OUTPATIENT
Start: 2024-01-19 | End: 2024-01-19 | Stop reason: HOSPADM

## 2024-01-19 RX ORDER — LIDOCAINE HYDROCHLORIDE 10 MG/ML
0.5 INJECTION, SOLUTION INFILTRATION; PERINEURAL ONCE AS NEEDED
Status: DISCONTINUED | OUTPATIENT
Start: 2024-01-19 | End: 2024-01-19 | Stop reason: HOSPADM

## 2024-01-19 RX ORDER — DROPERIDOL 2.5 MG/ML
0.62 INJECTION, SOLUTION INTRAMUSCULAR; INTRAVENOUS
Status: DISCONTINUED | OUTPATIENT
Start: 2024-01-19 | End: 2024-01-19 | Stop reason: HOSPADM

## 2024-01-19 RX ORDER — FLUTICASONE PROPIONATE 50 MCG
2 SPRAY, SUSPENSION (ML) NASAL DAILY
Status: DISCONTINUED | OUTPATIENT
Start: 2024-01-19 | End: 2024-01-24 | Stop reason: HOSPADM

## 2024-01-19 RX ORDER — MORPHINE SULFATE 2 MG/ML
4 INJECTION, SOLUTION INTRAMUSCULAR; INTRAVENOUS ONCE
Status: COMPLETED | OUTPATIENT
Start: 2024-01-19 | End: 2024-01-19

## 2024-01-19 RX ORDER — HYDROCODONE BITARTRATE AND ACETAMINOPHEN 7.5; 325 MG/1; MG/1
1 TABLET ORAL EVERY 4 HOURS PRN
Status: DISCONTINUED | OUTPATIENT
Start: 2024-01-19 | End: 2024-01-19 | Stop reason: HOSPADM

## 2024-01-19 RX ORDER — BUPIVACAINE HYDROCHLORIDE AND EPINEPHRINE 5; 5 MG/ML; UG/ML
INJECTION, SOLUTION EPIDURAL; INTRACAUDAL; PERINEURAL AS NEEDED
Status: DISCONTINUED | OUTPATIENT
Start: 2024-01-19 | End: 2024-01-19 | Stop reason: HOSPADM

## 2024-01-19 RX ORDER — SODIUM CHLORIDE 0.9 % (FLUSH) 0.9 %
10 SYRINGE (ML) INJECTION AS NEEDED
Status: DISCONTINUED | OUTPATIENT
Start: 2024-01-19 | End: 2024-01-24 | Stop reason: HOSPADM

## 2024-01-19 RX ORDER — HYDROMORPHONE HYDROCHLORIDE 1 MG/ML
0.5 INJECTION, SOLUTION INTRAMUSCULAR; INTRAVENOUS; SUBCUTANEOUS
Status: DISCONTINUED | OUTPATIENT
Start: 2024-01-19 | End: 2024-01-24 | Stop reason: HOSPADM

## 2024-01-19 RX ORDER — ACETAMINOPHEN 325 MG/1
650 TABLET ORAL EVERY 4 HOURS PRN
Status: DISCONTINUED | OUTPATIENT
Start: 2024-01-19 | End: 2024-01-19

## 2024-01-19 RX ORDER — LABETALOL HYDROCHLORIDE 5 MG/ML
5 INJECTION, SOLUTION INTRAVENOUS
Status: DISCONTINUED | OUTPATIENT
Start: 2024-01-19 | End: 2024-01-19 | Stop reason: HOSPADM

## 2024-01-19 RX ORDER — FINASTERIDE 5 MG/1
5 TABLET, FILM COATED ORAL DAILY
Status: DISCONTINUED | OUTPATIENT
Start: 2024-01-19 | End: 2024-01-24 | Stop reason: HOSPADM

## 2024-01-19 RX ORDER — EPHEDRINE SULFATE 50 MG/ML
5 INJECTION, SOLUTION INTRAVENOUS ONCE AS NEEDED
Status: DISCONTINUED | OUTPATIENT
Start: 2024-01-19 | End: 2024-01-19 | Stop reason: HOSPADM

## 2024-01-19 RX ORDER — ONDANSETRON 2 MG/ML
4 INJECTION INTRAMUSCULAR; INTRAVENOUS ONCE AS NEEDED
Status: COMPLETED | OUTPATIENT
Start: 2024-01-19 | End: 2024-01-19

## 2024-01-19 RX ORDER — IPRATROPIUM BROMIDE AND ALBUTEROL SULFATE 2.5; .5 MG/3ML; MG/3ML
3 SOLUTION RESPIRATORY (INHALATION) ONCE AS NEEDED
Status: DISCONTINUED | OUTPATIENT
Start: 2024-01-19 | End: 2024-01-19 | Stop reason: HOSPADM

## 2024-01-19 RX ORDER — GABAPENTIN 300 MG/1
300 CAPSULE ORAL 2 TIMES DAILY
Status: DISCONTINUED | OUTPATIENT
Start: 2024-01-20 | End: 2024-01-24 | Stop reason: HOSPADM

## 2024-01-19 RX ORDER — ONDANSETRON 2 MG/ML
4 INJECTION INTRAMUSCULAR; INTRAVENOUS EVERY 6 HOURS PRN
Status: DISCONTINUED | OUTPATIENT
Start: 2024-01-19 | End: 2024-01-24 | Stop reason: HOSPADM

## 2024-01-19 RX ORDER — HYDROCODONE BITARTRATE AND ACETAMINOPHEN 5; 325 MG/1; MG/1
1 TABLET ORAL ONCE AS NEEDED
Status: DISCONTINUED | OUTPATIENT
Start: 2024-01-19 | End: 2024-01-19 | Stop reason: HOSPADM

## 2024-01-19 RX ORDER — METHOCARBAMOL 750 MG/1
750 TABLET, FILM COATED ORAL 2 TIMES DAILY PRN
Status: DISCONTINUED | OUTPATIENT
Start: 2024-01-19 | End: 2024-01-24 | Stop reason: HOSPADM

## 2024-01-19 RX ORDER — SODIUM CHLORIDE 9 MG/ML
INJECTION, SOLUTION INTRAVENOUS AS NEEDED
Status: DISCONTINUED | OUTPATIENT
Start: 2024-01-19 | End: 2024-01-19 | Stop reason: HOSPADM

## 2024-01-19 RX ORDER — HYDROMORPHONE HYDROCHLORIDE 1 MG/ML
0.25 INJECTION, SOLUTION INTRAMUSCULAR; INTRAVENOUS; SUBCUTANEOUS
Status: DISCONTINUED | OUTPATIENT
Start: 2024-01-19 | End: 2024-01-19 | Stop reason: HOSPADM

## 2024-01-19 RX ORDER — SODIUM CHLORIDE 0.9 % (FLUSH) 0.9 %
3-10 SYRINGE (ML) INJECTION AS NEEDED
Status: DISCONTINUED | OUTPATIENT
Start: 2024-01-19 | End: 2024-01-19 | Stop reason: HOSPADM

## 2024-01-19 RX ORDER — GABAPENTIN 300 MG/1
300 CAPSULE ORAL ONCE
Status: COMPLETED | OUTPATIENT
Start: 2024-01-19 | End: 2024-01-19

## 2024-01-19 RX ORDER — HYDROCODONE BITARTRATE AND ACETAMINOPHEN 5; 325 MG/1; MG/1
2 TABLET ORAL EVERY 6 HOURS PRN
Status: DISCONTINUED | OUTPATIENT
Start: 2024-01-19 | End: 2024-01-24 | Stop reason: HOSPADM

## 2024-01-19 RX ORDER — SODIUM CHLORIDE 9 MG/ML
40 INJECTION, SOLUTION INTRAVENOUS AS NEEDED
Status: DISCONTINUED | OUTPATIENT
Start: 2024-01-19 | End: 2024-01-24 | Stop reason: HOSPADM

## 2024-01-19 RX ORDER — SODIUM CHLORIDE 0.9 % (FLUSH) 0.9 %
10 SYRINGE (ML) INJECTION EVERY 12 HOURS SCHEDULED
Status: DISCONTINUED | OUTPATIENT
Start: 2024-01-19 | End: 2024-01-24 | Stop reason: HOSPADM

## 2024-01-19 RX ORDER — PROCHLORPERAZINE EDISYLATE 5 MG/ML
5 INJECTION INTRAMUSCULAR; INTRAVENOUS EVERY 6 HOURS PRN
Status: DISCONTINUED | OUTPATIENT
Start: 2024-01-19 | End: 2024-01-24 | Stop reason: HOSPADM

## 2024-01-19 RX ORDER — INDOCYANINE GREEN AND WATER 25 MG
2.5 KIT INJECTION ONCE
Status: DISCONTINUED | OUTPATIENT
Start: 2024-01-19 | End: 2024-01-24 | Stop reason: HOSPADM

## 2024-01-19 RX ORDER — PHENYLEPHRINE HYDROCHLORIDE 10 MG/ML
INJECTION INTRAVENOUS AS NEEDED
Status: DISCONTINUED | OUTPATIENT
Start: 2024-01-19 | End: 2024-01-19 | Stop reason: SURG

## 2024-01-19 RX ORDER — UREA 200 MG/G
GEL TOPICAL
Status: DISCONTINUED | OUTPATIENT
Start: 2024-01-19 | End: 2024-01-24 | Stop reason: HOSPADM

## 2024-01-19 RX ORDER — NITROGLYCERIN 0.4 MG/1
0.4 TABLET SUBLINGUAL
Status: DISCONTINUED | OUTPATIENT
Start: 2024-01-19 | End: 2024-01-24 | Stop reason: HOSPADM

## 2024-01-19 RX ORDER — POTASSIUM CHLORIDE 750 MG/1
40 TABLET, FILM COATED, EXTENDED RELEASE ORAL ONCE
Status: COMPLETED | OUTPATIENT
Start: 2024-01-19 | End: 2024-01-19

## 2024-01-19 RX ORDER — FENTANYL CITRATE 50 UG/ML
50 INJECTION, SOLUTION INTRAMUSCULAR; INTRAVENOUS ONCE AS NEEDED
Status: DISCONTINUED | OUTPATIENT
Start: 2024-01-19 | End: 2024-01-19 | Stop reason: HOSPADM

## 2024-01-19 RX ORDER — MORPHINE SULFATE 2 MG/ML
4 INJECTION, SOLUTION INTRAMUSCULAR; INTRAVENOUS EVERY 4 HOURS PRN
Status: DISCONTINUED | OUTPATIENT
Start: 2024-01-19 | End: 2024-01-24 | Stop reason: HOSPADM

## 2024-01-19 RX ORDER — ROSUVASTATIN CALCIUM 20 MG/1
20 TABLET, COATED ORAL NIGHTLY
Status: DISCONTINUED | OUTPATIENT
Start: 2024-01-19 | End: 2024-01-24 | Stop reason: HOSPADM

## 2024-01-19 RX ORDER — METOPROLOL SUCCINATE 25 MG/1
25 TABLET, EXTENDED RELEASE ORAL DAILY
Status: DISCONTINUED | OUTPATIENT
Start: 2024-01-19 | End: 2024-01-22

## 2024-01-19 RX ORDER — ESCITALOPRAM OXALATE 20 MG/1
20 TABLET ORAL DAILY
Status: DISCONTINUED | OUTPATIENT
Start: 2024-01-19 | End: 2024-01-24 | Stop reason: HOSPADM

## 2024-01-19 RX ORDER — SODIUM CHLORIDE 0.9 % (FLUSH) 0.9 %
3 SYRINGE (ML) INJECTION EVERY 12 HOURS SCHEDULED
Status: DISCONTINUED | OUTPATIENT
Start: 2024-01-19 | End: 2024-01-19 | Stop reason: HOSPADM

## 2024-01-19 RX ORDER — MIDAZOLAM HYDROCHLORIDE 1 MG/ML
0.5 INJECTION INTRAMUSCULAR; INTRAVENOUS
Status: DISCONTINUED | OUTPATIENT
Start: 2024-01-19 | End: 2024-01-19 | Stop reason: HOSPADM

## 2024-01-19 RX ORDER — ONDANSETRON 2 MG/ML
INJECTION INTRAMUSCULAR; INTRAVENOUS AS NEEDED
Status: DISCONTINUED | OUTPATIENT
Start: 2024-01-19 | End: 2024-01-19 | Stop reason: SURG

## 2024-01-19 RX ORDER — ACETAMINOPHEN 650 MG/1
650 SUPPOSITORY RECTAL EVERY 4 HOURS PRN
Status: DISCONTINUED | OUTPATIENT
Start: 2024-01-19 | End: 2024-01-19

## 2024-01-19 RX ORDER — NALOXONE HCL 0.4 MG/ML
0.2 VIAL (ML) INJECTION AS NEEDED
Status: DISCONTINUED | OUTPATIENT
Start: 2024-01-19 | End: 2024-01-19 | Stop reason: HOSPADM

## 2024-01-19 RX ORDER — INDOCYANINE GREEN AND WATER 25 MG
2.5 KIT INJECTION ONCE
Status: COMPLETED | OUTPATIENT
Start: 2024-01-19 | End: 2024-01-19

## 2024-01-19 RX ORDER — PROMETHAZINE HYDROCHLORIDE 25 MG/1
25 TABLET ORAL ONCE AS NEEDED
Status: DISCONTINUED | OUTPATIENT
Start: 2024-01-19 | End: 2024-01-19 | Stop reason: HOSPADM

## 2024-01-19 RX ORDER — FLUMAZENIL 0.1 MG/ML
0.2 INJECTION INTRAVENOUS AS NEEDED
Status: DISCONTINUED | OUTPATIENT
Start: 2024-01-19 | End: 2024-01-19 | Stop reason: HOSPADM

## 2024-01-19 RX ORDER — LIDOCAINE HYDROCHLORIDE 20 MG/ML
INJECTION, SOLUTION INFILTRATION; PERINEURAL AS NEEDED
Status: DISCONTINUED | OUTPATIENT
Start: 2024-01-19 | End: 2024-01-19 | Stop reason: SURG

## 2024-01-19 RX ORDER — ACETAMINOPHEN 500 MG
1000 TABLET ORAL EVERY 6 HOURS
Status: DISCONTINUED | OUTPATIENT
Start: 2024-01-19 | End: 2024-01-20

## 2024-01-19 RX ORDER — SODIUM CHLORIDE AND POTASSIUM CHLORIDE 150; 900 MG/100ML; MG/100ML
60 INJECTION, SOLUTION INTRAVENOUS CONTINUOUS
Status: DISCONTINUED | OUTPATIENT
Start: 2024-01-19 | End: 2024-01-22

## 2024-01-19 RX ORDER — GABAPENTIN 300 MG/1
600 CAPSULE ORAL EVERY EVENING
Status: DISCONTINUED | OUTPATIENT
Start: 2024-01-19 | End: 2024-01-24 | Stop reason: HOSPADM

## 2024-01-19 RX ADMIN — HYDROMORPHONE HYDROCHLORIDE 0.25 MG: 1 INJECTION, SOLUTION INTRAMUSCULAR; INTRAVENOUS; SUBCUTANEOUS at 20:22

## 2024-01-19 RX ADMIN — PHENYLEPHRINE HYDROCHLORIDE 100 MCG: 10 INJECTION INTRAVENOUS at 17:28

## 2024-01-19 RX ADMIN — PIPERACILLIN SODIUM AND TAZOBACTAM SODIUM 3.38 G: 3; .375 INJECTION, SOLUTION INTRAVENOUS at 19:53

## 2024-01-19 RX ADMIN — INDOCYANINE GREEN AND WATER 2.5 MG: KIT at 16:28

## 2024-01-19 RX ADMIN — ACETAMINOPHEN 1000 MG: 500 TABLET ORAL at 23:04

## 2024-01-19 RX ADMIN — ALLOPURINOL 100 MG: 100 TABLET ORAL at 23:04

## 2024-01-19 RX ADMIN — ACETAMINOPHEN 1000 MG: 500 TABLET ORAL at 16:21

## 2024-01-19 RX ADMIN — ROCURONIUM BROMIDE 50 MG: 10 INJECTION, SOLUTION INTRAVENOUS at 16:45

## 2024-01-19 RX ADMIN — MORPHINE SULFATE 4 MG: 2 INJECTION, SOLUTION INTRAMUSCULAR; INTRAVENOUS at 13:40

## 2024-01-19 RX ADMIN — CEFOXITIN SODIUM 2000 MG: 2 POWDER, FOR SOLUTION INTRAVENOUS at 16:28

## 2024-01-19 RX ADMIN — ACETAMINOPHEN 1000 MG: 500 TABLET ORAL at 13:39

## 2024-01-19 RX ADMIN — ONDANSETRON HYDROCHLORIDE 4 MG: 2 INJECTION, SOLUTION INTRAMUSCULAR; INTRAVENOUS at 13:39

## 2024-01-19 RX ADMIN — FLUTICASONE PROPIONATE 2 SPRAY: 50 SPRAY, METERED NASAL at 23:03

## 2024-01-19 RX ADMIN — GABAPENTIN 600 MG: 300 CAPSULE ORAL at 23:03

## 2024-01-19 RX ADMIN — CLOBETASOL PROPIONATE CREAM USP, 0.05% 1 APPLICATION: 0.5 CREAM TOPICAL at 23:03

## 2024-01-19 RX ADMIN — ROCURONIUM BROMIDE 20 MG: 10 INJECTION, SOLUTION INTRAVENOUS at 17:23

## 2024-01-19 RX ADMIN — METOPROLOL SUCCINATE 25 MG: 25 TABLET, EXTENDED RELEASE ORAL at 23:04

## 2024-01-19 RX ADMIN — Medication 10 ML: at 23:04

## 2024-01-19 RX ADMIN — CELECOXIB 200 MG: 200 CAPSULE ORAL at 16:21

## 2024-01-19 RX ADMIN — PIPERACILLIN SODIUM AND TAZOBACTAM SODIUM 3.38 G: 3; .375 INJECTION, SOLUTION INTRAVENOUS at 12:20

## 2024-01-19 RX ADMIN — PHENYLEPHRINE HYDROCHLORIDE 100 MCG: 10 INJECTION INTRAVENOUS at 17:43

## 2024-01-19 RX ADMIN — ROSUVASTATIN CALCIUM 20 MG: 20 TABLET, FILM COATED ORAL at 23:04

## 2024-01-19 RX ADMIN — ONDANSETRON 4 MG: 2 INJECTION INTRAMUSCULAR; INTRAVENOUS at 18:40

## 2024-01-19 RX ADMIN — POTASSIUM CHLORIDE AND SODIUM CHLORIDE 100 ML/HR: 900; 150 INJECTION, SOLUTION INTRAVENOUS at 23:03

## 2024-01-19 RX ADMIN — HYDROMORPHONE HYDROCHLORIDE 0.5 MG: 1 INJECTION, SOLUTION INTRAMUSCULAR; INTRAVENOUS; SUBCUTANEOUS at 18:18

## 2024-01-19 RX ADMIN — POTASSIUM CHLORIDE 40 MEQ: 750 TABLET, EXTENDED RELEASE ORAL at 12:57

## 2024-01-19 RX ADMIN — SODIUM CHLORIDE, POTASSIUM CHLORIDE, SODIUM LACTATE AND CALCIUM CHLORIDE 1000 ML: 600; 310; 30; 20 INJECTION, SOLUTION INTRAVENOUS at 12:16

## 2024-01-19 RX ADMIN — PHENYLEPHRINE HYDROCHLORIDE 100 MCG: 10 INJECTION INTRAVENOUS at 18:03

## 2024-01-19 RX ADMIN — LABETALOL HYDROCHLORIDE 5 MG: 5 INJECTION, SOLUTION INTRAVENOUS at 19:23

## 2024-01-19 RX ADMIN — UREA: 17 CREAM TOPICAL at 23:03

## 2024-01-19 RX ADMIN — HYDROMORPHONE HYDROCHLORIDE 0.5 MG: 1 INJECTION, SOLUTION INTRAMUSCULAR; INTRAVENOUS; SUBCUTANEOUS at 17:14

## 2024-01-19 RX ADMIN — SUGAMMADEX 200 MG: 100 INJECTION, SOLUTION INTRAVENOUS at 18:45

## 2024-01-19 RX ADMIN — PROPOFOL 50 MG: 10 INJECTION, EMULSION INTRAVENOUS at 16:41

## 2024-01-19 RX ADMIN — MIRTAZAPINE 30 MG: 30 TABLET, FILM COATED ORAL at 23:04

## 2024-01-19 RX ADMIN — SODIUM CHLORIDE, POTASSIUM CHLORIDE, SODIUM LACTATE AND CALCIUM CHLORIDE 9 ML/HR: 600; 310; 30; 20 INJECTION, SOLUTION INTRAVENOUS at 16:28

## 2024-01-19 RX ADMIN — ACETAMINOPHEN 1000 MG: 10 INJECTION, SOLUTION INTRAVENOUS at 17:43

## 2024-01-19 RX ADMIN — FAMOTIDINE 20 MG: 10 INJECTION INTRAVENOUS at 16:28

## 2024-01-19 RX ADMIN — PHENYLEPHRINE HYDROCHLORIDE 100 MCG: 10 INJECTION INTRAVENOUS at 18:09

## 2024-01-19 RX ADMIN — FINASTERIDE 5 MG: 5 TABLET, FILM COATED ORAL at 23:04

## 2024-01-19 RX ADMIN — PHENYLEPHRINE HYDROCHLORIDE 100 MCG: 10 INJECTION INTRAVENOUS at 18:33

## 2024-01-19 RX ADMIN — ONDANSETRON 4 MG: 2 INJECTION INTRAMUSCULAR; INTRAVENOUS at 23:28

## 2024-01-19 RX ADMIN — GABAPENTIN 300 MG: 300 CAPSULE ORAL at 16:21

## 2024-01-19 RX ADMIN — ESCITALOPRAM 20 MG: 20 TABLET, FILM COATED ORAL at 23:04

## 2024-01-19 RX ADMIN — SODIUM CHLORIDE, POTASSIUM CHLORIDE, SODIUM LACTATE AND CALCIUM CHLORIDE: 600; 310; 30; 20 INJECTION, SOLUTION INTRAVENOUS at 16:39

## 2024-01-19 RX ADMIN — LIDOCAINE HYDROCHLORIDE 100 MG: 20 INJECTION, SOLUTION INFILTRATION; PERINEURAL at 16:45

## 2024-01-19 RX ADMIN — ONDANSETRON 4 MG: 2 INJECTION INTRAMUSCULAR; INTRAVENOUS at 20:19

## 2024-01-19 RX ADMIN — PROPOFOL 120 MG: 10 INJECTION, EMULSION INTRAVENOUS at 16:45

## 2024-01-19 NOTE — ED NOTES
Patient to er from home per [facility] with c/o increasing in weakness over the last few days per report. Reported the weakness caused the patient to fall two days ago. Patient c/o some bilateral rib pain, patient stated this could be from the coughing he is doing and not from the fall. Patient stated he did not hit his head with this fall. Patient has hx of COPD. At scene patient was 89-90% room air. Ems placed patient on 2l/m nc. Patient is alert x 4 to base line. No blood thinners reported in triage.

## 2024-01-19 NOTE — H&P
Internal medicine history and physical  INTERNAL MEDICINE   [facility]       Patient Identification:  Name: Kp Hernandez  Age: 78 y.o.  Sex: male  :  1945  MRN: [id number]                   Primary Care Physician: Murphy Felix DO                               Date of admission:2024    Chief Complaint: Progressive pain and discomfort in his upper abdomen and right side of the abdomen for the last 7 days with associated decreased appetite.    History of Present Illness:   Patient is a 78-year-old male who has past medical history remarkable for chronic kidney disease, benign prostatic hyperplasia, COPD, osteoarthritis hypertension and dyslipidemia start having discomfort in his right upper abdomen with associated decreased appetite.  Pain continued to worsen and couple days ago patient will be an almost fall and hit the right side of the chest to the furniture with worsening pain.  Since then he has reached a point that he is very weak could not get out of the bed because of worsening pain and EMS was called.  Patient also in the emergency room about a week ago for complaints of scrotal pain for which no significant normality was found.  Workup in the emergency room revealed white blood cell count of 24.72 and CT scan of the chest did not show any rib fracture or pneumothorax but it shows inflammation of gallbladder.  Patient is also noted worsening creatinine from baseline 1.2-1.47.  Patient admits that he not drinking anything much but did take his medications.  He also service is consulted and patient is made n.p.o. Zosyn started and patient is ready to undergo laparoscopic colectomy later this evening.  Patient was seen in the [facility] area.      Past Medical History:  Past Medical History:   Diagnosis Date    Asbestosis     BPH without urinary obstruction     CKD (chronic kidney disease)     COPD (chronic obstructive pulmonary disease)     Depression     Dyslipidemia      Ganglion cyst     GERD without esophagitis     Hearing loss     Hyperlipidemia     Hypertension     Osteoarthritis      Past Surgical History:  Past Surgical History:   Procedure Laterality Date    CERVICAL CHIARI DECOMPRESSION POSTERIOR N/A     COLONOSCOPY  2000    ELBOW ARTHROPLASTY Left     ENDOSCOPY N/A 11/28/2022    Procedure: ESOPHAGOGASTRODUODENOSCOPY with balloon dilatation 18mm-20mm;  Surgeon: Ulysses Eugene MD;  Location: [facility] MAIN OR;  Service: Gastroenterology;  Laterality: N/A;  hiatal hernia, s. ring    HERNIA REPAIR      INGUINAL HERNIA REPAIR      KNEE ACL RECONSTRUCTION Bilateral     SPINE SURGERY      TONSILLECTOMY        Home Meds:  Medications Prior to Admission   Medication Sig Dispense Refill Last Dose    albuterol sulfate  (90 Base) MCG/ACT inhaler Inhale 2 puffs Every 4 (Four) Hours As Needed for Wheezing. Indications: Asthma, Chronic Obstructive Lung Disease 6.7 g 2     allopurinol (ZYLOPRIM) 100 MG tablet Take 1 tablet by mouth twice daily (Patient taking differently: Take 1 tablet by mouth 2 (Two) Times a Day.) 180 tablet 0     clobetasol (TEMOVATE) 0.05 % cream APPLY  CREAM TOPICALLY TO APPROPRIATE AREA AS DIRECTED TWICE DAILY TO  HANDS (Patient taking differently: Apply 1 application  topically to the appropriate area as directed 2 (Two) Times a Day.) 60 g 5     escitalopram (LEXAPRO) 20 MG tablet Take 1 tablet by mouth Daily. 90 tablet 3     Farxiga 10 MG tablet Take 1 tablet by mouth once daily 30 tablet 0     finasteride (PROSCAR) 5 MG tablet TAKE 1 TABLET BY MOUTH ONCE DAILY AS DIRECTED (Patient taking differently: Take 1 tablet by mouth Daily.) 90 tablet 3     fluticasone (FLONASE) 50 MCG/ACT nasal spray Use 2 spray(s) in each nostril once daily (Patient taking differently: 2 sprays into the nostril(s) as directed by provider Daily.) 48 g 0     gabapentin (NEURONTIN) 300 MG capsule TAKE 1 CAPSULE BY MOUTH IN THE MORNING AND 1 ONCE DAILY AT LUNCH  AND 2 AT NIGHT. CALL  OFFICE FOR APPOINTMENT. (Patient taking differently: Take 1 capsule by mouth See Admin Instructions. 1 capsule in the AM (morning) 1 capsules at Lunch (noon) and 2 capsules in the PM) 360 capsule 0     hydroCHLOROthiazide (HYDRODIURIL) 25 MG tablet Take 1 tablet by mouth once daily (Patient taking differently: Take 1 tablet by mouth Daily.) 90 tablet 3     HYDROcodone-acetaminophen (NORCO) 5-325 MG per tablet Take 2 tablets by mouth Every 6 (Six) Hours As Needed for Severe Pain. 240 tablet 0     methocarbamol (ROBAXIN) 750 MG tablet TAKE 1 TABLET BY MOUTH TWICE DAILY AS NEEDED FOR MUSCLE SPASM (Patient taking differently: Take 1 tablet by mouth 2 (Two) Times a Day As Needed.) 60 tablet 5     metoprolol succinate XL (TOPROL-XL) 25 MG 24 hr tablet Take 1 tablet by mouth once daily (Patient taking differently: Take 1 tablet by mouth Daily.) 90 tablet 0 1/18/2024 at 0600    mirtazapine (REMERON) 30 MG tablet TAKE 1 TABLET BY MOUTH ONCE DAILY AT NIGHT (Patient taking differently: Take 1 tablet by mouth Every Night.) 90 tablet 1     multivitamin with minerals tablet tablet Take 1 tablet by mouth Daily.       pseudoephedrine (SUDAFED) 60 MG tablet Take 1 tablet by mouth Every 12 (Twelve) Hours As Needed for Congestion (use sparingly as may raise bp). 60 tablet 5     rosuvastatin (CRESTOR) 20 MG tablet TAKE 1 TABLET BY MOUTH ONCE DAILY AT NIGHT (Patient taking differently: Take 1 tablet by mouth Every Night.) 90 tablet 3     SPIRIVA HANDIHALER 18 MCG per inhalation capsule Place 1 capsule into inhaler and inhale Daily.       urea (CARMOL) 40 % cream Apply  topically to the appropriate area as directed Daily. To palms of both hands (Patient taking differently: Apply 1 application  topically to the appropriate area as directed Daily. To palms of both hands) 120 g 5     Zinc 50 MG capsule Take 1 capsule by mouth Daily. 1 chewable daily        Current Meds:     Current Facility-Administered Medications:     [MAR Hold]  "indocyanine green (IC-GREEN) injection 2.5 mg, 2.5 mg, Intravenous, Once, David Lakhani MD    [COMPLETED] Insert Peripheral IV, , , Once **AND** [MAR Hold] sodium chloride 0.9 % flush 10 mL, 10 mL, Intravenous, PRN, Romaine Gamino MD  Allergies:  No Known Allergies  Social History:   Social History     Tobacco Use    Smoking status: Never    Smokeless tobacco: Never   Substance Use Topics    Alcohol use: No      Family History:  Family History   Problem Relation Age of Onset    Hypertension Mother     Atrial fibrillation Mother     Alcohol abuse Father     Brain cancer Sister     GI problems Brother     COPD Brother     Malig Hyperthermia Neg Hx           Review of Systems  See history of present illness and past medical history.          Vitals:   /60 (BP Location: Right arm, Patient Position: Lying)   Pulse 97   Temp 99.1 °F (37.3 °C) (Oral)   Resp 18   Ht 175.3 cm (69\")   Wt 88.9 kg (195 lb 15.8 oz)   SpO2 92%   BMI 28.94 kg/m²   I/O:   Intake/Output Summary (Last 24 hours) at 1/19/2024 1538  Last data filed at 1/19/2024 1258  Gross per 24 hour   Intake 1050 ml   Output --   Net 1050 ml     Exam:  Patient is examined using the personal protective equipment as per guidelines from infection control for this particular patient as enacted.  Hand washing was performed before and after patient interaction.  General Appearance:  Alert awake oriented   Head:    Normocephalic, without obvious abnormality, atraumatic   Eyes:    PERRL, conjunctiva/corneas clear, EOM's intact, both eyes   Ears:    Normal external ear canals, both ears   Nose:   Nares normal, septum midline, mucosa normal, no drainage    or sinus tenderness   Throat:   Lips, tongue, gums normal; oral mucosa pink and moist   Neck: Supple adenopathy noted   Back:     Symmetric, no curvature, ROM normal, no CVA tenderness   Lungs:   Decreased breath sounds at the bases   Chest Wall:    No tenderness or deformity    Heart:  S1-S2 regular "   Abdomen:   Right upper quadrant tenderness noted   Extremities:   Extremities normal, atraumatic, no cyanosis or edema   Pulses:   Pulses palpable in all extremities; symmetric all extremities   Skin: No rash noted   Neurologic: Alert and oriented and grossly nonfocal       Data Review:      I reviewed the patient's new clinical results.  Results from last 7 days   Lab Units 01/19/24  1059 01/13/24  0800   WBC 10*3/mm3 24.72* 8.62   HEMOGLOBIN g/dL 15.3 15.3   PLATELETS 10*3/mm3 180 189     Results from last 7 days   Lab Units 01/19/24  1059 01/13/24  0800   SODIUM mmol/L 140 144   POTASSIUM mmol/L 2.9* 3.8   CHLORIDE mmol/L 97* 105   CO2 mmol/L 28.8 25.1   BUN mg/dL 20 24*   CREATININE mg/dL 1.47* 1.20   CALCIUM mg/dL 9.4 9.4   GLUCOSE mg/dL 123* 103*     CT Chest Without Contrast Diagnostic    Result Date: 1/19/2024  1. Findings are consistent with acute cholecystitis with gallbladder wall thickening and pericholecystic stranding. Stones or sludge layer dependently within the gallbladder. Potential secondary colitis hepatic flexure. 2. Subsegmental atelectasis in the lungs. No pleural effusion or pneumothorax. 3. Atherosclerotic disease with coronary arterial calcification.  Findings discussed with ALLAN Bradford in the emergency department, on 01/19/2024 at 11:30 a.m.  Radiation dose reduction techniques were utilized, including automated exposure control and exposure modulation based on body size.   This report was finalized on 1/19/2024 1:39 PM by Dr. Ezequiel Smith M.D on Workstation: [id number]     ECG 12 Lead Other; Weakness   Preliminary Result   HEART RATE= 98  bpm   RR Interval= 612  ms   CO Interval= 197  ms   P Horizontal Axis= 21  deg   P Front Axis= -2  deg   QRSD Interval= 105  ms   QT Interval= 363  ms   QTcB= 464  ms   QRS Axis= -19  deg   T Wave Axis= -1  deg   - ABNORMAL ECG -   Sinus rhythm   Left ventricular hypertrophy   Borderline T abnormalities, anterior leads   Baseline wander in  lead(s) V1   Electronically Signed By:    Date and Time of Study: 2024-01-19 10:45:30        Microbiology Results (last 10 days)       Procedure Component Value - Date/Time    Respiratory Panel PCR w/COVID-19(SARS-CoV-2) [facility]/YEN/RYAN/PAD/COR/ROYER In-House, NP Swab in UTM/VTM, 2 HR TAT - Swab, Nasopharynx [[id number]]  (Normal) Collected: 01/19/24 1058    Lab Status: Final result Specimen: Swab from Nasopharynx Updated: 01/19/24 1304     ADENOVIRUS, PCR Not Detected     Coronavirus 229E Not Detected     Coronavirus HKU1 Not Detected     Coronavirus NL63 Not Detected     Coronavirus OC43 Not Detected     COVID19 Not Detected     Human Metapneumovirus Not Detected     Human Rhinovirus/Enterovirus Not Detected     Influenza A PCR Not Detected     Influenza B PCR Not Detected     Parainfluenza Virus 1 Not Detected     Parainfluenza Virus 2 Not Detected     Parainfluenza Virus 3 Not Detected     Parainfluenza Virus 4 Not Detected     RSV, PCR Not Detected     Bordetella pertussis pcr Not Detected     Bordetella parapertussis PCR Not Detected     Chlamydophila pneumoniae PCR Not Detected     Mycoplasma pneumo by PCR Not Detected    Narrative:      In the setting of a positive respiratory panel with a viral infection PLUS a negative procalcitonin without other underlying concern for bacterial infection, consider observing off antibiotics or discontinuation of antibiotics and continue supportive care. If the respiratory panel is positive for atypical bacterial infection (Bordetella pertussis, Chlamydophila pneumoniae, or Mycoplasma pneumoniae), consider antibiotic de-escalation to target atypical bacterial infection.            Assessment:  Active Hospital Problems    Diagnosis  POA    **Cholecystitis [K81.9]  Yes    Fall [W19.XXXA]  Yes    Hypokalemia [E87.6]  Yes    CKD (chronic kidney disease) stage 2, GFR 60-89 ml/min [N18.2]  Yes    COPD mixed type [J44.9]  Yes    Essential hypertension [I10]  Yes    Depression with  anxiety [F41.8]  Yes       Medical decision making/care plan: See admitting orders  Acute cholecystitis with adjacent cellulitis-keep him n.p.o., continue with IV Zosyn, follow-up blood cultures, IV fluids prepare him for laparoscopic cholecystectomy as per the surgery service.  Hypokalemia and dehydration with acute kidney injury-hold his diuretics continue with IV fluids and monitor for volume overload replace bladder protocol  Hypertension-continue hypertensive regimen of hypertensive process.  COPD-continue with as needed nebulizer treatment and provide him with oxygen supplementation and continuous pulse ox monitoring.  Depression anxiety-continue his home regimen.  Savana Menezes MD   1/19/2024  15:38 EST    Parts of this note may be an electronic transcription/translation of spoken language to printed text using the Dragon dictation system.

## 2024-01-19 NOTE — ANESTHESIA PREPROCEDURE EVALUATION
" Anesthesia Evaluation     Patient summary reviewed and Nursing notes reviewed   no history of anesthetic complications:   NPO Solid Status: > 8 hours  NPO Liquid Status: > 2 hours           Airway   Mallampati: II  Dental - normal exam     Pulmonary    (+) COPD,    ROS comment: asbestosis  Cardiovascular   Exercise tolerance: good (4-7 METS)    Rhythm: regular    (+) hypertension, hyperlipidemia    ROS comment: Sinus rhythm  Left ventricular hypertrophy  Borderline T abnormalities, anterior leads  Baseline wander in lead(s) V1      Neuro/Psych  (+) psychiatric history Anxiety and Depression  GI/Hepatic/Renal/Endo    (+) GERD, renal disease- CRI    Musculoskeletal     Abdominal    Substance History - negative use     OB/GYN negative ob/gyn ROS         Other   arthritis,                       Anesthesia Plan    ASA 3     general     (/88 (BP Location: Right arm, Patient Position: Lying)   Pulse 93   Temp 36.8 °C (98.3 °F) (Oral)   Resp 18   Ht 175.3 cm (69\")   Wt 88.9 kg (195 lb 15.8 oz)   SpO2 (!) 87%   BMI 28.94 kg/m²     I have reviewed the patient's history with the patient and the chart, including all pertinent laboratory results and imaging. I have explained the risks of anesthesia including but not limited to dental damage, corneal abrasion, nerve injury, MI, stroke, and death.    )  intravenous induction     Anesthetic plan, risks, benefits, and alternatives have been provided, discussed and informed consent has been obtained with: patient.        CODE STATUS:    Code Status (Patient has no pulse and is not breathing): CPR (Attempt to Resuscitate)  Medical Interventions (Patient has pulse or is breathing): Full Support      "

## 2024-01-19 NOTE — ED PROVIDER NOTES
EMERGENCY DEPARTMENT ENCOUNTER  Room Number:  36/36  PCP: Murphy Felix DO  Independent Historians: Patient and EMS      HPI:  Chief Complaint: had concerns including Weakness - Generalized and Fall.  As well as dyspnea and rib pains    A complete HPI/ROS/PMH/PSH/SH/FH are unobtainable due to: None    Chronic or social conditions impacting patient care (Social Determinants of Health): None      Context: Kp Hernandez is a 78 y.o. male with a medical history of COPD, CKD, hypertension and hyperlipidemia who presents to the ED c/o acute generalized weakness with a recent fall.  Also complaining of dyspnea and right-sided rib pains.  Patient lives at home with his wife.  He was here in this hospital ED about 1 week ago for evaluation of scrotal pain.  He was at home trying to recover from his recent flulike symptoms in the past few days.  Yesterday, he accidentally stumbled against some dining room furniture and fell to the ground.  Thinks he may have injured his right-sided ribs during that fall.  This morning, he tried to get up out of bed but had no strength to do so and felt very unsteady when trying to stand.  This did not improve with some rest.  Therefore he and his wife called 911.  Paramedics brought him here for further evaluation.  Patient denies any chest pain apart from the lower right-sided ribs area.  He says it is a sharp pain that is worse when he takes a deep breath.  He denies any vomiting or diarrhea.  He has had some recent congestion and malaise type of symptoms.      Review of prior external notes (non-ED) -and- Review of prior external test results outside of this encounter: I reviewed the PCP progress note from July 27, 2023 when he had follow-up care for CKD, prediabetes and osteoarthritis    Prescription drug monitoring program review: EDILIA reviewed by Savana Menezes MD, Romaine Gamino MD   N/A and EDILIA query complete and reviewed. Patient receives regular prescriptions for  controlled substances.    PAST MEDICAL HISTORY  Active Ambulatory Problems     Diagnosis Date Noted    Essential hypertension 01/19/2016    Benign prostatic hypertrophy without urinary obstruction 01/19/2016    CKD (chronic kidney disease) stage 2, GFR 60-89 ml/min 01/19/2016    COPD mixed type 01/19/2016    Depression with anxiety 01/19/2016    Dyslipidemia 01/19/2016    Ganglion 01/19/2016    Gastroesophageal reflux disease without esophagitis 01/19/2016    Hearing loss 01/19/2016    Osteoarthritis 01/19/2016    Impacted cerumen 01/19/2016    Pituitary microadenoma 05/10/2022    History of gout 09/14/2022    Esophageal dysphagia 09/27/2022    Encounter for screening colonoscopy 09/27/2022     Resolved Ambulatory Problems     Diagnosis Date Noted    No Resolved Ambulatory Problems     Past Medical History:   Diagnosis Date    Asbestosis     BPH without urinary obstruction     CKD (chronic kidney disease)     COPD (chronic obstructive pulmonary disease)     Depression     Ganglion cyst     GERD without esophagitis     Hyperlipidemia     Hypertension          PAST SURGICAL HISTORY  Past Surgical History:   Procedure Laterality Date    CERVICAL CHIARI DECOMPRESSION POSTERIOR N/A     COLONOSCOPY  2000    ELBOW ARTHROPLASTY Left     ENDOSCOPY N/A 11/28/2022    Procedure: ESOPHAGOGASTRODUODENOSCOPY with balloon dilatation 18mm-20mm;  Surgeon: Ulysses Eugene MD;  Location: [facility] MAIN OR;  Service: Gastroenterology;  Laterality: N/A;  hiatal hernia, s. ring    HERNIA REPAIR      INGUINAL HERNIA REPAIR      KNEE ACL RECONSTRUCTION Bilateral     SPINE SURGERY      TONSILLECTOMY           FAMILY HISTORY  Family History   Problem Relation Age of Onset    Hypertension Mother     Atrial fibrillation Mother     Alcohol abuse Father     Brain cancer Sister     GI problems Brother     COPD Brother          SOCIAL HISTORY  Social History     Socioeconomic History    Marital status:    Tobacco Use    Smoking status:  Never    Smokeless tobacco: Never   Vaping Use    Vaping Use: Never used   Substance and Sexual Activity    Alcohol use: No    Drug use: No         ALLERGIES  Patient has no known allergies.      REVIEW OF SYSTEMS  Review of Systems  Included in HPI  All systems reviewed and negative except for those discussed in HPI.      PHYSICAL EXAM    I have reviewed the triage vital signs and nursing notes.    ED Triage Vitals [01/19/24 1012]   Temp Heart Rate Resp BP SpO2   100.1 °F (37.8 °C) 100 20 138/90 93 %      Temp src Heart Rate Source Patient Position BP Location FiO2 (%)   Oral -- -- -- --       Physical Exam  GENERAL: alert, no acute distress  SKIN: Warm, dry, no diaphoresis  HENT: Normocephalic, atraumatic without sign of injury to the face or scalp  EYES: no scleral icterus, EOMI, PERRL  CV: regular rhythm, regular rate  RESPIRATORY: normal effort, lungs clear to auscultation bilaterally.  There is some mild right anterior lateral inferior rib tenderness to palpation  ABDOMEN: soft, mild tenderness in the right upper quadrant area.  No masses palpable.  No peritoneal features elicited.  MUSCULOSKELETAL: no deformity, no asymmetry, no significant edema  NEURO: alert, moves all extremities, follows commands, speech is clear, no facial droop      NIH: 0                                                         PPE: The patient wore: mask and I wore:mask and gloves  throughout the entire patient encounter.    LAB RESULTS  Recent Results (from the past 24 hour(s))   ECG 12 Lead Other; Weakness    Collection Time: 01/19/24 10:45 AM   Result Value Ref Range    QT Interval 363 ms    QTC Interval 464 ms   Respiratory Panel PCR w/COVID-19(SARS-CoV-2) LISBET/YEN/RYAN/PAD/COR/ROYER In-House, NP Swab in [facility], 2 HR TAT - Swab, Nasopharynx    Collection Time: 01/19/24 10:58 AM    Specimen: Nasopharynx; Swab   Result Value Ref Range    ADENOVIRUS, PCR Not Detected Not Detected    Coronavirus 229E Not Detected Not Detected     Coronavirus HKU1 Not Detected Not Detected    Coronavirus NL63 Not Detected Not Detected    Coronavirus OC43 Not Detected Not Detected    COVID19 Not Detected Not Detected - Ref. Range    Human Metapneumovirus Not Detected Not Detected    Human Rhinovirus/Enterovirus Not Detected Not Detected    Influenza A PCR Not Detected Not Detected    Influenza B PCR Not Detected Not Detected    Parainfluenza Virus 1 Not Detected Not Detected    Parainfluenza Virus 2 Not Detected Not Detected    Parainfluenza Virus 3 Not Detected Not Detected    Parainfluenza Virus 4 Not Detected Not Detected    RSV, PCR Not Detected Not Detected    Bordetella pertussis pcr Not Detected Not Detected    Bordetella parapertussis PCR Not Detected Not Detected    Chlamydophila pneumoniae PCR Not Detected Not Detected    Mycoplasma pneumo by PCR Not Detected Not Detected   Comprehensive Metabolic Panel    Collection Time: 01/19/24 10:59 AM    Specimen: Blood   Result Value Ref Range    Glucose 123 (H) 65 - 99 mg/dL    BUN 20 8 - 23 mg/dL    Creatinine 1.47 (H) 0.76 - 1.27 mg/dL    Sodium 140 136 - 145 mmol/L    Potassium 2.9 (L) 3.5 - 5.2 mmol/L    Chloride 97 (L) 98 - 107 mmol/L    CO2 28.8 22.0 - 29.0 mmol/L    Calcium 9.4 8.6 - 10.5 mg/dL    Total Protein 7.1 6.0 - 8.5 g/dL    Albumin 4.1 3.5 - 5.2 g/dL    ALT (SGPT) 27 1 - 41 U/L    AST (SGOT) 43 (H) 1 - 40 U/L    Alkaline Phosphatase 69 39 - 117 U/L    Total Bilirubin 1.0 0.0 - 1.2 mg/dL    Globulin 3.0 gm/dL    A/G Ratio 1.4 g/dL    BUN/Creatinine Ratio 13.6 7.0 - 25.0    Anion Gap 14.2 5.0 - 15.0 mmol/L    eGFR 48.8 (L) >60.0 mL/min/1.73   Protime-INR    Collection Time: 01/19/24 10:59 AM    Specimen: Blood   Result Value Ref Range    Protime 15.0 (H) 11.7 - 14.2 Seconds    INR 1.16 (H) 0.90 - 1.10   aPTT    Collection Time: 01/19/24 10:59 AM    Specimen: Blood   Result Value Ref Range    PTT 32.5 22.7 - 35.4 seconds   Procalcitonin    Collection Time: 01/19/24 10:59 AM    Specimen: Blood    Result Value Ref Range    Procalcitonin 4.75 (H) 0.00 - 0.25 ng/mL   Lactic Acid, Plasma    Collection Time: 01/19/24 10:59 AM    Specimen: Blood   Result Value Ref Range    Lactate 1.7 0.5 - 2.0 mmol/L   Lipase    Collection Time: 01/19/24 10:59 AM    Specimen: Blood   Result Value Ref Range    Lipase 12 (L) 13 - 60 U/L   CBC Auto Differential    Collection Time: 01/19/24 10:59 AM    Specimen: Blood   Result Value Ref Range    WBC 24.72 (H) 3.40 - 10.80 10*3/mm3    RBC 4.75 4.14 - 5.80 10*6/mm3    Hemoglobin 15.3 13.0 - 17.7 g/dL    Hematocrit 44.6 37.5 - 51.0 %    MCV 93.9 79.0 - 97.0 fL    MCH 32.2 26.6 - 33.0 pg    MCHC 34.3 31.5 - 35.7 g/dL    RDW 13.5 12.3 - 15.4 %    RDW-SD 46.2 37.0 - 54.0 fl    MPV 9.1 6.0 - 12.0 fL    Platelets 180 140 - 450 10*3/mm3    Neutrophil % 88.2 (H) 42.7 - 76.0 %    Lymphocyte % 4.0 (L) 19.6 - 45.3 %    Monocyte % 6.5 5.0 - 12.0 %    Eosinophil % 0.2 (L) 0.3 - 6.2 %    Basophil % 0.2 0.0 - 1.5 %    Immature Grans % 0.9 (H) 0.0 - 0.5 %    Neutrophils, Absolute 21.80 (H) 1.70 - 7.00 10*3/mm3    Lymphocytes, Absolute 0.99 0.70 - 3.10 10*3/mm3    Monocytes, Absolute 1.60 (H) 0.10 - 0.90 10*3/mm3    Eosinophils, Absolute 0.06 0.00 - 0.40 10*3/mm3    Basophils, Absolute 0.04 0.00 - 0.20 10*3/mm3    Immature Grans, Absolute 0.23 (H) 0.00 - 0.05 10*3/mm3    nRBC 0.0 0.0 - 0.2 /100 WBC   Magnesium    Collection Time: 01/19/24 10:59 AM    Specimen: Blood   Result Value Ref Range    Magnesium 2.1 1.6 - 2.4 mg/dL         RADIOLOGY  CT Chest Without Contrast Diagnostic    Result Date: 1/19/2024  CT CHEST WITHOUT CONTRAST  HISTORY: Dyspnea. Fall with rib pain.  TECHNIQUE:  CT chest includes axial imaging from the thoracic inlet to the upper abdomen without IV contrast. Date reconstructed in coronal and sagittal planes. Radiation dose reduction techniques were utilized, including automated exposure control and exposure modulation based on body size.  COMPARISON: None  FINDINGS: No endotracheal  or central endobronchial lesion is evident. Atherosclerotic calcifications are present and there are coronary arterial calcifications.  There is linear subsegmental right upper lobe, lingular, bilateral lower lobe atelectasis. There is elevation of the right hemidiaphragm. There is no pleural effusion or pneumothorax.  Imaging through the upper abdomen demonstrates pericholecystic stranding. There is gallbladder wall thickening and there appear to be stones or sludge layering dependently in the gallbladder. The adjacent hepatic flexure of the colon is decompressed and exhibits equivocal wall thickening.      1. Findings are consistent with acute cholecystitis with gallbladder wall thickening and pericholecystic stranding. Stones or sludge layer dependently within the gallbladder. Potential secondary colitis hepatic flexure. 2. Subsegmental atelectasis in the lungs. No pleural effusion or pneumothorax. 3. Atherosclerotic disease with coronary arterial calcification.  Findings discussed with ALLAN Bradford in the emergency department, on 01/19/2024 at 11:30 a.m.  Radiation dose reduction techniques were utilized, including automated exposure control and exposure modulation based on body size.          MEDICATIONS GIVEN IN ER  Medications   sodium chloride 0.9 % flush 10 mL (has no administration in time range)   acetaminophen (TYLENOL) tablet 1,000 mg (has no administration in time range)   ondansetron (ZOFRAN) injection 4 mg (has no administration in time range)   morphine injection 4 mg (has no administration in time range)   piperacillin-tazobactam (ZOSYN) 3.375 g in iso-osmotic dextrose 50 ml (premix) (0 g Intravenous Stopped 1/19/24 1258)   lactated ringers bolus 1,000 mL (0 mL Intravenous Stopped 1/19/24 1258)   potassium chloride (K-DUR,KLOR-CON) ER tablet 40 mEq (40 mEq Oral Given 1/19/24 1257)         ORDERS PLACED DURING THIS VISIT:  Orders Placed This Encounter   Procedures    Respiratory Panel PCR  w/COVID-19(SARS-CoV-2) LISBET/YEN/RYAN/PAD/COR/ROYER In-House, NP Swab in UTM/VTM, 2 HR TAT - Swab, Nasopharynx    Blood Culture - Blood,    Blood Culture - Blood,    CT Chest Without Contrast Diagnostic    Comprehensive Metabolic Panel    Protime-INR    aPTT    Procalcitonin    Lactic Acid, Plasma    Lipase    Urinalysis With Microscopic If Indicated (No Culture) - Urine, Clean Catch    CBC Auto Differential    Magnesium    Monitor Blood Pressure    Pulse Oximetry, Continuous    Surgery (on-call MD unless specified)    [facility] (on-call MD unless specified) Details    ECG 12 Lead Other; Weakness    Insert Peripheral IV    Inpatient Admission    CBC & Differential         OUTPATIENT MEDICATION MANAGEMENT:  Current Facility-Administered Medications Ordered in Epic   Medication Dose Route Frequency Provider Last Rate Last Admin    acetaminophen (TYLENOL) tablet 1,000 mg  1,000 mg Oral Once Romaine Gamino MD        morphine injection 4 mg  4 mg Intravenous Once Romaine Gamino MD        ondansetron (ZOFRAN) injection 4 mg  4 mg Intravenous Once Romaine Gamino MD        sodium chloride 0.9 % flush 10 mL  10 mL Intravenous PRN Romaine Gamino MD         Current Outpatient Medications Ordered in Epic   Medication Sig Dispense Refill    albuterol sulfate  (90 Base) MCG/ACT inhaler Inhale 2 puffs Every 4 (Four) Hours As Needed for Wheezing. Indications: Asthma, Chronic Obstructive Lung Disease 6.7 g 2    allopurinol (ZYLOPRIM) 100 MG tablet Take 1 tablet by mouth twice daily 180 tablet 0    clobetasol (TEMOVATE) 0.05 % cream APPLY  CREAM TOPICALLY TO APPROPRIATE AREA AS DIRECTED TWICE DAILY TO  HANDS 60 g 5    escitalopram (LEXAPRO) 20 MG tablet Take 1 tablet by mouth Daily. 90 tablet 3    Farxiga 10 MG tablet Take 1 tablet by mouth once daily 30 tablet 0    finasteride (PROSCAR) 5 MG tablet TAKE 1 TABLET BY MOUTH ONCE DAILY AS DIRECTED 90 tablet 3    fluticasone (FLONASE) 50 MCG/ACT nasal spray Use 2 spray(s) in each  nostril once daily 48 g 0    gabapentin (NEURONTIN) 300 MG capsule TAKE 1 CAPSULE BY MOUTH IN THE MORNING AND 1 ONCE DAILY AT LUNCH  AND 2 AT NIGHT. CALL OFFICE FOR APPOINTMENT. 360 capsule 0    hydroCHLOROthiazide (HYDRODIURIL) 25 MG tablet Take 1 tablet by mouth once daily 90 tablet 3    HYDROcodone-acetaminophen (NORCO) 5-325 MG per tablet Take 2 tablets by mouth Every 6 (Six) Hours As Needed for Severe Pain. 240 tablet 0    methocarbamol (ROBAXIN) 750 MG tablet TAKE 1 TABLET BY MOUTH TWICE DAILY AS NEEDED FOR MUSCLE SPASM 60 tablet 5    metoprolol succinate XL (TOPROL-XL) 25 MG 24 hr tablet Take 1 tablet by mouth once daily 90 tablet 0    mirtazapine (REMERON) 30 MG tablet TAKE 1 TABLET BY MOUTH ONCE DAILY AT NIGHT 90 tablet 1    multivitamin with minerals tablet tablet Take 1 tablet by mouth Daily.      pseudoephedrine (SUDAFED) 60 MG tablet Take 1 tablet by mouth Every 12 (Twelve) Hours As Needed for Congestion (use sparingly as may raise bp). 60 tablet 5    rosuvastatin (CRESTOR) 20 MG tablet TAKE 1 TABLET BY MOUTH ONCE DAILY AT NIGHT 90 tablet 3    SPIRIVA HANDIHALER 18 MCG per inhalation capsule       urea (CARMOL) 40 % cream Apply  topically to the appropriate area as directed Daily. To palms of both hands 120 g 5    Zinc 50 MG capsule Take  by mouth. 1 chewable daily           PROCEDURES  Procedures      Critical care provider statement:    Critical care time (minutes): 30-74.   Critical care time was exclusive of:  Separately billable procedures and treating other patients   Critical care was necessary to treat or prevent imminent or life-threatening deterioration of the following conditions:  Sepsis   Critical care was time spent personally by me on the following activities:  Development of treatment plan with patient or surrogate, discussions with consultants, evaluation of patient's response to treatment, examination of patient, obtaining history from patient or surrogate, ordering and performing  treatments and interventions, ordering and review of laboratory studies, ordering and review of radiographic studies, pulse oximetry, re-evaluation of patient's condition and review of old charts. Critical Care indicators: Sepsis / septicemia       PROGRESS, DATA ANALYSIS, CONSULTS, AND MEDICAL DECISION MAKING  All labs have been independently interpreted by me.  All radiology studies have been reviewed by me. All EKG's have been independently viewed and interpreted by me.  Discussion below represents my analysis of pertinent findings related to patient's condition, differential diagnosis, treatment plan and final disposition.    Differential diagnosis includes but is not limited to COVID-19, influenza, pneumonia, rib fracture, dehydration, COPD exacerbation, hypoglycemia.    Clinical Scores:                  ED Course as of 01/19/24 1336   Fri Jan 19, 2024   1141 WBC(!): 24.72 [YEVGENIY]   1141 Procalcitonin(!): 4.75 [YEVGENIY]   1141 I independently interpreted the CT chest without contrast and my findings are, no apparent rib fracture, no pneumothorax, the gallbladder appears to be inflamed. [YEVGENIY]   1141 I discussed with Dr. Smith from radiology.  He tells me that patient's CT scan is suggestive of acute cholecystitis.  I am ordering an ultrasound of the gallbladder now.  I see the patient's white blood cell count is quite elevated and his procalcitonin is also elevated.  We will start IV Zosyn and draw blood cultures at this time. [YEVGENIY]   1155 EKG         EKG time/Interp time: 1045/1057  Rhythm/Rate: Sinus rhythm, 98 bpm  P waves and ID: Present, 197 ms  QRS, axis: 105 ms, LVH, left axis deviation  ST and T waves: No ST segment elevations are present.  Independently interpreted by me contemporaneously with treatment   [YEVGENIY]   1234 I discussed with Dr. Lakhani from general surgery.  He agrees to consult on the patient.  He request [facility]. [YEVGENIY]   1256 Creatinine(!): 1.47 [YEVGENIY]   1326 I discussed with  Dr. Menezes from [facility] about this patient.  He agrees to accept him to the hospitalist service for further medical management. [YEVGENIY]      ED Course User Index  [YEVGENIY] Romaine Gamino MD             AS OF 13:36 EST VITALS:    BP - 136/67  HR - 97  TEMP - (!) 102 °F (38.9 °C) (Tympanic)  O2 SATS - 94%    COMPLEXITY OF CARE  The patient requires admission.      DIAGNOSIS  Final diagnoses:   Acute cholecystitis   Sepsis without acute organ dysfunction, due to unspecified organism   Weakness   Hypokalemia         DISPOSITION  ED Disposition       ED Disposition   Decision to Admit    Condition   --    Comment   Level of Care: Telemetry [5]   Diagnosis: Cholecystitis [[id number]]   Admitting Physician: MARYANN MENEZES [[id number]]   Attending Physician: MARYANN MENEZES [[id number]]   Certification: I Certify That Inpatient Hospital Services Are Medically Necessary For Greater Than 2 Midnights                  Please note that portions of this document were completed with a voice recognition program.    Note Disclaimer: At [facility], we believe that sharing information builds trust and better relationships. You are receiving this note because you recently visited [facility]. It is possible you will see health information before a provider has talked with you about it. This kind of information can be easy to misunderstand. To help you fully understand what it means for your health, we urge you to discuss this note with your provider.         Romaine Gamino MD  01/19/24 2169

## 2024-01-19 NOTE — ANESTHESIA PROCEDURE NOTES
Airway  Urgency: elective    Date/Time: 1/19/2024 4:56 PM  Difficult airway    General Information and Staff    Patient location during procedure: OR    Indications and Patient Condition  Indications for airway management: airway protection    Preoxygenated: yes  Mask difficulty assessment: 1 - vent by mask    Final Airway Details  Final airway type: endotracheal airway      Successful airway: ETT  Cuffed: yes   Successful intubation technique: video laryngoscopy  Facilitating devices/methods: Bougie  Blade: CMAC  Blade size: D  ETT size (mm): 7.5  Cormack-Lehane Classification: grade I - full view of glottis  Placement verified by: chest auscultation and capnometry   Measured from: lips  ETT/EBT  to lips (cm): 23  Number of attempts at approach: 2  Assessment: lips, teeth, and gum same as pre-op and atraumatic intubation    Additional Comments  Easy mask ventilation. Gr 3 view with DL (Mac 4), Gr 1 view with CMAC.

## 2024-01-19 NOTE — ED NOTES
Nursing report ED to floor  Kp Hernandez  78 y.o.  male    : Kp Hernandez is a 78 y.o. male with a medical history of COPD, CKD, hypertension and hyperlipidemia who presents to the ED c/o acute generalized weakness with a recent fall.  Also complaining of dyspnea and right-sided rib pains.  Patient lives at home with his wife.  He was here in this hospital ED about 1 week ago for evaluation of scrotal pain.  He was at home trying to recover from his recent flulike symptoms in the past few days.  Yesterday, he accidentally stumbled against some dining room furniture and fell to the ground.  Thinks he may have injured his right-sided ribs during that fall.  This morning, he tried to get up out of bed but had no strength to do so and felt very unsteady when trying to stand.  This did not improve with some rest.  Therefore he and his wife called 911.  Paramedics brought him here for further evaluation.  Patient denies any chest pain apart from the lower right-sided ribs area.  He says it is a sharp pain that is worse when he takes a deep breath.  He denies any vomiting or diarrhea.  He has had some recent congestion and malaise type of symptoms.   HPI :   Chief Complaint   Patient presents with    Weakness - Generalized    Fall       Admitting doctor:   Savana Menezes MD    Admitting diagnosis:   The primary encounter diagnosis was Acute cholecystitis. Diagnoses of Sepsis without acute organ dysfunction, due to unspecified organism, Weakness, and Hypokalemia were also pertinent to this visit.    Code status:   Current Code Status       Date Active Code Status Order ID Comments User Context       Not on file            Allergies:   Patient has no known allergies.    Isolation:   Enhanced Droplet/Contact     Intake and Output    Intake/Output Summary (Last 24 hours) at 1/19/2024 1331  Last data filed at 1/19/2024 1258  Gross per 24 hour   Intake 1050 ml   Output --   Net 1050 ml       Weight:       01/19/24  1026    Weight: 88.9 kg (195 lb 15.8 oz)       Most recent vitals:   Vitals:    01/19/24 1239 01/19/24 1241 01/19/24 1243 01/19/24 1328   BP:  136/67     BP Location:       Patient Position:       Pulse: 98 97     Resp:       Temp:    (!) 102 °F (38.9 °C)   TempSrc:    Tympanic   SpO2: 91%  94%    Weight:       Height:           Active LDAs/IV Access:   Lines, Drains & Airways       Active LDAs       Name Placement date Placement time Site Days    Peripheral IV 01/19/24 1112 Anterior;Left Forearm 01/19/24  1112  Forearm  less than 1                    Labs (abnormal labs have a star):   Labs Reviewed   COMPREHENSIVE METABOLIC PANEL - Abnormal; Notable for the following components:       Result Value    Glucose 123 (*)     Creatinine 1.47 (*)     Potassium 2.9 (*)     Chloride 97 (*)     AST (SGOT) 43 (*)     eGFR 48.8 (*)     All other components within normal limits    Narrative:     GFR Normal >60  Chronic Kidney Disease <60  Kidney Failure <15    The GFR formula is only valid for adults with stable renal function between ages 18 and 70.   PROTIME-INR - Abnormal; Notable for the following components:    Protime 15.0 (*)     INR 1.16 (*)     All other components within normal limits   PROCALCITONIN - Abnormal; Notable for the following components:    Procalcitonin 4.75 (*)     All other components within normal limits    Narrative:     As a Marker for Sepsis (Non-Neonates):    1. <0.5 ng/mL represents a low risk of severe sepsis and/or septic shock.  2. >2 ng/mL represents a high risk of severe sepsis and/or septic shock.    As a Marker for Lower Respiratory Tract Infections that require antibiotic therapy:    PCT on Admission    Antibiotic Therapy       6-12 Hrs later    >0.5                Strongly Recommended  >0.25 - <0.5        Recommended   0.1 - 0.25          Discouraged              Remeasure/reassess PCT  <0.1                Strongly Discouraged     Remeasure/reassess PCT    As 28 day mortality risk marker:  "\"Change in Procalcitonin Result\" (>80% or <=80%) if Day 0 (or Day 1) and Day 4 values are available. Refer to http://www.[facility]-pct-calculator.com    Change in PCT <=80%  A decrease of PCT levels below or equal to 80% defines a positive change in PCT test result representing a higher risk for 28-day all-cause mortality of patients diagnosed with severe sepsis for septic shock.    Change in PCT >80%  A decrease of PCT levels of more than 80% defines a negative change in PCT result representing a lower risk for 28-day all-cause mortality of patients diagnosed with severe sepsis or septic shock.      LIPASE - Abnormal; Notable for the following components:    Lipase 12 (*)     All other components within normal limits   CBC WITH AUTO DIFFERENTIAL - Abnormal; Notable for the following components:    WBC 24.72 (*)     Neutrophil % 88.2 (*)     Lymphocyte % 4.0 (*)     Eosinophil % 0.2 (*)     Immature Grans % 0.9 (*)     Neutrophils, Absolute 21.80 (*)     Monocytes, Absolute 1.60 (*)     Immature Grans, Absolute 0.23 (*)     All other components within normal limits   RESPIRATORY PANEL PCR W/ COVID-19 (SARS-COV-2), NP SWAB IN UTM/VTP, 2 HR TAT - Normal    Narrative:     In the setting of a positive respiratory panel with a viral infection PLUS a negative procalcitonin without other underlying concern for bacterial infection, consider observing off antibiotics or discontinuation of antibiotics and continue supportive care. If the respiratory panel is positive for atypical bacterial infection (Bordetella pertussis, Chlamydophila pneumoniae, or Mycoplasma pneumoniae), consider antibiotic de-escalation to target atypical bacterial infection.   APTT - Normal   LACTIC ACID, PLASMA - Normal   MAGNESIUM - Normal   BLOOD CULTURE   BLOOD CULTURE   URINALYSIS W/ MICROSCOPIC IF INDICATED (NO CULTURE)   CBC AND DIFFERENTIAL    Narrative:     The following orders were created for panel order CBC & Differential.  Procedure             "                   Abnormality         Status                     ---------                               -----------         ------                     CBC Auto Differential[[id number]]        Abnormal            Final result                 Please view results for these tests on the individual orders.       EKG:   ECG 12 Lead Other; Weakness   Preliminary Result   HEART RATE= 98  bpm   RR Interval= 612  ms   LA Interval= 197  ms   P Horizontal Axis= 21  deg   P Front Axis= -2  deg   QRSD Interval= 105  ms   QT Interval= 363  ms   QTcB= 464  ms   QRS Axis= -19  deg   T Wave Axis= -1  deg   - ABNORMAL ECG -   Sinus rhythm   Left ventricular hypertrophy   Borderline T abnormalities, anterior leads   Baseline wander in lead(s) V1   Electronically Signed By:    Date and Time of Study: 2024-01-19 10:45:30          Meds given in ED:   Medications   sodium chloride 0.9 % flush 10 mL (has no administration in time range)   acetaminophen (TYLENOL) tablet 1,000 mg (has no administration in time range)   ondansetron (ZOFRAN) injection 4 mg (has no administration in time range)   morphine injection 4 mg (has no administration in time range)   piperacillin-tazobactam (ZOSYN) 3.375 g in iso-osmotic dextrose 50 ml (premix) (0 g Intravenous Stopped 1/19/24 1258)   lactated ringers bolus 1,000 mL (0 mL Intravenous Stopped 1/19/24 1258)   potassium chloride (K-DUR,KLOR-CON) ER tablet 40 mEq (40 mEq Oral Given 1/19/24 1257)       Imaging results:  CT Chest Without Contrast Diagnostic    Result Date: 1/19/2024  1. Findings are consistent with acute cholecystitis with gallbladder wall thickening and pericholecystic stranding. Stones or sludge layer dependently within the gallbladder. Potential secondary colitis hepatic flexure. 2. Subsegmental atelectasis in the lungs. No pleural effusion or pneumothorax. 3. Atherosclerotic disease with coronary arterial calcification.  Findings discussed with ALLAN Bradford in the emergency  department, on 01/19/2024 at 11:30 a.m.  Radiation dose reduction techniques were utilized, including automated exposure control and exposure modulation based on body size.        Ambulatory status:   - with assist x1    Social issues:   Social History     Socioeconomic History    Marital status:    Tobacco Use    Smoking status: Never    Smokeless tobacco: Never   Vaping Use    Vaping Use: Never used   Substance and Sexual Activity    Alcohol use: No    Drug use: No       NIH Stroke Scale:       Amilcar Wang RN  01/19/24 13:31 EST

## 2024-01-19 NOTE — CONSULTS
General Surgery H&P/Consultation      Impression/Plan: 78-year-old gentleman with acute cholecystitis.  I recommended proceeding to the operating room for robotic assisted cholecystectomy with intraoperative cholangiogram.  The risk and rationale of the procedure been discussed with him and he has agreed to proceed.  I discussed with him the risk for need for an open procedure, possible injury to the common bile duct, bile leak postoperatively, and the general risks of the procedure.    CC: Abdominal pain, lower right rib pain    HPI:   Mr. Kp Hernandez is a 78 y.o. male that presented to the hospital with progressively worsening right-sided rib and abdominal pain.  He also has had generalized weakness for the last 2 days.  He is normally very active and his wife reports that he walks their dog 2 to 3 miles per day.  Over the last couple days he is become increasingly weak and yesterday he experienced a fall in their home related to his weakness.  He was brought to the emergency room today for further evaluation.  Workup in the emergency room with a CT of his chest did not reveal any acute issues within the chest but did demonstrate cholecystitis.    Past Medical History:   Past Medical History:   Diagnosis Date    Asbestosis     BPH without urinary obstruction     CKD (chronic kidney disease)     COPD (chronic obstructive pulmonary disease)     Depression     Dyslipidemia     Ganglion cyst     GERD without esophagitis     Hearing loss     Hyperlipidemia     Hypertension     Osteoarthritis        Past Surgical History:   Past Surgical History:   Procedure Laterality Date    CERVICAL CHIARI DECOMPRESSION POSTERIOR N/A     COLONOSCOPY  2000    ELBOW ARTHROPLASTY Left     ENDOSCOPY N/A 11/28/2022    Procedure: ESOPHAGOGASTRODUODENOSCOPY with balloon dilatation 18mm-20mm;  Surgeon: Ulysses Eugene MD;  Location: [facility] MAIN OR;  Service: Gastroenterology;  Laterality: N/A;  hiatal hernia, s. ring    HERNIA REPAIR       INGUINAL HERNIA REPAIR      KNEE ACL RECONSTRUCTION Bilateral     SPINE SURGERY      TONSILLECTOMY         Medications:  (Not in a hospital admission)        Current Facility-Administered Medications:     [COMPLETED] Insert Peripheral IV, , , Once **AND** sodium chloride 0.9 % flush 10 mL, 10 mL, Intravenous, PRN, Romaine Gamino MD    Current Outpatient Medications:     albuterol sulfate  (90 Base) MCG/ACT inhaler, Inhale 2 puffs Every 4 (Four) Hours As Needed for Wheezing. Indications: Asthma, Chronic Obstructive Lung Disease, Disp: 6.7 g, Rfl: 2    allopurinol (ZYLOPRIM) 100 MG tablet, Take 1 tablet by mouth twice daily (Patient taking differently: Take 1 tablet by mouth 2 (Two) Times a Day.), Disp: 180 tablet, Rfl: 0    clobetasol (TEMOVATE) 0.05 % cream, APPLY  CREAM TOPICALLY TO APPROPRIATE AREA AS DIRECTED TWICE DAILY TO  HANDS (Patient taking differently: Apply 1 application  topically to the appropriate area as directed 2 (Two) Times a Day.), Disp: 60 g, Rfl: 5    escitalopram (LEXAPRO) 20 MG tablet, Take 1 tablet by mouth Daily., Disp: 90 tablet, Rfl: 3    Farxiga 10 MG tablet, Take 1 tablet by mouth once daily, Disp: 30 tablet, Rfl: 0    finasteride (PROSCAR) 5 MG tablet, TAKE 1 TABLET BY MOUTH ONCE DAILY AS DIRECTED (Patient taking differently: Take 1 tablet by mouth Daily.), Disp: 90 tablet, Rfl: 3    fluticasone (FLONASE) 50 MCG/ACT nasal spray, Use 2 spray(s) in each nostril once daily (Patient taking differently: 2 sprays into the nostril(s) as directed by provider Daily.), Disp: 48 g, Rfl: 0    gabapentin (NEURONTIN) 300 MG capsule, TAKE 1 CAPSULE BY MOUTH IN THE MORNING AND 1 ONCE DAILY AT LUNCH  AND 2 AT NIGHT. CALL OFFICE FOR APPOINTMENT. (Patient taking differently: Take 1 capsule by mouth See Admin Instructions. 1 capsule in the AM (morning) 1 capsules at Lunch (noon) and 2 capsules in the PM), Disp: 360 capsule, Rfl: 0    hydroCHLOROthiazide (HYDRODIURIL) 25 MG tablet, Take 1  tablet by mouth once daily (Patient taking differently: Take 1 tablet by mouth Daily.), Disp: 90 tablet, Rfl: 3    HYDROcodone-acetaminophen (NORCO) 5-325 MG per tablet, Take 2 tablets by mouth Every 6 (Six) Hours As Needed for Severe Pain., Disp: 240 tablet, Rfl: 0    methocarbamol (ROBAXIN) 750 MG tablet, TAKE 1 TABLET BY MOUTH TWICE DAILY AS NEEDED FOR MUSCLE SPASM (Patient taking differently: Take 1 tablet by mouth 2 (Two) Times a Day As Needed.), Disp: 60 tablet, Rfl: 5    metoprolol succinate XL (TOPROL-XL) 25 MG 24 hr tablet, Take 1 tablet by mouth once daily (Patient taking differently: Take 1 tablet by mouth Daily.), Disp: 90 tablet, Rfl: 0    mirtazapine (REMERON) 30 MG tablet, TAKE 1 TABLET BY MOUTH ONCE DAILY AT NIGHT (Patient taking differently: Take 1 tablet by mouth Every Night.), Disp: 90 tablet, Rfl: 1    multivitamin with minerals tablet tablet, Take 1 tablet by mouth Daily., Disp: , Rfl:     pseudoephedrine (SUDAFED) 60 MG tablet, Take 1 tablet by mouth Every 12 (Twelve) Hours As Needed for Congestion (use sparingly as may raise bp)., Disp: 60 tablet, Rfl: 5    rosuvastatin (CRESTOR) 20 MG tablet, TAKE 1 TABLET BY MOUTH ONCE DAILY AT NIGHT (Patient taking differently: Take 1 tablet by mouth Every Night.), Disp: 90 tablet, Rfl: 3    SPIRIVA HANDIHALER 18 MCG per inhalation capsule, Place 1 capsule into inhaler and inhale Daily., Disp: , Rfl:     urea (CARMOL) 40 % cream, Apply  topically to the appropriate area as directed Daily. To palms of both hands (Patient taking differently: Apply 1 application  topically to the appropriate area as directed Daily. To palms of both hands), Disp: 120 g, Rfl: 5    Zinc 50 MG capsule, Take 1 capsule by mouth Daily. 1 chewable daily, Disp: , Rfl:      Allergies: No Known Allergies    Social History:   Social History     Socioeconomic History    Marital status:    Tobacco Use    Smoking status: Never    Smokeless tobacco: Never   Vaping Use    Vaping Use:  Never used   Substance and Sexual Activity    Alcohol use: No    Drug use: No       Family History:   Family History   Problem Relation Age of Onset    Hypertension Mother     Atrial fibrillation Mother     Alcohol abuse Father     Brain cancer Sister     GI problems Brother     COPD Brother        Review of Systems:  A comprehensive review of systems was negative except for the following positives: Abdominal pain, right chest pain    Physical Exam:   Vitals:    01/19/24 1329   BP: 126/72   Pulse: 99   Resp:    Temp:    SpO2: 92%     BMI: Body mass index is 29.81 kg/m².   88.9 kg (195 lb 15.8 oz)      Intake/Output Summary (Last 24 hours) at 1/19/2024 1430  Last data filed at 1/19/2024 1258  Gross per 24 hour   Intake 1050 ml   Output --   Net 1050 ml       GENERAL: no acute distress, awake and alert  RESPIRATORY: symmetric excursion bilaterally, normal work of breathing  CARDIOVASCULAR: Tachycardic, well perfused  GASTROINTESTINAL: Soft, tender to palpation in the right upper quadrant    Pertinent labs:   Results from last 7 days   Lab Units 01/19/24  1059 01/13/24  0800   WBC 10*3/mm3 24.72* 8.62   HEMOGLOBIN g/dL 15.3 15.3   HEMATOCRIT % 44.6 45.9   PLATELETS 10*3/mm3 180 189     Results from last 7 days   Lab Units 01/19/24  1059 01/13/24  0800   SODIUM mmol/L 140 144   POTASSIUM mmol/L 2.9* 3.8   CHLORIDE mmol/L 97* 105   CO2 mmol/L 28.8 25.1   BUN mg/dL 20 24*   CREATININE mg/dL 1.47* 1.20   CALCIUM mg/dL 9.4 9.4   BILIRUBIN mg/dL 1.0  --    ALK PHOS U/L 69  --    ALT (SGPT) U/L 27  --    AST (SGOT) U/L 43*  --    GLUCOSE mg/dL 123* 103*       IMAGING:  CT chest reviewed showing thick-walled appearance of the gallbladder with surrounding inflammation.           David Lakhani MD  General and Endoscopic Surgery  [facility]    [address], [address]  [place] [address]  P: [phone]  F: [phone]

## 2024-01-19 NOTE — PROGRESS NOTES
Clinical Pharmacy Services: Medication History    Kp Hernandez is a 78 y.o. male presenting to [facility] for   Chief Complaint   Patient presents with    Weakness - Generalized    Fall       He  has a past medical history of Asbestosis, BPH without urinary obstruction, CKD (chronic kidney disease), COPD (chronic obstructive pulmonary disease), Depression, Dyslipidemia, Ganglion cyst, GERD without esophagitis, Hearing loss, Hyperlipidemia, Hypertension, and Osteoarthritis.    Allergies as of 01/19/2024    (No Known Allergies)       Medication information was obtained from: Pharmacy  Pharmacy and Phone Number:   [id number] [place] [facility] [place] - [address] [facility] - [phone]  - [phone] FX  [address] [facility] [place] [address]  Phone: [phone] Fax: [phone]    [facility] - [facility] [address] - [phone]  - [phone]   [address]  [facility] [address]  Phone: [phone] Fax: [phone]  \    Prior to Admission Medications       Prescriptions Last Dose Informant Patient Reported? Taking?    albuterol sulfate  (90 Base) MCG/ACT inhaler  Pharmacy No Yes    Inhale 2 puffs Every 4 (Four) Hours As Needed for Wheezing. Indications: Asthma, Chronic Obstructive Lung Disease    allopurinol (ZYLOPRIM) 100 MG tablet  Pharmacy No Yes    Take 1 tablet by mouth twice daily    Patient taking differently:  Take 1 tablet by mouth 2 (Two) Times a Day.    clobetasol (TEMOVATE) 0.05 % cream  Pharmacy No Yes    APPLY  CREAM TOPICALLY TO APPROPRIATE AREA AS DIRECTED TWICE DAILY TO  HANDS    Patient taking differently:  Apply 1 application  topically to the appropriate area as directed 2 (Two) Times a Day.    escitalopram (LEXAPRO) 20 MG tablet  Pharmacy No Yes    Take 1 tablet by mouth Daily.    Farxiga 10 MG tablet  Pharmacy No Yes    Take 1 tablet by mouth once daily    finasteride (PROSCAR) 5 MG tablet  Pharmacy  No Yes    TAKE 1 TABLET BY MOUTH ONCE DAILY AS DIRECTED    Patient taking differently:  Take 1 tablet by mouth Daily.    fluticasone (FLONASE) 50 MCG/ACT nasal spray  Pharmacy No Yes    Use 2 spray(s) in each nostril once daily    Patient taking differently:  2 sprays into the nostril(s) as directed by provider Daily.    gabapentin (NEURONTIN) 300 MG capsule  Pharmacy No Yes    TAKE 1 CAPSULE BY MOUTH IN THE MORNING AND 1 ONCE DAILY AT LUNCH  AND 2 AT NIGHT. CALL OFFICE FOR APPOINTMENT.    Patient taking differently:  Take 1 capsule by mouth See Admin Instructions. 1 capsule in the AM (morning) 1 capsules at Lunch (noon) and 2 capsules in the PM    hydroCHLOROthiazide (HYDRODIURIL) 25 MG tablet  Pharmacy No Yes    Take 1 tablet by mouth once daily    Patient taking differently:  Take 1 tablet by mouth Daily.    HYDROcodone-acetaminophen (NORCO) 5-325 MG per tablet  Pharmacy No Yes    Take 2 tablets by mouth Every 6 (Six) Hours As Needed for Severe Pain.    methocarbamol (ROBAXIN) 750 MG tablet  Pharmacy No Yes    TAKE 1 TABLET BY MOUTH TWICE DAILY AS NEEDED FOR MUSCLE SPASM    Patient taking differently:  Take 1 tablet by mouth 2 (Two) Times a Day As Needed.    metoprolol succinate XL (TOPROL-XL) 25 MG 24 hr tablet  Pharmacy No Yes    Take 1 tablet by mouth once daily    Patient taking differently:  Take 1 tablet by mouth Daily.    mirtazapine (REMERON) 30 MG tablet  Pharmacy No Yes    TAKE 1 TABLET BY MOUTH ONCE DAILY AT NIGHT    Patient taking differently:  Take 1 tablet by mouth Every Night.    multivitamin with minerals tablet tablet  Self Yes Yes    Take 1 tablet by mouth Daily.    pseudoephedrine (SUDAFED) 60 MG tablet  Self No Yes    Take 1 tablet by mouth Every 12 (Twelve) Hours As Needed for Congestion (use sparingly as may raise bp).    rosuvastatin (CRESTOR) 20 MG tablet  Pharmacy No Yes    TAKE 1 TABLET BY MOUTH ONCE DAILY AT NIGHT    Patient taking differently:  Take 1 tablet by mouth Every Night.     SPIRIVA HANDIHALER 18 MCG per inhalation capsule  Pharmacy Yes Yes    Place 1 capsule into inhaler and inhale Daily.    urea (CARMOL) 40 % cream  Self No Yes    Apply  topically to the appropriate area as directed Daily. To palms of both hands    Patient taking differently:  Apply 1 application  topically to the appropriate area as directed Daily. To palms of both hands    Zinc 50 MG capsule  Pharmacy Yes Yes    Take 1 capsule by mouth Daily. 1 chewable daily              Medication notes:     This medication list is complete to the best of my knowledge as of 1/19/2024    Please call if questions.    Petr Keller  Medication History Technician  [phone]    1/19/2024 14:24 EST

## 2024-01-20 LAB
ALBUMIN SERPL-MCNC: 3.3 G/DL (ref 3.5–5.2)
ALBUMIN/GLOB SERPL: 1.2 G/DL
ALP SERPL-CCNC: 68 U/L (ref 39–117)
ALT SERPL W P-5'-P-CCNC: 51 U/L (ref 1–41)
ANION GAP SERPL CALCULATED.3IONS-SCNC: 11 MMOL/L (ref 5–15)
AST SERPL-CCNC: 73 U/L (ref 1–40)
BASOPHILS # BLD AUTO: 0.03 10*3/MM3 (ref 0–0.2)
BASOPHILS NFR BLD AUTO: 0.2 % (ref 0–1.5)
BILIRUB SERPL-MCNC: 0.9 MG/DL (ref 0–1.2)
BUN SERPL-MCNC: 18 MG/DL (ref 8–23)
BUN/CREAT SERPL: 13.2 (ref 7–25)
CALCIUM SPEC-SCNC: 8.5 MG/DL (ref 8.6–10.5)
CHLORIDE SERPL-SCNC: 103 MMOL/L (ref 98–107)
CO2 SERPL-SCNC: 26 MMOL/L (ref 22–29)
CREAT SERPL-MCNC: 1.36 MG/DL (ref 0.76–1.27)
DEPRECATED RDW RBC AUTO: 45 FL (ref 37–54)
EGFRCR SERPLBLD CKD-EPI 2021: 53.6 ML/MIN/1.73
EOSINOPHIL # BLD AUTO: 0 10*3/MM3 (ref 0–0.4)
EOSINOPHIL NFR BLD AUTO: 0 % (ref 0.3–6.2)
ERYTHROCYTE [DISTWIDTH] IN BLOOD BY AUTOMATED COUNT: 13.2 % (ref 12.3–15.4)
GLOBULIN UR ELPH-MCNC: 2.7 GM/DL
GLUCOSE BLDC GLUCOMTR-MCNC: 137 MG/DL (ref 70–130)
GLUCOSE SERPL-MCNC: 134 MG/DL (ref 65–99)
HCT VFR BLD AUTO: 38.4 % (ref 37.5–51)
HGB BLD-MCNC: 13.1 G/DL (ref 13–17.7)
IMM GRANULOCYTES # BLD AUTO: 0.3 10*3/MM3 (ref 0–0.05)
IMM GRANULOCYTES NFR BLD AUTO: 1.6 % (ref 0–0.5)
LYMPHOCYTES # BLD AUTO: 0.63 10*3/MM3 (ref 0.7–3.1)
LYMPHOCYTES NFR BLD AUTO: 3.4 % (ref 19.6–45.3)
MAGNESIUM SERPL-MCNC: 2 MG/DL (ref 1.6–2.4)
MCH RBC QN AUTO: 32.1 PG (ref 26.6–33)
MCHC RBC AUTO-ENTMCNC: 34.1 G/DL (ref 31.5–35.7)
MCV RBC AUTO: 94.1 FL (ref 79–97)
MONOCYTES # BLD AUTO: 1.2 10*3/MM3 (ref 0.1–0.9)
MONOCYTES NFR BLD AUTO: 6.6 % (ref 5–12)
NEUTROPHILS NFR BLD AUTO: 16.15 10*3/MM3 (ref 1.7–7)
NEUTROPHILS NFR BLD AUTO: 88.2 % (ref 42.7–76)
NRBC BLD AUTO-RTO: 0 /100 WBC (ref 0–0.2)
PLATELET # BLD AUTO: 161 10*3/MM3 (ref 140–450)
PMV BLD AUTO: 9.5 FL (ref 6–12)
POTASSIUM SERPL-SCNC: 3.4 MMOL/L (ref 3.5–5.2)
POTASSIUM SERPL-SCNC: 4 MMOL/L (ref 3.5–5.2)
POTASSIUM SERPL-SCNC: 4.1 MMOL/L (ref 3.5–5.2)
PROT SERPL-MCNC: 6 G/DL (ref 6–8.5)
QT INTERVAL: 363 MS
QTC INTERVAL: 464 MS
RBC # BLD AUTO: 4.08 10*6/MM3 (ref 4.14–5.8)
SODIUM SERPL-SCNC: 140 MMOL/L (ref 136–145)
WBC NRBC COR # BLD AUTO: 18.31 10*3/MM3 (ref 3.4–10.8)

## 2024-01-20 PROCEDURE — 25010000002 SODIUM CHLORIDE 0.9 % WITH KCL 20 MEQ 20-0.9 MEQ/L-% SOLUTION: Performed by: SURGERY

## 2024-01-20 PROCEDURE — 97530 THERAPEUTIC ACTIVITIES: CPT

## 2024-01-20 PROCEDURE — 97162 PT EVAL MOD COMPLEX 30 MIN: CPT

## 2024-01-20 PROCEDURE — 85025 COMPLETE CBC W/AUTO DIFF WBC: CPT | Performed by: SURGERY

## 2024-01-20 PROCEDURE — 94640 AIRWAY INHALATION TREATMENT: CPT

## 2024-01-20 PROCEDURE — 82948 REAGENT STRIP/BLOOD GLUCOSE: CPT

## 2024-01-20 PROCEDURE — 25010000002 PROCHLORPERAZINE 10 MG/2ML SOLUTION: Performed by: SURGERY

## 2024-01-20 PROCEDURE — 25010000002 PIPERACILLIN SOD-TAZOBACTAM PER 1 G: Performed by: SURGERY

## 2024-01-20 PROCEDURE — 94761 N-INVAS EAR/PLS OXIMETRY MLT: CPT

## 2024-01-20 PROCEDURE — 99024 POSTOP FOLLOW-UP VISIT: CPT | Performed by: SURGERY

## 2024-01-20 PROCEDURE — 94799 UNLISTED PULMONARY SVC/PX: CPT

## 2024-01-20 PROCEDURE — 25010000002 POTASSIUM CHLORIDE 10 MEQ/100ML SOLUTION: Performed by: INTERNAL MEDICINE

## 2024-01-20 PROCEDURE — 94664 DEMO&/EVAL PT USE INHALER: CPT

## 2024-01-20 PROCEDURE — 25010000002 ONDANSETRON PER 1 MG: Performed by: SURGERY

## 2024-01-20 PROCEDURE — 83735 ASSAY OF MAGNESIUM: CPT | Performed by: SURGERY

## 2024-01-20 PROCEDURE — 84132 ASSAY OF SERUM POTASSIUM: CPT | Performed by: INTERNAL MEDICINE

## 2024-01-20 PROCEDURE — 80053 COMPREHEN METABOLIC PANEL: CPT | Performed by: SURGERY

## 2024-01-20 PROCEDURE — 25010000002 HYDROMORPHONE PER 4 MG: Performed by: SURGERY

## 2024-01-20 PROCEDURE — 25010000002 ENOXAPARIN PER 10 MG: Performed by: SURGERY

## 2024-01-20 RX ORDER — ACETAMINOPHEN 500 MG
1000 TABLET ORAL EVERY 6 HOURS PRN
Status: DISCONTINUED | OUTPATIENT
Start: 2024-01-20 | End: 2024-01-24 | Stop reason: HOSPADM

## 2024-01-20 RX ORDER — POTASSIUM CHLORIDE 7.45 MG/ML
10 INJECTION INTRAVENOUS
Status: COMPLETED | OUTPATIENT
Start: 2024-01-20 | End: 2024-01-20

## 2024-01-20 RX ORDER — ENOXAPARIN SODIUM 100 MG/ML
40 INJECTION SUBCUTANEOUS DAILY
Status: DISCONTINUED | OUTPATIENT
Start: 2024-01-20 | End: 2024-01-24 | Stop reason: HOSPADM

## 2024-01-20 RX ORDER — POTASSIUM CHLORIDE 750 MG/1
40 TABLET, FILM COATED, EXTENDED RELEASE ORAL EVERY 4 HOURS
Status: DISPENSED | OUTPATIENT
Start: 2024-01-20 | End: 2024-01-20

## 2024-01-20 RX ADMIN — PIPERACILLIN SODIUM AND TAZOBACTAM SODIUM 3.38 G: 3; .375 INJECTION, SOLUTION INTRAVENOUS at 11:18

## 2024-01-20 RX ADMIN — GABAPENTIN 300 MG: 300 CAPSULE ORAL at 08:40

## 2024-01-20 RX ADMIN — PIPERACILLIN SODIUM AND TAZOBACTAM SODIUM 3.38 G: 3; .375 INJECTION, SOLUTION INTRAVENOUS at 19:58

## 2024-01-20 RX ADMIN — HYDROMORPHONE HYDROCHLORIDE 0.5 MG: 1 INJECTION, SOLUTION INTRAMUSCULAR; INTRAVENOUS; SUBCUTANEOUS at 02:19

## 2024-01-20 RX ADMIN — PIPERACILLIN SODIUM AND TAZOBACTAM SODIUM 3.38 G: 3; .375 INJECTION, SOLUTION INTRAVENOUS at 03:23

## 2024-01-20 RX ADMIN — CLOBETASOL PROPIONATE CREAM USP, 0.05% 1 APPLICATION: 0.5 CREAM TOPICAL at 08:41

## 2024-01-20 RX ADMIN — HYDROMORPHONE HYDROCHLORIDE 0.5 MG: 1 INJECTION, SOLUTION INTRAMUSCULAR; INTRAVENOUS; SUBCUTANEOUS at 09:58

## 2024-01-20 RX ADMIN — ROSUVASTATIN CALCIUM 20 MG: 20 TABLET, FILM COATED ORAL at 20:04

## 2024-01-20 RX ADMIN — TIOTROPIUM BROMIDE INHALATION SPRAY 2 PUFF: 3.12 SPRAY, METERED RESPIRATORY (INHALATION) at 06:48

## 2024-01-20 RX ADMIN — POTASSIUM CHLORIDE 10 MEQ: 7.46 INJECTION, SOLUTION INTRAVENOUS at 04:15

## 2024-01-20 RX ADMIN — HYDROMORPHONE HYDROCHLORIDE 0.5 MG: 1 INJECTION, SOLUTION INTRAMUSCULAR; INTRAVENOUS; SUBCUTANEOUS at 00:07

## 2024-01-20 RX ADMIN — ALLOPURINOL 100 MG: 100 TABLET ORAL at 08:40

## 2024-01-20 RX ADMIN — Medication 10 ML: at 20:06

## 2024-01-20 RX ADMIN — ENOXAPARIN SODIUM 40 MG: 100 INJECTION SUBCUTANEOUS at 08:40

## 2024-01-20 RX ADMIN — Medication 10 ML: at 08:41

## 2024-01-20 RX ADMIN — POTASSIUM CHLORIDE 10 MEQ: 7.46 INJECTION, SOLUTION INTRAVENOUS at 05:24

## 2024-01-20 RX ADMIN — POTASSIUM CHLORIDE 10 MEQ: 7.46 INJECTION, SOLUTION INTRAVENOUS at 01:05

## 2024-01-20 RX ADMIN — PROCHLORPERAZINE EDISYLATE 5 MG: 5 INJECTION INTRAMUSCULAR; INTRAVENOUS at 02:19

## 2024-01-20 RX ADMIN — POTASSIUM CHLORIDE AND SODIUM CHLORIDE 100 ML/HR: 900; 150 INJECTION, SOLUTION INTRAVENOUS at 09:58

## 2024-01-20 RX ADMIN — ONDANSETRON 4 MG: 2 INJECTION INTRAMUSCULAR; INTRAVENOUS at 11:18

## 2024-01-20 RX ADMIN — ALLOPURINOL 100 MG: 100 TABLET ORAL at 20:04

## 2024-01-20 RX ADMIN — HYDROMORPHONE HYDROCHLORIDE 0.5 MG: 1 INJECTION, SOLUTION INTRAMUSCULAR; INTRAVENOUS; SUBCUTANEOUS at 05:24

## 2024-01-20 RX ADMIN — POTASSIUM CHLORIDE 10 MEQ: 7.46 INJECTION, SOLUTION INTRAVENOUS at 02:19

## 2024-01-20 RX ADMIN — MIRTAZAPINE 30 MG: 30 TABLET, FILM COATED ORAL at 20:04

## 2024-01-20 RX ADMIN — HYDROCODONE BITARTRATE AND ACETAMINOPHEN 2 TABLET: 5; 325 TABLET ORAL at 22:07

## 2024-01-20 RX ADMIN — GABAPENTIN 300 MG: 300 CAPSULE ORAL at 12:33

## 2024-01-20 NOTE — PROGRESS NOTES
Postop day 1 from robotic assisted cholecystectomy with cholangiogram.  Continue Zosyn for gangrenous cholecystitis and localized peritonitis noted intraoperatively.  Anticipate for days of antibiotics postoperatively.  Okay for diet as tolerated.  Incisions are in good order.  Drain is thin, serosanguineous, slightly murky in character.

## 2024-01-20 NOTE — ANESTHESIA POSTPROCEDURE EVALUATION
"Patient: Kp Hernandez    Procedure Summary       Date: 01/19/24 Room / Location: [facility] OR [address] [place] [address] MAIN OR    Anesthesia Start: 1638 Anesthesia Stop: 1913    Procedure: CHOLECYSTECTOMY LAPAROSCOPIC WITH DAVINCI ROBOT (Abdomen) Diagnosis:     Surgeons: David Lakhani MD Provider: John Watkins MD    Anesthesia Type: general ASA Status: 3            Anesthesia Type: general    Vitals  Vitals Value Taken Time   /85 01/19/24 2015   Temp 37 °C (98.6 °F) 01/19/24 2000   Pulse 91 01/19/24 2017   Resp 20 01/19/24 2000   SpO2 92 % 01/19/24 2017   Vitals shown include unfiled device data.        Post Anesthesia Care and Evaluation    Pain management: adequate    Airway patency: patent  Anesthetic complications: No anesthetic complications    Cardiovascular status: acceptable  Respiratory status: acceptable  Hydration status: acceptable    Comments: /60   Pulse 93   Temp 37 °C (98.6 °F) (Oral)   Resp 20   Ht 175.3 cm (69\")   Wt 88.9 kg (195 lb 15.8 oz)   SpO2 93%   BMI 28.94 kg/m²       "

## 2024-01-20 NOTE — OP NOTE
OPERATIVE REPORT     DATE OF OPERATION: 01/19/24     SURGEON:   David Lakhani MD    Assistant: Nadira Taylor RNFA was responsible for performing the following activities: Suturing, Closing, Placing Dressing, and Held/Positioned Camera and their skilled assistance was necessary for the success of this case.      PREOPERATIVE DIAGNOSIS: Acute cholecystitis    POSTOPERATIVE DIAGNOSIS: Acute gangrenous cholecystitis    PROCEDURE PERFORMED: Robotic assisted cholecystectomy with intraoperative cholangiogram    ANESTHESIA: General    SPECIMEN: Gallbladder    DRAINS: 19 [place] Kiel drain    BLOOD LOSS: 50 cc    INDICATIONS FOR OPERATION: Mr. Kp Hernandez is a 78 y.o. year old senting to the hospital with complaint of weakness.  He had a fall yesterday and underwent CT of his chest to evaluate for rib fractures.  He was noted to have a severe acute cholecystitis. Robotic cholecystectomy with cholangiogram was recommended. All risks (including bleeding, infection, damage to surrounding structures, bile duct injury, and bile leak), benefits, and alternatives were explained to the patient and he agreed and wished to proceed.  Informed consent was obtained.    FINDINGS:   Gangrenous, distended gallbladder  Critical view of safety prior to performing cholangiogram  Normal intraoperative cholangiogram with retrohepatic filling of left and right hepatic duct, brisk emptying of contrast into duodenum without filling defect.    OPERATIVE REPORT: The patient was taken to the operating room, transferred onto the operating room table, and underwent general endotracheal anesthesia without incident. The patient was prepped and draped in the usual sterile fashion.  Preoperative antibiotics were given, and a timeout was performed.  Half percent Marcaine with epinephrine was and injected into the skin and subcutaneous tissues prior to all incisions.  After confirmation of an orogastric tube being placed, a veress needle was inserted  at pulido's point.  A reassuring saline drop test was performed.  The abdomen was insufflated to 15 mmHg.  The viscera underlying the veress needle was inspected for evidence of injury and the veress needle withdrawn.  The abdomen was entered using an optiview technique through the left rectus near the level of the umbilicus.  2 additional 8 mm trocars were placed in the right abdomen near the level of the umbilicus. An additional trocar was placed in the left upper quadrant.  The gallbladder was identified under the omentum and the right upper quadrant.  It was distended and gangrenous in appearance.  I aspirated dark bilious fluid from the colon using the aspiration needle.  This decompressed the gallbladder so that it can be grasped.  The Triggertrap robot system was docked and the remainder of the procedure was completed with assistance from the Triggertrap robot.  I began by lifting up the gallbladder and dissecting the omentum off of the gallbladder with the hook cautery and the suction .  Once the omentum was freed from the gallbladder I was able to retracted cranially and laterally.  This allowed me to grasp the infundibulum and with the hook cautery I incised the peritoneum around this.  I then dissected the cystic triangle with the cautery and the suction .  I was able to isolate the cystic artery and the cystic duct.  Using ICG I was able to clearly delineate the common bile duct and was able to see the cystic duct going into it.  There was no ICG which filled the cystic duct consistent with his severe acute cholecystitis.  I placed 2 clips proximally on the cystic artery and 1 distally.  I then divided the artery with the cautery.  A clip was placed on the cystic duct near the infundibulum.  I then incised the cystic duct with the hook cautery and the cholangiogram catheter was placed into the cystic duct.  It was secured in place with a clip.  With fluoroscopy, contrast was injected and  confirmed the anatomy and that there were no stones present.  Contrast was seen going into the right and left hepatic ducts as well as the duodenum.  The cholangiogram catheter was then removed.  An additional clip was placed on the bile duct side and the duct was transected.  The cystic artery was also clipped and divided.  Hook electrocautery was then used to remove the gallbladder from the liver bed.  The gallbladder was placed into a bag. Hemostasis of the cystic plate was obtained with the hook cautery. The area was then irrigated and inspected for bleeding and bile leak.  No bleeding or bile was noted.  The gallbladder was removed through the trocar site placed through the left rectus muscle.  A 0 vicryl was placed using a suture passer to reapproximate the fascia at the extraction site. 3 grams of Arminda was applied to the gallbladder fossa.  A 19 [place] Kiel drain was placed with tip terminating in the gallbladder fossa and brought out through the right lateral port.  The remaining ports removed under direct visualization.  The incisions were then closed with 4-0 monocryl sutures and Dermabond.  All needle and lap counts were correct at the end of the case.  The patient was awoken from general endotracheal anesthesia and taken to the recovery area for further monitoring.    David Lakhani M.D.  General and Endoscopic Surgery  [facility]    [address], [address]  [place], [place], [address]  P: [phone]  F: [phone]

## 2024-01-20 NOTE — PROGRESS NOTES
Name: Kp Hernandez ADMIT: 2024   : 1945  PCP: Murphy Felix DO    MRN: [id number] LOS: 1 days   AGE/SEX: 78 y.o. male  ROOM: [facility]   Subjective   Chief Complaint   Patient presents with    Weakness - Generalized    Fall     79 yo with history of COPD, CKD, HTN who presents with ewakness and falls and found to have acute gangrenous cholecystitis. He underwent cholecystectomy with Dr. Lakhani on     Still with pain  On IVFs  On IV ABX    ROS  No f/c  + n/v  No cp/palp  No soa/cough    Objective   Vital Signs  Temp:  [97.7 °F (36.5 °C)-102 °F (38.9 °C)] 98.2 °F (36.8 °C)  Heart Rate:  [] 86  Resp:  [16-20] 18  BP: (110-163)/() 122/70  SpO2:  [85 %-97 %] 85 %  on  Flow (L/min):  [2-5] 3;   Device (Oxygen Therapy): nasal cannula  Body mass index is 28.94 kg/m².    Physical Exam  HENT:      Head: Normocephalic and atraumatic.   Eyes:      General: No scleral icterus.  Cardiovascular:      Rate and Rhythm: Normal rate and regular rhythm.      Heart sounds: Normal heart sounds.   Pulmonary:      Effort: Pulmonary effort is normal. No respiratory distress.      Breath sounds: Normal breath sounds.   Abdominal:      General: There is distension (slight).      Palpations: Abdomen is soft.      Tenderness: There is abdominal tenderness (with post op changes). There is no guarding.   Musculoskeletal:      Cervical back: Neck supple.   Neurological:      Mental Status: He is alert.   Psychiatric:         Behavior: Behavior normal.         Results Review:       I reviewed the patient's new clinical results.  Results from last 7 days   Lab Units 24  0636 24  1059   WBC 10*3/mm3 18.31* 24.72*   HEMOGLOBIN g/dL 13.1 15.3   PLATELETS 10*3/mm3 161 180     Results from last 7 days   Lab Units 24  0636 24  2334 24  1059   SODIUM mmol/L 140  --  140   POTASSIUM mmol/L 4.0 3.4* 2.9*   CHLORIDE mmol/L 103  --  97*   CO2 mmol/L 26.0  --  28.8   BUN mg/dL 18  --  20    CREATININE mg/dL 1.36*  --  1.47*   GLUCOSE mg/dL 134*  --  123*   Estimated Creatinine Clearance: 50.2 mL/min (A) (by C-G formula based on SCr of 1.36 mg/dL (H)).  Results from last 7 days   Lab Units 01/20/24  0636 01/19/24  1059   ALBUMIN g/dL 3.3* 4.1   BILIRUBIN mg/dL 0.9 1.0   ALK PHOS U/L 68 69   AST (SGOT) U/L 73* 43*   ALT (SGPT) U/L 51* 27     Results from last 7 days   Lab Units 01/20/24  0636 01/19/24  1059   CALCIUM mg/dL 8.5* 9.4   ALBUMIN g/dL 3.3* 4.1   MAGNESIUM mg/dL 2.0 2.1     Results from last 7 days   Lab Units 01/19/24  1059   PROCALCITONIN ng/mL 4.75*   LACTATE mmol/L 1.7       Coag   Results from last 7 days   Lab Units 01/19/24  1059   INR  1.16*   APTT seconds 32.5     HbA1C   Lab Results   Component Value Date    HGBA1C 5.7 (H) 03/21/2023     Infection   Results from last 7 days   Lab Units 01/19/24  1059   PROCALCITONIN ng/mL 4.75*     Radiology(recent) FL Cholangiogram Operative    Result Date: 1/19/2024  Patent cystic and common bile duct. Filling defects in the biliary tree are suspected to at least in part represent air bubbles.  This report was finalized on 1/19/2024 8:56 PM by Dr. Yonis Rizzo M.D on Workstation: [id number]      CT Chest Without Contrast Diagnostic    Result Date: 1/19/2024  1. Findings are consistent with acute cholecystitis with gallbladder wall thickening and pericholecystic stranding. Stones or sludge layer dependently within the gallbladder. Potential secondary colitis hepatic flexure. 2. Subsegmental atelectasis in the lungs. No pleural effusion or pneumothorax. 3. Atherosclerotic disease with coronary arterial calcification.  Findings discussed with ALLAN Bradford in the emergency department, on 01/19/2024 at 11:30 a.m.  Radiation dose reduction techniques were utilized, including automated exposure control and exposure modulation based on body size.   This report was finalized on 1/19/2024 1:39 PM by Dr. Ezequiel Smith M.D on Workstation:  "[id number]     No results found for: \"TROPONINT\", \"TROPONINI\", \"BNP\"  No components found for: \"TSH;2\"    acetaminophen, 1,000 mg, Oral, Q6H  allopurinol, 100 mg, Oral, BID  clobetasol, 1 application , Topical, BID  empagliflozin, 10 mg, Oral, Daily  enoxaparin, 40 mg, Subcutaneous, Daily  escitalopram, 20 mg, Oral, Daily  finasteride, 5 mg, Oral, Daily  fluticasone, 2 spray, Nasal, Daily  gabapentin, 300 mg, Oral, BID  gabapentin, 600 mg, Oral, Q PM  indocyanine green, 2.5 mg, Intravenous, Once  metoprolol succinate XL, 25 mg, Oral, Daily  mirtazapine, 30 mg, Oral, Nightly  piperacillin-tazobactam, 3.375 g, Intravenous, Q8H  rosuvastatin, 20 mg, Oral, Nightly  sodium chloride, 10 mL, Intravenous, Q12H  tiotropium bromide monohydrate, 2 puff, Inhalation, Daily - RT  urea, , Topical, Q24H      sodium chloride 0.9 % with KCl 20 mEq, 100 mL/hr, Last Rate: 100 mL/hr (01/20/24 0958)    Diet: Regular/House Diet, Cardiac Diets; Healthy Heart (2-3 Na+); Texture: Regular Texture (IDDSI 7); Fluid Consistency: Thin (IDDSI 0)      Assessment & Plan      Active Hospital Problems    Diagnosis  POA    **Cholecystitis [K81.9]  Yes    Fall [W19.XXXA]  Yes    Hypokalemia [E87.6]  Yes    CKD (chronic kidney disease) stage 2, GFR 60-89 ml/min [N18.2]  Yes    COPD mixed type [J44.9]  Yes    Essential hypertension [I10]  Yes    Depression with anxiety [F41.8]  Yes      Resolved Hospital Problems   No resolved problems to display.     77 yo with history of COPD, CKD, HTN who presents with ewakness and falls and found to have acute gangrenous cholecystitis. He underwent cholecystectomy with Dr. Lakhani on 1/19    Continue IVFs, IV ABX, monitor labs  PRN agents for pain control. Surgery monitoring in post-operative state   Bronchodilators/oxygen.       DW staff  DW family  Reviewed records       Sonny Verduzco MD  [facility]  01/20/24  10:27 EST  "

## 2024-01-20 NOTE — THERAPY EVALUATION
Patient Name: Kp Hernandez  : 1945    MRN: [id number]                              Today's Date: 2024       Admit Date: 2024    Visit Dx:     ICD-10-CM ICD-9-CM   1. Acute cholecystitis  K81.0 575.0   2. Sepsis without acute organ dysfunction, due to unspecified organism  A41.9 038.9     995.91   3. Weakness  R53.1 780.79   4. Hypokalemia  E87.6 276.8     Patient Active Problem List   Diagnosis    Essential hypertension    Benign prostatic hypertrophy without urinary obstruction    CKD (chronic kidney disease) stage 2, GFR 60-89 ml/min    COPD mixed type    Depression with anxiety    Dyslipidemia    Ganglion    Gastroesophageal reflux disease without esophagitis    Hearing loss    Osteoarthritis    Impacted cerumen    Pituitary microadenoma    History of gout    Esophageal dysphagia    Encounter for screening colonoscopy    Cholecystitis    Fall    Hypokalemia     Past Medical History:   Diagnosis Date    Asbestosis     BPH without urinary obstruction     CKD (chronic kidney disease)     COPD (chronic obstructive pulmonary disease)     Depression     Dyslipidemia     Ganglion cyst     GERD without esophagitis     Hearing loss     Hyperlipidemia     Hypertension     Osteoarthritis      Past Surgical History:   Procedure Laterality Date    CERVICAL CHIARI DECOMPRESSION POSTERIOR N/A     COLONOSCOPY      ELBOW ARTHROPLASTY Left     ENDOSCOPY N/A 2022    Procedure: ESOPHAGOGASTRODUODENOSCOPY with balloon dilatation 18mm-20mm;  Surgeon: Ulysses Eugene MD;  Location: [facility] MAIN OR;  Service: Gastroenterology;  Laterality: N/A;  hiatal hernia, s. ring    HERNIA REPAIR      INGUINAL HERNIA REPAIR      KNEE ACL RECONSTRUCTION Bilateral     SPINE SURGERY      TONSILLECTOMY        General Information       Row Name 24 1408          Physical Therapy Time and Intention    Document Type evaluation  -ZB     Mode of Treatment individual therapy;physical therapy  -ZB       Row Name 24  1408          General Information    Patient Profile Reviewed yes  -ZB     Prior Level of Function independent:;community mobility;ADL's  -ZB     Existing Precautions/Restrictions fall  CED drain  -ZB     Barriers to Rehab none identified  -ZB       Row Name 01/20/24 1408          Living Environment    People in Home spouse  -ZB       Row Name 01/20/24 1408          Home Main Entrance    Number of Stairs, Main Entrance one  -ZB       Row Name 01/20/24 1408          Stairs Within Home, Primary    Number of Stairs, Within Home, Primary none  -ZB       Row Name 01/20/24 1408          Cognition    Orientation Status (Cognition) oriented x 4  -ZB       Row Name 01/20/24 1408          Safety Issues, Functional Mobility    Safety Issues Affecting Function (Mobility) awareness of need for assistance  -ZB     Impairments Affecting Function (Mobility) balance;endurance/activity tolerance;strength;pain  -ZB     Comment, Safety Issues/Impairments (Mobility) gait belt and non skid socks donned  -ZB               User Key  (r) = Recorded By, (t) = Taken By, (c) = Cosigned By      Initials Name Provider Type    ZB Gucci Workman, PT Physical Therapist                   Mobility       Row Name 01/20/24 1409          Bed Mobility    Comment, (Bed Mobility) NT UIC  -ZB       Row Name 01/20/24 1409          Bed-Chair Transfer    Bed-Chair [place] (Transfers) not tested  -ZB     Comment, (Bed-Chair Transfer) NT UIC  -ZB       Row Name 01/20/24 1409          Sit-Stand Transfer    Sit-Stand [place] (Transfers) contact guard  -ZB     Assistive Device (Sit-Stand Transfers) walker, front-wheeled  -ZB     Comment, (Sit-Stand Transfer) lost balance following initial stand and fell back into chair; able to remain upright with rwx on second attempt  -ZB       Row Name 01/20/24 1409          Gait/Stairs (Locomotion)    [place] Level (Gait) contact guard  -ZB     Assistive Device (Gait) walker, front-wheeled  -ZB     Patient was able  to Ambulate yes  -ZB     Distance in Feet (Gait) 12  -ZB     Deviations/Abnormal Patterns (Gait) base of support, wide;[place] decreased;gait speed decreased;stride length decreased  -ZB     Bilateral Gait Deviations forward flexed posture  -ZB     [place] Level (Stairs) not tested  -ZB     Comment, (Gait/Stairs) overall mildly unsteady during gait despite use of rwx, no overt LOB but patient reported increasing lightheadedness  -ZB               User Key  (r) = Recorded By, (t) = Taken By, (c) = Cosigned By      Initials Name Provider Type    ZB Gucci Workman, PT Physical Therapist                   Obj/Interventions       Row Name 01/20/24 1410          Range of Motion Comprehensive    General Range of Motion bilateral lower extremity ROM WFL  -ZB       Row Name 01/20/24 1410          Strength Comprehensive (MMT)    Comment, General Manual Muscle Testing (MMT) Assessment gross B LE MMT 4-/5  -ZB       Row Name 01/20/24 1410          Motor Skills    Motor Skills functional endurance  -ZB     Functional Endurance Limited by light head this PM  -ZB       Row Name 01/20/24 1410          Balance    Balance Assessment ankle strategy assessment  -ZB     Balance Interventions sitting;standing;sit to stand;supported;static;dynamic  -ZB       Row Name 01/20/24 1410          Sensory Assessment (Somatosensory)    Sensory Assessment (Somatosensory) sensation intact  -ZB               User Key  (r) = Recorded By, (t) = Taken By, (c) = Cosigned By      Initials Name Provider Type    ZB Gucci Workman, PT Physical Therapist                   Goals/Plan       Row Name 01/20/24 1417          Bed Mobility Goal 1 (PT)    Activity/Assistive Device (Bed Mobility Goal 1, PT) bed mobility activities, all  -ZB     [place] Level/Cues Needed (Bed Mobility Goal 1, PT) independent  -ZB     Time Frame (Bed Mobility Goal 1, PT) short term goal (STG);1 week  -ZB       Row Name 01/20/24 1417          Transfer Goal 1 (PT)     Activity/Assistive Device (Transfer Goal 1, PT) sit-to-stand/stand-to-sit;bed-to-chair/chair-to-bed  -ZB     [place] Level/Cues Needed (Transfer Goal 1, PT) modified independence  -ZB     Time Frame (Transfer Goal 1, PT) short term goal (STG);1 week  -ZB       Row Name 01/20/24 1417          Gait Training Goal 1 (PT)    Activity/Assistive Device (Gait Training Goal 1, PT) gait (walking locomotion)  -ZB     [place] Level (Gait Training Goal 1, PT) modified independence  -ZB     Distance (Gait Training Goal 1, PT) 150  -ZB     Time Frame (Gait Training Goal 1, PT) short term goal (STG);1 week  -ZB       Row Name 01/20/24 1417          Stairs Goal 1 (PT)    Activity/Assistive Device (Stairs Goal 1, PT) stairs, all skills  -ZB     [place] Level/Cues Needed (Stairs Goal 1, PT) modified independence  -ZB     Number of Stairs (Stairs Goal 1, PT) 1  -ZB     Time Frame (Stairs Goal 1, PT) short term goal (STG);1 week  -ZB       Row Name 01/20/24 1417          Therapy Assessment/Plan (PT)    Planned Therapy Interventions (PT) balance training;bed mobility training;gait training;home exercise program;strengthening;stair training;patient/family education;transfer training  -ZB               User Key  (r) = Recorded By, (t) = Taken By, (c) = Cosigned By      Initials Name Provider Type    ZB Gucci Workman, PT Physical Therapist                   Clinical Impression       Row Name 01/20/24 1411          Pain    Pain Location incisional  -ZB     Pain Location - flank  -ZB     Pre/Posttreatment Pain Comment complained of incisional discomfort but did not provide rating  -ZB     Pain Intervention(s) Ambulation/increased activity;Repositioned;Rest  -ZB       Row Name 01/20/24 1411          Plan of Care Review    Plan of Care Reviewed With patient;spouse  -ZB     Outcome Evaluation Patient is a 77 y/o male evaluated by therapy POD 1 s/p laproscopic cholecystectomy. PMHx includes but is not limited to: CKD, BPH, COPD,  OA, HTN, and dyslipidemia. The patient lives with his spouse in a SSH with 1 GABO and reports (I) ADLs and community mobility without us of AD at baseline. The patient does not require supplemental O2 at baseline. Today he is [facility] on arrival, performed STS cga with posterior LOB after brief standing period which resulted in falling back into the recliner, but was able to maintain standing balance on second attempt with use of rwx. The patient ambulated 12' cga + rwx with no overt LOB but is mildly unsteady overall and limited by increasing lightheadedness this session. Anticipated DC home with assist, pending acute progress. The patient may need a rolling walker at DC to ensure safety with ambulation once home. PT will continue to monitor and progress as tolerated.  -ZB       Row Name 01/20/24 1411          Therapy Assessment/Plan (PT)    Rehab Potential (PT) good, to achieve stated therapy goals  -ZB     Criteria for Skilled Interventions Met (PT) yes  -ZB     Therapy Frequency (PT) 5 times/wk  -ZB       Row Name 01/20/24 1411          Vital Signs    Pre SpO2 (%) 96  -ZB     O2 Delivery Pre Treatment nasal cannula  -ZB     Intra SpO2 (%) 92  -ZB     O2 Delivery Intra Treatment nasal cannula  -ZB     Post SpO2 (%) 96  -ZB     O2 Delivery Post Treatment nasal cannula  -ZB     Pre Patient Position Sitting  -ZB     Intra Patient Position Standing  -ZB     Post Patient Position Sitting  -ZB       Row Name 01/20/24 1411          Positioning and Restraints    Pre-Treatment Position sitting in chair/recliner  -ZB     Post Treatment Position chair  -ZB     In Chair notified nsg;reclined;call light within reach;encouraged to call for assist;with family/caregiver  -ZB               User Key  (r) = Recorded By, (t) = Taken By, (c) = Cosigned By      Initials Name Provider Type    ZB Gucci Workman, PT Physical Therapist                   Outcome Measures       Row Name 01/20/24 1417 01/20/24 0819       How much help from another  person do you currently need...    Turning from your back to your side while in flat bed without using bedrails? 3  -ZB 3  -TH    Moving from lying on back to sitting on the side of a flat bed without bedrails? 3  -ZB 3  -TH    Moving to and from a bed to a chair (including a wheelchair)? 3  -ZB 2  -TH    Standing up from a chair using your arms (e.g., wheelchair, bedside chair)? 3  -ZB 2  -TH    Climbing 3-5 steps with a railing? 2  -ZB 2  -TH    To walk in hospital room? 3  -ZB 2  -TH    AM-PAC 6 Clicks Score (PT) 17  -ZB 14  -TH    Highest Level of Mobility Goal 5 --> Static standing  -ZB 4 --> Transfer to chair/commode  -TH              User Key  (r) = Recorded By, (t) = Taken By, (c) = Cosigned By      Initials Name Provider Type    TH Coni Mendez, RN Registered Nurse    Gucci Bearden, PT Physical Therapist                                 Physical Therapy Education       Title: PT OT SLP Therapies (Done)       Topic: Physical Therapy (Done)       Point: Mobility training (Done)       Learning Progress Summary             Patient Acceptance, E,D, VU,DU by JAY JAY at 1/20/2024 1418   Significant Other Acceptance, E,D, VU,DU by JAY JAY at 1/20/2024 1418                         Point: Home exercise program (Done)       Learning Progress Summary             Patient Acceptance, E,D, VU,DU by JAY JAY at 1/20/2024 1418   Significant Other Acceptance, E,D, VU,DU by JAY JAY at 1/20/2024 1418                         Point: Body mechanics (Done)       Learning Progress Summary             Patient Acceptance, E,D, VU,DU by JAY JAY at 1/20/2024 1418   Significant Other Acceptance, E,D, VU,DU by JAY JAY at 1/20/2024 1418                         Point: Precautions (Done)       Learning Progress Summary             Patient Acceptance, E,D, VU,DU by JAY JAY at 1/20/2024 1418   Significant Other Acceptance, E,D, VU,DU by JAY JAY at 1/20/2024 1418                                         User Key       Initials Effective Dates Name Provider Type Discipline    JAY JAY  08/30/23 -  Gucci Workman, PT Physical Therapist PT                  PT Recommendation and Plan  Planned Therapy Interventions (PT): balance training, bed mobility training, gait training, home exercise program, strengthening, stair training, patient/family education, transfer training  Plan of Care Reviewed With: patient, spouse  Outcome Evaluation: Patient is a 77 y/o male evaluated by therapy POD 1 s/p laproscopic cholecystectomy. PMHx includes but is not limited to: CKD, BPH, COPD, OA, HTN, and dyslipidemia. The patient lives with his spouse in a SSH with 1 GABO and reports (I) ADLs and community mobility without us of AD at baseline. The patient does not require supplemental O2 at baseline. Today he is [facility] on arrival, performed STS cga with posterior LOB after brief standing period which resulted in falling back into the recliner, but was able to maintain standing balance on second attempt with use of rwx. The patient ambulated 12' cga + rwx with no overt LOB but is mildly unsteady overall and limited by increasing lightheadedness this session. Anticipated DC home with assist, pending acute progress. The patient may need a rolling walker at DC to ensure safety with ambulation once home. PT will continue to monitor and progress as tolerated.     Time Calculation:         PT Charges       Row Name 01/20/24 1418             Time Calculation    Start Time 1331  -ZB      Stop Time 1351  -ZB      Time Calculation (min) 20 min  -ZB      PT Received On 01/20/24  -ZB      PT - Next Appointment 01/22/24  -ZB      PT Goal Re-Cert Due Date 02/03/24  -ZB         Time Calculation- PT    Total Timed Code Minutes- PT 12 minute(s)  -ZB         Timed Charges    32517 - PT Therapeutic Activity Minutes 12  -ZB         Total Minutes    Timed Charges Total Minutes 12  -ZB       Total Minutes 12  -ZB                User Key  (r) = Recorded By, (t) = Taken By, (c) = Cosigned By      Initials Name Provider Type    Gucci Bearden, JACQUELINE  Physical Therapist                  Therapy Charges for Today       Code Description Service Date Service Provider Modifiers Qty    41293956394  PT THERAPEUTIC ACT EA 15 MIN 1/20/2024 Gucci Workman, PT GP 1    06993406953 HC PT EVAL MOD COMPLEXITY 3 1/20/2024 Gucci Workman, PT GP 1            PT G-Codes  AM-PAC 6 Clicks Score (PT): 17  PT Discharge Summary  Anticipated Discharge Disposition (PT): home with assist    Roland Workman, PT  1/20/2024

## 2024-01-20 NOTE — PLAN OF CARE
Goal Outcome Evaluation:  Plan of Care Reviewed With: patient, spouse           Outcome Evaluation: Patient is a 77 y/o male evaluated by therapy POD 1 s/p laproscopic cholecystectomy. PMHx includes but is not limited to: CKD, BPH, COPD, OA, HTN, and dyslipidemia. The patient lives with his spouse in a SSH with 1 GABO and reports (I) ADLs and community mobility without us of AD at baseline. The patient does not require supplemental O2 at baseline. Today he is [facility] on arrival, performed STS cga with posterior LOB after brief standing period which resulted in falling back into the recliner, but was able to maintain standing balance on second attempt with use of rwx. The patient ambulated 12' cga + rwx with no overt LOB but is mildly unsteady overall and limited by increasing lightheadedness this session. Anticipated DC home with assist, pending acute progress. The patient may need a rolling walker at DC to ensure safety with ambulation once home. PT will continue to monitor and progress as tolerated.      Anticipated Discharge Disposition (PT): home with assist

## 2024-01-20 NOTE — PLAN OF CARE
Goal Outcome Evaluation:  Plan of Care Reviewed With: patient, spouse        Progress: improving  Outcome Evaluation: Patient is alert and oriented x4, on 3 L NC, VSS. Patient was initially confused at start of shift but has since been oriented x4. Patient has intermittently complained of pain and nausea- PRN medications given. Patient was an assist x2 up to bedside commode and then ambulated to chair. Patient also worked with PT today. Patient has bed/chair alarm on, bed is in lowest position, call light within reach, and fall precautions maintained.

## 2024-01-21 LAB
ALBUMIN SERPL-MCNC: 2.7 G/DL (ref 3.5–5.2)
ALBUMIN/GLOB SERPL: 0.9 G/DL
ALP SERPL-CCNC: 85 U/L (ref 39–117)
ALT SERPL W P-5'-P-CCNC: 44 U/L (ref 1–41)
ANION GAP SERPL CALCULATED.3IONS-SCNC: 8 MMOL/L (ref 5–15)
AST SERPL-CCNC: 59 U/L (ref 1–40)
BILIRUB SERPL-MCNC: 0.5 MG/DL (ref 0–1.2)
BUN SERPL-MCNC: 20 MG/DL (ref 8–23)
BUN/CREAT SERPL: 16 (ref 7–25)
CALCIUM SPEC-SCNC: 8.4 MG/DL (ref 8.6–10.5)
CHLORIDE SERPL-SCNC: 109 MMOL/L (ref 98–107)
CO2 SERPL-SCNC: 26 MMOL/L (ref 22–29)
CREAT SERPL-MCNC: 1.25 MG/DL (ref 0.76–1.27)
DEPRECATED RDW RBC AUTO: 46 FL (ref 37–54)
EGFRCR SERPLBLD CKD-EPI 2021: 59.3 ML/MIN/1.73
ERYTHROCYTE [DISTWIDTH] IN BLOOD BY AUTOMATED COUNT: 13.2 % (ref 12.3–15.4)
GLOBULIN UR ELPH-MCNC: 2.9 GM/DL
GLUCOSE SERPL-MCNC: 115 MG/DL (ref 65–99)
HCT VFR BLD AUTO: 37.9 % (ref 37.5–51)
HGB BLD-MCNC: 12.8 G/DL (ref 13–17.7)
MCH RBC QN AUTO: 31.9 PG (ref 26.6–33)
MCHC RBC AUTO-ENTMCNC: 33.8 G/DL (ref 31.5–35.7)
MCV RBC AUTO: 94.5 FL (ref 79–97)
PLATELET # BLD AUTO: 176 10*3/MM3 (ref 140–450)
PMV BLD AUTO: 9.4 FL (ref 6–12)
POTASSIUM SERPL-SCNC: 3.8 MMOL/L (ref 3.5–5.2)
PROT SERPL-MCNC: 5.6 G/DL (ref 6–8.5)
RBC # BLD AUTO: 4.01 10*6/MM3 (ref 4.14–5.8)
SODIUM SERPL-SCNC: 143 MMOL/L (ref 136–145)
WBC NRBC COR # BLD AUTO: 15.24 10*3/MM3 (ref 3.4–10.8)

## 2024-01-21 PROCEDURE — 80053 COMPREHEN METABOLIC PANEL: CPT | Performed by: INTERNAL MEDICINE

## 2024-01-21 PROCEDURE — 85027 COMPLETE CBC AUTOMATED: CPT | Performed by: INTERNAL MEDICINE

## 2024-01-21 PROCEDURE — 25010000002 SODIUM CHLORIDE 0.9 % WITH KCL 20 MEQ 20-0.9 MEQ/L-% SOLUTION: Performed by: SURGERY

## 2024-01-21 PROCEDURE — 25010000002 ONDANSETRON PER 1 MG: Performed by: SURGERY

## 2024-01-21 PROCEDURE — 25010000002 PIPERACILLIN SOD-TAZOBACTAM PER 1 G: Performed by: SURGERY

## 2024-01-21 PROCEDURE — 99024 POSTOP FOLLOW-UP VISIT: CPT | Performed by: SURGERY

## 2024-01-21 PROCEDURE — 94799 UNLISTED PULMONARY SVC/PX: CPT

## 2024-01-21 PROCEDURE — 25010000002 ENOXAPARIN PER 10 MG: Performed by: SURGERY

## 2024-01-21 PROCEDURE — 25010000002 HYDROMORPHONE PER 4 MG: Performed by: SURGERY

## 2024-01-21 RX ADMIN — MIRTAZAPINE 30 MG: 30 TABLET, FILM COATED ORAL at 21:01

## 2024-01-21 RX ADMIN — POTASSIUM CHLORIDE AND SODIUM CHLORIDE 100 ML/HR: 900; 150 INJECTION, SOLUTION INTRAVENOUS at 23:28

## 2024-01-21 RX ADMIN — ALLOPURINOL 100 MG: 100 TABLET ORAL at 08:45

## 2024-01-21 RX ADMIN — HYDROMORPHONE HYDROCHLORIDE 0.5 MG: 1 INJECTION, SOLUTION INTRAMUSCULAR; INTRAVENOUS; SUBCUTANEOUS at 21:37

## 2024-01-21 RX ADMIN — Medication 10 ML: at 21:01

## 2024-01-21 RX ADMIN — GABAPENTIN 300 MG: 300 CAPSULE ORAL at 12:43

## 2024-01-21 RX ADMIN — ALLOPURINOL 100 MG: 100 TABLET ORAL at 21:01

## 2024-01-21 RX ADMIN — CLOBETASOL PROPIONATE CREAM USP, 0.05% 1 APPLICATION: 0.5 CREAM TOPICAL at 08:47

## 2024-01-21 RX ADMIN — PIPERACILLIN SODIUM AND TAZOBACTAM SODIUM 3.38 G: 3; .375 INJECTION, SOLUTION INTRAVENOUS at 04:13

## 2024-01-21 RX ADMIN — FLUTICASONE PROPIONATE 2 SPRAY: 50 SPRAY, METERED NASAL at 08:46

## 2024-01-21 RX ADMIN — HYDROCODONE BITARTRATE AND ACETAMINOPHEN 2 TABLET: 5; 325 TABLET ORAL at 18:01

## 2024-01-21 RX ADMIN — METHOCARBAMOL TABLETS 750 MG: 750 TABLET, COATED ORAL at 00:58

## 2024-01-21 RX ADMIN — HYDROCODONE BITARTRATE AND ACETAMINOPHEN 2 TABLET: 5; 325 TABLET ORAL at 08:45

## 2024-01-21 RX ADMIN — EMPAGLIFLOZIN 10 MG: 10 TABLET, FILM COATED ORAL at 08:45

## 2024-01-21 RX ADMIN — PIPERACILLIN SODIUM AND TAZOBACTAM SODIUM 3.38 G: 3; .375 INJECTION, SOLUTION INTRAVENOUS at 11:20

## 2024-01-21 RX ADMIN — GABAPENTIN 600 MG: 300 CAPSULE ORAL at 18:01

## 2024-01-21 RX ADMIN — HYDROMORPHONE HYDROCHLORIDE 0.5 MG: 1 INJECTION, SOLUTION INTRAMUSCULAR; INTRAVENOUS; SUBCUTANEOUS at 11:16

## 2024-01-21 RX ADMIN — PIPERACILLIN SODIUM AND TAZOBACTAM SODIUM 3.38 G: 3; .375 INJECTION, SOLUTION INTRAVENOUS at 18:06

## 2024-01-21 RX ADMIN — ENOXAPARIN SODIUM 40 MG: 100 INJECTION SUBCUTANEOUS at 08:45

## 2024-01-21 RX ADMIN — HYDROMORPHONE HYDROCHLORIDE 0.5 MG: 1 INJECTION, SOLUTION INTRAMUSCULAR; INTRAVENOUS; SUBCUTANEOUS at 00:59

## 2024-01-21 RX ADMIN — CLOBETASOL PROPIONATE CREAM USP, 0.05% 1 APPLICATION: 0.5 CREAM TOPICAL at 21:03

## 2024-01-21 RX ADMIN — POTASSIUM CHLORIDE AND SODIUM CHLORIDE 100 ML/HR: 900; 150 INJECTION, SOLUTION INTRAVENOUS at 01:00

## 2024-01-21 RX ADMIN — TIOTROPIUM BROMIDE INHALATION SPRAY 2 PUFF: 3.12 SPRAY, METERED RESPIRATORY (INHALATION) at 07:03

## 2024-01-21 RX ADMIN — FINASTERIDE 5 MG: 5 TABLET, FILM COATED ORAL at 08:46

## 2024-01-21 RX ADMIN — ESCITALOPRAM 20 MG: 20 TABLET, FILM COATED ORAL at 08:46

## 2024-01-21 RX ADMIN — METOPROLOL SUCCINATE 25 MG: 25 TABLET, EXTENDED RELEASE ORAL at 08:46

## 2024-01-21 RX ADMIN — ROSUVASTATIN CALCIUM 20 MG: 20 TABLET, FILM COATED ORAL at 21:01

## 2024-01-21 RX ADMIN — UREA: 17 CREAM TOPICAL at 08:47

## 2024-01-21 RX ADMIN — POTASSIUM CHLORIDE AND SODIUM CHLORIDE 100 ML/HR: 900; 150 INJECTION, SOLUTION INTRAVENOUS at 13:30

## 2024-01-21 RX ADMIN — ONDANSETRON 4 MG: 2 INJECTION INTRAMUSCULAR; INTRAVENOUS at 13:30

## 2024-01-21 RX ADMIN — HYDROMORPHONE HYDROCHLORIDE 0.5 MG: 1 INJECTION, SOLUTION INTRAMUSCULAR; INTRAVENOUS; SUBCUTANEOUS at 13:30

## 2024-01-21 RX ADMIN — Medication 10 ML: at 08:46

## 2024-01-21 RX ADMIN — GABAPENTIN 300 MG: 300 CAPSULE ORAL at 08:44

## 2024-01-21 NOTE — PROGRESS NOTES
Name: Kp Hernandez ADMIT: 2024   : 1945  PCP: [id number]    MRN: [id number] LOS: 2 days   AGE/SEX: 78 y.o. male  ROOM: [facility]     Subjective   Subjective   Having confusion at night. Wife at bedside states better currently. Not sleeping well. He states he is having a little gas and a very small bowel movement. Nothing documented in epic.     Objective   Objective   Vital Signs  Temp:  [98.1 °F (36.7 °C)-98.8 °F (37.1 °C)] 98.1 °F (36.7 °C)  Heart Rate:  [84-89] 84  Resp:  [16] 16  BP: (109-148)/(61-80) 148/80  SpO2:  [93 %-100 %] 96 %  on  Flow (L/min):  [3] 3;   Device (Oxygen Therapy): room air  Body mass index is 28.94 kg/m².    Physical Exam  Constitutional:       General: He is not in acute distress.     Appearance: Normal appearance. He is not toxic-appearing.   HENT:      Head: Normocephalic and atraumatic.   Eyes:      Extraocular Movements: Extraocular movements intact.   Cardiovascular:      Rate and Rhythm: Normal rate and regular rhythm.   Pulmonary:      Effort: Pulmonary effort is normal. No respiratory distress.      Breath sounds: Normal breath sounds. No wheezing or rhonchi.   Abdominal:      General: Bowel sounds are normal.      Palpations: Abdomen is soft.      Tenderness: There is no abdominal tenderness. There is no guarding or rebound.      Comments: drain   Musculoskeletal:         General: No swelling.      Right lower leg: No edema.      Left lower leg: No edema.   Skin:     General: Skin is warm and dry.   Neurological:      General: No focal deficit present.      Mental Status: He is alert.   Psychiatric:         Mood and Affect: Mood normal.         Behavior: Behavior normal.     Results Review  I reviewed the patient's new clinical results.  Results from last 7 days   Lab Units 24  0636 24  0636 24  1059   WBC 10*3/mm3 15.24* 18.31* 24.72*   HEMOGLOBIN g/dL 12.8* 13.1 15.3   PLATELETS 10*3/mm3 176 161 180     Results from last 7 days    Lab Units 01/21/24  0636 01/20/24  1109 01/20/24  0636 01/19/24  2334 01/19/24  1059   SODIUM mmol/L 143  --  140  --  140   POTASSIUM mmol/L 3.8 4.1 4.0 3.4* 2.9*   CHLORIDE mmol/L 109*  --  103  --  97*   CO2 mmol/L 26.0  --  26.0  --  28.8   BUN mg/dL 20  --  18  --  20   CREATININE mg/dL 1.25  --  1.36*  --  1.47*   GLUCOSE mg/dL 115*  --  134*  --  123*     Lab Results   Component Value Date    ANIONGAP 8.0 01/21/2024     Estimated Creatinine Clearance: 54.6 mL/min (by C-G formula based on SCr of 1.25 mg/dL).   Lab Results   Component Value Date    EGFR 59.3 (L) 01/21/2024     Results from last 7 days   Lab Units 01/21/24  0636 01/20/24  0636 01/19/24  1059   ALBUMIN g/dL 2.7* 3.3* 4.1   BILIRUBIN mg/dL 0.5 0.9 1.0   ALK PHOS U/L 85 68 69   AST (SGOT) U/L 59* 73* 43*   ALT (SGPT) U/L 44* 51* 27     Results from last 7 days   Lab Units 01/21/24  0636 01/20/24  0636 01/19/24  1059   CALCIUM mg/dL 8.4* 8.5* 9.4   ALBUMIN g/dL 2.7* 3.3* 4.1   MAGNESIUM mg/dL  --  2.0 2.1     Results from last 7 days   Lab Units 01/19/24  1059   PROCALCITONIN ng/mL 4.75*   LACTATE mmol/L 1.7     Glucose   Date/Time Value Ref Range Status   01/20/2024 0636 137 (H) 70 - 130 mg/dL Final       FL Cholangiogram Operative    Result Date: 1/19/2024  Patent cystic and common bile duct. Filling defects in the biliary tree are suspected to at least in part represent air bubbles.  This report was finalized on 1/19/2024 8:56 PM by Dr. Yonis Rizzo M.D on Workstation: [id number]      CT Chest Without Contrast Diagnostic    Result Date: 1/19/2024  1. Findings are consistent with acute cholecystitis with gallbladder wall thickening and pericholecystic stranding. Stones or sludge layer dependently within the gallbladder. Potential secondary colitis hepatic flexure. 2. Subsegmental atelectasis in the lungs. No pleural effusion or pneumothorax. 3. Atherosclerotic disease with coronary arterial calcification.  Findings discussed with Romaine Gamino,  PA in the emergency department, on 01/19/2024 at 11:30 a.m.  Radiation dose reduction techniques were utilized, including automated exposure control and exposure modulation based on body size.   This report was finalized on 1/19/2024 1:39 PM by Dr. Ezequiel Smith M.D on Workstation: [id number]       Scheduled Meds  allopurinol, 100 mg, Oral, BID  clobetasol, 1 application , Topical, BID  empagliflozin, 10 mg, Oral, Daily  enoxaparin, 40 mg, Subcutaneous, Daily  escitalopram, 20 mg, Oral, Daily  finasteride, 5 mg, Oral, Daily  fluticasone, 2 spray, Nasal, Daily  gabapentin, 300 mg, Oral, BID  gabapentin, 600 mg, Oral, Q PM  indocyanine green, 2.5 mg, Intravenous, Once  metoprolol succinate XL, 25 mg, Oral, Daily  mirtazapine, 30 mg, Oral, Nightly  piperacillin-tazobactam, 3.375 g, Intravenous, Q8H  rosuvastatin, 20 mg, Oral, Nightly  sodium chloride, 10 mL, Intravenous, Q12H  tiotropium bromide monohydrate, 2 puff, Inhalation, Daily - RT  urea, , Topical, Q24H    Continuous Infusions  sodium chloride 0.9 % with KCl 20 mEq, 100 mL/hr, Last Rate: 100 mL/hr (01/21/24 0620)    PRN Meds    acetaminophen    albuterol    Calcium Replacement - Follow Nurse / BPA Driven Protocol    HYDROcodone-acetaminophen    HYDROmorphone    Magnesium Standard Dose Replacement - Follow Nurse / BPA Driven Protocol    methocarbamol    Morphine    nitroglycerin    ondansetron    Phosphorus Replacement - Follow Nurse / BPA Driven Protocol    Potassium Replacement - Follow Nurse / BPA Driven Protocol    prochlorperazine    [COMPLETED] Insert Peripheral IV **AND** sodium chloride    sodium chloride    sodium chloride    sodium chloride 0.9 % with KCl 20 mEq, 100 mL/hr, Last Rate: 100 mL/hr (01/21/24 0620)    Diet  Diet: Regular/House Diet, Cardiac Diets; Healthy Heart (2-3 Na+); Texture: Regular Texture (IDDSI 7); Fluid Consistency: Thin (IDDSI 0)    I have personally reviewed:  [x]  Medications  [x]  Laboratory   [x]  Microbiology   [x]   Radiology   []  EKG/Telemetry   []  Cardiology/Vascular   []  Pathology   []  Records      Assessment/Plan     Active Hospital Problems    Diagnosis  POA    **Cholecystitis [K81.9]  Yes    Fall [W19.XXXA]  Yes    Hypokalemia [E87.6]  Yes    CKD (chronic kidney disease) stage 2, GFR 60-89 ml/min [N18.2]  Yes    COPD mixed type [J44.9]  Yes    Essential hypertension [I10]  Yes    Depression with anxiety [F41.8]  Yes      Resolved Hospital Problems   No resolved problems to display.     78 y.o. male with history of COPD, CKD, HTN who presents with ewakness and falls and found to have acute gangrenous cholecystitis.     Acute gangrenous cholecystitis  Sepsis  s/p cholecystectomy 1/19/24  Drain per surgery  IV antibiotics, IVF  WBC improving. No fever since 1/19/2024    Metabolic encephalopathy  Improved currently continue to monitor  Delirium precautions    Hypertension  Control acceptable    COPD  Asbestosis    CKD 2-3  Creatinine stable    DVT prophylaxis  Lovenox 40 mg SC daily    Discharge  A few days probably. Home with family. Working with PT.  Expected discharge date/ time has not been documented.    Discussed with patient, family, and nursing staff    Faizan Barnard MD  [facility]  01/21/24

## 2024-01-21 NOTE — PLAN OF CARE
Problem: Adult Inpatient Plan of Care  Goal: Plan of Care Review  Flowsheets (Taken 1/21/2024 1521)  Outcome Evaluation: AOX4 but has episodes of confusion. Room air. Fluids infusing as ordered. Medicated prn for pain. CED drain, serosanginous fluid. Will occassionally try to get out of bed by himself, educated multiple times on fall risk. Bed alarm on, family at bedside, call light within reach.   Goal Outcome Evaluation:              Outcome Evaluation: AOX4 but has episodes of confusion. Room air. Fluids infusing as ordered. Medicated prn for pain. CED drain, serosanginous fluid. Will occassionally try to get out of bed by himself, educated multiple times on fall risk. Bed alarm on, family at bedside, call light within reach.

## 2024-01-21 NOTE — PLAN OF CARE
Problem: Adult Inpatient Plan of Care  Goal: Plan of Care Review  Outcome: Ongoing, Progressing  Flowsheets (Taken 1/21/2024 0621)  Progress: improving  Plan of Care Reviewed With:   patient   spouse  Outcome Evaluation:   Patient more alert and orientated during night although wide awake   sleep schedule off. Intermittent confusion   intermittent frustration   daughter at bedside from 1230am - wife home to rest. Bulb drain serosangous drainage. No complaints of nausea. Assistx1-Standby on ambulation. Falls precautions with frequent reorientation of situation and time.  Goal: Patient-Specific Goal (Individualized)  Outcome: Ongoing, Progressing  Goal: Absence of Hospital-Acquired Illness or Injury  Outcome: Ongoing, Progressing  Intervention: Identify and Manage Fall Risk  Recent Flowsheet Documentation  Taken 1/21/2024 0600 by Dea Reynolds, RN  Safety Promotion/Fall Prevention:   safety round/check completed   room organization consistent   nonskid shoes/slippers when out of bed   fall prevention program maintained   clutter free environment maintained   assistive device/personal items within reach   activity supervised  Taken 1/21/2024 0400 by Dea Reynolds, RN  Safety Promotion/Fall Prevention:   safety round/check completed   room organization consistent   nonskid shoes/slippers when out of bed   fall prevention program maintained   clutter free environment maintained   assistive device/personal items within reach   activity supervised  Taken 1/21/2024 0200 by Dea Reynolds, RN  Safety Promotion/Fall Prevention:   safety round/check completed   toileting scheduled   room organization consistent   nonskid shoes/slippers when out of bed   fall prevention program maintained   clutter free environment maintained   assistive device/personal items within reach   activity supervised  Taken 1/21/2024 0012 by Dea Reynolds, RN  Safety Promotion/Fall Prevention:   safety round/check completed   nonskid  shoes/slippers when out of bed   fall prevention program maintained   clutter free environment maintained   assistive device/personal items within reach   activity supervised  Taken 1/20/2024 2201 by Dea Reynolds RN  Safety Promotion/Fall Prevention:   safety round/check completed   room organization consistent   fall prevention program maintained   clutter free environment maintained   assistive device/personal items within reach   activity supervised  Taken 1/20/2024 2002 by Dae Reynolds RN  Safety Promotion/Fall Prevention:   safety round/check completed   mobility aid in reach   fall prevention program maintained   clutter free environment maintained   assistive device/personal items within reach   activity supervised  Intervention: Prevent Skin Injury  Recent Flowsheet Documentation  Taken 1/21/2024 0600 by Dea Reynolds RN  Body Position: position changed independently  Taken 1/21/2024 0400 by Dea Reynolds RN  Body Position:   weight shifting   position changed independently  Taken 1/21/2024 0200 by Dea Reynolds RN  Body Position:   weight shifting   position changed independently  Taken 1/21/2024 0012 by Dea Reynolds RN  Body Position:   position changed independently   tilted   weight shifting  Skin Protection: adhesive use limited  Taken 1/20/2024 2201 by Dea Reynolds RN  Body Position:   position changed independently   tilted   weight shifting  Taken 1/20/2024 2002 by Dea Reynolds RN  Body Position:   position changed independently   weight shifting  Skin Protection: adhesive use limited  Intervention: Prevent and Manage VTE (Venous Thromboembolism) Risk  Recent Flowsheet Documentation  Taken 1/21/2024 0012 by Dea Reynolds RN  Activity Management: activity minimized  VTE Prevention/Management:   bilateral   sequential compression devices off   patient refused intervention  Taken 1/20/2024 2002 by Dea Reynolds RN  Activity Management: activity encouraged  VTE  Prevention/Management:   bilateral   sequential compression devices off   patient refused intervention  Intervention: Prevent Infection  Recent Flowsheet Documentation  Taken 1/21/2024 0600 by Dea Reynolds RN  Infection Prevention:   single patient room provided   hand hygiene promoted   environmental surveillance performed  Taken 1/21/2024 0400 by Dea Reynolds RN  Infection Prevention:   single patient room provided   rest/sleep promoted   environmental surveillance performed   hand hygiene promoted  Taken 1/21/2024 0200 by Dea Reynolds RN  Infection Prevention:   single patient room provided   rest/sleep promoted   environmental surveillance performed  Taken 1/21/2024 0012 by Dea Reynolds RN  Infection Prevention:   environmental surveillance performed   rest/sleep promoted   single patient room provided  Taken 1/20/2024 2201 by Dea Reynolds RN  Infection Prevention:   single patient room provided   rest/sleep promoted   environmental surveillance performed  Taken 1/20/2024 2002 by Dea Reynolds RN  Infection Prevention:   single patient room provided   environmental surveillance performed  Goal: Optimal Comfort and Wellbeing  Outcome: Ongoing, Progressing  Intervention: Monitor Pain and Promote Comfort  Recent Flowsheet Documentation  Taken 1/21/2024 0200 by Dea Reynolds RN  Pain Management Interventions:   see MAR   quiet environment facilitated   pillow support provided   position adjusted  Taken 1/21/2024 0012 by Dea Reynolds RN  Pain Management Interventions: see MAR  Taken 1/20/2024 2201 by Dea Reynolds RN  Pain Management Interventions: see MAR  Intervention: Provide Person-Centered Care  Recent Flowsheet Documentation  Taken 1/21/2024 0012 by Dea Reynolds RN  Trust Relationship/Rapport:   care explained   questions answered   thoughts/feelings acknowledged   choices provided  Taken 1/20/2024 2002 by Dea Reynolds RN  Trust Relationship/Rapport:   care explained   questions  answered   questions encouraged   thoughts/feelings acknowledged  Goal: Readiness for Transition of Care  Outcome: Ongoing, Progressing   Goal Outcome Evaluation:  Plan of Care Reviewed With: patient, spouse        Progress: improving  Outcome Evaluation: Patient more alert and orientated during night although wide awake; sleep schedule off. Intermittent confusion; intermittent frustration; daughter at bedside from 1230am - wife home to rest. Bulb drain serosangous drainage. No complaints of nausea. Assistx1-Standby on ambulation. Falls precautions with frequent reorientation of situation and time.

## 2024-01-21 NOTE — PROGRESS NOTES
Postop day 2 from robotic assisted cholecystectomy with cholangiogram.  Continue Zosyn for gangrenous cholecystitis and localized peritonitis noted intraoperatively.  Anticipate 4 days of antibiotics postoperatively.  Leukocytosis improving.  Continue drain to bulb suction.  Incisions in good order.  Passing flatus but also belching.  Diet as tolerated.

## 2024-01-22 LAB
ANION GAP SERPL CALCULATED.3IONS-SCNC: 8.6 MMOL/L (ref 5–15)
BUN SERPL-MCNC: 19 MG/DL (ref 8–23)
BUN/CREAT SERPL: 14.8 (ref 7–25)
CALCIUM SPEC-SCNC: 8.2 MG/DL (ref 8.6–10.5)
CHLORIDE SERPL-SCNC: 107 MMOL/L (ref 98–107)
CO2 SERPL-SCNC: 25.4 MMOL/L (ref 22–29)
CREAT SERPL-MCNC: 1.28 MG/DL (ref 0.76–1.27)
DEPRECATED RDW RBC AUTO: 44.8 FL (ref 37–54)
EGFRCR SERPLBLD CKD-EPI 2021: 57.6 ML/MIN/1.73
ERYTHROCYTE [DISTWIDTH] IN BLOOD BY AUTOMATED COUNT: 13.2 % (ref 12.3–15.4)
GLUCOSE SERPL-MCNC: 88 MG/DL (ref 65–99)
HCT VFR BLD AUTO: 34.3 % (ref 37.5–51)
HGB BLD-MCNC: 11.6 G/DL (ref 13–17.7)
LAB AP CASE REPORT: NORMAL
MCH RBC QN AUTO: 31.7 PG (ref 26.6–33)
MCHC RBC AUTO-ENTMCNC: 33.8 G/DL (ref 31.5–35.7)
MCV RBC AUTO: 93.7 FL (ref 79–97)
PATH REPORT.FINAL DX SPEC: NORMAL
PATH REPORT.GROSS SPEC: NORMAL
PLATELET # BLD AUTO: 186 10*3/MM3 (ref 140–450)
PMV BLD AUTO: 9 FL (ref 6–12)
POTASSIUM SERPL-SCNC: 3.8 MMOL/L (ref 3.5–5.2)
QT INTERVAL: 379 MS
QTC INTERVAL: 474 MS
RBC # BLD AUTO: 3.66 10*6/MM3 (ref 4.14–5.8)
SODIUM SERPL-SCNC: 141 MMOL/L (ref 136–145)
WBC NRBC COR # BLD AUTO: 13.32 10*3/MM3 (ref 3.4–10.8)

## 2024-01-22 PROCEDURE — 25010000002 ENOXAPARIN PER 10 MG: Performed by: SURGERY

## 2024-01-22 PROCEDURE — 93010 ELECTROCARDIOGRAM REPORT: CPT | Performed by: INTERNAL MEDICINE

## 2024-01-22 PROCEDURE — 99024 POSTOP FOLLOW-UP VISIT: CPT | Performed by: SURGERY

## 2024-01-22 PROCEDURE — 85027 COMPLETE CBC AUTOMATED: CPT | Performed by: HOSPITALIST

## 2024-01-22 PROCEDURE — 94799 UNLISTED PULMONARY SVC/PX: CPT

## 2024-01-22 PROCEDURE — 80048 BASIC METABOLIC PNL TOTAL CA: CPT | Performed by: HOSPITALIST

## 2024-01-22 PROCEDURE — 93005 ELECTROCARDIOGRAM TRACING: CPT | Performed by: HOSPITALIST

## 2024-01-22 PROCEDURE — 25010000002 PIPERACILLIN SOD-TAZOBACTAM PER 1 G: Performed by: SURGERY

## 2024-01-22 PROCEDURE — 25010000002 OLANZAPINE 10 MG RECONSTITUTED SOLUTION: Performed by: NURSE PRACTITIONER

## 2024-01-22 RX ORDER — SODIUM CHLORIDE 9 MG/ML
60 INJECTION, SOLUTION INTRAVENOUS CONTINUOUS
Status: DISCONTINUED | OUTPATIENT
Start: 2024-01-22 | End: 2024-01-24

## 2024-01-22 RX ORDER — METOPROLOL SUCCINATE 25 MG/1
25 TABLET, EXTENDED RELEASE ORAL ONCE
Qty: 1 TABLET | Refills: 0 | Status: COMPLETED | OUTPATIENT
Start: 2024-01-22 | End: 2024-01-22

## 2024-01-22 RX ORDER — OLANZAPINE 10 MG/2ML
5 INJECTION, POWDER, FOR SOLUTION INTRAMUSCULAR ONCE
Status: COMPLETED | OUTPATIENT
Start: 2024-01-22 | End: 2024-01-22

## 2024-01-22 RX ORDER — METOPROLOL SUCCINATE 50 MG/1
50 TABLET, EXTENDED RELEASE ORAL DAILY
Status: DISCONTINUED | OUTPATIENT
Start: 2024-01-23 | End: 2024-01-24 | Stop reason: HOSPADM

## 2024-01-22 RX ADMIN — EMPAGLIFLOZIN 10 MG: 10 TABLET, FILM COATED ORAL at 08:15

## 2024-01-22 RX ADMIN — SODIUM CHLORIDE 60 ML/HR: 9 INJECTION, SOLUTION INTRAVENOUS at 14:17

## 2024-01-22 RX ADMIN — ALLOPURINOL 100 MG: 100 TABLET ORAL at 08:15

## 2024-01-22 RX ADMIN — Medication 10 ML: at 20:59

## 2024-01-22 RX ADMIN — TIOTROPIUM BROMIDE INHALATION SPRAY 2 PUFF: 3.12 SPRAY, METERED RESPIRATORY (INHALATION) at 06:59

## 2024-01-22 RX ADMIN — ALLOPURINOL 100 MG: 100 TABLET ORAL at 20:58

## 2024-01-22 RX ADMIN — PIPERACILLIN SODIUM AND TAZOBACTAM SODIUM 3.38 G: 3; .375 INJECTION, SOLUTION INTRAVENOUS at 10:15

## 2024-01-22 RX ADMIN — ROSUVASTATIN CALCIUM 20 MG: 20 TABLET, FILM COATED ORAL at 20:58

## 2024-01-22 RX ADMIN — GABAPENTIN 300 MG: 300 CAPSULE ORAL at 11:47

## 2024-01-22 RX ADMIN — HYDROCODONE BITARTRATE AND ACETAMINOPHEN 2 TABLET: 5; 325 TABLET ORAL at 14:41

## 2024-01-22 RX ADMIN — GABAPENTIN 300 MG: 300 CAPSULE ORAL at 06:37

## 2024-01-22 RX ADMIN — FINASTERIDE 5 MG: 5 TABLET, FILM COATED ORAL at 08:15

## 2024-01-22 RX ADMIN — HYDROCODONE BITARTRATE AND ACETAMINOPHEN 2 TABLET: 5; 325 TABLET ORAL at 08:15

## 2024-01-22 RX ADMIN — METOPROLOL SUCCINATE 25 MG: 25 TABLET, EXTENDED RELEASE ORAL at 08:15

## 2024-01-22 RX ADMIN — Medication 10 ML: at 08:17

## 2024-01-22 RX ADMIN — CLOBETASOL PROPIONATE CREAM USP, 0.05% 1 APPLICATION: 0.5 CREAM TOPICAL at 08:20

## 2024-01-22 RX ADMIN — FLUTICASONE PROPIONATE 2 SPRAY: 50 SPRAY, METERED NASAL at 08:19

## 2024-01-22 RX ADMIN — CLOBETASOL PROPIONATE CREAM USP, 0.05% 1 APPLICATION: 0.5 CREAM TOPICAL at 20:58

## 2024-01-22 RX ADMIN — ESCITALOPRAM 20 MG: 20 TABLET, FILM COATED ORAL at 08:15

## 2024-01-22 RX ADMIN — MIRTAZAPINE 30 MG: 30 TABLET, FILM COATED ORAL at 20:58

## 2024-01-22 RX ADMIN — ENOXAPARIN SODIUM 40 MG: 100 INJECTION SUBCUTANEOUS at 08:15

## 2024-01-22 RX ADMIN — GABAPENTIN 600 MG: 300 CAPSULE ORAL at 17:33

## 2024-01-22 RX ADMIN — OLANZAPINE 5 MG: 10 INJECTION, POWDER, FOR SOLUTION INTRAMUSCULAR at 02:40

## 2024-01-22 RX ADMIN — PIPERACILLIN SODIUM AND TAZOBACTAM SODIUM 3.38 G: 3; .375 INJECTION, SOLUTION INTRAVENOUS at 02:30

## 2024-01-22 RX ADMIN — METOPROLOL SUCCINATE 25 MG: 25 TABLET, EXTENDED RELEASE ORAL at 17:33

## 2024-01-22 RX ADMIN — HYDROCODONE BITARTRATE AND ACETAMINOPHEN 2 TABLET: 5; 325 TABLET ORAL at 01:13

## 2024-01-22 RX ADMIN — PIPERACILLIN SODIUM AND TAZOBACTAM SODIUM 3.38 G: 3; .375 INJECTION, SOLUTION INTRAVENOUS at 18:15

## 2024-01-22 NOTE — PROGRESS NOTES
Postop day 3 from robotic assisted cholecystectomy with cholangiogram.  Final pathology demonstrated acute necrotizing cholecystitis.  Continue Zosyn for gangrenous cholecystitis and localized peritonitis noted intraoperatively.  Anticipate 4 days of antibiotics postoperatively.  Leukocytosis improving.  Discontinue CED drain today.  Incisions in good order.  Continue diet as tolerated.

## 2024-01-22 NOTE — PROGRESS NOTES
"    Name: Kp Hernandez ADMIT: 2024   : 1945  PCP: IsidroMurphy DO SHUN    MRN: [id number] LOS: 3 days   AGE/SEX: 78 y.o. male  ROOM: [facility]     Subjective   Subjective   Had confusion again at night. Some agitation. Better today. Wife worried about \"sundowning\". She states he recently had outpatient evaluation. Currently is calm and mostly appropriate. Drain just removed.     Objective   Objective   Vital Signs  Temp:  [97.7 °F (36.5 °C)-98.2 °F (36.8 °C)] 97.7 °F (36.5 °C)  Heart Rate:  [78-89] 82  Resp:  [16] 16  BP: (152-179)/(79-92) 165/79  SpO2:  [92 %-96 %] 96 %  on  Flow (L/min):  [2] 2;   Device (Oxygen Therapy): room air  Body mass index is 28.94 kg/m².    Physical Exam  Constitutional:       General: He is not in acute distress.     Appearance: Normal appearance. He is not toxic-appearing.   HENT:      Head: Normocephalic and atraumatic.   Eyes:      Extraocular Movements: Extraocular movements intact.   Cardiovascular:      Rate and Rhythm: Normal rate and regular rhythm.   Pulmonary:      Effort: Pulmonary effort is normal. No respiratory distress.      Breath sounds: Normal breath sounds. No wheezing or rhonchi.   Abdominal:      General: Bowel sounds are normal.      Palpations: Abdomen is soft.      Tenderness: There is no abdominal tenderness. There is no guarding or rebound.   Musculoskeletal:         General: No swelling.      Right lower leg: No edema.      Left lower leg: No edema.   Skin:     General: Skin is warm and dry.   Neurological:      General: No focal deficit present.      Mental Status: He is alert.   Psychiatric:         Mood and Affect: Mood normal.         Behavior: Behavior normal.     Results Review  I reviewed the patient's new clinical results.  Results from last 7 days   Lab Units 24  0558 24  0636 24  0636 24  1059   WBC 10*3/mm3 13.32* 15.24* 18.31* 24.72*   HEMOGLOBIN g/dL 11.6* 12.8* 13.1 15.3   PLATELETS 10*3/mm3 186 176 161 180 "     Results from last 7 days   Lab Units 01/22/24  0558 01/21/24  0636 01/20/24  1109 01/20/24  0636 01/19/24  2334 01/19/24  1059   SODIUM mmol/L 141 143  --  140  --  140   POTASSIUM mmol/L 3.8 3.8 4.1 4.0   < > 2.9*   CHLORIDE mmol/L 107 109*  --  103  --  97*   CO2 mmol/L 25.4 26.0  --  26.0  --  28.8   BUN mg/dL 19 20  --  18  --  20   CREATININE mg/dL 1.28* 1.25  --  1.36*  --  1.47*   GLUCOSE mg/dL 88 115*  --  134*  --  123*    < > = values in this interval not displayed.     Lab Results   Component Value Date    ANIONGAP 8.6 01/22/2024     Estimated Creatinine Clearance: 53.3 mL/min (A) (by C-G formula based on SCr of 1.28 mg/dL (H)).   Lab Results   Component Value Date    EGFR 57.6 (L) 01/22/2024     Results from last 7 days   Lab Units 01/21/24  0636 01/20/24  0636 01/19/24  1059   ALBUMIN g/dL 2.7* 3.3* 4.1   BILIRUBIN mg/dL 0.5 0.9 1.0   ALK PHOS U/L 85 68 69   AST (SGOT) U/L 59* 73* 43*   ALT (SGPT) U/L 44* 51* 27     Results from last 7 days   Lab Units 01/22/24  0558 01/21/24  0636 01/20/24  0636 01/19/24  1059   CALCIUM mg/dL 8.2* 8.4* 8.5* 9.4   ALBUMIN g/dL  --  2.7* 3.3* 4.1   MAGNESIUM mg/dL  --   --  2.0 2.1     Results from last 7 days   Lab Units 01/19/24  1059   PROCALCITONIN ng/mL 4.75*   LACTATE mmol/L 1.7     Glucose   Date/Time Value Ref Range Status   01/20/2024 0636 137 (H) 70 - 130 mg/dL Final       No radiology results for the last day    Scheduled Meds  allopurinol, 100 mg, Oral, BID  clobetasol, 1 application , Topical, BID  empagliflozin, 10 mg, Oral, Daily  enoxaparin, 40 mg, Subcutaneous, Daily  escitalopram, 20 mg, Oral, Daily  finasteride, 5 mg, Oral, Daily  fluticasone, 2 spray, Nasal, Daily  gabapentin, 300 mg, Oral, BID  gabapentin, 600 mg, Oral, Q PM  indocyanine green, 2.5 mg, Intravenous, Once  metoprolol succinate XL, 25 mg, Oral, Daily  mirtazapine, 30 mg, Oral, Nightly  piperacillin-tazobactam, 3.375 g, Intravenous, Q8H  rosuvastatin, 20 mg, Oral, Nightly  sodium  chloride, 10 mL, Intravenous, Q12H  tiotropium bromide monohydrate, 2 puff, Inhalation, Daily - RT  urea, , Topical, Q24H    Continuous Infusions  sodium chloride 0.9 % with KCl 20 mEq, 100 mL/hr, Last Rate: 100 mL/hr (01/22/24 0500)    PRN Meds    acetaminophen    albuterol    Calcium Replacement - Follow Nurse / BPA Driven Protocol    HYDROcodone-acetaminophen    HYDROmorphone    Magnesium Standard Dose Replacement - Follow Nurse / BPA Driven Protocol    methocarbamol    Morphine    nitroglycerin    ondansetron    Phosphorus Replacement - Follow Nurse / BPA Driven Protocol    Potassium Replacement - Follow Nurse / BPA Driven Protocol    prochlorperazine    [COMPLETED] Insert Peripheral IV **AND** sodium chloride    sodium chloride    sodium chloride    sodium chloride 0.9 % with KCl 20 mEq, 100 mL/hr, Last Rate: 100 mL/hr (01/22/24 0500)    Diet  Diet: Regular/House Diet, Cardiac Diets; Healthy Heart (2-3 Na+); Texture: Regular Texture (IDDSI 7); Fluid Consistency: Thin (IDDSI 0)    I have personally reviewed:  [x]  Medications  [x]  Laboratory   []  Microbiology   []  Radiology   [x]  EKG/Telemetry   []  Cardiology/Vascular   []  Pathology   []  Records      Assessment/Plan     Active Hospital Problems    Diagnosis  POA    **Cholecystitis [K81.9]  Yes    Fall [W19.XXXA]  Yes    Hypokalemia [E87.6]  Yes    CKD (chronic kidney disease) stage 2, GFR 60-89 ml/min [N18.2]  Yes    COPD mixed type [J44.9]  Yes    Essential hypertension [I10]  Yes    Depression with anxiety [F41.8]  Yes      Resolved Hospital Problems   No resolved problems to display.     78 y.o. male with history of COPD, CKD, HTN who presents with ewakness and falls and found to have acute gangrenous cholecystitis.     Acute gangrenous cholecystitis  Sepsis  s/p cholecystectomy 1/19/24  Drain per surgery  IV antibiotics, IVF (ate partial breakfast will decrease rate)  WBC improving. No fever since 1/19/2024    Metabolic  encephalopathy  Sundowning  Delirium precautions  D/W wife once back home and back to usual routine should clear    Hypertension  Control acceptable    COPD  Asbestosis    CKD 2-3  Creatinine stable    DVT prophylaxis  Lovenox 40 mg SC daily    Discharge  Home with family soon  Expected Discharge Date: 1/24/2024; Expected Discharge Time:     Discussed with patient, family, and nursing staff    Faizan Barnard MD  [facility]  01/22/24

## 2024-01-22 NOTE — PROGRESS NOTES
Discharge Planning Assessment  [facility]     Patient Name: Kp Hernandez  MRN: [id number]  Today's Date: 1/22/2024    Admit Date: 1/19/2024    Plan: Return home with spouse   Discharge Needs Assessment       Row Name 01/22/24 1305       Living Environment    People in Home spouse    Name(s) of People in Home Wife Roxy    Current Living Arrangements home    Potentially Unsafe Housing Conditions none    Primary Care Provided by self    Provides Primary Care For no one    Family Caregiver if Needed spouse    Family Caregiver Names Wife Roxy [phone]    Quality of Family Relationships helpful    Able to Return to Prior Arrangements yes       Resource/Environmental Concerns    Resource/Environmental Concerns none    Transportation Concerns none       Food Insecurity    Within the past 12 months, you worried that your food would run out before you got the money to buy more. Never true    Within the past 12 months, the food you bought just didn't last and you didn't have money to get more. Never true       Transition Planning    Patient/Family Anticipates Transition to home with family    Patient/Family Anticipated Services at Transition none    Transportation Anticipated family or friend will provide       Discharge Needs Assessment    Readmission Within the Last 30 Days no previous admission in last 30 days    Equipment Currently Used at Home none    Concerns to be Addressed denies needs/concerns at this time    Anticipated Changes Related to Illness none    Equipment Needed After Discharge walker, rolling                   Discharge Plan       Row Name 01/22/24 1307       Plan    Plan Return home with spouse    Patient/Family in Agreement with Plan yes    Plan Comments Spoke with patient and wife Roxy [phone] at bedside.  Patient is IADL, uses no DME, has never used HH or been to SNF.  They live in a 1 story home with 1 GABO.  PCP is Dr. Murphy Felix and pharmacy is [facility].  Patient  drives, wife drives and will help as needed.  Per PT - patient may need walker.  Wife requests to wait and see how he is doing closer to DC before ordering a walker.  CCP will follow.  Tahir CAMACHO                  Continued Care and Services - Admitted Since 1/19/2024    Coordination has not been started for this encounter.       Expected Discharge Date and Time       Expected Discharge Date Expected Discharge Time    Jan 24, 2024            Demographic Summary       Row Name 01/22/24 1304       General Information    Admission Type inpatient    Arrived From home    Referral Source admission list    Reason for Consult discharge planning    Preferred Language English                   Functional Status       Row Name 01/22/24 1304       Functional Status    Usual Activity Tolerance good    Current Activity Tolerance fair       Functional Status, IADL    Medications independent    Meal Preparation independent;assistive person  Wife    Housekeeping assistive person;independent    Laundry independent;assistive person    Shopping assistive person;independent       Mental Status    General Appearance WDL WDL       Mental Status Summary    Recent Changes in Mental Status/Cognitive Functioning other (see comments)  Forgetful @ present                               Becky S. [place], RN

## 2024-01-22 NOTE — PLAN OF CARE
Goal Outcome Evaluation:         A/O x4, room air. CED drain removed today. IVF rate decreased. Medicated for abdominal pain as needed. Pt with new a fib this evening. EKG obtained and MD notified. Metoprolol dose increased and cardiology consulted.

## 2024-01-22 NOTE — SIGNIFICANT NOTE
01/22/24 1130   OTHER   Discipline physical therapist   Rehab Time/Intention   Session Not Performed patient unavailable for treatment  (on 2 attempts this morning. RN aware. Will follow up as time allows.)

## 2024-01-22 NOTE — PLAN OF CARE
Problem: Adult Inpatient Plan of Care  Goal: Plan of Care Review  Outcome: Ongoing, Progressing  Flowsheets (Taken 1/22/2024 0448)  Plan of Care Reviewed With:   patient   spouse  Outcome Evaluation:   Patient agitated and disorientated during night   CED drain serosanginous fluid   Assistx1 to BSC   Pain medications given per MAR   Frequent reorientation   Safety precautions bed alarm   Family at bedside until 12pm. Call light within reach.  Goal: Patient-Specific Goal (Individualized)  Outcome: Ongoing, Progressing  Goal: Absence of Hospital-Acquired Illness or Injury  Outcome: Ongoing, Progressing  Intervention: Identify and Manage Fall Risk  Recent Flowsheet Documentation  Taken 1/22/2024 0400 by Dea Reynolds, RN  Safety Promotion/Fall Prevention:   safety round/check completed   room organization consistent   nonskid shoes/slippers when out of bed   fall prevention program maintained   clutter free environment maintained   assistive device/personal items within reach   activity supervised  Taken 1/22/2024 0200 by Dea Reynolds, RN  Safety Promotion/Fall Prevention:   safety round/check completed   room organization consistent   fall prevention program maintained   clutter free environment maintained   assistive device/personal items within reach   activity supervised  Taken 1/21/2024 2351 by Dea Reynolds, RN  Safety Promotion/Fall Prevention:   safety round/check completed   room organization consistent   nonskid shoes/slippers when out of bed   fall prevention program maintained   clutter free environment maintained   assistive device/personal items within reach   activity supervised  Taken 1/21/2024 2205 by Dea Reynolds, RN  Safety Promotion/Fall Prevention:   safety round/check completed   room organization consistent   nonskid shoes/slippers when out of bed   muscle strengthening facilitated   fall prevention program maintained   clutter free environment maintained   assistive device/personal items  within reach   activity supervised  Taken 1/21/2024 2005 by Dea Reynolds RN  Safety Promotion/Fall Prevention:   safety round/check completed   nonskid shoes/slippers when out of bed   fall prevention program maintained   clutter free environment maintained   assistive device/personal items within reach   activity supervised  Intervention: Prevent Skin Injury  Recent Flowsheet Documentation  Taken 1/22/2024 0400 by Dea Reynolds RN  Body Position: position changed independently  Taken 1/22/2024 0200 by Dea Reynolds RN  Body Position:   position changed independently   weight shifting  Taken 1/21/2024 2351 by Dea Reynolds RN  Body Position:   position changed independently   weight shifting  Taken 1/21/2024 2205 by Dea Reynolds RN  Body Position: position changed independently  Taken 1/21/2024 2005 by Dea Reynolds RN  Body Position: position changed independently  Intervention: Prevent and Manage VTE (Venous Thromboembolism) Risk  Recent Flowsheet Documentation  Taken 1/21/2024 2351 by Dea Reynolds RN  Activity Management: activity minimized  VTE Prevention/Management:   sequential compression devices off   patient refused intervention  Taken 1/21/2024 2205 by Dea Reynolds RN  Activity Management: activity minimized  Taken 1/21/2024 2005 by Dea Reynolds RN  Activity Management: activity encouraged  VTE Prevention/Management:   bilateral   sequential compression devices off   patient refused intervention  Intervention: Prevent Infection  Recent Flowsheet Documentation  Taken 1/22/2024 0400 by Dea Reynolds RN  Infection Prevention:   single patient room provided   rest/sleep promoted   environmental surveillance performed  Taken 1/22/2024 0200 by Dea Reynolds RN  Infection Prevention:   single patient room provided   rest/sleep promoted   environmental surveillance performed  Taken 1/21/2024 2351 by Dea Reynolds RN  Infection Prevention:   environmental surveillance performed   hand  hygiene promoted   rest/sleep promoted   single patient room provided  Taken 1/21/2024 2205 by Dea Reynolds RN  Infection Prevention:   environmental surveillance performed   hand hygiene promoted   personal protective equipment utilized   rest/sleep promoted   single patient room provided  Taken 1/21/2024 2005 by Dea Reynolds RN  Infection Prevention:   environmental surveillance performed   single patient room provided   hand hygiene promoted  Goal: Optimal Comfort and Wellbeing  Outcome: Ongoing, Progressing  Intervention: Monitor Pain and Promote Comfort  Recent Flowsheet Documentation  Taken 1/22/2024 0200 by Dea Reynolds RN  Pain Management Interventions: see MAR  Taken 1/21/2024 2205 by Dea Reynolds RN  Pain Management Interventions:   see MAR   quiet environment facilitated   pillow support provided   position adjusted   relaxation techniques promoted  Intervention: Provide Person-Centered Care  Recent Flowsheet Documentation  Taken 1/21/2024 2351 by Dea Reynolds RN  Trust Relationship/Rapport:   care explained   choices provided  Taken 1/21/2024 2005 by Dea Reynolds RN  Trust Relationship/Rapport:   care explained   choices provided  Goal: Readiness for Transition of Care  Outcome: Ongoing, Progressing   Goal Outcome Evaluation:  Plan of Care Reviewed With: patient, spouse        Progress: improving  Outcome Evaluation: Patient agitated and disorientated during night ; CED drain serosanginous fluid ; Assistx1 to BSC ; Pain medications given per MAR ; Frequent reorientation ; Safety precautions bed alarm ; Family at bedside until 12pm. Call light within reach.

## 2024-01-23 PROBLEM — I48.91 ATRIAL FIBRILLATION: Status: ACTIVE | Noted: 2024-01-23

## 2024-01-23 LAB
ANION GAP SERPL CALCULATED.3IONS-SCNC: 12.6 MMOL/L (ref 5–15)
BUN SERPL-MCNC: 18 MG/DL (ref 8–23)
BUN/CREAT SERPL: 15.1 (ref 7–25)
CALCIUM SPEC-SCNC: 8.7 MG/DL (ref 8.6–10.5)
CHLORIDE SERPL-SCNC: 105 MMOL/L (ref 98–107)
CO2 SERPL-SCNC: 20.4 MMOL/L (ref 22–29)
CREAT SERPL-MCNC: 1.19 MG/DL (ref 0.76–1.27)
DEPRECATED RDW RBC AUTO: 43.2 FL (ref 37–54)
EGFRCR SERPLBLD CKD-EPI 2021: 62.9 ML/MIN/1.73
ERYTHROCYTE [DISTWIDTH] IN BLOOD BY AUTOMATED COUNT: 13 % (ref 12.3–15.4)
GLUCOSE SERPL-MCNC: 86 MG/DL (ref 65–99)
HCT VFR BLD AUTO: 38.7 % (ref 37.5–51)
HCT VFR BLD AUTO: 39.1 % (ref 37.5–51)
HGB BLD-MCNC: 12.6 G/DL (ref 13–17.7)
HGB BLD-MCNC: 13.3 G/DL (ref 13–17.7)
MCH RBC QN AUTO: 30.1 PG (ref 26.6–33)
MCHC RBC AUTO-ENTMCNC: 32.6 G/DL (ref 31.5–35.7)
MCV RBC AUTO: 92.4 FL (ref 79–97)
PLATELET # BLD AUTO: 232 10*3/MM3 (ref 140–450)
PMV BLD AUTO: 9.2 FL (ref 6–12)
POTASSIUM SERPL-SCNC: 3.8 MMOL/L (ref 3.5–5.2)
RBC # BLD AUTO: 4.19 10*6/MM3 (ref 4.14–5.8)
SODIUM SERPL-SCNC: 138 MMOL/L (ref 136–145)
WBC NRBC COR # BLD AUTO: 11.63 10*3/MM3 (ref 3.4–10.8)

## 2024-01-23 PROCEDURE — 85014 HEMATOCRIT: CPT | Performed by: INTERNAL MEDICINE

## 2024-01-23 PROCEDURE — 99222 1ST HOSP IP/OBS MODERATE 55: CPT | Performed by: INTERNAL MEDICINE

## 2024-01-23 PROCEDURE — 25010000002 PIPERACILLIN SOD-TAZOBACTAM PER 1 G: Performed by: SURGERY

## 2024-01-23 PROCEDURE — 85027 COMPLETE CBC AUTOMATED: CPT | Performed by: HOSPITALIST

## 2024-01-23 PROCEDURE — 94799 UNLISTED PULMONARY SVC/PX: CPT

## 2024-01-23 PROCEDURE — 94664 DEMO&/EVAL PT USE INHALER: CPT

## 2024-01-23 PROCEDURE — 25010000002 HYDROMORPHONE PER 4 MG: Performed by: SURGERY

## 2024-01-23 PROCEDURE — 97110 THERAPEUTIC EXERCISES: CPT

## 2024-01-23 PROCEDURE — 25010000002 ENOXAPARIN PER 10 MG: Performed by: SURGERY

## 2024-01-23 PROCEDURE — 85018 HEMOGLOBIN: CPT | Performed by: INTERNAL MEDICINE

## 2024-01-23 PROCEDURE — 80048 BASIC METABOLIC PNL TOTAL CA: CPT | Performed by: HOSPITALIST

## 2024-01-23 PROCEDURE — 97116 GAIT TRAINING THERAPY: CPT

## 2024-01-23 PROCEDURE — 94761 N-INVAS EAR/PLS OXIMETRY MLT: CPT

## 2024-01-23 PROCEDURE — 99024 POSTOP FOLLOW-UP VISIT: CPT | Performed by: SURGERY

## 2024-01-23 RX ORDER — LOPERAMIDE HYDROCHLORIDE 2 MG/1
2 CAPSULE ORAL ONCE
Status: COMPLETED | OUTPATIENT
Start: 2024-01-23 | End: 2024-01-23

## 2024-01-23 RX ADMIN — ENOXAPARIN SODIUM 40 MG: 100 INJECTION SUBCUTANEOUS at 08:25

## 2024-01-23 RX ADMIN — ROSUVASTATIN CALCIUM 20 MG: 20 TABLET, FILM COATED ORAL at 20:53

## 2024-01-23 RX ADMIN — EMPAGLIFLOZIN 10 MG: 10 TABLET, FILM COATED ORAL at 08:25

## 2024-01-23 RX ADMIN — Medication 10 ML: at 20:55

## 2024-01-23 RX ADMIN — CLOBETASOL PROPIONATE CREAM USP, 0.05% 1 APPLICATION: 0.5 CREAM TOPICAL at 08:35

## 2024-01-23 RX ADMIN — METOPROLOL SUCCINATE 50 MG: 50 TABLET, FILM COATED, EXTENDED RELEASE ORAL at 08:24

## 2024-01-23 RX ADMIN — PIPERACILLIN SODIUM AND TAZOBACTAM SODIUM 3.38 G: 3; .375 INJECTION, SOLUTION INTRAVENOUS at 20:53

## 2024-01-23 RX ADMIN — HYDROCODONE BITARTRATE AND ACETAMINOPHEN 2 TABLET: 5; 325 TABLET ORAL at 16:03

## 2024-01-23 RX ADMIN — MIRTAZAPINE 30 MG: 30 TABLET, FILM COATED ORAL at 20:53

## 2024-01-23 RX ADMIN — ALLOPURINOL 100 MG: 100 TABLET ORAL at 20:53

## 2024-01-23 RX ADMIN — HYDROMORPHONE HYDROCHLORIDE 0.5 MG: 1 INJECTION, SOLUTION INTRAMUSCULAR; INTRAVENOUS; SUBCUTANEOUS at 20:53

## 2024-01-23 RX ADMIN — LOPERAMIDE HYDROCHLORIDE 2 MG: 2 CAPSULE ORAL at 14:25

## 2024-01-23 RX ADMIN — ESCITALOPRAM 20 MG: 20 TABLET, FILM COATED ORAL at 08:24

## 2024-01-23 RX ADMIN — FINASTERIDE 5 MG: 5 TABLET, FILM COATED ORAL at 08:24

## 2024-01-23 RX ADMIN — Medication 10 ML: at 08:25

## 2024-01-23 RX ADMIN — FLUTICASONE PROPIONATE 2 SPRAY: 50 SPRAY, METERED NASAL at 08:35

## 2024-01-23 RX ADMIN — ALLOPURINOL 100 MG: 100 TABLET ORAL at 08:24

## 2024-01-23 RX ADMIN — GABAPENTIN 300 MG: 300 CAPSULE ORAL at 11:45

## 2024-01-23 RX ADMIN — CLOBETASOL PROPIONATE CREAM USP, 0.05% 1 APPLICATION: 0.5 CREAM TOPICAL at 20:54

## 2024-01-23 RX ADMIN — GABAPENTIN 300 MG: 300 CAPSULE ORAL at 08:24

## 2024-01-23 RX ADMIN — PIPERACILLIN SODIUM AND TAZOBACTAM SODIUM 3.38 G: 3; .375 INJECTION, SOLUTION INTRAVENOUS at 04:00

## 2024-01-23 RX ADMIN — GABAPENTIN 600 MG: 300 CAPSULE ORAL at 16:03

## 2024-01-23 RX ADMIN — PIPERACILLIN SODIUM AND TAZOBACTAM SODIUM 3.38 G: 3; .375 INJECTION, SOLUTION INTRAVENOUS at 11:45

## 2024-01-23 RX ADMIN — TIOTROPIUM BROMIDE INHALATION SPRAY 2 PUFF: 3.12 SPRAY, METERED RESPIRATORY (INHALATION) at 07:36

## 2024-01-23 NOTE — CONSULTS
"Date of Hospital Visit: 24  Encounter Provider: Gagandeep Mcneill MD  Place of Service: [facility] CARDIOLOGY  Patient Name: Kp Hernandez  :1945  Referral Provider: Savana Menezes MD    Chief complaint generalized weakness, falls, dyspnea, and rib pain    History of Present Illness    Mr. Hernandez is a 78 year old man with CKD, HTN, and COPD who presented with severe abdominal pain. Prior to admission, he was having a lot of \"indigestion\" but the pain was acute. Before his illness, he was walking his 70 pound dog 3 miles daily without SOA or CP.    He is now POD#4 lap peg for necrotizing cholecystitis. We were consulted for possible atrial fibrillation. He has no previous history of cardiac issues and has not had any palpitations or cardiac symptoms here.  His sister reported a \"black tarry\" BM today.       Telemetry 2024 @ 1958      Past Medical History:   Diagnosis Date    Asbestosis     BPH without urinary obstruction     CKD (chronic kidney disease)     COPD (chronic obstructive pulmonary disease)     Depression     Dyslipidemia     Ganglion cyst     GERD without esophagitis     Hearing loss     Hyperlipidemia     Hypertension     Osteoarthritis        Past Surgical History:   Procedure Laterality Date    CERVICAL CHIARI DECOMPRESSION POSTERIOR N/A     CHOLECYSTECTOMY N/A 2024    Procedure: CHOLECYSTECTOMY LAPAROSCOPIC WITH DAVINCI ROBOT;  Surgeon: David Lakhani MD;  Location: [facility] MAIN OR;  Service: Robotics - DaVinci;  Laterality: N/A;    COLONOSCOPY  2000    ELBOW ARTHROPLASTY Left     ENDOSCOPY N/A 2022    Procedure: ESOPHAGOGASTRODUODENOSCOPY with balloon dilatation 18mm-20mm;  Surgeon: Ulysses Eugene MD;  Location: [facility] MAIN OR;  Service: Gastroenterology;  Laterality: N/A;  hiatal hernia, s. ring    HERNIA REPAIR      INGUINAL HERNIA REPAIR      KNEE ACL RECONSTRUCTION Bilateral     SPINE SURGERY      TONSILLECTOMY         Prior to " Admission medications    Medication Sig Start Date End Date Taking? Authorizing Provider   albuterol sulfate  (90 Base) MCG/ACT inhaler Inhale 2 puffs Every 4 (Four) Hours As Needed for Wheezing. Indications: Asthma, Chronic Obstructive Lung Disease 7/3/23  Yes Murphy Felix DO   allopurinol (ZYLOPRIM) 100 MG tablet Take 1 tablet by mouth twice daily  Patient taking differently: Take 1 tablet by mouth 2 (Two) Times a Day. 11/12/23  Yes Murphy Felix DO   clobetasol (TEMOVATE) 0.05 % cream APPLY  CREAM TOPICALLY TO APPROPRIATE AREA AS DIRECTED TWICE DAILY TO  HANDS  Patient taking differently: Apply 1 application  topically to the appropriate area as directed 2 (Two) Times a Day. 2/20/23  Yes Murphy Felix DO   escitalopram (LEXAPRO) 20 MG tablet Take 1 tablet by mouth Daily. 9/22/23  Yes Murphy Felix DO   Farxiga 10 MG tablet Take 1 tablet by mouth once daily 12/27/23  Yes Murphy Felix DO   finasteride (PROSCAR) 5 MG tablet TAKE 1 TABLET BY MOUTH ONCE DAILY AS DIRECTED  Patient taking differently: Take 1 tablet by mouth Daily. 11/26/23  Yes Murphy Felix DO   fluticasone (FLONASE) 50 MCG/ACT nasal spray Use 2 spray(s) in each nostril once daily  Patient taking differently: 2 sprays into the nostril(s) as directed by provider Daily. 6/8/23  Yes Murphy Felix DO   gabapentin (NEURONTIN) 300 MG capsule TAKE 1 CAPSULE BY MOUTH IN THE MORNING AND 1 ONCE DAILY AT LUNCH  AND 2 AT NIGHT. CALL OFFICE FOR APPOINTMENT.  Patient taking differently: Take 1 capsule by mouth See Admin Instructions. 1 capsule in the AM (morning) 1 capsules at Lunch (noon) and 2 capsules in the PM 10/20/23  Yes Murphy Felix DO   hydroCHLOROthiazide (HYDRODIURIL) 25 MG tablet Take 1 tablet by mouth once daily  Patient taking differently: Take 1 tablet by mouth Daily. 7/24/23  Yes Murphy Felix DO   HYDROcodone-acetaminophen (NORCO) 5-325 MG per tablet Take 2 tablets by mouth Every 6 (Six) Hours  As Needed for Severe Pain. 12/21/23  Yes Murphy Felix DO   methocarbamol (ROBAXIN) 750 MG tablet TAKE 1 TABLET BY MOUTH TWICE DAILY AS NEEDED FOR MUSCLE SPASM  Patient taking differently: Take 1 tablet by mouth 2 (Two) Times a Day As Needed. 8/21/23  Yes Murphy Felix DO   metoprolol succinate XL (TOPROL-XL) 25 MG 24 hr tablet Take 1 tablet by mouth once daily  Patient taking differently: Take 1 tablet by mouth Daily. 11/29/23  Yes Murphy Felix DO   mirtazapine (REMERON) 30 MG tablet TAKE 1 TABLET BY MOUTH ONCE DAILY AT NIGHT  Patient taking differently: Take 1 tablet by mouth Every Night. 8/17/23  Yes Murphy Felix DO   multivitamin with minerals tablet tablet Take 1 tablet by mouth Daily.   Yes Saray Paris MD   pseudoephedrine (SUDAFED) 60 MG tablet Take 1 tablet by mouth Every 12 (Twelve) Hours As Needed for Congestion (use sparingly as may raise bp). 7/30/20  Yes Murphy Felix DO   rosuvastatin (CRESTOR) 20 MG tablet TAKE 1 TABLET BY MOUTH ONCE DAILY AT NIGHT  Patient taking differently: Take 1 tablet by mouth Every Night. 3/20/23  Yes Murphy Felix DO   SPIRIVA HANDIHALER 18 MCG per inhalation capsule Place 1 capsule into inhaler and inhale Daily. 6/21/16  Yes Saray Paris MD   urea (CARMOL) 40 % cream Apply  topically to the appropriate area as directed Daily. To palms of both hands  Patient taking differently: Apply 1 application  topically to the appropriate area as directed Daily. To palms of both hands 1/24/20  Yes Murphy Felix DO   Zinc 50 MG capsule Take 1 capsule by mouth Daily. 1 chewable daily   Yes Saray Paris MD       Social History     Socioeconomic History    Marital status:    Tobacco Use    Smoking status: Never    Smokeless tobacco: Never   Vaping Use    Vaping Use: Never used   Substance and Sexual Activity    Alcohol use: No    Drug use: No       Family History   Problem Relation Age of Onset    Hypertension Mother      "Atrial fibrillation Mother     Alcohol abuse Father     Brain cancer Sister     GI problems Brother     COPD Brother     Malig Hyperthermia Neg Hx        Review of Systems   Constitutional:  Positive for fatigue.   Gastrointestinal:  Positive for abdominal pain.   Psychiatric/Behavioral:  Positive for confusion.         Objective:     Vitals:    01/23/24 0736 01/23/24 0836 01/23/24 1421 01/23/24 1431   BP:  161/85 173/94 169/85   BP Location:  Right arm Right arm Right arm   Patient Position:  Lying Lying Lying   Pulse: 82 78 77 75   Resp: 18  16    Temp:   98.6 °F (37 °C)    TempSrc:   Oral    SpO2: 94%  97%    Weight:       Height:         Body mass index is 28.94 kg/m².    Last Weight and Admission Weight        01/19/24  1026   Weight: 88.9 kg (195 lb 15.8 oz)     Flowsheet Rows      Flowsheet Row First Filed Value   Admission Height 172.7 cm (67.99\") Documented at 01/19/2024 1026   Admission Weight 88.9 kg (195 lb 15.8 oz) Documented at 01/19/2024 1026              Intake/Output Summary (Last 24 hours) at 1/23/2024 1438  Last data filed at 1/22/2024 2235  Gross per 24 hour   Intake --   Output 300 ml   Net -300 ml         Physical Exam  Vitals reviewed.   Constitutional:       Appearance: He is well-developed.   HENT:      Head: Normocephalic.      Nose: Nose normal.      Mouth/Throat:      Pharynx: Oropharynx is clear.   Eyes:      Conjunctiva/sclera: Conjunctivae normal.   Neck:      Vascular: No JVD.   Cardiovascular:      Rate and Rhythm: Normal rate and regular rhythm.      Pulses: Normal pulses.      Heart sounds: Normal heart sounds.   Pulmonary:      Effort: Pulmonary effort is normal.      Breath sounds: Normal breath sounds.   Abdominal:      Palpations: There is no mass.      Tenderness: There is abdominal tenderness (minimal TTP).   Musculoskeletal:         General: No swelling. Normal range of motion.      Cervical back: Normal range of motion.   Skin:     General: Skin is warm and dry.      " Findings: No erythema.   Neurological:      General: No focal deficit present.      Mental Status: He is alert.      Cranial Nerves: No cranial nerve deficit.   Psychiatric:         Mood and Affect: Mood normal.                 Lab Review:                Results from last 7 days   Lab Units 01/23/24  0620   SODIUM mmol/L 138   POTASSIUM mmol/L 3.8   CHLORIDE mmol/L 105   CO2 mmol/L 20.4*   BUN mg/dL 18   CREATININE mg/dL 1.19   GLUCOSE mg/dL 86   CALCIUM mg/dL 8.7         Results from last 7 days   Lab Units 01/23/24  1143 01/23/24  0508   WBC 10*3/mm3  --  11.63*   HEMOGLOBIN g/dL 13.3 12.6*   HEMATOCRIT % 39.1 38.7   PLATELETS 10*3/mm3  --  232     Results from last 7 days   Lab Units 01/19/24  1059   INR  1.16*   APTT seconds 32.5         Results from last 7 days   Lab Units 01/20/24  0636   MAGNESIUM mg/dL 2.0                   I personally viewed and interpreted the patient's EKG/Telemetry data    Assessment/Plan:     1. Atrial ectopy -- review of tele shows SR with PACs, then salvos of nonsustained atrial tachycardia lasting seconds at most, then a brief period of atrial flutter last night. This is likely due to acute illness, but he is 78 with risk factors for atrial arrhythmias. I'll check an echo to assess LA volume. He will need a 14 day rhythm monitor at discharge (ordered). His metoprolol dose has been doubled and I agree with this. I would not anticoagulate him for this brief episode, especially with reports of black stool.  I did recheck a Hgb, and it's stable.    2. HTN -- continue metoprolol as above, would consider amlodipine vs ARB if still uncontrolled. Defer to primary team.

## 2024-01-23 NOTE — PROGRESS NOTES
Postop day 4 from robotic assisted cholecystectomy with cholangiogram.  Final pathology demonstrated acute necrotizing cholecystitis.  Continue Zosyn for gangrenous cholecystitis and localized peritonitis noted intraoperatively.  Antibiotics to complete tomorrow morning.  Leukocytosis improving.  Incisions in good order.  Passing more flatus and nausea improving.  Continue diet as tolerated.  Cardiology consult noted for atrial fibrillation.  He is okay for anticoagulation from surgical standpoint should it be deemed necessary.

## 2024-01-23 NOTE — PROGRESS NOTES
Name: Kp Hernandez ADMIT: 2024   : 1945  PCP: Murphy Felix     MRN: [id number] LOS: 4 days   AGE/SEX: 78 y.o. male  ROOM: [facility]     Subjective   Subjective   Had atrial fibrillation last night back in sinus now. He states he had dark stool. Per nursing staff it was more dark brown than black.      Objective   Objective   Vital Signs  Temp:  [97.9 °F (36.6 °C)-98.2 °F (36.8 °C)] 97.9 °F (36.6 °C)  Heart Rate:  [] 78  Resp:  [16-20] 18  BP: (144-184)/() 161/85  SpO2:  [91 %-97 %] 94 %  on   ;   Device (Oxygen Therapy): room air  Body mass index is 28.94 kg/m².    Physical Exam  Constitutional:       General: He is not in acute distress.     Appearance: Normal appearance. He is not toxic-appearing.   HENT:      Head: Normocephalic and atraumatic.   Eyes:      Extraocular Movements: Extraocular movements intact.   Cardiovascular:      Rate and Rhythm: Normal rate and regular rhythm.   Pulmonary:      Effort: Pulmonary effort is normal. No respiratory distress.      Breath sounds: Normal breath sounds. No wheezing or rhonchi.   Abdominal:      General: Bowel sounds are normal.      Palpations: Abdomen is soft.      Tenderness: There is no abdominal tenderness. There is no guarding or rebound.   Musculoskeletal:         General: No swelling.      Right lower leg: No edema.      Left lower leg: No edema.   Skin:     General: Skin is warm and dry.   Neurological:      General: No focal deficit present.      Mental Status: He is alert.   Psychiatric:         Mood and Affect: Mood normal.         Behavior: Behavior normal.     Results Review  I reviewed the patient's new clinical results.  Results from last 7 days   Lab Units 24  1143 24  0508 24  0558 24  0636 24  0636   WBC 10*3/mm3  --  11.63* 13.32* 15.24* 18.31*   HEMOGLOBIN g/dL 13.3 12.6* 11.6* 12.8* 13.1   PLATELETS 10*3/mm3  --  232 186 176 161     Results from last 7 days   Lab Units  "01/23/24  0620 01/22/24  0558 01/21/24  0636 01/20/24  1109 01/20/24  0636   SODIUM mmol/L 138 141 143  --  140   POTASSIUM mmol/L 3.8 3.8 3.8 4.1 4.0   CHLORIDE mmol/L 105 107 109*  --  103   CO2 mmol/L 20.4* 25.4 26.0  --  26.0   BUN mg/dL 18 19 20  --  18   CREATININE mg/dL 1.19 1.28* 1.25  --  1.36*   GLUCOSE mg/dL 86 88 115*  --  134*     Lab Results   Component Value Date    ANIONGAP 12.6 01/23/2024     Estimated Creatinine Clearance: 57.4 mL/min (by C-G formula based on SCr of 1.19 mg/dL).   Lab Results   Component Value Date    EGFR 62.9 01/23/2024     Results from last 7 days   Lab Units 01/21/24  0636 01/20/24  0636 01/19/24  1059   ALBUMIN g/dL 2.7* 3.3* 4.1   BILIRUBIN mg/dL 0.5 0.9 1.0   ALK PHOS U/L 85 68 69   AST (SGOT) U/L 59* 73* 43*   ALT (SGPT) U/L 44* 51* 27     Results from last 7 days   Lab Units 01/23/24  0620 01/22/24  0558 01/21/24  0636 01/20/24  0636 01/19/24  1059   CALCIUM mg/dL 8.7 8.2* 8.4* 8.5* 9.4   ALBUMIN g/dL  --   --  2.7* 3.3* 4.1   MAGNESIUM mg/dL  --   --   --  2.0 2.1     Results from last 7 days   Lab Units 01/19/24  1059   PROCALCITONIN ng/mL 4.75*   LACTATE mmol/L 1.7     No results found for: \"HGBA1C\", \"POCGLU\"      No radiology results for the last day    Scheduled Meds  allopurinol, 100 mg, Oral, BID  clobetasol, 1 application , Topical, BID  empagliflozin, 10 mg, Oral, Daily  enoxaparin, 40 mg, Subcutaneous, Daily  escitalopram, 20 mg, Oral, Daily  finasteride, 5 mg, Oral, Daily  fluticasone, 2 spray, Nasal, Daily  gabapentin, 300 mg, Oral, BID  gabapentin, 600 mg, Oral, Q PM  indocyanine green, 2.5 mg, Intravenous, Once  metoprolol succinate XL, 50 mg, Oral, Daily  mirtazapine, 30 mg, Oral, Nightly  piperacillin-tazobactam, 3.375 g, Intravenous, Q8H  rosuvastatin, 20 mg, Oral, Nightly  sodium chloride, 10 mL, Intravenous, Q12H  tiotropium bromide monohydrate, 2 puff, Inhalation, Daily - RT  urea, , Topical, Q24H    Continuous Infusions  sodium chloride, 60 mL/hr, Last " Rate: 60 mL/hr (01/22/24 1417)    PRN Meds    acetaminophen    albuterol    Calcium Replacement - Follow Nurse / BPA Driven Protocol    HYDROcodone-acetaminophen    HYDROmorphone    Magnesium Standard Dose Replacement - Follow Nurse / BPA Driven Protocol    methocarbamol    Morphine    nitroglycerin    ondansetron    Phosphorus Replacement - Follow Nurse / BPA Driven Protocol    Potassium Replacement - Follow Nurse / BPA Driven Protocol    prochlorperazine    [COMPLETED] Insert Peripheral IV **AND** sodium chloride    sodium chloride    sodium chloride    sodium chloride, 60 mL/hr, Last Rate: 60 mL/hr (01/22/24 1417)    Diet  Diet: Regular/House Diet, Cardiac Diets; Healthy Heart (2-3 Na+); Texture: Regular Texture (IDDSI 7); Fluid Consistency: Thin (IDDSI 0)    I have personally reviewed:  [x]  Medications  [x]  Laboratory   []  Microbiology   []  Radiology   [x]  EKG/Telemetry sinus  []  Cardiology/Vascular   []  Pathology   []  Records     CHADS-VASc Risk Assessment              3 Total Score    1 Hypertension    2 Age >/= 75        Criteria that do not apply:    CHF    DM    PRIOR STROKE/TIA/THROMBO    Vascular Disease    Age 65-74    Sex: Female              Assessment/Plan     Active Hospital Problems    Diagnosis  POA    **Cholecystitis [K81.9]  Yes    Atrial fibrillation [I48.91]  Unknown    Fall [W19.XXXA]  Yes    Hypokalemia [E87.6]  Yes    CKD (chronic kidney disease) stage 2, GFR 60-89 ml/min [N18.2]  Yes    COPD mixed type [J44.9]  Yes    Essential hypertension [I10]  Yes    Depression with anxiety [F41.8]  Yes      Resolved Hospital Problems   No resolved problems to display.     78 y.o. male with history of COPD, CKD, HTN who presents with ewakness and falls and found to have acute gangrenous cholecystitis.     Acute gangrenous cholecystitis  Sepsis  s/p cholecystectomy 1/19/24  Drain per surgery  IV antibiotics.  WBC improving. No fever since 1/19/2024  Monitoring Hgb. Had dark brown stool.      Atrial fibrillation with rapid rate  Back in sinus now  Toprol-XL increased from 25 to 50 mg daily  Further recommendations per cardiology  Surgery has cleared for anticoagulation if recommended    Metabolic encephalopathy  Sundowning  Delirium precautions  D/W wife once back home and back to usual routine should clear    Hypertension  High BP reading this morning for now continue to monitor    COPD  Asbestosis    CKD 2-3  Creatinine stable    DVT prophylaxis  Lovenox 40 mg SC daily    Discharge  Home with family soon  Expected Discharge Date: 1/24/2024; Expected Discharge Time:     Discussed with patient, family, and nursing staff    Faizan Barnard MD  [facility]  01/23/24

## 2024-01-23 NOTE — THERAPY TREATMENT NOTE
Patient Name: Kp Hernandez  : 1945    MRN: [id number]                              Today's Date: 2024       Admit Date: 2024    Visit Dx:     ICD-10-CM ICD-9-CM   1. Acute cholecystitis  K81.0 575.0   2. Sepsis without acute organ dysfunction, due to unspecified organism  A41.9 038.9     995.91   3. Weakness  R53.1 780.79   4. Hypokalemia  E87.6 276.8     Patient Active Problem List   Diagnosis    Essential hypertension    Benign prostatic hypertrophy without urinary obstruction    CKD (chronic kidney disease) stage 2, GFR 60-89 ml/min    COPD mixed type    Depression with anxiety    Dyslipidemia    Ganglion    Gastroesophageal reflux disease without esophagitis    Hearing loss    Osteoarthritis    Impacted cerumen    Pituitary microadenoma    History of gout    Esophageal dysphagia    Encounter for screening colonoscopy    Cholecystitis    Fall    Hypokalemia     Past Medical History:   Diagnosis Date    Asbestosis     BPH without urinary obstruction     CKD (chronic kidney disease)     COPD (chronic obstructive pulmonary disease)     Depression     Dyslipidemia     Ganglion cyst     GERD without esophagitis     Hearing loss     Hyperlipidemia     Hypertension     Osteoarthritis      Past Surgical History:   Procedure Laterality Date    CERVICAL CHIARI DECOMPRESSION POSTERIOR N/A     CHOLECYSTECTOMY N/A 2024    Procedure: CHOLECYSTECTOMY LAPAROSCOPIC WITH DAVINCI ROBOT;  Surgeon: David Lakhani MD;  Location: [facility] MAIN OR;  Service: Robotics - DaVinci;  Laterality: N/A;    COLONOSCOPY  2000    ELBOW ARTHROPLASTY Left     ENDOSCOPY N/A 2022    Procedure: ESOPHAGOGASTRODUODENOSCOPY with balloon dilatation 18mm-20mm;  Surgeon: Ulysses Eugene MD;  [place] [facility] [place];  Service: Gastroenterology;  Laterality: N/A;  hiatal hernia, s. ring    HERNIA REPAIR      INGUINAL HERNIA REPAIR      KNEE ACL RECONSTRUCTION Bilateral     SPINE SURGERY      TONSILLECTOMY        General  Information       [facility] Name 01/23/24 1541          Physical Therapy Time and Intention    Document Type therapy note (daily note)  -     Mode of Treatment physical therapy  -       Row Name 01/23/24 1541          General Information    Existing Precautions/Restrictions fall  -[facility] Name 01/23/24 1541          Cognition    Orientation Status (Cognition) oriented x 3  -               User Key  (r) = Recorded By, (t) = Taken By, (c) = Cosigned By      Initials Name Provider Type    Sandy Cordoba PTA Physical Therapist Assistant                   Mobility       [facility] Name 01/23/24 1541          Bed Mobility    Bed Mobility supine-sit  -     Supine-Sit [place] (Bed Mobility) minimum assist (75% patient effort)  -     Assistive Device (Bed Mobility) bed rails;head of bed elevated  -[facility] Name 01/23/24 1541          Sit-Stand Transfer    Sit-Stand [place] (Transfers) standby assist  -[facility] Name 01/23/24 1541          Gait/Stairs (Locomotion)    [place] Level (Gait) standby assist;minimum assist (75% patient effort)  -     Distance in Feet (Gait) 450  -     Comment, (Gait/Stairs) increased giovanna, cues to slow down; occasional LOB especially while turning or disctracted  -               User Key  (r) = Recorded By, (t) = Taken By, (c) = Cosigned By      Initials Name Provider Type    Sandy Cordoba PTA Physical Therapist Assistant                   Obj/Interventions       [facility] Name 01/23/24 1543          Motor Skills    Therapeutic Exercise --  standing heel/toe raises, marches, squats, SLR flex x10 reps  -               User Key  (r) = Recorded By, (t) = Taken By, (c) = Cosigned By      Initials Name Provider Type    Sandy Cordoba PTA Physical Therapist Assistant                   Goals/Plan    No documentation.                  Clinical Impression       Row Name 01/23/24 1544          Pain    Pretreatment Pain Rating 0/10 - no pain  -      Posttreatment Pain Rating 0/10 - no pain  -       Row Name 01/23/24 1544          Positioning and Restraints    Pre-Treatment Position in bed  -     Post Treatment Position bed  -     In Bed sitting EOB;call light within reach;encouraged to call for assist;with family/caregiver  -               User Key  (r) = Recorded By, (t) = Taken By, (c) = Cosigned By      Initials Name Provider Type     Sandy Martin PTA Physical Therapist Assistant                   Outcome Measures       Row Name 01/23/24 1544 01/23/24 0835       How much help from another person do you currently need...    Turning from your back to your side while in flat bed without using bedrails? 4  -SM 4  -KE    Moving from lying on back to sitting on the side of a flat bed without bedrails? 3  -SM 3  -KE    Moving to and from a bed to a chair (including a wheelchair)? -- 3  -KE    Standing up from a chair using your arms (e.g., wheelchair, bedside chair)? 3  - 3  -KE    Climbing 3-5 steps with a railing? 3  -SM 2  -KE    To walk in hospital room? 3  -SM 3  -KE    AM-PAC 6 Clicks Score (PT) -- 18  -KE    Highest Level of Mobility Goal -- 6 --> Walk 10 steps or more  -      Row Name 01/23/24 1544          Functional Assessment    Outcome Measure Options AM-PAC 6 Clicks Basic Mobility (PT)  -               User Key  (r) = Recorded By, (t) = Taken By, (c) = Cosigned By      Initials Name Provider Type     Sandy Martin PTA Physical Therapist Assistant    Helen Cortez, RN Registered Nurse                                 Physical Therapy Education       Title: PT OT SLP Therapies (Done)       Topic: Physical Therapy (Done)       Point: Mobility training (Done)       Learning Progress Summary             Patient Acceptance, E,TB,D, VU,NR by  at 1/23/2024 1544    Acceptance, E,D, VU,DU by JAY JAY at 1/20/2024 1418   Significant Other Acceptance, E,D, VU,DU by ZEDILBERTO at 1/20/2024 1418                         Point: Home exercise  program (Done)       Learning Progress Summary             Patient Acceptance, E,TB,D, VU,NR by  at 1/23/2024 1544    Acceptance, E,D, VU,DU by ZB at 1/20/2024 1418   Significant Other Acceptance, E,D, VU,DU by ZB at 1/20/2024 1418                         Point: Body mechanics (Done)       Learning Progress Summary             Patient Acceptance, E,TB,D, VU,NR by  at 1/23/2024 1544    Acceptance, E,D, VU,DU by ZB at 1/20/2024 1418   Significant Other Acceptance, E,D, VU,DU by ZB at 1/20/2024 1418                         Point: Precautions (Done)       Learning Progress Summary             Patient Acceptance, E,TB,D, VU,NR by  at 1/23/2024 1544    Acceptance, E,D, VU,DU by ZB at 1/20/2024 1418   Significant Other Acceptance, E,D, VU,DU by ZB at 1/20/2024 1418                                         User Key       Initials Effective Dates Name Provider Type Discipline     03/07/18 -  Sandy Martin PTA Physical Therapist Assistant PT     08/30/23 -  Gucci Workman PT Physical Therapist PT                  PT Recommendation and Plan     Plan of Care Reviewed With: patient  Progress: improving  Outcome Evaluation: Pt tolerated treatment well this date. Increased gait distance to 450ft w/out AD, though w/ SBA mostly. Pt demonstrated occasional LOB especially w/ turns and becoming distracted, requiring min A to correct. Pt was instructed on a few standing LE exercises while holding onto bed rail. Encouraged pt to ambulate w/ nsg during the day.     Time Calculation:         PT Charges       Row Name 01/23/24 1548             Time Calculation    Start Time 1425  -      Stop Time 1501  -      Time Calculation (min) 36 min  -      PT Received On 01/23/24  -      PT - Next Appointment 01/24/24  -                User Key  (r) = Recorded By, (t) = Taken By, (c) = Cosigned By      Initials Name Provider Type     Sandy Martin PTA Physical Therapist Assistant                  Therapy Charges for  Today       Code Description Service Date Service Provider Modifiers Qty    05186719011 HC GAIT TRAINING EA 15 MIN 1/23/2024 Sandy Martin, ANNABELLE GP 1    62758411121 HC PT THER PROC EA 15 MIN 1/23/2024 Sandy Martin, ANNABELLE GP 1            PT G-Codes  Outcome Measure Options: AM-PAC 6 Clicks Basic Mobility (PT)  AM-PAC 6 Clicks Score (PT): 18  PT Discharge Summary  Anticipated Discharge Disposition (PT): home with home health, home with assist    Sandy Martin PTA  1/23/2024

## 2024-01-23 NOTE — PLAN OF CARE
Goal Outcome Evaluation:  Plan of Care Reviewed With: patient        Progress: improving  Outcome Evaluation: Pt tolerated treatment well this date. Increased gait distance to 450ft w/out AD, though w/ SBA mostly. Pt demonstrated occasional LOB especially w/ turns and becoming distracted, requiring min A to correct. Pt was instructed on a few standing LE exercises while holding onto bed rail. Encouraged pt to ambulate w/ nsg during the day.      Anticipated Discharge Disposition (PT): home with home health, home with assist

## 2024-01-24 ENCOUNTER — READMISSION MANAGEMENT (OUTPATIENT)
Dept: CALL CENTER | Facility: HOSPITAL | Age: 79
End: 2024-01-24
Payer: MEDICARE

## 2024-01-24 ENCOUNTER — APPOINTMENT (OUTPATIENT)
Dept: CARDIOLOGY | Facility: HOSPITAL | Age: 79
End: 2024-01-24
Payer: MEDICARE

## 2024-01-24 VITALS
SYSTOLIC BLOOD PRESSURE: 145 MMHG | BODY MASS INDEX: 29.55 KG/M2 | RESPIRATION RATE: 20 BRPM | TEMPERATURE: 97.9 F | HEART RATE: 73 BPM | WEIGHT: 195 LBS | DIASTOLIC BLOOD PRESSURE: 70 MMHG | HEIGHT: 68 IN | OXYGEN SATURATION: 95 %

## 2024-01-24 LAB
ANION GAP SERPL CALCULATED.3IONS-SCNC: 14.5 MMOL/L (ref 5–15)
AORTIC DIMENSIONLESS INDEX: 0.7 (DI)
BACTERIA SPEC AEROBE CULT: NORMAL
BACTERIA SPEC AEROBE CULT: NORMAL
BH CV ECHO MEAS - ACS: 2.32 CM
BH CV ECHO MEAS - AO MEAN PG: 4.4 MMHG
BH CV ECHO MEAS - AO ROOT DIAM: 3.7 CM
BH CV ECHO MEAS - AO V2 MAX: 156 CM/SEC
BH CV ECHO MEAS - AO V2 VTI: 28.3 CM
BH CV ECHO MEAS - AVA(I,D): 2.7 CM2
BH CV ECHO MEAS - EDV(CUBED): 88.6 ML
BH CV ECHO MEAS - EDV(MOD-SP2): 82 ML
BH CV ECHO MEAS - EDV(MOD-SP4): 74 ML
BH CV ECHO MEAS - EF(MOD-BP): 63 %
BH CV ECHO MEAS - EF(MOD-SP2): 62.2 %
BH CV ECHO MEAS - EF(MOD-SP4): 63.5 %
BH CV ECHO MEAS - ESV(CUBED): 15.3 ML
BH CV ECHO MEAS - ESV(MOD-SP2): 31 ML
BH CV ECHO MEAS - ESV(MOD-SP4): 27 ML
BH CV ECHO MEAS - FS: 44.3 %
BH CV ECHO MEAS - IVS/LVPW: 0.97 CM
BH CV ECHO MEAS - IVSD: 0.97 CM
BH CV ECHO MEAS - LA A2CS (ATRIAL LENGTH): 5.7 CM
BH CV ECHO MEAS - LA A4C LENGTH: 6.6 CM
BH CV ECHO MEAS - LAT PEAK E' VEL: 8 CM/SEC
BH CV ECHO MEAS - LV DIASTOLIC VOL/BSA (35-75): 50.9 CM2
BH CV ECHO MEAS - LV MASS(C)D: 147.9 GRAMS
BH CV ECHO MEAS - LV MEAN PG: 2.04 MMHG
BH CV ECHO MEAS - LV SYSTOLIC VOL/BSA (12-30): 18.6 CM2
BH CV ECHO MEAS - LV V1 MAX: 98 CM/SEC
BH CV ECHO MEAS - LV V1 VTI: 19.8 CM
BH CV ECHO MEAS - LVIDD: 4.5 CM
BH CV ECHO MEAS - LVIDS: 2.48 CM
BH CV ECHO MEAS - LVOT AREA: 3.9 CM2
BH CV ECHO MEAS - LVOT DIAM: 2.23 CM
BH CV ECHO MEAS - LVPWD: 1 CM
BH CV ECHO MEAS - MED PEAK E' VEL: 6 CM/SEC
BH CV ECHO MEAS - MV A DUR: 0.12 SEC
BH CV ECHO MEAS - MV A MAX VEL: 87.7 CM/SEC
BH CV ECHO MEAS - MV DEC TIME: 0.38 SEC
BH CV ECHO MEAS - MV E MAX VEL: 53.4 CM/SEC
BH CV ECHO MEAS - MV E/A: 0.61
BH CV ECHO MEAS - MV P1/2T: 71.1 MSEC
BH CV ECHO MEAS - MV V2 VTI: 23.5 CM
BH CV ECHO MEAS - PA V2 MAX: 86.9 CM/SEC
BH CV ECHO MEAS - PI END-D VEL: 146.6 CM/SEC
BH CV ECHO MEAS - RAP SYSTOLE: 3 MMHG
BH CV ECHO MEAS - RV MAX PG: 2.9 MMHG
BH CV ECHO MEAS - RV V1 MAX: 85.7 CM/SEC
BH CV ECHO MEAS - RV V1 VTI: 15.1 CM
BH CV ECHO MEAS - SI(MOD-SP2): 35.1 ML/M2
BH CV ECHO MEAS - SI(MOD-SP4): 32.3 ML/M2
BH CV ECHO MEAS - SV(LVOT): 77.5 ML
BH CV ECHO MEAS - SV(MOD-SP2): 51 ML
BH CV ECHO MEAS - SV(MOD-SP4): 47 ML
BH CV ECHO MEAS - TAPSE (>1.6): 1.9 CM
BH CV ECHO MEASUREMENTS AVERAGE E/E' RATIO: 7.63
BH CV XLRA - RV BASE: 3 CM
BH CV XLRA - RV LENGTH: 5.6 CM
BH CV XLRA - RV MID: 2.8 CM
BUN SERPL-MCNC: 17 MG/DL (ref 8–23)
BUN/CREAT SERPL: 15 (ref 7–25)
CALCIUM SPEC-SCNC: 8.7 MG/DL (ref 8.6–10.5)
CHLORIDE SERPL-SCNC: 105 MMOL/L (ref 98–107)
CO2 SERPL-SCNC: 21.5 MMOL/L (ref 22–29)
CREAT SERPL-MCNC: 1.13 MG/DL (ref 0.76–1.27)
DEPRECATED RDW RBC AUTO: 45.6 FL (ref 37–54)
EGFRCR SERPLBLD CKD-EPI 2021: 66.9 ML/MIN/1.73
ERYTHROCYTE [DISTWIDTH] IN BLOOD BY AUTOMATED COUNT: 13.6 % (ref 12.3–15.4)
GLUCOSE SERPL-MCNC: 107 MG/DL (ref 65–99)
HCT VFR BLD AUTO: 38.7 % (ref 37.5–51)
HGB BLD-MCNC: 13.5 G/DL (ref 13–17.7)
LEFT ATRIUM VOLUME INDEX: 27 ML/M2
LEFT ATRIUM VOLUME: 55 ML
MCH RBC QN AUTO: 32.1 PG (ref 26.6–33)
MCHC RBC AUTO-ENTMCNC: 34.9 G/DL (ref 31.5–35.7)
MCV RBC AUTO: 91.9 FL (ref 79–97)
PLATELET # BLD AUTO: 223 10*3/MM3 (ref 140–450)
PMV BLD AUTO: 9 FL (ref 6–12)
POTASSIUM SERPL-SCNC: 3.7 MMOL/L (ref 3.5–5.2)
RBC # BLD AUTO: 4.21 10*6/MM3 (ref 4.14–5.8)
SODIUM SERPL-SCNC: 141 MMOL/L (ref 136–145)
WBC NRBC COR # BLD AUTO: 13.81 10*3/MM3 (ref 3.4–10.8)

## 2024-01-24 PROCEDURE — 94799 UNLISTED PULMONARY SVC/PX: CPT

## 2024-01-24 PROCEDURE — 94761 N-INVAS EAR/PLS OXIMETRY MLT: CPT

## 2024-01-24 PROCEDURE — 93306 TTE W/DOPPLER COMPLETE: CPT

## 2024-01-24 PROCEDURE — 25010000002 MORPHINE PER 10 MG: Performed by: SURGERY

## 2024-01-24 PROCEDURE — 93306 TTE W/DOPPLER COMPLETE: CPT | Performed by: INTERNAL MEDICINE

## 2024-01-24 PROCEDURE — 93246 EXT ECG>7D<15D RECORDING: CPT

## 2024-01-24 PROCEDURE — 94664 DEMO&/EVAL PT USE INHALER: CPT

## 2024-01-24 PROCEDURE — 99232 SBSQ HOSP IP/OBS MODERATE 35: CPT

## 2024-01-24 PROCEDURE — 25010000002 ENOXAPARIN PER 10 MG: Performed by: SURGERY

## 2024-01-24 PROCEDURE — 80048 BASIC METABOLIC PNL TOTAL CA: CPT | Performed by: HOSPITALIST

## 2024-01-24 PROCEDURE — 25010000002 PIPERACILLIN SOD-TAZOBACTAM PER 1 G: Performed by: SURGERY

## 2024-01-24 PROCEDURE — 85027 COMPLETE CBC AUTOMATED: CPT | Performed by: HOSPITALIST

## 2024-01-24 RX ORDER — METOPROLOL SUCCINATE 50 MG/1
50 TABLET, EXTENDED RELEASE ORAL DAILY
Qty: 30 TABLET | Refills: 0 | Status: SHIPPED | OUTPATIENT
Start: 2024-01-25 | End: 2024-01-24 | Stop reason: SDUPTHER

## 2024-01-24 RX ORDER — UREA 40 %
1 CREAM (GRAM) TOPICAL
Start: 2024-01-24

## 2024-01-24 RX ORDER — LOPERAMIDE HYDROCHLORIDE 2 MG/1
2 CAPSULE ORAL ONCE
Status: COMPLETED | OUTPATIENT
Start: 2024-01-24 | End: 2024-01-24

## 2024-01-24 RX ORDER — METOPROLOL SUCCINATE 50 MG/1
50 TABLET, EXTENDED RELEASE ORAL DAILY
Qty: 30 TABLET | Refills: 0 | Status: SHIPPED | OUTPATIENT
Start: 2024-01-25

## 2024-01-24 RX ADMIN — EMPAGLIFLOZIN 10 MG: 10 TABLET, FILM COATED ORAL at 09:43

## 2024-01-24 RX ADMIN — GABAPENTIN 300 MG: 300 CAPSULE ORAL at 09:43

## 2024-01-24 RX ADMIN — FINASTERIDE 5 MG: 5 TABLET, FILM COATED ORAL at 09:43

## 2024-01-24 RX ADMIN — TIOTROPIUM BROMIDE INHALATION SPRAY 2 PUFF: 3.12 SPRAY, METERED RESPIRATORY (INHALATION) at 10:29

## 2024-01-24 RX ADMIN — ESCITALOPRAM 20 MG: 20 TABLET, FILM COATED ORAL at 09:43

## 2024-01-24 RX ADMIN — GABAPENTIN 300 MG: 300 CAPSULE ORAL at 11:58

## 2024-01-24 RX ADMIN — LOPERAMIDE HYDROCHLORIDE 2 MG: 2 CAPSULE ORAL at 14:25

## 2024-01-24 RX ADMIN — PIPERACILLIN SODIUM AND TAZOBACTAM SODIUM 3.38 G: 3; .375 INJECTION, SOLUTION INTRAVENOUS at 11:58

## 2024-01-24 RX ADMIN — ENOXAPARIN SODIUM 40 MG: 100 INJECTION SUBCUTANEOUS at 09:43

## 2024-01-24 RX ADMIN — FLUTICASONE PROPIONATE 2 SPRAY: 50 SPRAY, METERED NASAL at 09:45

## 2024-01-24 RX ADMIN — CLOBETASOL PROPIONATE CREAM USP, 0.05% 1 APPLICATION: 0.5 CREAM TOPICAL at 09:44

## 2024-01-24 RX ADMIN — PIPERACILLIN SODIUM AND TAZOBACTAM SODIUM 3.38 G: 3; .375 INJECTION, SOLUTION INTRAVENOUS at 03:00

## 2024-01-24 RX ADMIN — UREA: 17 CREAM TOPICAL at 09:44

## 2024-01-24 RX ADMIN — HYDROCODONE BITARTRATE AND ACETAMINOPHEN 2 TABLET: 5; 325 TABLET ORAL at 09:43

## 2024-01-24 RX ADMIN — Medication 10 ML: at 09:45

## 2024-01-24 RX ADMIN — MORPHINE SULFATE 4 MG: 2 INJECTION, SOLUTION INTRAMUSCULAR; INTRAVENOUS at 03:53

## 2024-01-24 RX ADMIN — METOPROLOL SUCCINATE 50 MG: 50 TABLET, FILM COATED, EXTENDED RELEASE ORAL at 09:43

## 2024-01-24 RX ADMIN — ALLOPURINOL 100 MG: 100 TABLET ORAL at 09:43

## 2024-01-24 NOTE — PROGRESS NOTES
LOS: 5 days   Patient Care Team:  Murphy Felix DO as PCP - General  Ulysses Eugene MD as Consulting Physician (Gastroenterology)    Chief Complaint: follow up atrial ectopy, hypertension     Interval History: He denies chest pain or discomfort, palpitations, or shortness of breath today. He reports that he is feeling better. He has remained in sinus rhythm overnight.     Vital Signs:  Temp:  [97.3 °F (36.3 °C)-98.6 °F (37 °C)] 98.1 °F (36.7 °C)  Heart Rate:  [70-81] 77  Resp:  [16-20] 20  BP: (145-173)/(82-94) 172/94    Intake/Output Summary (Last 24 hours) at 1/24/2024 1148  Last data filed at 1/24/2024 0943  Gross per 24 hour   Intake 360 ml   Output 550 ml   Net -190 ml      Physical Exam  Vitals reviewed.   Constitutional:       General: He is not in acute distress.  HENT:      Head: Normocephalic.      Nose: Nose normal.   Eyes:      Extraocular Movements: Extraocular movements intact.      Pupils: Pupils are equal, round, and reactive to light.   Cardiovascular:      Rate and Rhythm: Normal rate and regular rhythm.      Pulses: Normal pulses.      Heart sounds: Normal heart sounds. Heart sounds not distant. No murmur heard.     No friction rub. No gallop. No S3 or S4 sounds.   Pulmonary:      Effort: Pulmonary effort is normal.      Breath sounds: Normal breath sounds.   Abdominal:      General: Abdomen is flat. Bowel sounds are normal.      Palpations: Abdomen is soft.      Tenderness: There is abdominal tenderness in the right upper quadrant.   Skin:     General: Skin is warm and dry.   Neurological:      General: No focal deficit present.      Mental Status: He is alert and oriented to person, place, and time. Mental status is at baseline.   Psychiatric:         Mood and Affect: Mood normal.         Behavior: Behavior normal.       Results Review:    Results from last 7 days   Lab Units 01/24/24  0608   SODIUM mmol/L 141   POTASSIUM mmol/L 3.7   CHLORIDE mmol/L 105   CO2 mmol/L 21.5*   BUN  mg/dL 17   CREATININE mg/dL 1.13   GLUCOSE mg/dL 107*   CALCIUM mg/dL 8.7         Results from last 7 days   Lab Units 01/24/24  0608   WBC 10*3/mm3 13.81*   HEMOGLOBIN g/dL 13.5   HEMATOCRIT % 38.7   PLATELETS 10*3/mm3 223     Results from last 7 days   Lab Units 01/19/24  1059   INR  1.16*   APTT seconds 32.5         Results from last 7 days   Lab Units 01/20/24  0636   MAGNESIUM mg/dL 2.0           I reviewed the patient's new clinical results.        Assessment & Plan:  Atrial ectopy - telemetry reviewed, he remained in SR overnight. A 14 day Zio has been ordered to be placed prior to discharge. His metoprolol has been doubled and he is tolerating this well. Anticoagulation is not indicated at this time given the brevity of the episode.   Severe abdominal pain - POD 5 lap peg for necrotizing cholecystitis   Hypertension - BP elevated this morning prior to administration of metoprolol. Would consider amlodipine vs ARB if remains elevated, defer to primary team.   Chronic kidney disease   COPD     Cassie Louis, APRN  01/24/24  11:48 EST

## 2024-01-24 NOTE — DISCHARGE SUMMARY
[facility]    Date of Discharge:  1/24/2024    PCP: Murphy Felix DO    Discharge Diagnosis:   Active Hospital Problems    Diagnosis  POA    **Cholecystitis [K81.9]  Yes    Fall [W19.XXXA]  Yes    Hypokalemia [E87.6]  Yes    CKD (chronic kidney disease) stage 2, GFR 60-89 ml/min [N18.2]  Yes    COPD mixed type [J44.9]  Yes    Essential hypertension [I10]  Yes    Depression with anxiety [F41.8]  Yes      Resolved Hospital Problems   No resolved problems to display.     Procedure(s):  CHOLECYSTECTOMY LAPAROSCOPIC WITH DAVINCI ROBOT     Consults       Date and Time Order Name Status Description    1/22/2024  4:18 PM Inpatient Cardiology Consult Completed     1/19/2024 12:42 PM [facility] (on-call MD unless specified) Details      1/19/2024 11:59 AM Surgery (on-call MD unless specified) Completed           Hospital Course  78 y.o. male with history of COPD, CKD, HTN who presents with weakness and falls and found to have acute gangrenous cholecystitis. On 1/19/2024 he underwent cholecystectomy. He had a drain postoperatively that has been removed and he completed course of IV antibiotics. Leukocytosis is improving and he has had no fever since 1/19/2024. Surgery has cleared him for discharge home. He is tolerating a diet and having bowel movements. He did have some sundowning at night but during the day he was improved. Wife is at bedside currently states he is similar to baseline. He had atrial ectopy. Cardiology reviewed telemetry that showed SR with PACs, then salvos of nonsustained atrial tachycardia lasting seconds at most, then a brief period of atrial flutter. They felt it was due to acute illness. Echocardiogram is below. They are recommending a 14-day ZIO at discharge. They agreed with increasing metoprolol. They did not recommend anticoagulation due to the brevity of the episode. He reported an episode of dark stool. Hemoglobin has been stable and nursing  "staff states stool today has been \"normal brown\". He still has mild leukocytosis but overall has clinically improved. He has no fever and no new infectious complaints. Would recommend follow-up CBC with PCP next week.    I discussed the patient's findings and my recommendations with patient, family, and nursing staff.    Temp:  [97.3 °F (36.3 °C)-98.1 °F (36.7 °C)] 98.1 °F (36.7 °C)  Heart Rate:  [70-81] 77  Resp:  [18-20] 20  BP: (145-172)/(70-94) 145/70  Body mass index is 29.65 kg/m².    Physical Exam  Constitutional:       General: He is not in acute distress.     Appearance: Normal appearance. He is not toxic-appearing.   HENT:      Head: Normocephalic and atraumatic.   Cardiovascular:      Rate and Rhythm: Normal rate and regular rhythm.   Pulmonary:      Effort: Pulmonary effort is normal. No respiratory distress.      Breath sounds: Normal breath sounds. No wheezing or rhonchi.   Abdominal:      General: Bowel sounds are normal.      Palpations: Abdomen is soft.      Tenderness: There is no abdominal tenderness. There is no guarding or rebound.   Musculoskeletal:         General: No swelling.   Skin:     General: Skin is warm and dry.   Neurological:      General: No focal deficit present.      Mental Status: He is alert.   Psychiatric:         Mood and Affect: Mood normal.         Behavior: Behavior normal.       Disposition: Home or Self Care       Discharge Medications        Changes to Medications        Instructions Start Date   clobetasol 0.05 % cream  Commonly known as: TEMOVATE  What changed: See the new instructions.   APPLY  CREAM TOPICALLY TO APPROPRIATE AREA AS DIRECTED TWICE DAILY TO  HANDS      finasteride 5 MG tablet  Commonly known as: PROSCAR  What changed: additional instructions   TAKE 1 TABLET BY MOUTH ONCE DAILY AS DIRECTED      fluticasone 50 MCG/ACT nasal spray  Commonly known as: FLONASE  What changed: how to take this   Use 2 spray(s) in each nostril once daily      gabapentin 300 MG " capsule  Commonly known as: NEURONTIN  What changed: See the new instructions.   TAKE 1 CAPSULE BY MOUTH IN THE MORNING AND 1 ONCE DAILY AT LUNCH  AND 2 AT NIGHT. CALL OFFICE FOR APPOINTMENT.      methocarbamol 750 MG tablet  Commonly known as: ROBAXIN  What changed: reasons to take this   TAKE 1 TABLET BY MOUTH TWICE DAILY AS NEEDED FOR MUSCLE SPASM      metoprolol succinate XL 50 MG 24 hr tablet  Commonly known as: TOPROL-XL  What changed:   medication strength  how much to take   50 mg, Oral, Daily   Start Date: January 25, 2024            Continue These Medications        Instructions Start Date   albuterol sulfate  (90 Base) MCG/ACT inhaler  Commonly known as: PROVENTIL HFA;VENTOLIN HFA;PROAIR HFA   2 puffs, Inhalation, Every 4 Hours PRN      allopurinol 100 MG tablet  Commonly known as: ZYLOPRIM   Take 1 tablet by mouth twice daily      escitalopram 20 MG tablet  Commonly known as: LEXAPRO   20 mg, Oral, Daily      Farxiga 10 MG tablet  Generic drug: dapagliflozin Propanediol   10 mg, Oral, Daily      hydroCHLOROthiazide 25 MG tablet  Commonly known as: HYDRODIURIL   Take 1 tablet by mouth once daily      HYDROcodone-acetaminophen 5-325 MG per tablet  Commonly known as: NORCO   2 tablets, Oral, Every 6 Hours PRN      mirtazapine 30 MG tablet  Commonly known as: REMERON   TAKE 1 TABLET BY MOUTH ONCE DAILY AT NIGHT      multivitamin with minerals tablet tablet   1 tablet, Oral, Daily      pseudoephedrine 60 MG tablet  Commonly known as: SUDAFED   60 mg, Oral, Every 12 Hours PRN      rosuvastatin 20 MG tablet  Commonly known as: CRESTOR   TAKE 1 TABLET BY MOUTH ONCE DAILY AT NIGHT      Spiriva HandiHaler 18 MCG per inhalation capsule  Generic drug: tiotropium   1 capsule, Inhalation, Daily - RT      urea 40 % cream  Commonly known as: CARMOL   1 application , Topical, Every 24 Hours Scheduled, To palms of both hands      Zinc 50 MG capsule   1 capsule, Oral, Daily, 1 chewable daily              Diet  Instructions       Diet: Regular/House Diet, Cardiac Diets; Healthy Heart (2-3 Na+)      Discharge Diet:  Regular/House Diet  Cardiac Diets       Cardiac Diet: Healthy Heart (2-3 Na+)    Texture: Regular Texture (IDDSI 7)    Fluid Consistency: Thin (IDDSI 0)           Activity Instructions       Activity as Tolerated             Additional Instructions for the Follow-ups that You Need to Schedule       Call MD for problems / concerns.   As directed             Follow-up Information       Murphy Felix DO Follow up in 1 week(s).    Specialties: Family Medicine, Urgent Care, Emergency Medicine  Why: post hospital follow up, Lab check CBC next week  Contact information:  [address]  [facility] [address]  [place] [id number] [phone]               David Lakhani MD Follow up.    Specialty: General Surgery  Contact information:  [address] [facility] [address]  [place] [id number] [phone]                            Future Appointments   Date Time Provider Department Center   1/29/2024  1:00 PM Murphy Felix DO [facility] [address] MD  [facility]  01/24/24    Discharge time spent greater than 30 minutes.

## 2024-01-24 NOTE — PLAN OF CARE
Goal Outcome Evaluation:  Plan of Care Reviewed With: patient, spouse        Progress: improving   Pt AO x 4. Iv fluids infusing. Iv abx given as scheduled.VSS. NSR on the monitor Pain treated with PRN meds. Had several Bms this shift.All needs met.  Going for Echo today.WCTM for the rest of the shift.

## 2024-01-24 NOTE — OUTREACH NOTE
Prep Survey      Flowsheet Row Responses   [facility] patient discharged from? [place]   Is LACE score < 7 ? No   Eligibility [facility]   Date of Admission 01/19/24   Date of Discharge 01/24/24   Discharge Disposition Home or Self Care   Discharge diagnosis CHOLECYSTECTOMY LAPAROSCOPIC WITH DAVINCI ROBOT   Does the patient have one of the following disease processes/diagnoses(primary or secondary)? General Surgery   Does the patient have Home health ordered? No   Is there a DME ordered? No   Prep survey completed? Yes            Lou HUSAIN - Registered Nurse

## 2024-01-24 NOTE — PLAN OF CARE
Problem: Adult Inpatient Plan of Care  Goal: Plan of Care Review  Flowsheets (Taken 1/24/2024 2905)  Outcome Evaluation: AOX4, moments of confusion. Room air. IVs removed. Discharge instructions discussed at bedside with patient and spouse. Holter monitor placed. Discussed medication changes, when to call providers, and follow up appointments. Patient and spouse verbalized understanding. Discharged home with spouse.   Goal Outcome Evaluation:              Outcome Evaluation: AOX4, moments of confusion. Room air. IVs removed. Discharge instructions discussed at bedside with patient and spouse. Holter monitor placed. Discussed medication changes, when to call providers, and follow up appointments. Patient and spouse verbalized understanding. Discharged home with spouse.

## 2024-01-25 ENCOUNTER — TRANSITIONAL CARE MANAGEMENT TELEPHONE ENCOUNTER (OUTPATIENT)
Dept: CALL CENTER | Facility: HOSPITAL | Age: 79
End: 2024-01-25
Payer: MEDICARE

## 2024-01-25 DIAGNOSIS — M15.9 GENERALIZED OSTEOARTHRITIS OF MULTIPLE SITES: Primary | ICD-10-CM

## 2024-01-25 NOTE — OUTREACH NOTE
Call Center TCM Note      Flowsheet Row Responses   [facility] patient discharged from? [place]   Does the patient have one of the following disease processes/diagnoses(primary or secondary)? General Surgery   TCM attempt successful? Yes   Call start time 0812   Call end time 0815   Discharge diagnosis CHOLECYSTECTOMY LAPAROSCOPIC WITH DAVINCI ROBOT   Person spoke with today (if not patient) and relationship wife   Meds reviewed with patient/caregiver? Yes   Is the patient having any side effects they believe may be caused by any medication additions or changes? No   Does the patient have all medications related to this admission filled (includes all antibiotics, pain medications, etc.) Yes   Is the patient taking all medications as directed (includes completed medication regime)? Yes   Comments PCP appt 1/29/24 1 pm on schedule. This appt meets TCM guidelines.   Does the patient have an appointment with their PCP within 7-14 days of discharge? Yes   Has home health visited the patient within 72 hours of discharge? N/A   Psychosocial issues? No   Did the patient receive a copy of their discharge instructions? Yes   What is the patient's perception of their health status since discharge? Improving   Is the patient /caregiver able to teach back basic post-op care? Lifting as instructed by MD in discharge instructions, Take showers only when approved by MD-sponge bathe until then, No tub bath, swimming, or hot tub until instructed by MD   Is the patient/caregiver able to teach back signs and symptoms of incisional infection? Increased redness, swelling or pain at the incisonal site, Increased drainage or bleeding, Incisional warmth, Pus or odor from incision, Fever   Is the patient/caregiver able to teach back steps to recovery at home? Set small, achievable goals for return to baseline health   Is the patient/caregiver able to teach back the hierarchy of who to call/visit for symptoms/problems? PCP,  Specialist, Home health nurse, Urgent Care, ED, 911 Yes   TCM call completed? Yes   Wrap up additional comments Wife reports Pt is resting. No needs at this time.   Call end time 0815            Gina Rosales RN    1/25/2024, 08:16 EST

## 2024-01-29 ENCOUNTER — OFFICE VISIT (OUTPATIENT)
Dept: FAMILY MEDICINE CLINIC | Facility: CLINIC | Age: 79
End: 2024-01-29
Payer: MEDICARE

## 2024-01-29 VITALS
SYSTOLIC BLOOD PRESSURE: 126 MMHG | HEART RATE: 67 BPM | HEIGHT: 68 IN | DIASTOLIC BLOOD PRESSURE: 78 MMHG | WEIGHT: 195 LBS | OXYGEN SATURATION: 98 % | BODY MASS INDEX: 29.55 KG/M2

## 2024-01-29 DIAGNOSIS — Z00.00 MEDICARE ANNUAL WELLNESS VISIT, SUBSEQUENT: Primary | ICD-10-CM

## 2024-01-29 DIAGNOSIS — N18.31 STAGE 3A CHRONIC KIDNEY DISEASE: ICD-10-CM

## 2024-01-29 DIAGNOSIS — D35.2 PITUITARY ADENOMA: ICD-10-CM

## 2024-01-29 DIAGNOSIS — M15.9 GENERALIZED OSTEOARTHRITIS OF MULTIPLE SITES: ICD-10-CM

## 2024-01-29 DIAGNOSIS — E22.1 HYPERPROLACTINEMIA: ICD-10-CM

## 2024-01-29 DIAGNOSIS — Z90.49 HISTORY OF CHOLECYSTECTOMY: ICD-10-CM

## 2024-01-29 DIAGNOSIS — G89.29 CHRONIC BILATERAL LOW BACK PAIN WITH BILATERAL SCIATICA: ICD-10-CM

## 2024-01-29 DIAGNOSIS — M54.41 CHRONIC BILATERAL LOW BACK PAIN WITH BILATERAL SCIATICA: ICD-10-CM

## 2024-01-29 DIAGNOSIS — J44.9 MIXED TYPE COPD (CHRONIC OBSTRUCTIVE PULMONARY DISEASE): ICD-10-CM

## 2024-01-29 DIAGNOSIS — E66.9 OBESITY (BMI 30-39.9): ICD-10-CM

## 2024-01-29 DIAGNOSIS — R73.03 PREDIABETES: ICD-10-CM

## 2024-01-29 DIAGNOSIS — Z79.899 DRUG THERAPY: ICD-10-CM

## 2024-01-29 DIAGNOSIS — M54.42 CHRONIC BILATERAL LOW BACK PAIN WITH BILATERAL SCIATICA: ICD-10-CM

## 2024-01-29 DIAGNOSIS — E78.5 DYSLIPIDEMIA: ICD-10-CM

## 2024-01-29 PROCEDURE — 3078F DIAST BP <80 MM HG: CPT | Performed by: FAMILY MEDICINE

## 2024-01-29 PROCEDURE — 1159F MED LIST DOCD IN RCRD: CPT | Performed by: FAMILY MEDICINE

## 2024-01-29 PROCEDURE — 1160F RVW MEDS BY RX/DR IN RCRD: CPT | Performed by: FAMILY MEDICINE

## 2024-01-29 PROCEDURE — 1170F FXNL STATUS ASSESSED: CPT | Performed by: FAMILY MEDICINE

## 2024-01-29 PROCEDURE — 99214 OFFICE O/P EST MOD 30 MIN: CPT | Performed by: FAMILY MEDICINE

## 2024-01-29 PROCEDURE — G0439 PPPS, SUBSEQ VISIT: HCPCS | Performed by: FAMILY MEDICINE

## 2024-01-29 PROCEDURE — 3074F SYST BP LT 130 MM HG: CPT | Performed by: FAMILY MEDICINE

## 2024-01-29 RX ORDER — HYDROCODONE BITARTRATE AND ACETAMINOPHEN 5; 325 MG/1; MG/1
2 TABLET ORAL EVERY 6 HOURS PRN
Qty: 240 TABLET | Refills: 0 | Status: SHIPPED | OUTPATIENT
Start: 2024-01-29

## 2024-01-29 NOTE — PATIENT INSTRUCTIONS
Advance Care Planning and Advance Directives     You make decisions on a daily basis - decisions about where you want to live, your career, your home, your life. Perhaps one of the most important decisions you face is your choice for future medical care. Take time to talk with your family and your healthcare team and start planning today.  Advance Care Planning is a process that can help you:  Understand possible future healthcare decisions in light of your own experiences  Reflect on those decision in light of your goals and values  Discuss your decisions with those closest to you and the healthcare professionals that care for you  Make a plan by creating a document that reflects your wishes    Surrogate Decision Maker  In the event of a medical emergency, which has left you unable to communicate or to make your own decisions, you would need someone to make decisions for you.  It is important to discuss your preferences for medical treatment with this person while you are in good health.     Qualities of a surrogate decision maker:  Willing to take on this role and responsibility  Knows what you want for future medical care  Willing to follow your wishes even if they don't agree with them  Able to make difficult medical decisions under stressful circumstances    Advance Directives  These are legal documents you can create that will guide your healthcare team and decision maker(s) when needed. These documents can be stored in the electronic medical record.    Living Will - a legal document to guide your care if you have a terminal condition or a serious illness and are unable to communicate. States vary by statute in document names/types, but most forms may include one or more of the following:        -  Directions regarding life-prolonging treatments        -  Directions regarding artificially provided nutrition/hydration        -  Choosing a healthcare decision maker        -  Direction regarding organ/tissue  donation    Durable Power of  for Healthcare - this document names an -in-fact to make medical decisions for you, but it may also allow this person to make personal and financial decisions for you. Please seek the advice of an  if you need this type of document.    **Advance Directives are not required and no one may discriminate against you if you do not sign one.    Medical Orders  Many states allow specific forms/orders signed by your physician to record your wishes for medical treatment in your current state of health. This form, signed in personal communication with your physician, addresses resuscitation and other medical interventions that you may or may not want.      For more information or to schedule a time with a [facility] Advance Care Planning Facilitator contact: [email or web address] or call [phone] and someone will contact you directly.Calorie Counting for Weight Loss  Calories are units of energy. Your body needs a certain number of calories from food to keep going throughout the day. When you eat or drink more calories than your body needs, your body stores the extra calories mostly as fat. When you eat or drink fewer calories than your body needs, your body burns fat to get the energy it needs.  Calorie counting means keeping track of how many calories you eat and drink each day. Calorie counting can be helpful if you need to lose weight. If you eat fewer calories than your body needs, you should lose weight. Ask your health care provider what a healthy weight is for you.  For calorie counting to work, you will need to eat the right number of calories each day to lose a healthy amount of weight per week. A dietitian can help you figure out how many calories you need in a day and will suggest ways to reach your calorie goal.  A healthy amount of weight to lose each week is usually 1-2 lb (0.5-0.9 kg). This usually means that your daily calorie intake should be  reduced by 500-750 calories.  Eating 1,200-1,500 calories a day can help most women lose weight.  Eating 1,500-1,800 calories a day can help most men lose weight.  What do I need to know about calorie counting?  Work with your health care provider or dietitian to determine how many calories you should get each day. To meet your daily calorie goal, you will need to:  Find out how many calories are in each food that you would like to eat. Try to do this before you eat.  Decide how much of the food you plan to eat.  Keep a food log. Do this by writing down what you ate and how many calories it had.  To successfully lose weight, it is important to balance calorie counting with a healthy lifestyle that includes regular activity.  Where do I find calorie information?  The number of calories in a food can be found on a Nutrition Facts label. If a food does not have a Nutrition Facts label, try to look up the calories online or ask your dietitian for help.  Remember that calories are listed per serving. If you choose to have more than one serving of a food, you will have to multiply the calories per serving by the number of servings you plan to eat. For example, the label on a package of bread might say that a serving size is 1 slice and that there are 90 calories in a serving. If you eat 1 slice, you will have eaten 90 calories. If you eat 2 slices, you will have eaten 180 calories.  How do I keep a food log?  After each time that you eat, record the following in your food log as soon as possible:  What you ate. Be sure to include toppings, sauces, and other extras on the food.  How much you ate. This can be measured in cups, ounces, or number of items.  How many calories were in each food and drink.  The total number of calories in the food you ate.  Keep your food log near you, such as in a pocket-sized notebook or on an sharad or website on your mobile phone. Some programs will calculate calories for you and show you how  many calories you have left to meet your daily goal.  What are some portion-control tips?  Know how many calories are in a serving. This will help you know how many servings you can have of a certain food.  Use a measuring cup to measure serving sizes. You could also try weighing out portions on a kitchen scale. With time, you will be able to estimate serving sizes for some foods.  Take time to put servings of different foods on your favorite plates or in your favorite bowls and cups so you know what a serving looks like.  Try not to eat straight from a food's packaging, such as from a bag or box. Eating straight from the package makes it hard to see how much you are eating and can lead to overeating. Put the amount you would like to eat in a cup or on a plate to make sure you are eating the right portion.  Use smaller plates, glasses, and bowls for smaller portions and to prevent overeating.  Try not to multitask. For example, avoid watching TV or using your computer while eating. If it is time to eat, sit down at a table and enjoy your food. This will help you recognize when you are full. It will also help you be more mindful of what and how much you are eating.  What are tips for following this plan?  Reading food labels  Check the calorie count compared with the serving size. The serving size may be smaller than what you are used to eating.  Check the source of the calories. Try to choose foods that are high in protein, fiber, and vitamins, and low in saturated fat, trans fat, and sodium.  Shopping  Read nutrition labels while you shop. This will help you make healthy decisions about which foods to buy.  Pay attention to nutrition labels for low-fat or fat-free foods. These foods sometimes have the same number of calories or more calories than the full-fat versions. They also often have added sugar, starch, or salt to make up for flavor that was removed with the fat.  Make a grocery list of lower-calorie foods  and stick to it.  Cooking  Try to cook your favorite foods in a healthier way. For example, try baking instead of frying.  Use low-fat dairy products.  Meal planning  Use more fruits and vegetables. One-half of your plate should be fruits and vegetables.  Include lean proteins, such as chicken, turkey, and fish.  Lifestyle  Each week, aim to do one of the followin minutes of moderate exercise, such as walking.  75 minutes of vigorous exercise, such as running.  General information  Know how many calories are in the foods you eat most often. This will help you calculate calorie counts faster.  Find a way of tracking calories that works for you. Get creative. Try different apps or programs if writing down calories does not work for you.  What foods should I eat?    Eat nutritious foods. It is better to have a nutritious, high-calorie food, such as an avocado, than a food with few nutrients, such as a bag of potato chips.  Use your calories on foods and drinks that will fill you up and will not leave you hungry soon after eating.  Examples of foods that fill you up are nuts and nut butters, vegetables, lean proteins, and high-fiber foods such as whole grains. High-fiber foods are foods with more than 5 g of fiber per serving.  Pay attention to calories in drinks. Low-calorie drinks include water and unsweetened drinks.  The items listed above may not be a complete list of foods and beverages you can eat. Contact a dietitian for more information.  What foods should I limit?  Limit foods or drinks that are not good sources of vitamins, minerals, or protein or that are high in unhealthy fats. These include:  Candy.  Other sweets.  Sodas, specialty coffee drinks, alcohol, and juice.  The items listed above may not be a complete list of foods and beverages you should avoid. Contact a dietitian for more information.  How do I count calories when eating out?  Pay attention to portions. Often, portions are much larger  when eating out. Try these tips to keep portions smaller:  Consider sharing a meal instead of getting your own.  If you get your own meal, eat only half of it. Before you start eating, ask for a container and put half of your meal into it.  When available, consider ordering smaller portions from the menu instead of full portions.  Pay attention to your food and drink choices. Knowing the way food is cooked and what is included with the meal can help you eat fewer calories.  If calories are listed on the menu, choose the lower-calorie options.  Choose dishes that include vegetables, fruits, whole grains, low-fat dairy products, and lean proteins.  Choose items that are boiled, broiled, grilled, or steamed. Avoid items that are buttered, battered, fried, or served with cream sauce. Items labeled as crispy are usually fried, unless stated otherwise.  Choose water, low-fat milk, unsweetened iced tea, or other drinks without added sugar. If you want an alcoholic beverage, choose a lower-calorie option, such as a glass of wine or light beer.  Ask for dressings, sauces, and syrups on the side. These are usually high in calories, so you should limit the amount you eat.  If you want a salad, choose a garden salad and ask for grilled meats. Avoid extra toppings such as teresa, cheese, or fried items. Ask for the dressing on the side, or ask for olive oil and vinegar or lemon to use as dressing.  Estimate how many servings of a food you are given. Knowing serving sizes will help you be aware of how much food you are eating at restaurants.  Where to find more information  Centers for Disease Control and Prevention: www.cdc.gov  [place] Department of Agriculture: myplate.gov  Summary  Calorie counting means keeping track of how many calories you eat and drink each day. If you eat fewer calories than your body needs, you should lose weight.  A healthy amount of weight to lose per week is usually 1-2 lb (0.5-0.9 kg). This usually  means reducing your daily calorie intake by 500-750 calories.  The number of calories in a food can be found on a Nutrition Facts label. If a food does not have a Nutrition Facts label, try to look up the calories online or ask your dietitian for help.  Use smaller plates, glasses, and bowls for smaller portions and to prevent overeating.  Use your calories on foods and drinks that will fill you up and not leave you hungry shortly after a meal.  This information is not intended to replace advice given to you by your health care provider. Make sure you discuss any questions you have with your health care provider.  Document Revised: 01/28/2021 Document Reviewed: 01/28/2021  [employer] Patient Education © 2022 [employer]    Exercising to Lose Weight  Getting regular exercise is important for everyone. It is especially important if you are overweight. Being overweight increases your risk of heart disease, stroke, diabetes, high blood pressure, and several types of cancer. Exercising, and reducing the calories you consume, can help you lose weight and improve fitness and health.  Exercise can be moderate or vigorous intensity. To lose weight, most people need to do a certain amount of moderate or vigorous-intensity exercise each week.  How can exercise affect me?  You lose weight when you exercise enough to burn more calories than you eat. Exercise also reduces body fat and builds muscle. The more muscle you have, the more calories you burn. Exercise also:  Improves mood.  Reduces stress and tension.  Improves your overall fitness, flexibility, and endurance.  Increases bone strength.  Moderate-intensity exercise  Moderate-intensity exercise is any activity that gets you moving enough to burn at least three times more energy (calories) than if you were sitting.  Examples of moderate exercise include:  Walking a mile in 15 minutes.  Doing light yard work.  Biking at an easy pace.  Most people should get at least 150  minutes of moderate-intensity exercise a week to maintain their body weight.  Vigorous-intensity exercise  Vigorous-intensity exercise is any activity that gets you moving enough to burn at least six times more calories than if you were sitting. When you exercise at this intensity, you should be working hard enough that you are not able to carry on a conversation.  Examples of vigorous exercise include:  Running.  Playing a team sport, such as football, basketball, and soccer.  Jumping rope.  Most people should get at least 75 minutes a week of vigorous exercise to maintain their body weight.  What actions can I take to lose weight?  The amount of exercise you need to lose weight depends on:  Your age.  The type of exercise.  Any health conditions you have.  Your overall physical ability.  Talk to your health care provider about how much exercise you need and what types of activities are safe for you.  Nutrition    Make changes to your diet as told by your health care provider or diet and nutrition specialist (dietitian). This may include:  Eating fewer calories.  Eating more protein.  Eating less unhealthy fats.  Eating a diet that includes fresh fruits and vegetables, whole grains, low-fat dairy products, and lean protein.  Avoiding foods with added fat, salt, and sugar.  Drink plenty of water while you exercise to prevent dehydration or heat stroke.  Activity  Choose an activity that you enjoy and set realistic goals. Your health care provider can help you make an exercise plan that works for you.  Exercise at a moderate or vigorous intensity most days of the week.  The intensity of exercise may vary from person to person. You can tell how intense a workout is for you by paying attention to your breathing and heartbeat. Most people will notice their breathing and heartbeat get faster with more intense exercise.  Do resistance training twice each week, such as:  Push-ups.  Sit-ups.  Lifting weights.  Using  resistance bands.  Getting short amounts of exercise can be just as helpful as long, structured periods of exercise. If you have trouble finding time to exercise, try doing these things as part of your daily routine:  Get up, stretch, and walk around every 30 minutes throughout the day.  Go for a walk during your lunch break.  Park your car farther away from your destination.  If you take public transportation, get off one stop early and walk the rest of the way.  Make phone calls while standing up and walking around.  Take the stairs instead of elevators or escalators.  Wear comfortable clothes and shoes with good support.  Do not exercise so much that you hurt yourself, feel dizzy, or get very short of breath.  Where to find more information  [place] Department of Health and Human Services: www.hhs.gov  Centers for Disease Control and Prevention: www.cdc.gov  Contact a health care provider:  Before starting a new exercise program.  If you have questions or concerns about your weight.  If you have a medical problem that keeps you from exercising.  Get help right away if:  You have any of the following while exercising:  Injury.  Dizziness.  Difficulty breathing or shortness of breath that does not go away when you stop exercising.  Chest pain.  Rapid heartbeat.  These symptoms may represent a serious problem that is an emergency. Do not wait to see if the symptoms will go away. Get medical help right away. Call your local emergency services (911 in the [place] Do not drive yourself to the hospital.  Summary  Getting regular exercise is especially important if you are overweight.  Being overweight increases your risk of heart disease, stroke, diabetes, high blood pressure, and several types of cancer.  Losing weight happens when you burn more calories than you eat.  Reducing the amount of calories you eat, and getting regular moderate or vigorous exercise each week, helps you lose weight.  This information is not  intended to replace advice given to you by your health care provider. Make sure you discuss any questions you have with your health care provider.  Document Revised: 02/13/2022 Document Reviewed: 02/13/2022  Elsevier Patient Education © 2022 [employer] Opioid Use Disorder  Opioid use disorder is a condition in which opioids are used for reasons other than medical care. The person may use them even though taking them hurts the person's health and well-being. These drugs are powerful substances that relieve pain. Opioids include drugs such as heroin as well as prescription medicines for pain, such as:  Codeine.  Morphine.  Hydrocodone.  Oxycodone.  Fentanyl.  Taking prescribed opioids regularly can lead to dependence, especially if you take them in larger amounts or more often than they should be taken. Opioid use disorder can lead to problems with mental and physical health, including:  Depression or anxiety.  Severe constipation.  Malnutrition and weight loss.  Sleep problems.  Diseases caused by infections, such as hepatitis or HIV.  Sexual problems.  Opioid use disorder can be dangerous. It increases the risk of suicide and can lead to a life-threatening overdose.  What are the causes?  This condition is caused by taking opioids. Taking opioids again and again results in changes in the brain that make it hard to control opioid use. Many people develop this condition because they like the way they feel when they take opioids or because they get addicted to them.  What increases the risk?  This condition is more likely to develop in people who:  Have a family history of opioid use disorder.  Misuse other drugs.  Have a mental illness, such as depression, post-traumatic stress disorder, or antisocial personality disorder.  Begin use at an early age, such as during their teenage years.  What are the signs or symptoms?  Symptoms of this condition include:  Taking opioids in larger amounts or for longer periods than  you want to.  Spending an abnormal amount of time getting opioids, using them, or recovering from their effects.  Craving opioids.  Using opioids in a way that interferes with work, school, social activities, and personal relationships.  Giving up or cutting down on important life activities because of opioid use.  Using opioids when it is dangerous, such as when driving a car.  Continuing to use the drug even after it has led to problems such as:  Physical or mental health problems.  Legal or financial troubles.  Job loss.  Broken relationships.  Being unable to slow down or stop your use of the drug.  Needing more and more of an opioid to get the same effect (building up a tolerance).  Experiencing unpleasant symptoms if you do not use the opioid (withdrawal). Some symptoms of withdrawal include:  Depression, anxiety, or feeling irritable.  Nausea or vomiting.  Muscle aches or spasms.  Watery eyes.  Trouble sleeping.  Yawning.  How is this diagnosed?  This condition is diagnosed based on:  A physical exam.  Your history of opioid use.  Your symptoms. This includes:  How opioid use affects your life.  Changes in personality, behaviors, and mood.  Having at least two symptoms of opioid use disorder within a 12-month period.  Health issues related to using opioids.  Blood or urine tests to screen for drugs.  How is this treated?    The first goal of treatment is to stop your use of opioids. This must be done safely and may involve taking medicines to lessen withdrawal symptoms. Treatment may also involve:  Taking part in group and individual counseling from mental health providers who have experience with substance use disorder.  Staying at a residential treatment center for several days or weeks.  Attending daily counseling sessions at a treatment center.  Taking medicines as told by your health care provider that:  Ease symptoms and prevent complications during withdrawal.  Block cravings and block the good feeling  that you get from using opioids.  Treat other mental health issues, such as depression or anxiety.  Reduce agitation.  Participating in a support group to share your experience with others who are going through the same thing.  Using opioid maintenance treatment. This involves taking certain kinds of opioid medicines. These medicines satisfy cravings but are safer than opioids that are commonly misused.  Recovery can be a long process. Some people who undergo treatment start using opioids again after stopping (relapse). If you relapse, it does not mean that treatment will not work.  Follow these instructions at home:  Medicines  Take over-the-counter and prescription medicines only as told by your health care provider.  Check with your health care provider before starting any new medicines, herbs, or supplements.  General instructions  Do not use any drugs or alcohol.  Avoid people and activities that trigger your use of opioids.  Learn and practice techniques for managing stress.  Have a plan for vulnerable moments. These are times when you are most likely to relapse. Get phone numbers of those who are willing to help and who are committed to your recovery.  Attend support groups regularly. These groups provide emotional support, advice, and guidance.  Keep all follow-up visits. This is important. Follow-up visits include continuing to work with therapists and support groups.  Where to find more information  [place] on Drug Abuse: drugabuse.gov  Substance Abuse and Mental Health Services Administration: samhsa.gov  Narcotics Anonymous: na.org  Contact a health care provider if:  You cannot take your medicines as told.  Your symptoms get worse.  You have a relapse.  Get help right away if:  You may have taken too much of an opioid (overdosed). Common symptoms of an overdose include:  Sleepiness or difficulty waking from sleep.  Decrease in attention or confusion.  Slurred speech.  Slowed breathing and a  slow pulse (bradycardia).  Nausea and vomiting.  Abnormally small pupils.  You have serious thoughts about hurting yourself or others.  These symptoms may represent a serious problem that is an emergency. Do not wait to see if the symptoms will go away. Get medical help right away. Call your local emergency services (786 in the [place] Do not drive yourself to the hospital.  If you were prescribed a drug (naloxone) that reverses the effects of an opioid overdose, a friend, family member, or emergency services provider can administer the drug in an emergency.  If you ever feel like you may hurt yourself or others, or have thoughts about taking your own life, get help right away. You can go to your nearest emergency department or call:  Your local emergency services (863 in the [place]  A suicide crisis helpline, such as the National Suicide Prevention Lifeline at 1-639.656.8527 or 361 in the [place] This is open 24 hours a day.  Summary  Opioid use disorder is a condition in which opioids are used for reasons other than medical care.  Opioid use disorder can be dangerous. It can lead to various mental and physical problems, and an opioid overdose can be life-threatening.  The first goal of treatment is to stop your use of opioids. This must be done safely and may involve taking medicines to lessen withdrawal symptoms.  This information is not intended to replace advice given to you by your health care provider. Make sure you discuss any questions you have with your health care provider.  Document Revised: 07/13/2022 Document Reviewed: 03/30/2022  [employer] Patient Education © 2023 [employer]  Opioid Pain Medicine Management  Opioid pain medicines are strong medicines that are used to treat bad or very bad pain. When you take them for a short time, they can help you:  Sleep better.  Do better in physical therapy.  Feel better during the first few days after you get hurt.  Recover from surgery.  Only take these medicines  if a doctor says that you can. You should only take them for a short time. This is because opioids can be very addictive. This means that they are hard to stop taking. The longer you take opioids, the harder it may be to stop taking them.  What are the risks?  Opioids can cause problems (side effects). Taking them for more than 3 days raises your chance of problems, such as:  Trouble pooping (constipation).  Feeling sick to your stomach (nausea).  Vomiting.  Feeling very sleepy.  Confusion.  Not being able to stop taking the medicine.  Breathing problems.  Taking opioids for a long time can make it hard for you to do daily tasks. It can also put you at risk for:  Car accidents.  Depression.  Suicide.  Heart attack.  Taking too much of the medicine (overdose). This can lead to death.  What is a pain treatment plan?  A pain treatment plan is a plan made by you and your doctor. Work with your doctor to make a plan for treating your pain. To help you do this:  Talk about the goals of your treatment, including:  How much pain you might expect to have.  How you will manage the pain.  Talk about the risks and benefits of taking these medicines for your condition.  Remember that a good treatment plan uses more than one approach and lowers the risks of side effects.  Tell your doctor about the amount of medicines you take and about any drug or alcohol use.  Get your pain medicine prescriptions from only one doctor.  Pain can be managed with other treatments. Work with your doctor to find other ways to help your pain, such as:  Physical therapy or doing gentle exercises.  Counseling.  Eating healthy foods.  Massage.  Meditation.  Other pain medicines.  How to use opioid pain medicine safely  Taking medicine  Take your pain medicine exactly as told by your doctor. Take it only when you need it.  If your pain is not too bad, you may take less medicine if your doctor allows.  If you have no pain, do not take the medicine unless  your doctor tells you to take it.  If your pain is very bad, do not take more medicine than your doctor told you to take. Call your doctor to know what to do.  Write down the times when you take your pain medicine. Look at the times before you take your next dose.  Take other over-the-counter or prescription medicines only as told by your doctor.  Keeping yourself and others safe    While you are taking opioids:  Do not drive, use machines, or power tools.  Do not sign important papers (legal documents).  Do not drink alcohol.  Do not take sleeping pills.  Do not take care of children by yourself.  Do not do activities where you need to climb or be in high places, like working on a ladder.  Do not go to a lake, river, ocean, swimming pool, or hot tub.  Keep your opioids locked up or in a place where children cannot reach them.  Do not share your pain medicine with anyone.  Stopping your use of opioids  If you have been taking opioids for more than a few weeks, you may need to slowly decrease (taper) how much you take until you stop taking them. Doing this can lower your chance of having symptoms.   Symptoms that come from suddenly stopping the use of opioids include:  Pain and cramping in your belly (abdomen).  Feeling sick to your stomach (nausea).z  Sweating.  Feeling very sleepy.  Feeling restless.  Shaking you cannot control (tremors).  Cravings for the medicine.  Do not try to stop taking them by yourself. Work with your doctor to stop. Your doctor will help you take less until you are not taking the medicine at all.  Getting rid of unused pills  Do not save any pills that you did not use. Get rid of the pills by:  Taking them to a take-back program in your area.  Bringing them to a pharmacy that receives unused pills.  Flushing them down the toilet. Check the label or package insert of your medicine to see whether this is safe to do.  Throwing them in the trash. Check the label or package insert of your  medicine to see whether this is safe to do. If it is safe to throw them out:  Take the pills out of their container.  Put the pills into a container you can seal.  Mix the pills with used coffee grounds, food scraps, dirt, or cat litter.  Put this in the trash.  Follow these instructions at home:  Activity  Do exercises as told by your doctor.  Avoid doing things that make your pain worse.  Return to your normal activities as told by your doctor. Ask your doctor what activities are safe for you.  General instructions  You may need to take these actions to prevent or treat constipation:  Drink enough fluid to keep your pee (urine) pale yellow.  Take over-the-counter or prescription medicines.  Eat foods that are high in fiber. These include beans, whole grains, and fresh fruits and vegetables.  Limit foods that are high in fat and sugar. These include fried or sweet foods.  Keep all follow-up visits.  Where to find support  If you have been taking opioids for a long time, get help from a local support group or counselor. Ask your doctor about this.  Where to find more information  Centers for Disease Control and Prevention (CDC): www.cdc.gov  [place] Food and Drug Administration (FDA): www.fda.gov  Get help right away if:  You may have taken too much of an opioid (overdosed). Common symptoms of an overdose:  Your breathing is slower or more shallow than normal.  You have a very slow heartbeat.  Your speech is not normal.  You vomit or you feel as if you may vomit.  The black centers of your eyes (pupils) are smaller than normal.  You have other potential symptoms:  You feel very confused.  You faint.  You are very sleepy.  You have cold skin.  You have blue lips or fingernails.  You have thoughts of harming yourself or harming others.  These symptoms may be an emergency. Get help right away. Call your local emergency services (911 in the [place]  Do not wait to see if the symptoms will go away.  Do not drive yourself to  the hospital.  Get help right away if you feel like you may hurt yourself or others, or have thoughts about taking your own life. Go to your nearest emergency room or:  Call your local emergency services (911 in the [place]  Call the National Poison Control Center at 1-745.879.6683.  Call a suicide crisis helpline, such as the National Suicide Prevention Lifeline at 1-291.492.7701 or 763 in the [place] This is open 24 hours a day.  Text the Crisis Text Line at 975439.  Summary  Opioid are strong medicines that are used to treat bad or very bad pain.  A pain treatment plan is a plan made by you and your doctor. Work with your doctor to make a plan for treating your pain.  If you think that you or someone else may have taken too much of an opioid, get help right away.  This information is not intended to replace advice given to you by your health care provider. Make sure you discuss any questions you have with your health care provider.  Document Revised: 07/13/2022 Document Reviewed: 03/30/2022  [employer] Patient Education © 2023 [employer]

## 2024-01-29 NOTE — PROGRESS NOTES
QUICK REFERENCE INFORMATION:  The ABCs of the Annual Wellness Visit    Subsequent Medicare Wellness Visit    HEALTH RISK ASSESSMENT    1945  Kp Hernandez is a 78 y.o. male who presents for an Subsequent Wellness Visit.    The following portions of the patient's history were reviewed and   updated as appropriate: allergies, current medications, past family history, past medical history, past social history, past surgical history, and problem list.    Compared to one year ago, the patient feels his physical   health is the same.    Compared to one year ago, the patient feels his mental   health is the same.    Recent Hospitalizations:  He was admitted within the past 365 days at [facility].     Current Medical Providers:  Patient Care Team:  Murphy Felix DO as PCP - General  Ulysses Eugene MD as Consulting Physician (Gastroenterology)  Kendy Harrell MD (Dermatology)  Tan Bermeo MD as Consulting Physician (Pulmonary Disease)  Yonis Glover MD as Consulting Physician (Ophthalmology)  Miguel Whalen MD as Consulting Physician (Endocrinology)  David Guerrero MD as Consulting Physician (Neurosurgery)    I reviewed list of current Medical providers and suppliers with patient and are listed above to the best of the patient's and my knowledge.    Smoking Status:  Social History     Tobacco Use   Smoking Status Never   Smokeless Tobacco Never       Alcohol Consumption:  Social History     Substance and Sexual Activity   Alcohol Use No       Depression Screen:       2024    12:00 PM   PHQ-2/PHQ-9 Depression Screening   Little Interest or Pleasure in Doing Things 0-->not at all   Feeling Down, Depressed or Hopeless 0-->not at all   PHQ-9: Brief Depression Severity Measure Score 0       Health Habits and Functional and Cognitive Screenin/29/2024    12:00 PM   Functional & Cognitive Status   Do you have difficulty preparing food and eating? No   Do you  have difficulty bathing yourself, getting dressed or grooming yourself? No   Do you have difficulty using the toilet? No   Do you have difficulty moving around from place to place? No   Do you have trouble with steps or getting out of a bed or a chair? No   Current Diet Well Balanced Diet   Dental Exam Up to date   Eye Exam Up to date   Exercise (times per week) 3 times per week   Current Exercises Include Light Weights   Do you need help using the phone?  No   Are you deaf or do you have serious difficulty hearing?  No   Do you need help to go to places out of walking distance? No   Do you need help shopping? No   Do you need help preparing meals?  No   Do you need help with housework?  No   Do you need help with laundry? No   Do you need help taking your medications? No   Do you need help managing money? No   Do you ever drive or ride in a car without wearing a seat belt? No   Have you felt unusual stress, anger or loneliness in the last month? No   Who do you live with? Spouse   If you need help, do you have trouble finding someone available to you? No   Have you been bothered in the last four weeks by sexual problems? No   Do you have difficulty concentrating, remembering or making decisions? No       Does the patient have evidence of cognitive impairment? yes    Aspirin use counseling: Does not need ASA (and currently is not on it)    Recent Lab Results:  CMP:  Lab Results   Component Value Date    BUN 17 01/24/2024    CREATININE 1.13 01/24/2024    EGFRIFNONA 60 01/05/2022    EGFRIFAFRI 69 01/05/2022    BCR 15.0 01/24/2024     01/24/2024    K 3.7 01/24/2024    CO2 21.5 (L) 01/24/2024    CALCIUM 8.7 01/24/2024    PROTENTOTREF 7.0 03/21/2023    ALBUMIN 2.7 (L) 01/21/2024    LABGLOBREF 2.5 03/21/2023    LABIL2 1.8 03/21/2023    BILITOT 0.5 01/21/2024    ALKPHOS 85 01/21/2024    AST 59 (H) 01/21/2024    ALT 44 (H) 01/21/2024     Lipid Panel:  Lab Results   Component Value Date    TRIG 202 (H) 03/21/2023     HDL 49 03/21/2023    VLDL 34 03/21/2023    LDLHDL 1.4 05/18/2022     HbA1c:  Lab Results   Component Value Date    HGBA1C 5.7 (H) 03/21/2023       Visual Acuity:  No results found.    Age-appropriate Screening Schedule:  Refer to the list below for future screening recommendations based on patient's age, sex and/or medical conditions. Orders for these recommended tests are listed in the plan section. The patient has been provided with a written plan.    Health Maintenance   Topic Date Due    COLORECTAL CANCER SCREENING  07/19/2018    LIPID PANEL  03/21/2024    ANNUAL WELLNESS VISIT  01/29/2025    BMI FOLLOWUP  01/29/2025    TDAP/TD VACCINES (7 - Td or Tdap) 08/03/2032    COVID-19 Vaccine  Completed    INFLUENZA VACCINE  Completed    Pneumococcal Vaccine 65+  Completed    HEPATITIS C SCREENING  Addressed          Outpatient Medications Prior to Visit   Medication Sig Dispense Refill    albuterol sulfate  (90 Base) MCG/ACT inhaler Inhale 2 puffs Every 4 (Four) Hours As Needed for Wheezing. Indications: Asthma, Chronic Obstructive Lung Disease 6.7 g 2    allopurinol (ZYLOPRIM) 100 MG tablet Take 1 tablet by mouth twice daily (Patient taking differently: Take 1 tablet by mouth 2 (Two) Times a Day.) 180 tablet 0    clobetasol (TEMOVATE) 0.05 % cream APPLY  CREAM TOPICALLY TO APPROPRIATE AREA AS DIRECTED TWICE DAILY TO  HANDS (Patient taking differently: Apply 1 Application topically to the appropriate area as directed 2 (Two) Times a Day.) 60 g 5    escitalopram (LEXAPRO) 20 MG tablet Take 1 tablet by mouth Daily. 90 tablet 3    Farxiga 10 MG tablet Take 1 tablet by mouth once daily 30 tablet 0    finasteride (PROSCAR) 5 MG tablet TAKE 1 TABLET BY MOUTH ONCE DAILY AS DIRECTED (Patient taking differently: Take 1 tablet by mouth Daily.) 90 tablet 3    fluticasone (FLONASE) 50 MCG/ACT nasal spray Use 2 spray(s) in each nostril once daily (Patient taking differently: 2 sprays into the nostril(s) as directed by  provider Daily.) 48 g 0    gabapentin (NEURONTIN) 300 MG capsule TAKE 1 CAPSULE BY MOUTH IN THE MORNING AND 1 ONCE DAILY AT LUNCH  AND 2 AT NIGHT. CALL OFFICE FOR APPOINTMENT. (Patient taking differently: Take 1 capsule by mouth See Admin Instructions. 1 capsule in the AM (morning) 1 capsules at Lunch (noon) and 2 capsules in the PM) 360 capsule 0    hydroCHLOROthiazide (HYDRODIURIL) 25 MG tablet Take 1 tablet by mouth once daily (Patient taking differently: Take 1 tablet by mouth Daily.) 90 tablet 3    Ibuprofen 3 %, Gabapentin 10 %, Baclofen 2 %, lidocaine 4 %, Ketamine HCl 10 % Apply 1-2 g topically to the appropriate area as directed 3 (Three) to 4 (Four) times daily. 90 g 5    methocarbamol (ROBAXIN) 750 MG tablet TAKE 1 TABLET BY MOUTH TWICE DAILY AS NEEDED FOR MUSCLE SPASM (Patient taking differently: Take 1 tablet by mouth 2 (Two) Times a Day As Needed.) 60 tablet 5    metoprolol succinate XL (TOPROL-XL) 50 MG 24 hr tablet Take 1 tablet by mouth Daily. 30 tablet 0    mirtazapine (REMERON) 30 MG tablet TAKE 1 TABLET BY MOUTH ONCE DAILY AT NIGHT (Patient taking differently: Take 1 tablet by mouth Every Night.) 90 tablet 1    multivitamin with minerals tablet tablet Take 1 tablet by mouth Daily.      rosuvastatin (CRESTOR) 20 MG tablet TAKE 1 TABLET BY MOUTH ONCE DAILY AT NIGHT (Patient taking differently: Take 1 tablet by mouth Every Night.) 90 tablet 3    SPIRIVA HANDIHALER 18 MCG per inhalation capsule Place 1 capsule into inhaler and inhale Daily.      urea (CARMOL) 40 % cream Apply 1 application  topically to the appropriate area as directed Daily. To palms of both hands      Zinc 50 MG capsule Take 1 capsule by mouth Daily. 1 chewable daily      HYDROcodone-acetaminophen (NORCO) 5-325 MG per tablet Take 2 tablets by mouth Every 6 (Six) Hours As Needed for Severe Pain. 240 tablet 0    pseudoephedrine (SUDAFED) 60 MG tablet Take 1 tablet by mouth Every 12 (Twelve) Hours As Needed for Congestion (use  sparingly as may raise bp). (Patient not taking: Reported on 1/29/2024) 60 tablet 5     No facility-administered medications prior to visit.       Patient Active Problem List   Diagnosis    Essential hypertension    Benign prostatic hypertrophy without urinary obstruction    CKD (chronic kidney disease) stage 2, GFR 60-89 ml/min    COPD mixed type    Depression with anxiety    Dyslipidemia    Ganglion    Gastroesophageal reflux disease without esophagitis    Hearing loss    Osteoarthritis    Impacted cerumen    Pituitary microadenoma    History of gout    Esophageal dysphagia    Encounter for screening colonoscopy    Cholecystitis    Fall    Hypokalemia       Advance Care Planning:  ACP discussion was held with the patient during this visit. Patient does not have an advance directive, information provided.    Identification of Risk Factors:  Risk factors include: Obesity/Overweight .    Review of Systems   Constitutional:  Negative for chills and fever.   Respiratory:  Negative for shortness of breath and wheezing.    Cardiovascular:  Negative for chest pain and palpitations.   Gastrointestinal:  Positive for abdominal pain and diarrhea. Negative for blood in stool, nausea and vomiting.   Skin:  Positive for wound. Negative for rash.       Compared to one year ago, the patient feels his physical health is the same.  Compared to one year ago, the patient feels his mental health is the same.    Objective     Physical Exam  Vitals and nursing note reviewed.   Constitutional:       Appearance: He is well-developed.   HENT:      Head: Normocephalic and atraumatic.   Neck:      Thyroid: No thyromegaly.      Vascular: No JVD.   Cardiovascular:      Rate and Rhythm: Normal rate and regular rhythm.      Heart sounds: Normal heart sounds. No murmur heard.     No friction rub. No gallop.   Pulmonary:      Effort: Pulmonary effort is normal. No respiratory distress.      Breath sounds: Normal breath sounds. No wheezing or  "rales.   Abdominal:      General: Bowel sounds are normal. There is no distension.      Palpations: Abdomen is soft.      Tenderness: There is no abdominal tenderness. There is no guarding or rebound.   Musculoskeletal:      Cervical back: Neck supple.   Skin:     General: Skin is warm and dry.      Comments: Incision sites healing well   Neurological:      Mental Status: He is alert.   Psychiatric:         Behavior: Behavior normal.         Vitals:    01/29/24 1250   BP: 126/78   Pulse: 67   SpO2: 98%   Weight: 88.5 kg (195 lb)   Height: 172.7 cm (68\")   PainSc:   2   PainLoc: Abdomen     Body mass index is 29.65 kg/m².    BMI is >= 25 and <30. (Overweight) The following options were offered after discussion;: weight loss educational material (shared in after visit summary)      Assessment & Plan   Patient Self-Management and Personalized Health Advice  The patient has been provided with information about: diet and exercise and preventive services including:   Annual Wellness Visit (AWV).    Visit Diagnoses:    ICD-10-CM ICD-9-CM   1. Medicare annual wellness visit, subsequent  Z00.00 V70.0   2. Stage 3a chronic kidney disease  N18.31 585.3   3. Prediabetes  R73.03 790.29   4. Generalized osteoarthritis of multiple sites  M15.9 715.09   5. Chronic bilateral low back pain with bilateral sciatica  M54.42 724.2    M54.41 724.3    G89.29 338.29   6. Hyperprolactinemia  E22.1 253.1   7. Pituitary adenoma  D35.2 227.3   8. Dyslipidemia  E78.5 272.4   9. History of cholecystectomy  Z90.49 V45.79   10. Drug therapy  Z79.899 V58.69   11. Mixed type COPD (chronic obstructive pulmonary disease)  J44.9 496   12. Obesity (BMI 30-39.9)  E66.9 278.00       Orders Placed This Encounter   Procedures    CBC (No Diff)     Order Specific Question:   Release to patient     Answer:   Routine Release [[id number]]    Comprehensive Metabolic Panel     Order Specific Question:   Release to patient     Answer:   Routine Release [[id number]] "    Lipid Panel     Order Specific Question:   Release to patient     Answer:   Routine Release [[id number]]    Hemoglobin A1c     Order Specific Question:   Release to patient     Answer:   Routine Release [[id number]]    TSH     Order Specific Question:   Release to patient     Answer:   Routine Release [[id number]]    Prolactin     Order Specific Question:   Release to patient     Answer:   Routine Release [[id number]]    ToxASSURE Select 13 (MW) - Urine, Clean Catch     Order Specific Question:   Release to patient     Answer:   Routine Release [[id number]]    Microalbumin / Creatinine Urine Ratio - Urine, Clean Catch     Order Specific Question:   Release to patient     Answer:   Routine Release [[id number]]       Outpatient Encounter Medications as of 1/29/2024   Medication Sig Dispense Refill    albuterol sulfate  (90 Base) MCG/ACT inhaler Inhale 2 puffs Every 4 (Four) Hours As Needed for Wheezing. Indications: Asthma, Chronic Obstructive Lung Disease 6.7 g 2    allopurinol (ZYLOPRIM) 100 MG tablet Take 1 tablet by mouth twice daily (Patient taking differently: Take 1 tablet by mouth 2 (Two) Times a Day.) 180 tablet 0    clobetasol (TEMOVATE) 0.05 % cream APPLY  CREAM TOPICALLY TO APPROPRIATE AREA AS DIRECTED TWICE DAILY TO  HANDS (Patient taking differently: Apply 1 Application topically to the appropriate area as directed 2 (Two) Times a Day.) 60 g 5    escitalopram (LEXAPRO) 20 MG tablet Take 1 tablet by mouth Daily. 90 tablet 3    Farxiga 10 MG tablet Take 1 tablet by mouth once daily 30 tablet 0    finasteride (PROSCAR) 5 MG tablet TAKE 1 TABLET BY MOUTH ONCE DAILY AS DIRECTED (Patient taking differently: Take 1 tablet by mouth Daily.) 90 tablet 3    fluticasone (FLONASE) 50 MCG/ACT nasal spray Use 2 spray(s) in each nostril once daily (Patient taking differently: 2 sprays into the nostril(s) as directed by provider Daily.) 48 g 0    gabapentin (NEURONTIN) 300 MG capsule TAKE 1 CAPSULE BY MOUTH IN  THE MORNING AND 1 ONCE DAILY AT LUNCH  AND 2 AT NIGHT. CALL OFFICE FOR APPOINTMENT. (Patient taking differently: Take 1 capsule by mouth See Admin Instructions. 1 capsule in the AM (morning) 1 capsules at Lunch (noon) and 2 capsules in the PM) 360 capsule 0    hydroCHLOROthiazide (HYDRODIURIL) 25 MG tablet Take 1 tablet by mouth once daily (Patient taking differently: Take 1 tablet by mouth Daily.) 90 tablet 3    HYDROcodone-acetaminophen (NORCO) 5-325 MG per tablet Take 2 tablets by mouth Every 6 (Six) Hours As Needed for Severe Pain. 240 tablet 0    Ibuprofen 3 %, Gabapentin 10 %, Baclofen 2 %, lidocaine 4 %, Ketamine HCl 10 % Apply 1-2 g topically to the appropriate area as directed 3 (Three) to 4 (Four) times daily. 90 g 5    methocarbamol (ROBAXIN) 750 MG tablet TAKE 1 TABLET BY MOUTH TWICE DAILY AS NEEDED FOR MUSCLE SPASM (Patient taking differently: Take 1 tablet by mouth 2 (Two) Times a Day As Needed.) 60 tablet 5    metoprolol succinate XL (TOPROL-XL) 50 MG 24 hr tablet Take 1 tablet by mouth Daily. 30 tablet 0    mirtazapine (REMERON) 30 MG tablet TAKE 1 TABLET BY MOUTH ONCE DAILY AT NIGHT (Patient taking differently: Take 1 tablet by mouth Every Night.) 90 tablet 1    multivitamin with minerals tablet tablet Take 1 tablet by mouth Daily.      rosuvastatin (CRESTOR) 20 MG tablet TAKE 1 TABLET BY MOUTH ONCE DAILY AT NIGHT (Patient taking differently: Take 1 tablet by mouth Every Night.) 90 tablet 3    SPIRIVA HANDIHALER 18 MCG per inhalation capsule Place 1 capsule into inhaler and inhale Daily.      urea (CARMOL) 40 % cream Apply 1 application  topically to the appropriate area as directed Daily. To palms of both hands      Zinc 50 MG capsule Take 1 capsule by mouth Daily. 1 chewable daily      [DISCONTINUED] HYDROcodone-acetaminophen (NORCO) 5-325 MG per tablet Take 2 tablets by mouth Every 6 (Six) Hours As Needed for Severe Pain. 240 tablet 0    pseudoephedrine (SUDAFED) 60 MG tablet Take 1 tablet by  mouth Every 12 (Twelve) Hours As Needed for Congestion (use sparingly as may raise bp). (Patient not taking: Reported on 1/29/2024) 60 tablet 5     No facility-administered encounter medications on file as of 1/29/2024.       Reviewed use of high risk medication in the elderly: yes  Reviewed for potential of harmful drug interactions in the elderly: yes  He is on opioid medication identified on active medication list. I have reviewed chart for other potential  high risk medication/s and harmful drug interactions in the elderly.    Social History     Socioeconomic History    Marital status:    Tobacco Use    Smoking status: Never    Smokeless tobacco: Never   Vaping Use    Vaping Use: Never used   Substance and Sexual Activity    Alcohol use: No    Drug use: No     Patient was screened for OUD and GELY risk factors.  Kp DANY Hernandez's pain level was assessed as well today.  I included a review current recommendations on pain management, best use practices, current CDC guidelines, alternatives to opioids including OTC & Rx topicals, non-opioid oral medications (eg nsaids, acetominophen) and life style interventions such as yoga, della chi, stretching, regular exercise, PT/OT and referral to specialty care like Pain Management that specialize in pain treatment when appropriate.   I also included a review of serious risks of opioid use and substance use disorder.  I have included all of this in the after visit summary along with other risk factors identified that are pertinent to the patient today.      Follow Up:  Return in about 3 months (around 4/29/2024).     An After Visit Summary and PPPS with all of these plans were given to the patient.           ++++++++++++++++++++++++++++++++++++++++++++++++++++++++++++++++++   Additional E&M Note during same encounter follows:  Patient has multiple medical problems which are significant and separately identifiable that require additional work above and beyond the Medicare  Wellness Visit.   Chief Complaint   Patient presents with    Medicare Wellness-subsequent    Hypertension    Hyperlipidemia    Hospital Follow Up Visit     Jew     Back Pain    Pituitary Adenoma   Discharge Diagnosis:         Active Hospital Problems     Diagnosis   POA    **Cholecystitis [K81.9]   Yes    Fall [W19.XXXA]   Yes    Hypokalemia [E87.6]   Yes    CKD (chronic kidney disease) stage 2, GFR 60-89 ml/min [N18.2]   Yes    COPD mixed type [J44.9]   Yes    Essential hypertension [I10]   Yes    Depression with anxiety [F41.8]   Yes       Resolved Hospital Problems   No resolved problems to display.      Procedure(s):  CHOLECYSTECTOMY LAPAROSCOPIC WITH DAVINCI ROBOT  Consults         Date and Time Order Name Status Description     1/22/2024  4:18 PM Inpatient Cardiology Consult Completed       1/19/2024 12:42 PM [facility] (on-call MD unless specified) Details         1/19/2024 11:59 AM Surgery (on-call MD unless specified) Completed               Hospital Course  78 y.o. male with history of COPD, CKD, HTN who presents with weakness and falls and found to have acute gangrenous cholecystitis. On 1/19/2024 he underwent cholecystectomy. He had a drain postoperatively that has been removed and he completed course of IV antibiotics. Leukocytosis is improving and he has had no fever since 1/19/2024. Surgery has cleared him for discharge home. He is tolerating a diet and having bowel movements. He did have some sundowning at night but during the day he was improved. Wife is at bedside currently states he is similar to baseline. He had atrial ectopy. Cardiology reviewed telemetry that showed SR with PACs, then salvos of nonsustained atrial tachycardia lasting seconds at most, then a brief period of atrial flutter. They felt it was due to acute illness. Echocardiogram is below. They are recommending a 14-day ZIO at discharge. They agreed with increasing metoprolol. They did not recommend anticoagulation due to the brevity  "of the episode. He reported an episode of dark stool. Hemoglobin has been stable and nursing staff states stool today has been \"normal brown\". He still has mild leukocytosis but overall has clinically improved. He has no fever and no new infectious complaints. Would recommend follow-up CBC with PCP next week.     I discussed the patient's findings and my recommendations with patient, family, and nursing staff.  HPI  Patient 79 y/o copd, htn, ckd IIIa, hld, pituitary adenoma (with elevated prolactin) taht developed sudden onset abd pain with fevers that rapidly progressed.  He became very confused and too weak to leave the bed.  Wife called 911 and transported to hospital.  He had acute cholecystitis and removed lap.   He is due recheck htn, hld and ckd labs;  Also due endocirne labs today for pituitary adenoma;   Has chronic back and OA pain, on opioids.  Last refill >30days and due;  Post-choley some diarrhea, but slowing down now.  Still having dark stools, but has been using pepto bismol when started havign abd pain;     Review of Systems   Constitutional: Negative for chills and fever.   Cardiovascular:  Negative for chest pain and palpitations.   Respiratory:  Negative for shortness of breath and wheezing.    Skin:  Positive for wound. Negative for rash.   Gastrointestinal:  Positive for abdominal pain and diarrhea. Negative for blood in stool, nausea and vomiting.       Social History     Tobacco Use    Smoking status: Never    Smokeless tobacco: Never   Substance Use Topics    Alcohol use: No     O:   Vitals:    01/29/24 1250   BP: 126/78   Pulse: 67   SpO2: 98%   Weight: 88.5 kg (195 lb)   Height: 172.7 cm (68\")   PainSc:   2   PainLoc: Abdomen     Body mass index is 29.65 kg/m².  Vitals and nursing note reviewed.   Constitutional:       Appearance: Well-developed.   HENT:      Head: Normocephalic and atraumatic.   Neck:      Thyroid: No thyromegaly.      Vascular: No JVD.   Pulmonary:      Effort: Pulmonary " "effort is normal. No respiratory distress.      Breath sounds: Normal breath sounds. No wheezing. No rales.   Cardiovascular:      Normal rate. Regular rhythm. Normal heart sounds.      No gallop.  No friction rub.   Abdominal:      General: Bowel sounds are normal. There is no distension.      Palpations: Abdomen is soft.      Tenderness: There is no abdominal tenderness. There is no guarding or rebound.   Musculoskeletal:      Cervical back: Neck supple. Skin:     General: Skin is warm and dry.      Comments: Incision sites healing well   Neurological:      Mental Status: Alert.   Psychiatric:         Behavior: Behavior normal.       Tissue Pathology Exam: [id number]  Order: [id number]  Status: Final result       Visible to patient: Yes (not seen)       Next appt: 02/01/2024 at 10:45 AM in General Surgery (David Lakhani MD)       Dx: Acute cholecystitis    Specimen Information: Gallbladder; Tissue   0 Result Notes      Component    Case Report   Surgical Pathology Report                         Case: [id number]                                   Authorizing Provider:  David Lakhani MD       Collected:           01/19/2024 06:36 PM           Ordering Location:     [facility]  Received:            01/19/2024 10:17 PM                                  MAIN OR                                                                       Pathologist:           Murphy Montes MD                                                         Specimen:    Gallbladder, gallbladder                                                                  Final Diagnosis   1.  Gallbladder, cholecystectomy: Benign gallbladder with               -A.  Acute necrotizing cholecystitis   Electronically signed by Murphy Montes MD on 1/22/2024 at 1001   Gross Description    1. Gallbladder.  Received in formalin labeled \"gallbladder\" is a 10.2 x 5.5 x 2.5 cm red to purple-green smooth to shaggy markedly disrupted gallbladder.  The " attached 0.3 cm in diameter cystic duct is probe patent.  Contents include a minimal amount of green viscous bile and no choleliths.  There are no choleliths within the specimen container.  The mucosa is red-brown velvety with diffuse green discoloration.  There are no excrescences and there are no exophytic lesions grossly identified.  The gallbladder wall ranges from 0.3 to 0.5 cm in thickness.  There are no pericystic lymph nodes grossly identified.  Representative sections are submitted as 1A with the cystic duct margin inked black     kls/uso/loyd        Diagnoses and all orders for this visit:    1. Medicare annual wellness visit, subsequent (Primary)    2. Stage 3a chronic kidney disease  -     CBC (No Diff)  -     Comprehensive Metabolic Panel  -     Microalbumin / Creatinine Urine Ratio - Urine, Clean Catch    3. Prediabetes  -     Hemoglobin A1c    4. Generalized osteoarthritis of multiple sites  -     HYDROcodone-acetaminophen (NORCO) 5-325 MG per tablet; Take 2 tablets by mouth Every 6 (Six) Hours As Needed for Severe Pain.  Dispense: 240 tablet; Refill: 0    5. Chronic bilateral low back pain with bilateral sciatica  -     HYDROcodone-acetaminophen (NORCO) 5-325 MG per tablet; Take 2 tablets by mouth Every 6 (Six) Hours As Needed for Severe Pain.  Dispense: 240 tablet; Refill: 0    6. Hyperprolactinemia  -     Prolactin    7. Pituitary adenoma  -     TSH  -     Prolactin    8. Dyslipidemia  -     Comprehensive Metabolic Panel  -     Lipid Panel    9. History of cholecystectomy    10. Drug therapy  -     ToxASSURE Select 13 (MW) - Urine, Clean Catch    11. Mixed type COPD (chronic obstructive pulmonary disease)    12. Obesity (BMI 30-39.9)        Copd - continue inhaler as per pulmonology  Dark stools recheck cbc  -hypertension - controlled, continue medications  -predm due screen dm2  -ckd - bp control; avoid nsaids;  check labs and urinary studies today  -HLD - continue statin, recheck lipids.  -check  prolactin and TSH today  The patient has read and signed the [facility] Controlled Substance Contract.  I will continue to see patient for regular follow up appointments.  They are well controlled on their medication.  EDILIA is updated every 3 months. The patient is aware of the potential for addiction and dependence.    Return in about 3 months (around 4/29/2024).

## 2024-01-30 DIAGNOSIS — G47.09 OTHER INSOMNIA: ICD-10-CM

## 2024-01-30 RX ORDER — MIRTAZAPINE 30 MG/1
TABLET, FILM COATED ORAL
Qty: 90 TABLET | Refills: 1 | Status: SHIPPED | OUTPATIENT
Start: 2024-01-30

## 2024-02-01 ENCOUNTER — OFFICE VISIT (OUTPATIENT)
Dept: SURGERY | Facility: CLINIC | Age: 79
End: 2024-02-01
Payer: MEDICARE

## 2024-02-01 VITALS
DIASTOLIC BLOOD PRESSURE: 78 MMHG | BODY MASS INDEX: 29.86 KG/M2 | SYSTOLIC BLOOD PRESSURE: 130 MMHG | HEIGHT: 68 IN | WEIGHT: 197 LBS

## 2024-02-01 DIAGNOSIS — K81.9 CHOLECYSTITIS: Primary | ICD-10-CM

## 2024-02-01 NOTE — PROGRESS NOTES
ASSESSMENT/PLAN:    78-year-old gentleman who is post robotic assisted cholecystectomy with intraoperative cholangiogram on 1/19/2024.  His incisions are in good order.  He is healing well.  He reports he has some intermittent shoulder pain since discharge.  He denies fevers.  He reports labs were rechecked in his primary care office with Dr. Felix on 1/29/2024.  We will request those labs for my review.  I will call them to let them know if any concerns arise based on these labs.  Otherwise, he can follow-up with me on an as-needed basis as he seems to be recovering quite well.    CC:     Chief Complaint   Patient presents with    Post-op      Robotic assisted cholecystectomy with intraoperative cholangiogram 1/19/24          SOCIAL HISTORY:   Social Determinants of Health     Tobacco Use: Low Risk  (2/1/2024)    Patient History     Smoking Tobacco Use: Never     Smokeless Tobacco Use: Never     Passive Exposure: Not on file   Alcohol Use: Not At Risk (1/19/2024)    AUDIT-C     Frequency of Alcohol Consumption: 4 or more times a week     Average Number of Drinks: 1 or 2     Frequency of Binge Drinking: Never   Financial Resource Strain: Not on file   Food Insecurity: No Food Insecurity (1/22/2024)    Hunger Vital Sign     Worried About Running Out of Food in the Last Year: Never true     Ran Out of Food in the Last Year: Never true   Transportation Needs: Not on file   Physical Activity: Not on file   Stress: Not on file   Social Connections: Unknown (10/9/2023)    Family and Community Support     Help with Day-to-Day Activities: Not on file     Lonely or Isolated: Not on file   Interpersonal Safety: Not At Risk (1/19/2024)    Abuse Screen     Unsafe at Home or Work/School: no     Feels Threatened by Someone?: no     Does Anyone Keep You from Contacting Others or Doint Things Outside the Home?: no     Physical Sign of Abuse Present: no   Depression: Not at risk (1/29/2024)    PHQ-2     PHQ-2 Score: 0   Housing  Stability: Not At Risk (1/22/2024)    Housing Stability     Current Living Arrangements: home     Potentially Unsafe Housing Conditions: none   Utilities: Not on file   Health Literacy: Unknown (1/22/2024)    Education     Help with school or training?: Not on file     Preferred Language: English   Employment: Unknown (10/9/2023)    Employment     Do you want help finding or keeping work or a job?: Not on file   Disabilities: Not At Risk (1/19/2024)    Disabilities     Concentrating, Remembering, or Making Decisions Difficulty: no     Doing Errands Independently Difficulty: no        FAMILY HISTORY:    Family History   Problem Relation Age of Onset    Hypertension Mother     Atrial fibrillation Mother     Alcohol abuse Father     Brain cancer Sister     GI problems Brother     COPD Brother     Malig Hyperthermia Neg Hx         OTHER SURGERY:  Past Surgical History:   Procedure Laterality Date    CERVICAL CHIARI DECOMPRESSION POSTERIOR N/A     CHOLECYSTECTOMY N/A 1/19/2024    Procedure: CHOLECYSTECTOMY LAPAROSCOPIC WITH DAVINCI ROBOT;  Surgeon: David Lakhani MD;  Location: [facility] MAIN OR;  Service: Robotics - DaVinci;  Laterality: N/A;    COLONOSCOPY  2000    ELBOW ARTHROPLASTY Left     ENDOSCOPY N/A 11/28/2022    Procedure: ESOPHAGOGASTRODUODENOSCOPY with balloon dilatation 18mm-20mm;  Surgeon: Ulysses Eugene MD;  Location: [facility] MAIN OR;  Service: Gastroenterology;  Laterality: N/A;  hiatal hernia, s. ring    HERNIA REPAIR      INGUINAL HERNIA REPAIR      KNEE ACL RECONSTRUCTION Bilateral     SPINE SURGERY      TONSILLECTOMY          PAST MEDICAL HISTORY:    Past Medical History:   Diagnosis Date    Asbestosis     BPH without urinary obstruction     CKD (chronic kidney disease)     COPD (chronic obstructive pulmonary disease)     Depression     Dyslipidemia     Ganglion cyst     GERD without esophagitis     Hearing loss     Hyperlipidemia     Hypertension     Osteoarthritis         MEDICATIONS:   Current  Outpatient Medications on File Prior to Visit   Medication Sig Dispense Refill    albuterol sulfate  (90 Base) MCG/ACT inhaler Inhale 2 puffs Every 4 (Four) Hours As Needed for Wheezing. Indications: Asthma, Chronic Obstructive Lung Disease 6.7 g 2    allopurinol (ZYLOPRIM) 100 MG tablet Take 1 tablet by mouth twice daily (Patient taking differently: Take 1 tablet by mouth 2 (Two) Times a Day.) 180 tablet 0    clobetasol (TEMOVATE) 0.05 % cream APPLY  CREAM TOPICALLY TO APPROPRIATE AREA AS DIRECTED TWICE DAILY TO  HANDS (Patient taking differently: Apply 1 Application topically to the appropriate area as directed 2 (Two) Times a Day.) 60 g 5    escitalopram (LEXAPRO) 20 MG tablet Take 1 tablet by mouth Daily. 90 tablet 3    Farxiga 10 MG tablet Take 1 tablet by mouth once daily 30 tablet 0    finasteride (PROSCAR) 5 MG tablet TAKE 1 TABLET BY MOUTH ONCE DAILY AS DIRECTED (Patient taking differently: Take 1 tablet by mouth Daily.) 90 tablet 3    fluticasone (FLONASE) 50 MCG/ACT nasal spray Use 2 spray(s) in each nostril once daily (Patient taking differently: 2 sprays into the nostril(s) as directed by provider Daily.) 48 g 0    gabapentin (NEURONTIN) 300 MG capsule TAKE 1 CAPSULE BY MOUTH IN THE MORNING AND 1 ONCE DAILY AT LUNCH  AND 2 AT NIGHT. CALL OFFICE FOR APPOINTMENT. (Patient taking differently: Take 1 capsule by mouth See Admin Instructions. 1 capsule in the AM (morning) 1 capsules at Lunch (noon) and 2 capsules in the PM) 360 capsule 0    hydroCHLOROthiazide (HYDRODIURIL) 25 MG tablet Take 1 tablet by mouth once daily (Patient taking differently: Take 1 tablet by mouth Daily.) 90 tablet 3    HYDROcodone-acetaminophen (NORCO) 5-325 MG per tablet Take 2 tablets by mouth Every 6 (Six) Hours As Needed for Severe Pain. 240 tablet 0    Ibuprofen 3 %, Gabapentin 10 %, Baclofen 2 %, lidocaine 4 %, Ketamine HCl 10 % Apply 1-2 g topically to the appropriate area as directed 3 (Three) to 4 (Four) times daily.  90 g 5    methocarbamol (ROBAXIN) 750 MG tablet TAKE 1 TABLET BY MOUTH TWICE DAILY AS NEEDED FOR MUSCLE SPASM (Patient taking differently: Take 1 tablet by mouth 2 (Two) Times a Day As Needed.) 60 tablet 5    metoprolol succinate XL (TOPROL-XL) 50 MG 24 hr tablet Take 1 tablet by mouth Daily. 30 tablet 0    mirtazapine (REMERON) 30 MG tablet TAKE 1 TABLET BY MOUTH ONCE DAILY AT NIGHT 90 tablet 1    multivitamin with minerals tablet tablet Take 1 tablet by mouth Daily.      pseudoephedrine (SUDAFED) 60 MG tablet Take 1 tablet by mouth Every 12 (Twelve) Hours As Needed for Congestion (use sparingly as may raise bp). 60 tablet 5    rosuvastatin (CRESTOR) 20 MG tablet TAKE 1 TABLET BY MOUTH ONCE DAILY AT NIGHT (Patient taking differently: Take 1 tablet by mouth Every Night.) 90 tablet 3    SPIRIVA HANDIHALER 18 MCG per inhalation capsule Place 1 capsule into inhaler and inhale Daily.      urea (CARMOL) 40 % cream Apply 1 application  topically to the appropriate area as directed Daily. To palms of both hands      Zinc 50 MG capsule Take 1 capsule by mouth Daily. 1 chewable daily       No current facility-administered medications on file prior to visit.        ALLERGIES:   No Known Allergies     Vitals:    02/01/24 1039   BP: 130/78      PHYSICAL EXAM:   Constitutional: Well-developed well-nourished, no acute distress  Gastrointestinal: Soft, properly tender around incisions         David Lakhani M.D.  General and Endoscopic Surgery  [facility]    [address], [address]  [place], [place], [address]  P: [phone]  F: [phone]

## 2024-02-02 ENCOUNTER — READMISSION MANAGEMENT (OUTPATIENT)
Dept: CALL CENTER | Facility: HOSPITAL | Age: 79
End: 2024-02-02
Payer: MEDICARE

## 2024-02-02 NOTE — OUTREACH NOTE
General Surgery Week 2 Survey      Flowsheet Row Responses   [facility] patient discharged from? [place]   Does the patient have one of the following disease processes/diagnoses(primary or secondary)? General Surgery   Week 2 attempt successful? Yes   Call start time 1715   Call end time 1719   Discharge diagnosis CHOLECYSTECTOMY LAPAROSCOPIC WITH DAVINCI ROBOT   Person spoke with today (if not patient) and relationship pt   Meds reviewed with patient/caregiver? Yes   Is the patient having any side effects they believe may be caused by any medication additions or changes? No   Does the patient have all medications related to this admission filled (includes all antibiotics, pain medications, etc.) Yes   Is the patient taking all medications as directed (includes completed medication regime)? Yes   Does the patient have a follow up appointment scheduled with their surgeon? Yes   Has the patient kept scheduled appointments due by today? Yes   Psychosocial issues? No   What is the patient's perception of their health status since discharge? Improving   Nursing interventions Nurse provided patient education   Is the patient /caregiver able to teach back basic post-op care? Drive as instructed by MD in discharge instructions, Take showers only when approved by MD-sponge bathe until then, No tub bath, swimming, or hot tub until instructed by MD, Lifting as instructed by MD in discharge instructions   Is the patient/caregiver able to teach back signs and symptoms of incisional infection? Increased redness, swelling or pain at the incisonal site, Increased drainage or bleeding, Fever, Pus or odor from incision, Incisional warmth   Is the patient/caregiver able to teach back steps to recovery at home? Rest and rebuild strength, gradually increase activity, Eat a well-balance diet   If the patient is a current smoker, are they able to teach back resources for cessation? Not a smoker   Is the patient/caregiver able to  teach back the hierarchy of who to call/visit for symptoms/problems? PCP, Specialist, Home health nurse, Urgent Care, ED, 911 Yes   Week 2 call completed? Yes   Graduated Yes   Is the patient interested in additional calls from an ambulatory ? No   Would this patient benefit from a Referral to [facility] Social Work? No   Wrap up additional comments Pt states he is doing good, and denies any complication with incisional site. Reviewed meds with pt. Pt had PCP/post-op appts.   Call end time [id number]            Stefani ARVIZU - Registered Nurse

## 2024-02-03 DIAGNOSIS — M54.42 CHRONIC BILATERAL LOW BACK PAIN WITH BILATERAL SCIATICA: ICD-10-CM

## 2024-02-03 DIAGNOSIS — G89.29 CHRONIC BILATERAL LOW BACK PAIN WITH BILATERAL SCIATICA: ICD-10-CM

## 2024-02-03 DIAGNOSIS — M79.604 PAIN IN BOTH LOWER EXTREMITIES: ICD-10-CM

## 2024-02-03 DIAGNOSIS — M79.605 PAIN IN BOTH LOWER EXTREMITIES: ICD-10-CM

## 2024-02-03 DIAGNOSIS — M54.41 CHRONIC BILATERAL LOW BACK PAIN WITH BILATERAL SCIATICA: ICD-10-CM

## 2024-02-05 LAB
ALBUMIN SERPL-MCNC: 4.1 G/DL (ref 3.8–4.8)
ALBUMIN/CREAT UR: <4 MG/G CREAT (ref 0–29)
ALBUMIN/GLOB SERPL: 1.2 {RATIO} (ref 1.2–2.2)
ALP SERPL-CCNC: 155 IU/L (ref 44–121)
ALT SERPL-CCNC: 52 IU/L (ref 0–44)
AST SERPL-CCNC: 42 IU/L (ref 0–40)
BILIRUB SERPL-MCNC: 0.3 MG/DL (ref 0–1.2)
BUN SERPL-MCNC: 21 MG/DL (ref 8–27)
BUN/CREAT SERPL: 15 (ref 10–24)
CALCIUM SERPL-MCNC: 9.4 MG/DL (ref 8.6–10.2)
CHLORIDE SERPL-SCNC: 99 MMOL/L (ref 96–106)
CHOLEST SERPL-MCNC: 152 MG/DL (ref 100–199)
CO2 SERPL-SCNC: 26 MMOL/L (ref 20–29)
CREAT SERPL-MCNC: 1.39 MG/DL (ref 0.76–1.27)
CREAT UR-MCNC: 70.5 MG/DL
DRUGS UR: NORMAL
EGFRCR SERPLBLD CKD-EPI 2021: 52 ML/MIN/1.73
ERYTHROCYTE [DISTWIDTH] IN BLOOD BY AUTOMATED COUNT: 13.4 % (ref 11.6–15.4)
GLOBULIN SER CALC-MCNC: 3.4 G/DL (ref 1.5–4.5)
GLUCOSE SERPL-MCNC: 95 MG/DL (ref 70–99)
HBA1C MFR BLD: 6.1 % (ref 4.8–5.6)
HCT VFR BLD AUTO: 43.6 % (ref 37.5–51)
HDLC SERPL-MCNC: 30 MG/DL
HGB BLD-MCNC: 14.7 G/DL (ref 13–17.7)
LDLC SERPL CALC-MCNC: 75 MG/DL (ref 0–99)
MCH RBC QN AUTO: 32.3 PG (ref 26.6–33)
MCHC RBC AUTO-ENTMCNC: 33.7 G/DL (ref 31.5–35.7)
MCV RBC AUTO: 96 FL (ref 79–97)
MICROALBUMIN UR-MCNC: <3 UG/ML
PLATELET # BLD AUTO: 435 X10E3/UL (ref 150–450)
POTASSIUM SERPL-SCNC: 4 MMOL/L (ref 3.5–5.2)
PROLACTIN SERPL-MCNC: 34.2 NG/ML (ref 3.6–25.2)
PROT SERPL-MCNC: 7.5 G/DL (ref 6–8.5)
RBC # BLD AUTO: 4.55 X10E6/UL (ref 4.14–5.8)
SODIUM SERPL-SCNC: 143 MMOL/L (ref 134–144)
TRIGL SERPL-MCNC: 292 MG/DL (ref 0–149)
TSH SERPL DL<=0.005 MIU/L-ACNC: 1.19 UIU/ML (ref 0.45–4.5)
VLDLC SERPL CALC-MCNC: 47 MG/DL (ref 5–40)
WBC # BLD AUTO: 11.3 X10E3/UL (ref 3.4–10.8)

## 2024-02-05 RX ORDER — GABAPENTIN 300 MG/1
300 CAPSULE ORAL SEE ADMIN INSTRUCTIONS
Qty: 360 CAPSULE | Refills: 1 | Status: SHIPPED | OUTPATIENT
Start: 2024-02-05

## 2024-02-05 RX ORDER — ALLOPURINOL 100 MG/1
TABLET ORAL
Qty: 180 TABLET | Refills: 0 | Status: SHIPPED | OUTPATIENT
Start: 2024-02-05

## 2024-02-06 ENCOUNTER — TELEPHONE (OUTPATIENT)
Dept: SURGERY | Facility: CLINIC | Age: 79
End: 2024-02-06
Payer: MEDICARE

## 2024-02-06 DIAGNOSIS — K81.9 CHOLECYSTITIS: Primary | ICD-10-CM

## 2024-02-06 RX ORDER — TOBRAMYCIN 3 MG/ML
SOLUTION/ DROPS OPHTHALMIC
Qty: 5 ML | Refills: 0 | Status: SHIPPED | OUTPATIENT
Start: 2024-02-06

## 2024-02-06 NOTE — TELEPHONE ENCOUNTER
----- Message from David Lakhani MD sent at 2/6/2024  1:35 PM EST -----  Can you let them know that I reviewed his labs? His AST and ALT are trending towards normal. His alkaline phosphatase was mildly elevated but his bilirubin is normal.  I do not think any further imaging is warranted based on this.  His WBC was lower than when he left the hospital and is almost back to normal which is reassuring.  I would like for him to get a repeat CMP in 1 month and I will order this.        ----- Message -----  From: Priyanka Marshall MA  Sent: 2/6/2024   7:50 AM EST  To: David Lakhani MD    Results are in Epic  ----- Message -----  From: David Lakhani MD  Sent: 2/1/2024   2:59 PM EST  To: Priyanka Marshall MA    I guess have him go to their office to have those labs drawn.  They had more ordered than what I would order.    ----- Message -----  From: Priyanka Marshall MA  Sent: 2/1/2024   1:15 PM EST  To: David Lakhani MD    I called and they said that the labs weren't actually collected on 1/29? Thought maybe they were sent to [facility] but I guess not. Did he say he had them done?      ----- Message -----  From: David Lakhani MD  Sent: 2/1/2024  10:58 AM EST  To: [facility]    Can we request his labs from 1/29 from Dr. Felix?

## 2024-02-06 NOTE — TELEPHONE ENCOUNTER
Called and notified pt's wife of labs results/recommendations (ok per verbal release). Advised that Dr. Lakhani would like pt to have repeat CMP in one month. Advised that he can have this done at the [facility] outpatient lab without an appointment.

## 2024-02-07 DIAGNOSIS — N18.31 STAGE 3A CHRONIC KIDNEY DISEASE: ICD-10-CM

## 2024-02-07 DIAGNOSIS — R73.03 PREDIABETES: ICD-10-CM

## 2024-02-07 RX ORDER — DAPAGLIFLOZIN 10 MG/1
1 TABLET, FILM COATED ORAL DAILY
Qty: 30 TABLET | Refills: 5 | Status: SHIPPED | OUTPATIENT
Start: 2024-02-07

## 2024-02-12 RX ORDER — FLUTICASONE PROPIONATE 50 MCG
SPRAY, SUSPENSION (ML) NASAL
Qty: 48 G | Refills: 0 | Status: SHIPPED | OUTPATIENT
Start: 2024-02-12

## 2024-02-22 RX ORDER — METOPROLOL SUCCINATE 25 MG/1
TABLET, EXTENDED RELEASE ORAL
Qty: 90 TABLET | Refills: 0 | OUTPATIENT
Start: 2024-02-22

## 2024-02-26 RX ORDER — ROSUVASTATIN CALCIUM 20 MG/1
TABLET, COATED ORAL
Qty: 90 TABLET | Refills: 0 | Status: SHIPPED | OUTPATIENT
Start: 2024-02-26

## 2024-02-27 DIAGNOSIS — L20.82 FLEXURAL ECZEMA: ICD-10-CM

## 2024-02-27 RX ORDER — CLOBETASOL PROPIONATE 0.5 MG/G
CREAM TOPICAL
Qty: 60 G | Refills: 2 | Status: SHIPPED | OUTPATIENT
Start: 2024-02-27

## 2024-03-03 DIAGNOSIS — M54.41 CHRONIC BILATERAL LOW BACK PAIN WITH BILATERAL SCIATICA: ICD-10-CM

## 2024-03-03 DIAGNOSIS — G89.29 CHRONIC BILATERAL LOW BACK PAIN WITH BILATERAL SCIATICA: ICD-10-CM

## 2024-03-03 DIAGNOSIS — M15.9 GENERALIZED OSTEOARTHRITIS OF MULTIPLE SITES: Primary | ICD-10-CM

## 2024-03-03 DIAGNOSIS — M54.42 CHRONIC BILATERAL LOW BACK PAIN WITH BILATERAL SCIATICA: ICD-10-CM

## 2024-03-03 RX ORDER — HYDROCODONE BITARTRATE AND ACETAMINOPHEN 10; 325 MG/1; MG/1
1 TABLET ORAL EVERY 6 HOURS PRN
Qty: 120 TABLET | Refills: 0 | Status: SHIPPED | OUTPATIENT
Start: 2024-03-03

## 2024-03-04 DIAGNOSIS — M54.42 CHRONIC BILATERAL LOW BACK PAIN WITH BILATERAL SCIATICA: ICD-10-CM

## 2024-03-04 DIAGNOSIS — G89.29 CHRONIC BILATERAL LOW BACK PAIN WITH BILATERAL SCIATICA: ICD-10-CM

## 2024-03-04 DIAGNOSIS — M54.41 CHRONIC BILATERAL LOW BACK PAIN WITH BILATERAL SCIATICA: ICD-10-CM

## 2024-03-04 RX ORDER — METHOCARBAMOL 750 MG/1
750 TABLET, FILM COATED ORAL 2 TIMES DAILY PRN
Qty: 60 TABLET | Refills: 5 | Status: SHIPPED | OUTPATIENT
Start: 2024-03-04

## 2024-03-04 NOTE — TELEPHONE ENCOUNTER
Last Appt: 01-   Next Appt: 05- 2020 [facility]

## 2024-03-05 ENCOUNTER — LAB (OUTPATIENT)
Dept: LAB | Facility: HOSPITAL | Age: 79
End: 2024-03-05
Payer: MEDICARE

## 2024-03-05 DIAGNOSIS — K81.9 CHOLECYSTITIS: ICD-10-CM

## 2024-03-05 LAB
ALBUMIN SERPL-MCNC: 4.1 G/DL (ref 3.5–5.2)
ALBUMIN/GLOB SERPL: 1.5 G/DL
ALP SERPL-CCNC: 73 U/L (ref 39–117)
ALT SERPL W P-5'-P-CCNC: 29 U/L (ref 1–41)
ANION GAP SERPL CALCULATED.3IONS-SCNC: 13.2 MMOL/L (ref 5–15)
AST SERPL-CCNC: 31 U/L (ref 1–40)
BILIRUB SERPL-MCNC: 0.5 MG/DL (ref 0–1.2)
BUN SERPL-MCNC: 15 MG/DL (ref 8–23)
BUN/CREAT SERPL: 13 (ref 7–25)
CALCIUM SPEC-SCNC: 9.4 MG/DL (ref 8.6–10.5)
CHLORIDE SERPL-SCNC: 103 MMOL/L (ref 98–107)
CO2 SERPL-SCNC: 25.8 MMOL/L (ref 22–29)
CREAT SERPL-MCNC: 1.15 MG/DL (ref 0.76–1.27)
EGFRCR SERPLBLD CKD-EPI 2021: 65.5 ML/MIN/1.73
GLOBULIN UR ELPH-MCNC: 2.8 GM/DL
GLUCOSE SERPL-MCNC: 110 MG/DL (ref 65–99)
POTASSIUM SERPL-SCNC: 3.5 MMOL/L (ref 3.5–5.2)
PROT SERPL-MCNC: 6.9 G/DL (ref 6–8.5)
SODIUM SERPL-SCNC: 142 MMOL/L (ref 136–145)

## 2024-03-05 PROCEDURE — 80053 COMPREHEN METABOLIC PANEL: CPT

## 2024-03-05 PROCEDURE — 36415 COLL VENOUS BLD VENIPUNCTURE: CPT

## 2024-03-06 ENCOUNTER — TELEPHONE (OUTPATIENT)
Dept: SURGERY | Facility: CLINIC | Age: 79
End: 2024-03-06
Payer: MEDICARE

## 2024-03-06 NOTE — TELEPHONE ENCOUNTER
----- Message from David Lakhani MD sent at 3/5/2024  4:38 PM EST -----  Please let him know his liver enzymes have normalized. He can follow up with me on an as needed basis.  ----- Message -----  From: Lab, Background User  Sent: 3/5/2024  12:38 PM EST  To: David Lakhani MD

## 2024-03-10 DIAGNOSIS — M54.41 CHRONIC BILATERAL LOW BACK PAIN WITH BILATERAL SCIATICA: Primary | ICD-10-CM

## 2024-03-10 DIAGNOSIS — M15.9 GENERALIZED OSTEOARTHRITIS OF MULTIPLE SITES: ICD-10-CM

## 2024-03-10 DIAGNOSIS — G89.29 CHRONIC BILATERAL LOW BACK PAIN WITH BILATERAL SCIATICA: Primary | ICD-10-CM

## 2024-03-10 DIAGNOSIS — M54.42 CHRONIC BILATERAL LOW BACK PAIN WITH BILATERAL SCIATICA: Primary | ICD-10-CM

## 2024-03-10 RX ORDER — HYDROCODONE BITARTRATE AND ACETAMINOPHEN 7.5; 325 MG/1; MG/1
TABLET ORAL
Qty: 150 TABLET | Refills: 0 | Status: SHIPPED | OUTPATIENT
Start: 2024-03-10

## 2024-03-20 ENCOUNTER — OFFICE VISIT (OUTPATIENT)
Dept: CARDIOLOGY | Facility: CLINIC | Age: 79
End: 2024-03-20
Payer: MEDICARE

## 2024-03-20 VITALS
DIASTOLIC BLOOD PRESSURE: 78 MMHG | HEART RATE: 62 BPM | HEIGHT: 68 IN | WEIGHT: 201.4 LBS | SYSTOLIC BLOOD PRESSURE: 128 MMHG | BODY MASS INDEX: 30.52 KG/M2

## 2024-03-20 DIAGNOSIS — I47.10 PAROXYSMAL SVT (SUPRAVENTRICULAR TACHYCARDIA): ICD-10-CM

## 2024-03-20 DIAGNOSIS — I48.92 ATRIAL FLUTTER, UNSPECIFIED TYPE: Primary | ICD-10-CM

## 2024-03-20 DIAGNOSIS — I10 ESSENTIAL HYPERTENSION: ICD-10-CM

## 2024-03-20 PROBLEM — Z12.11 ENCOUNTER FOR SCREENING COLONOSCOPY: Status: RESOLVED | Noted: 2022-09-27 | Resolved: 2024-03-20

## 2024-03-20 NOTE — PROGRESS NOTES
Date of Office Visit: 2024  Encounter Provider: EZIO Linn  Place of Service: [facility] CARDIOLOGY  Patient Name: Kp Hernandez  :1945  Primary Cardiologist: Dr. Gagandeep Mcneill    Chief Complaint   Patient presents with    Atrial Flutter    Hospital Follow Up Visit   :     HPI: Kp Hernandez is a 78 y.o. male who presents today for hospital follow-up visit.  He is new patient to me and I have reviewed his medical records.    He has been diagnosed with hypertension, hyperlipidemia, GERD, BPH, COPD, and chronic kidney disease.    In 2024, he presented to [facility] ED with severe abdominal pain and recent indigestion.  He underwent laparoscopic cholecystectomy for necrotizing cholecystitis.  Dr. Mcneill was consulted during the hospitalization for possible atrial fibrillation.  She reviewed the telemetry monitor which showed normal sinus rhythm with PACs and episodes of nonsustained atrial tachycardia.  He also had a brief episode of atrial flutter on .  She increased his metoprolol.  He reported black stools and was not anticoagulated.  Echocardiogram was normal.    He was discharged on a 14-day Holter monitor which showed the following: Normal sinus rhythm, average heart rate 70 bpm, rare PACs/PVCs, 9 episodes of nonsustained SVT with the longest 17 beats in duration at a rate of 154 bpm, a 4 beat episode of idioventricular rhythm, and no episodes of atrial fibrillation/flutter or ventricular tachycardia.    He presents today for follow-up visit and his wife is accompanying him.  We reviewed the hospital records and recent cardiac testing; he verbalizes understanding.  When he had a brief episode of atrial flutter, his wife said he was confused and wrestling her in the hospital.  He was asymptomatic.  While wearing the Holter monitor, he did not have any palpitations or tachycardia that he felt.  He denies chest pain, shortness of air, dizziness, or  syncope.  The other day as wife said his ankles were swollen and he said he was wearing tight socks.  Blood pressure and heart rate are normal.  He is wanting to go back to his regular exercise where he lifts over 50 pounds and asked him to discuss with his surgeon.      Past Medical History:   Diagnosis Date    Asbestosis     Atrial flutter 01/22/2024    Brief episode in the setting of acute illness 1/2024; no atrial flutter/fib on 14-day Holter monitor    BPH without urinary obstruction     CKD (chronic kidney disease)     COPD (chronic obstructive pulmonary disease)     Depression     Dyslipidemia     Ganglion cyst     GERD without esophagitis     Hearing loss     Hyperlipidemia     Hypertension     Osteoarthritis     Paroxysmal SVT (supraventricular tachycardia) 02/2024    9 brief episodes of nonsustained SVT (longest 17 beats in duration at a rate of 154 bpm) per 14-day Holter monitor         Past Surgical History:   Procedure Laterality Date    CERVICAL CHIARI DECOMPRESSION POSTERIOR N/A     CHOLECYSTECTOMY N/A 1/19/2024    Procedure: CHOLECYSTECTOMY LAPAROSCOPIC WITH DAVINCI ROBOT;  Surgeon: David Lakhani MD;  Location: [facility] MAIN OR;  Service: Robotics - DaVinci;  Laterality: N/A;    COLONOSCOPY  2000    ELBOW ARTHROPLASTY Left     ENDOSCOPY N/A 11/28/2022    Procedure: ESOPHAGOGASTRODUODENOSCOPY with balloon dilatation 18mm-20mm;  Surgeon: Ulysses Eugene MD;  Location: [facility] MAIN OR;  Service: Gastroenterology;  Laterality: N/A;  hiatal hernia, s. ring    HERNIA REPAIR      INGUINAL HERNIA REPAIR      KNEE ACL RECONSTRUCTION Bilateral     SPINE SURGERY      TONSILLECTOMY         Social History     Socioeconomic History    Marital status:    Tobacco Use    Smoking status: Never    Smokeless tobacco: Never   Vaping Use    Vaping status: Never Used   Substance and Sexual Activity    Alcohol use: No    Drug use: No       Family History   Problem Relation Age of Onset    Hypertension Mother      Atrial fibrillation Mother     Alcohol abuse Father     Brain cancer Sister     GI problems Brother     COPD Brother     Malig Hyperthermia Neg Hx        The following portion of the patient's history were reviewed and updated as appropriate: past medical history, past surgical history, past social history, past family history, allergies, current medications, and problem list.    Review of Systems   Constitutional: Negative.   Cardiovascular: Negative.    Respiratory: Negative.     Hematologic/Lymphatic: Negative.    Neurological: Negative.        No Known Allergies      Current Outpatient Medications:     albuterol sulfate  (90 Base) MCG/ACT inhaler, Inhale 2 puffs Every 4 (Four) Hours As Needed for Wheezing. Indications: Asthma, Chronic Obstructive Lung Disease, Disp: 6.7 g, Rfl: 2    allopurinol (ZYLOPRIM) 100 MG tablet, Take 1 tablet by mouth twice daily, Disp: 180 tablet, Rfl: 0    clobetasol propionate (TEMOVATE) 0.05 % cream, APPLY CREAM TOPICALLY TO APPROPRIATE AREA OF HANDS TWICE DAILY., Disp: 60 g, Rfl: 2    dapagliflozin Propanediol (Farxiga) 10 MG tablet, Take 1 tablet by mouth once daily, Disp: 30 tablet, Rfl: 5    escitalopram (LEXAPRO) 20 MG tablet, Take 1 tablet by mouth Daily., Disp: 90 tablet, Rfl: 3    finasteride (PROSCAR) 5 MG tablet, TAKE 1 TABLET BY MOUTH ONCE DAILY AS DIRECTED (Patient taking differently: Take 1 tablet by mouth Daily.), Disp: 90 tablet, Rfl: 3    fluticasone (FLONASE) 50 MCG/ACT nasal spray, Use 2 spray(s) in each nostril once daily, Disp: 48 g, Rfl: 0    gabapentin (NEURONTIN) 300 MG capsule, Take 1 capsule by mouth See Admin Instructions. 1 capsule in the AM (morning) 1 capsules at Lunch (noon) and 2 capsules in the PM, Disp: 360 capsule, Rfl: 1    hydroCHLOROthiazide (HYDRODIURIL) 25 MG tablet, Take 1 tablet by mouth once daily (Patient taking differently: Take 1 tablet by mouth Daily.), Disp: 90 tablet, Rfl: 3    HYDROcodone-acetaminophen (NORCO)  MG per  "tablet, Take 1 tablet by mouth Every 6 (Six) Hours As Needed for Severe Pain., Disp: 120 tablet, Rfl: 0    Ibuprofen 3 %, Gabapentin 10 %, Baclofen 2 %, lidocaine 4 %, Ketamine HCl 10 %, Apply 1-2 g topically to the appropriate area as directed 3 (Three) to 4 (Four) times daily., Disp: 90 g, Rfl: 5    methocarbamol (ROBAXIN) 750 MG tablet, Take 1 tablet by mouth 2 (Two) Times a Day As Needed for Muscle Spasms., Disp: 60 tablet, Rfl: 5    metoprolol succinate XL (TOPROL-XL) 50 MG 24 hr tablet, Take 1 tablet by mouth Daily., Disp: 30 tablet, Rfl: 0    mirtazapine (REMERON) 30 MG tablet, TAKE 1 TABLET BY MOUTH ONCE DAILY AT NIGHT, Disp: 90 tablet, Rfl: 1    multivitamin with minerals tablet tablet, Take 1 tablet by mouth Daily., Disp: , Rfl:     pseudoephedrine (SUDAFED) 60 MG tablet, Take 1 tablet by mouth Every 12 (Twelve) Hours As Needed for Congestion (use sparingly as may raise bp)., Disp: 60 tablet, Rfl: 5    rosuvastatin (CRESTOR) 20 MG tablet, TAKE 1 TABLET BY MOUTH ONCE DAILY AT NIGHT, Disp: 90 tablet, Rfl: 0    SPIRIVA HANDIHALER 18 MCG per inhalation capsule, Place 1 capsule into inhaler and inhale Daily., Disp: , Rfl:     tobramycin (Tobrex) 0.3 % solution ophthalmic solution, 2 drops to affected eye 4x daily, Disp: 5 mL, Rfl: 0    urea (CARMOL) 40 % cream, Apply 1 application  topically to the appropriate area as directed Daily. To palms of both hands, Disp: , Rfl:     Zinc 50 MG capsule, Take 1 capsule by mouth Daily. 1 chewable daily, Disp: , Rfl:     HYDROcodone-acetaminophen (NORCO) 7.5-325 MG per tablet, 1 po 5x a day prn pain, Disp: 150 tablet, Rfl: 0         Objective:     Vitals:    03/20/24 1053   BP: 128/78   BP Location: Left arm   Patient Position: Sitting   Cuff Size: Large Adult   Pulse: 62   Weight: 91.4 kg (201 lb 6.4 oz)   Height: 172.7 cm (67.99\")     Body mass index is 30.63 kg/m².    PHYSICAL EXAM:    Vitals Reviewed.   General Appearance: No acute distress, well developed and well " nourished.  HENT: No hearing loss noted.    Respiratory: No signs of respiratory distress. Respiration rhythm and depth normal.  Clear to auscultation.   Cardiovascular:  Jugular Venous Pressure: Normal  Heart Rate and Rhythm: Normal, Heart Sounds: Normal S1 and S2. No S3 or S4 noted.  Murmurs: No murmurs noted. No rubs, thrills, or gallops.   Lower Extremities: No edema noted.  Musculoskeletal: Normal movement of extremities.  Skin: General appearance normal.    Psychiatric: Patient alert and oriented to person, place, and time. Speech and behavior appropriate. Normal mood and affect.       ECG 12 Lead    Date/Time: 3/20/2024 10:58 AM  Performed by: Yolette Contreras APRN    Authorized by: Yolette Contreras APRN  Comparison: compared with previous ECG from 1/22/2024  Comparison to previous ECG: Atrial fibrillation, heart rate 94 bpm  Rhythm: sinus rhythm  Rate: normal  BPM: 62  Conduction: conduction normal  ST Segments: ST segments normal  T Waves: T waves normal  QRS axis: normal    Clinical impression: normal ECG            Assessment:       Diagnosis Plan   1. Atrial flutter, unspecified type        2. Paroxysmal SVT (supraventricular tachycardia)        3. Essential hypertension               Plan:       1.  Atrial flutter Fibrillation: Brief episode occurred on 1/22 and was documented on telemetry and EKG.  He was asymptomatic.  His wife said he was confused and wrestling her at the time.  He wore a 14-day Holter monitor which showed no further atrial flutter/fibrillation and he denied palpitations/heart racing.  Echocardiogram showed structurally normal heart.  He would benefit from avoiding Sudafed or other decongestants.  Continue metoprolol 50 mg daily. [id number] score of 3.  He is not anticoagulated due to the brief recurrence.  If he has further episodes, we may need to entertain the idea of starting a DOAC.    2.  Paroxysmal supraventricular tachycardia noted on 14-day Holter monitor.  Brief episodes  he was asymptomatic.  Continue metoprolol.    3.  Hypertension: Blood pressure normal today.    4.  He likes to lift more than 50 pounds and I have asked him to discuss recommendations with his surgeon.    5.  He will follow-up with Dr. Mcneill in 3 to 4 months.  They said that they do not need a refill on metoprolol at this time.    As always, it has been a pleasure to participate in your patient's care. Thank you.         Sincerely,         EZIO Mccarthy  [facility]      Dictated utilizing Dragon Dictation

## 2024-04-02 DIAGNOSIS — M54.41 CHRONIC BILATERAL LOW BACK PAIN WITH BILATERAL SCIATICA: ICD-10-CM

## 2024-04-02 DIAGNOSIS — M54.42 CHRONIC BILATERAL LOW BACK PAIN WITH BILATERAL SCIATICA: ICD-10-CM

## 2024-04-02 DIAGNOSIS — M15.9 GENERALIZED OSTEOARTHRITIS OF MULTIPLE SITES: ICD-10-CM

## 2024-04-02 DIAGNOSIS — G89.29 CHRONIC BILATERAL LOW BACK PAIN WITH BILATERAL SCIATICA: ICD-10-CM

## 2024-04-02 RX ORDER — HYDROCODONE BITARTRATE AND ACETAMINOPHEN 10; 325 MG/1; MG/1
1 TABLET ORAL EVERY 6 HOURS PRN
Qty: 120 TABLET | Refills: 0 | Status: SHIPPED | OUTPATIENT
Start: 2024-04-02

## 2024-04-02 NOTE — TELEPHONE ENCOUNTER
"    Caller: Kp Hernandez \"HEATH\"    Relationship: Self    Best call back number: [phone]     Requested Prescriptions:   Requested Prescriptions     Pending Prescriptions Disp Refills    HYDROcodone-acetaminophen (NORCO)  MG per tablet 120 tablet 0     Sig: Take 1 tablet by mouth Every 6 (Six) Hours As Needed for Severe Pain.        Pharmacy where request should be sent: [address] ([facility], [place] - [address] [facility] [phone] [facility] [phone]      Last office visit with prescribing clinician: 1/29/2024   Last telemedicine visit with prescribing clinician: Visit date not found   Next office visit with prescribing clinician: 5/2/2024     Additional details provided by patient:     Does the patient have less than a 3 day supply:  [x] Yes  [] No    Would you like a call back once the refill request has been completed: [] Yes [] No    If the office needs to give you a call back, can they leave a voicemail: [] Yes [] No    April Destiny Perrin Rep   04/02/24 13:25 EDT         "

## 2024-04-03 DIAGNOSIS — N18.31 STAGE 3A CHRONIC KIDNEY DISEASE: Primary | ICD-10-CM

## 2024-04-03 RX ORDER — DAPAGLIFLOZIN 5 MG/1
10 TABLET, FILM COATED ORAL DAILY
Qty: 180 TABLET | Refills: 1 | Status: SHIPPED | OUTPATIENT
Start: 2024-04-03

## 2024-04-17 ENCOUNTER — TELEPHONE (OUTPATIENT)
Dept: FAMILY MEDICINE CLINIC | Facility: CLINIC | Age: 79
End: 2024-04-17
Payer: MEDICARE

## 2024-04-17 NOTE — TELEPHONE ENCOUNTER
LMTRC with patient. I do not want to send until talking to the pt first so that he is aware the new tabs are 10 mg and not to take 2.

## 2024-04-17 NOTE — TELEPHONE ENCOUNTER
I just sent in a PA for Farxiga for this pt. The reason it is needing a PA is because he is taking 2 of the 5 mg tablets everyday. One of the questions asked is if there is some reason why the pt can't take 1 of the 10 mg tablets everyday? I couldn't put due to titration because pt has been on medication since July 2023. I did go ahead and submit the PA for the 5 mg 2 qd but it may get denied so figured I'd go ahead and ask if it does get denied, will it be okay to switch to Farxiga 10 mg 1 po qd? Please advise. Thanks.    Addendum    Pt called back and said he checked his pill bottle and it is 10 mg and he has been taking 1 daily. I will update his med list to reflect this. Sorry for the confusion.

## 2024-04-19 DIAGNOSIS — N18.31 STAGE 3A CHRONIC KIDNEY DISEASE: ICD-10-CM

## 2024-04-19 RX ORDER — DAPAGLIFLOZIN 10 MG/1
10 TABLET, FILM COATED ORAL DAILY
Qty: 90 TABLET | Refills: 1 | Status: SHIPPED | OUTPATIENT
Start: 2024-04-19

## 2024-05-01 DIAGNOSIS — M15.9 GENERALIZED OSTEOARTHRITIS OF MULTIPLE SITES: ICD-10-CM

## 2024-05-01 DIAGNOSIS — M54.42 CHRONIC BILATERAL LOW BACK PAIN WITH BILATERAL SCIATICA: ICD-10-CM

## 2024-05-01 DIAGNOSIS — G89.29 CHRONIC BILATERAL LOW BACK PAIN WITH BILATERAL SCIATICA: ICD-10-CM

## 2024-05-01 DIAGNOSIS — M54.41 CHRONIC BILATERAL LOW BACK PAIN WITH BILATERAL SCIATICA: ICD-10-CM

## 2024-05-01 RX ORDER — ALLOPURINOL 100 MG/1
TABLET ORAL
Qty: 180 TABLET | Refills: 0 | Status: SHIPPED | OUTPATIENT
Start: 2024-05-01

## 2024-05-01 NOTE — TELEPHONE ENCOUNTER
"    Caller: Kp Hernandez \"HEATH\"    Relationship: Self    Best call back number: [phone]    Requested Prescriptions:   Requested Prescriptions     Pending Prescriptions Disp Refills    HYDROcodone-acetaminophen (NORCO)  MG per tablet 120 tablet 0     Sig: Take 1 tablet by mouth Every 6 (Six) Hours As Needed for Severe Pain.        Pharmacy where request should be sent: [address] ([facility], [place] - [address] [facility] [phone] [facility] [phone]      Last office visit with prescribing clinician: 1/29/2024   Last telemedicine visit with prescribing clinician: Visit date not found   Next office visit with prescribing clinician: 5/2/2024     Additional details provided by patient: PATIENT HAS 2 DAYS REMAINING.    Does the patient have less than a 3 day supply:  [x] Yes  [] No    Would you like a call back once the refill request has been completed: [] Yes [x] No    If the office needs to give you a call back, can they leave a voicemail: [] Yes [x] No    [place]   05/01/24 08:53 EDT           "

## 2024-05-02 ENCOUNTER — OFFICE VISIT (OUTPATIENT)
Dept: FAMILY MEDICINE CLINIC | Facility: CLINIC | Age: 79
End: 2024-05-02
Payer: MEDICARE

## 2024-05-02 VITALS
HEIGHT: 67 IN | SYSTOLIC BLOOD PRESSURE: 118 MMHG | WEIGHT: 200 LBS | BODY MASS INDEX: 31.39 KG/M2 | OXYGEN SATURATION: 96 % | DIASTOLIC BLOOD PRESSURE: 62 MMHG | HEART RATE: 62 BPM

## 2024-05-02 DIAGNOSIS — M79.10 TRIGGER POINT: ICD-10-CM

## 2024-05-02 DIAGNOSIS — M79.10 MYALGIA: ICD-10-CM

## 2024-05-02 DIAGNOSIS — G56.80 SCAPULOTHORACIC SYNDROME: Primary | ICD-10-CM

## 2024-05-02 PROCEDURE — 1159F MED LIST DOCD IN RCRD: CPT | Performed by: FAMILY MEDICINE

## 2024-05-02 PROCEDURE — 20553 NJX 1/MLT TRIGGER POINTS 3/>: CPT | Performed by: FAMILY MEDICINE

## 2024-05-02 PROCEDURE — 1160F RVW MEDS BY RX/DR IN RCRD: CPT | Performed by: FAMILY MEDICINE

## 2024-05-02 PROCEDURE — 3074F SYST BP LT 130 MM HG: CPT | Performed by: FAMILY MEDICINE

## 2024-05-02 PROCEDURE — 3078F DIAST BP <80 MM HG: CPT | Performed by: FAMILY MEDICINE

## 2024-05-02 RX ORDER — HYDROCODONE BITARTRATE AND ACETAMINOPHEN 10; 325 MG/1; MG/1
1 TABLET ORAL EVERY 6 HOURS PRN
Qty: 120 TABLET | Refills: 0 | Status: SHIPPED | OUTPATIENT
Start: 2024-05-02

## 2024-05-02 RX ORDER — LIDOCAINE HYDROCHLORIDE 20 MG/ML
2 INJECTION, SOLUTION INFILTRATION; PERINEURAL ONCE
Status: COMPLETED | OUTPATIENT
Start: 2024-05-02 | End: 2024-05-02

## 2024-05-02 RX ORDER — TRIAMCINOLONE ACETONIDE 40 MG/ML
40 INJECTION, SUSPENSION INTRA-ARTICULAR; INTRAMUSCULAR ONCE
Status: COMPLETED | OUTPATIENT
Start: 2024-05-02 | End: 2024-05-02

## 2024-05-02 RX ADMIN — TRIAMCINOLONE ACETONIDE 40 MG: 40 INJECTION, SUSPENSION INTRA-ARTICULAR; INTRAMUSCULAR at 13:31

## 2024-05-02 RX ADMIN — LIDOCAINE HYDROCHLORIDE 2 ML: 20 INJECTION, SOLUTION INFILTRATION; PERINEURAL at 13:31

## 2024-05-02 NOTE — PROGRESS NOTES
Subjective   Kp Hernandez is a 78 y.o. male. Presents today for   Chief Complaint   Patient presents with    Abdominal Pain     Gallbladder removal        History of Present Illness  Patient 79 y/o here for f/u;  healing fine from abd surgery;  has had pain left bruce-scapular area since surgery and not going away;  no injuries or falls;     Review of Systems   Gastrointestinal:  Negative for abdominal pain.   Musculoskeletal:  Positive for arthralgias and myalgias.       Patient Active Problem List   Diagnosis    Essential hypertension    Benign prostatic hypertrophy without urinary obstruction    CKD (chronic kidney disease) stage 2, GFR 60-89 ml/min    COPD mixed type    Depression with anxiety    Dyslipidemia    Ganglion    Gastroesophageal reflux disease without esophagitis    Hearing loss    Osteoarthritis    Impacted cerumen    Pituitary microadenoma    History of gout    Esophageal dysphagia    Cholecystitis    Fall    Hypokalemia    Atrial flutter    Paroxysmal SVT (supraventricular tachycardia)       Social History     Socioeconomic History    Marital status:    Tobacco Use    Smoking status: Never    Smokeless tobacco: Never   Vaping Use    Vaping status: Never Used   Substance and Sexual Activity    Alcohol use: No    Drug use: No       No Known Allergies    Current Outpatient Medications on File Prior to Visit   Medication Sig Dispense Refill    albuterol sulfate  (90 Base) MCG/ACT inhaler Inhale 2 puffs Every 4 (Four) Hours As Needed for Wheezing. Indications: Asthma, Chronic Obstructive Lung Disease 6.7 g 2    allopurinol (ZYLOPRIM) 100 MG tablet Take 1 tablet by mouth twice daily 180 tablet 0    clobetasol propionate (TEMOVATE) 0.05 % cream APPLY CREAM TOPICALLY TO APPROPRIATE AREA OF HANDS TWICE DAILY. 60 g 2    dapagliflozin Propanediol (Farxiga) 10 MG tablet Take 10 mg by mouth Daily. 90 tablet 1    escitalopram (LEXAPRO) 20 MG tablet Take 1 tablet by mouth Daily. 90 tablet 3     finasteride (PROSCAR) 5 MG tablet TAKE 1 TABLET BY MOUTH ONCE DAILY AS DIRECTED (Patient taking differently: Take 1 tablet by mouth Daily.) 90 tablet 3    fluticasone (FLONASE) 50 MCG/ACT nasal spray Use 2 spray(s) in each nostril once daily 48 g 0    gabapentin (NEURONTIN) 300 MG capsule Take 1 capsule by mouth See Admin Instructions. 1 capsule in the AM (morning) 1 capsules at Lunch (noon) and 2 capsules in the  capsule 1    hydroCHLOROthiazide (HYDRODIURIL) 25 MG tablet Take 1 tablet by mouth once daily (Patient taking differently: Take 1 tablet by mouth Daily.) 90 tablet 3    HYDROcodone-acetaminophen (NORCO)  MG per tablet Take 1 tablet by mouth Every 6 (Six) Hours As Needed for Severe Pain. 120 tablet 0    Ibuprofen 3 %, Gabapentin 10 %, Baclofen 2 %, lidocaine 4 %, Ketamine HCl 10 % Apply 1-2 g topically to the appropriate area as directed 3 (Three) to 4 (Four) times daily. 90 g 5    methocarbamol (ROBAXIN) 750 MG tablet Take 1 tablet by mouth 2 (Two) Times a Day As Needed for Muscle Spasms. 60 tablet 5    metoprolol succinate XL (TOPROL-XL) 50 MG 24 hr tablet Take 1 tablet by mouth Daily. 30 tablet 0    mirtazapine (REMERON) 30 MG tablet TAKE 1 TABLET BY MOUTH ONCE DAILY AT NIGHT 90 tablet 1    multivitamin with minerals tablet tablet Take 1 tablet by mouth Daily.      rosuvastatin (CRESTOR) 20 MG tablet TAKE 1 TABLET BY MOUTH ONCE DAILY AT NIGHT 90 tablet 0    SPIRIVA HANDIHALER 18 MCG per inhalation capsule Place 1 capsule into inhaler and inhale Daily.      urea (CARMOL) 40 % cream Apply 1 application  topically to the appropriate area as directed Daily. To palms of both hands      Zinc 50 MG capsule Take 1 capsule by mouth Daily. 1 chewable daily      [DISCONTINUED] HYDROcodone-acetaminophen (NORCO) 7.5-325 MG per tablet 1 po 5x a day prn pain (Patient not taking: Reported on 5/2/2024) 150 tablet 0    [DISCONTINUED] pseudoephedrine (SUDAFED) 60 MG tablet Take 1 tablet by mouth Every 12  "(Twelve) Hours As Needed for Congestion (use sparingly as may raise bp). (Patient not taking: Reported on 5/2/2024) 60 tablet 5    [DISCONTINUED] tobramycin (Tobrex) 0.3 % solution ophthalmic solution 2 drops to affected eye 4x daily (Patient not taking: Reported on 5/2/2024) 5 mL 0     No current facility-administered medications on file prior to visit.       Objective   Vitals:    05/02/24 1304   BP: 118/62   Pulse: 62   SpO2: 96%   Weight: 90.7 kg (200 lb)   Height: 170.2 cm (67\")     Body mass index is 31.32 kg/m².    Physical Exam  Vitals and nursing note reviewed.   Constitutional:       Appearance: He is well-developed.   Musculoskeletal:      Cervical back: Neck supple.      Comments: Trigger points left T7, T8 and T9   Skin:     General: Skin is warm and dry.   Neurological:      Mental Status: He is alert.   Psychiatric:         Behavior: Behavior normal.     Inject Trigger Points, > 3    Date/Time: 5/2/2024 1:33 PM    Performed by: Murphy Felix DO  Authorized by: Murphy Felix DO  Consent: Verbal consent obtained.  Risks and benefits: risks, benefits and alternatives were discussed  Consent given by: patient  Patient understanding: patient states understanding of the procedure being performed  Site marked: the operative site was marked  Required items: required blood products, implants, devices, and special equipment available  Patient identity confirmed: verbally with patient  Preparation: Patient was prepped and draped in the usual sterile fashion.  Local anesthesia used: yes    Anesthesia:  Local anesthesia used: yes  Local Anesthetic: topical anesthetic and lidocaine 2% without epinephrine  Anesthetic total: 2 mL    Sedation:  Patient sedated: no    Patient tolerance: Patient tolerated the procedure well with no immediate complications  Comments: Kenalog 40mg           Assessment & Plan   Diagnoses and all orders for this visit:    1. Scapulothoracic syndrome (Primary)  -     lidocaine " (XYLOCAINE) 2% injection 2 mL  -     triamcinolone acetonide (KENALOG-40) injection 40 mg  -     Inject Trigger Points, > 3    2. Myalgia  -     lidocaine (XYLOCAINE) 2% injection 2 mL  -     triamcinolone acetonide (KENALOG-40) injection 40 mg  -     Inject Trigger Points, > 3    3. Trigger point  -     lidocaine (XYLOCAINE) 2% injection 2 mL  -     triamcinolone acetonide (KENALOG-40) injection 40 mg  -     Inject Trigger Points, > 3    Prn - RTC if worse or no improvement.                   -Follow up: 6 months and prn

## 2024-05-20 ENCOUNTER — TELEPHONE (OUTPATIENT)
Dept: FAMILY MEDICINE CLINIC | Facility: CLINIC | Age: 79
End: 2024-05-20
Payer: MEDICARE

## 2024-05-20 RX ORDER — CLOTRIMAZOLE 1 %
1 CREAM (GRAM) TOPICAL 2 TIMES DAILY
Qty: 60 G | Refills: 5 | Status: SHIPPED | OUTPATIENT
Start: 2024-05-20

## 2024-05-20 RX ORDER — TERBINAFINE HYDROCHLORIDE 250 MG/1
250 TABLET ORAL DAILY
Qty: 14 TABLET | Refills: 0 | Status: SHIPPED | OUTPATIENT
Start: 2024-05-20

## 2024-05-20 NOTE — TELEPHONE ENCOUNTER
Per pt, it tingles, blood red and has a little heat to it. They have been treating with monistat and topical but doesn't seem like his usual yeast infection

## 2024-05-20 NOTE — TELEPHONE ENCOUNTER
"  Caller: Kp Hernandez \"HEATH\"    Relationship: Self    Best call back number: [phone]     Who are you requesting to speak with (clinical staff, provider,  specific staff member): DR GIBSON OR MA    What was the call regarding: PATIENT STATES THAT HE WOKE UP THIS MORNING AND NOTICE SOME DISCOLORATION TO HIS PENIS AND SCROTUM. THEY ARE DARK RED IN COLORATION, BUT HE HAS NOT NOTICED ANY PAIN. PATIENT STATES THAT IT WAS LISTED AS A POSSIBLE SIDE EFFECT OF dapagliflozin Propanediol (Farxiga) 10 MG tablet . REQUESTS CALL BACK TO DISCUSS FURTHER CONCERN    "

## 2024-05-21 RX ORDER — ROSUVASTATIN CALCIUM 20 MG/1
TABLET, COATED ORAL
Qty: 90 TABLET | Refills: 1 | Status: SHIPPED | OUTPATIENT
Start: 2024-05-21

## 2024-05-21 NOTE — TELEPHONE ENCOUNTER
Hub to relay    Ok, it sounds like really bad fungal infection, which farxiga can cause.   I sent in the antifungals to try and the farxiga contineu, but if happens again will need to stop.  RRJ     I left message informing pt

## 2024-06-03 DIAGNOSIS — M54.41 CHRONIC BILATERAL LOW BACK PAIN WITH BILATERAL SCIATICA: ICD-10-CM

## 2024-06-03 DIAGNOSIS — M54.42 CHRONIC BILATERAL LOW BACK PAIN WITH BILATERAL SCIATICA: ICD-10-CM

## 2024-06-03 DIAGNOSIS — M15.9 GENERALIZED OSTEOARTHRITIS OF MULTIPLE SITES: ICD-10-CM

## 2024-06-03 DIAGNOSIS — G89.29 CHRONIC BILATERAL LOW BACK PAIN WITH BILATERAL SCIATICA: ICD-10-CM

## 2024-06-03 RX ORDER — HYDROCODONE BITARTRATE AND ACETAMINOPHEN 10; 325 MG/1; MG/1
1 TABLET ORAL EVERY 6 HOURS PRN
Qty: 120 TABLET | Refills: 0 | Status: SHIPPED | OUTPATIENT
Start: 2024-06-03

## 2024-06-03 NOTE — TELEPHONE ENCOUNTER
"  Caller: Kp Hernandez \"HEATH\"    Relationship: Self    Best call back number: [phone]    Requested Prescriptions:   Requested Prescriptions     Pending Prescriptions Disp Refills    HYDROcodone-acetaminophen (NORCO)  MG per tablet 120 tablet 0     Sig: Take 1 tablet by mouth Every 6 (Six) Hours As Needed for Severe Pain.        Pharmacy where request should be sent: [address] ([facility], [place] - [address] [facility] [phone] [facility] [phone]      Last office visit with prescribing clinician: 5/2/2024   Last telemedicine visit with prescribing clinician: Visit date not found   Next office visit with prescribing clinician: 11/4/2024     Additional details provided by patient: 2 DAYS SUPPLY    Does the patient have less than a 3 day supply:  [x] Yes  [] No    Would you like a call back once the refill request has been completed: [] Yes [x] No    If the office needs to give you a call back, can they leave a voicemail: [] Yes [x] No    Destiny Suero Rep   06/03/24 14:14 EDT         "

## 2024-06-07 RX ORDER — CLOTRIMAZOLE 1 %
1 CREAM (GRAM) TOPICAL 2 TIMES DAILY
Qty: 60 G | Refills: 5 | Status: SHIPPED | OUTPATIENT
Start: 2024-06-07

## 2024-06-26 ENCOUNTER — OFFICE VISIT (OUTPATIENT)
Dept: FAMILY MEDICINE CLINIC | Facility: CLINIC | Age: 79
End: 2024-06-26
Payer: MEDICARE

## 2024-06-26 VITALS
BODY MASS INDEX: 31.01 KG/M2 | SYSTOLIC BLOOD PRESSURE: 164 MMHG | DIASTOLIC BLOOD PRESSURE: 72 MMHG | HEIGHT: 67 IN | HEART RATE: 97 BPM | OXYGEN SATURATION: 94 % | WEIGHT: 197.6 LBS
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
## 2024-06-26 DIAGNOSIS — T14.8XXA MUSCLE STRAIN: ICD-10-CM

## 2024-06-26 PROBLEM — E87.6 HYPOKALEMIA: Status: RESOLVED | Noted: 2024-01-19 | Resolved: 2024-06-26

## 2024-06-26 PROBLEM — K81.9 CHOLECYSTITIS: Status: RESOLVED | Noted: 2024-01-19 | Resolved: 2024-06-26

## 2024-06-26 PROCEDURE — 1125F AMNT PAIN NOTED PAIN PRSNT: CPT

## 2024-06-26 PROCEDURE — 3077F SYST BP >= 140 MM HG: CPT

## 2024-06-26 PROCEDURE — 96372 THER/PROPH/DIAG INJ SC/IM: CPT

## 2024-06-26 PROCEDURE — 1159F MED LIST DOCD IN RCRD: CPT

## 2024-06-26 PROCEDURE — 99213 OFFICE O/P EST LOW 20 MIN: CPT

## 2024-06-26 PROCEDURE — 1160F RVW MEDS BY RX/DR IN RCRD: CPT

## 2024-06-26 PROCEDURE — 3078F DIAST BP <80 MM HG: CPT

## 2024-06-26 RX ORDER — KETOROLAC TROMETHAMINE 30 MG/ML
30 INJECTION, SOLUTION INTRAMUSCULAR; INTRAVENOUS ONCE
Status: COMPLETED | OUTPATIENT
Start: 2024-06-26 | End: 2024-06-26

## 2024-06-26 RX ADMIN — KETOROLAC TROMETHAMINE 30 MG: 30 INJECTION, SOLUTION INTRAMUSCULAR; INTRAVENOUS at 15:53

## 2024-06-26 NOTE — PATIENT INSTRUCTIONS

## 2024-06-26 NOTE — PROGRESS NOTES
Subjective   Kp Hernandez is a 78 y.o. male.     Chief Complaint   Patient presents with    Fall     Leg left and hip pain     Fell 3 weeks ago on [employer]        History of Present Illness     Fall, muscle strain: Patient had a fall on bleachers approximately 3 weeks ago, no LOC. The bleachers were wet and his left foot slipped and he went down 1 step.  He states that since the fall he has had mild improvement in his pain but it is still present.  His pain is located in the center of his left buttock as well as his left calf muscle.  He has been using an Ace wrap for his calf muscle and states that it greatly improves the discomfort.  He has also been using a topical pain cream which provides relief.  He has not tried icing, heating pad, massage, or Epsom salt baths.  He is able to bear weight.  Denies bone pain.  Denies muscle spasms.  He is currently taking Norco, Robaxin, and gabapentin for chronic pain issues.  He describes the pain as sharp, but the pain does not radiate.  Pain is in 2 separate localized areas.  Denies new onset paresthesias.    The following portions of the patient's history were reviewed and updated as appropriate: allergies, current medications, past family history, past medical history, past social history, past surgical history and problem list.    Review of Systems   Denies dizziness, fatigue, fever/chills, CP, SOA, headache, blood in urine/stool, abd pain, leg swelling.    Objective     Vitals:    06/26/24 1455   BP: 164/72   Pulse: 97   SpO2: 94%      Body mass index is 30.95 kg/m².    Physical Exam  Vitals reviewed.   Constitutional:       General: He is not in acute distress.     Appearance: Normal appearance. He is not ill-appearing or toxic-appearing.   HENT:      Right Ear: External ear normal.      Left Ear: External ear normal.      Nose: No congestion or rhinorrhea.      Mouth/Throat:      Mouth: Mucous membranes are moist.      Pharynx: Oropharynx is clear.   Eyes:       Conjunctiva/sclera: Conjunctivae normal.   Cardiovascular:      Rate and Rhythm: Normal rate.      Pulses: Normal pulses.      Heart sounds: Normal heart sounds.   Pulmonary:      Effort: Pulmonary effort is normal. No respiratory distress.      Breath sounds: Normal breath sounds.   Abdominal:      General: Bowel sounds are normal.      Palpations: Abdomen is soft.      Tenderness: There is no abdominal tenderness.   Musculoskeletal:         General: No swelling.      Lumbar back: No swelling, edema, spasms, tenderness or bony tenderness. Decreased range of motion (with left leg abduction).        Back:       Right lower leg: No edema.      Left lower leg: No edema.        Legs:       Comments: Location of pain. No erythema, swelling, or bruising. No tenderness to palpation.   Skin:     General: Skin is warm and dry.      Capillary Refill: Capillary refill takes less than 2 seconds.   Neurological:      General: No focal deficit present.      Mental Status: He is alert and oriented to person, place, and time.      Motor: No weakness.   Psychiatric:         Behavior: Behavior normal.       Assessment & Plan   Diagnoses and all orders for this visit:    1. Fall, initial encounter (Primary)    2. Muscle strain of left gluteus yomaira and left lower leg      Plan:     Rest, ice/heat, massage, epsom salt baths, topicals, compression, elevation.  Low suspicion for fracture at this time.  Follow up if symptoms worsen or fail to improve    Discussed Care Gaps, ordered referrals and encouraged vaccination updates.       - Pt agrees with plan of care and denies further questions/concerns today  - This document is intended for medical expert use only. Persons  reading this document without medical staff guidance may result in misinterpretation and unintended morbidity     Go to the ER for any possible life-threatening symptoms such as chest pain or shortness of air.      Please allow 3-5 business days for recommendations  based on new results      I personally spent time with this patient, preparing for the visit, reviewing tests, obtaining and/or reviewing a separately obtained history, performing a medically appropriate examination and/or evaluation, counseling and educating the patient/family/caregiver, ordering medications,  documenting information in the medical record and indepentently interpreting results.

## 2024-06-28 RX ORDER — METOPROLOL SUCCINATE 50 MG/1
50 TABLET, EXTENDED RELEASE ORAL DAILY
Qty: 30 TABLET | Refills: 5 | Status: SHIPPED | OUTPATIENT
Start: 2024-06-28

## 2024-06-28 NOTE — TELEPHONE ENCOUNTER
"Caller: Kp Hernandez \"HEATH\"    Relationship: Self    Best call back number: [phone] (Home)     Requested Prescriptions:   Requested Prescriptions     Pending Prescriptions Disp Refills    metoprolol succinate XL (TOPROL-XL) 50 MG 24 hr tablet 30 tablet 0     Sig: Take 1 tablet by mouth Daily.        Pharmacy where request should be sent: [address], [place] [address] [facility] [phone] [facility] [phone]      Last office visit with prescribing clinician: 5/2/2024   Last telemedicine visit with prescribing clinician: Visit date not found   Next office visit with prescribing clinician: 11/4/2024     Additional details provided by patient: COMPLETELY OUT     Does the patient have less than a 3 day supply:  [x] Yes  [] No    Would you like a call back once the refill request has been completed: [x] Yes [] No    If the office needs to give you a call back, can they leave a voicemail: [x] Yes [] No    Destiny Wasserman Rep   06/28/24 12:56 EDT   "

## 2024-07-02 RX ORDER — HYDROCHLOROTHIAZIDE 25 MG/1
TABLET ORAL
Qty: 90 TABLET | Refills: 1 | Status: SHIPPED | OUTPATIENT
Start: 2024-07-02

## 2024-07-03 DIAGNOSIS — M15.9 GENERALIZED OSTEOARTHRITIS OF MULTIPLE SITES: ICD-10-CM

## 2024-07-03 DIAGNOSIS — M54.41 CHRONIC BILATERAL LOW BACK PAIN WITH BILATERAL SCIATICA: ICD-10-CM

## 2024-07-03 DIAGNOSIS — M54.42 CHRONIC BILATERAL LOW BACK PAIN WITH BILATERAL SCIATICA: ICD-10-CM

## 2024-07-03 DIAGNOSIS — G89.29 CHRONIC BILATERAL LOW BACK PAIN WITH BILATERAL SCIATICA: ICD-10-CM

## 2024-07-03 RX ORDER — HYDROCODONE BITARTRATE AND ACETAMINOPHEN 10; 325 MG/1; MG/1
1 TABLET ORAL EVERY 6 HOURS PRN
Qty: 120 TABLET | Refills: 0 | Status: SHIPPED | OUTPATIENT
Start: 2024-07-03

## 2024-07-03 NOTE — TELEPHONE ENCOUNTER
"  Caller: Kp Hernandez \"HEATH\"    Relationship: Self    Best call back number: [phone]    Requested Prescriptions:   Requested Prescriptions     Pending Prescriptions Disp Refills    HYDROcodone-acetaminophen (NORCO)  MG per tablet 120 tablet 0     Sig: Take 1 tablet by mouth Every 6 (Six) Hours As Needed for Severe Pain.        Pharmacy where request should be sent: [address] ([facility], [place] - [address] [facility] [phone] [facility] [phone]      Last office visit with prescribing clinician: 5/2/2024   Last telemedicine visit with prescribing clinician: Visit date not found   Next office visit with prescribing clinician: 11/4/2024     Additional details provided by patient: 2 DAYS SUPPLY    Does the patient have less than a 3 day supply:  [x] Yes  [] No    Would you like a call back once the refill request has been completed: [] Yes [x] No    If the office needs to give you a call back, can they leave a voicemail: [] Yes [x] No    Destiny Suero Rep   07/03/24 11:20 EDT         "

## 2024-07-11 RX ORDER — METOPROLOL SUCCINATE 50 MG/1
50 TABLET, EXTENDED RELEASE ORAL DAILY
Qty: 30 TABLET | Refills: 5 | Status: SHIPPED | OUTPATIENT
Start: 2024-07-11 | End: 2024-07-12 | Stop reason: SDUPTHER

## 2024-07-11 NOTE — TELEPHONE ENCOUNTER
"  Caller: Kp Hernandez \"HEATH\"    Relationship: Self    Best call back number: [phone]     Requested Prescriptions:   Requested Prescriptions     Pending Prescriptions Disp Refills    metoprolol succinate XL (TOPROL-XL) 50 MG 24 hr tablet 30 tablet 5     Sig: Take 1 tablet by mouth Daily.        Pharmacy where request should be sent: [address], [place] - [address] [facility] [phone] [facility] [phone]      Last office visit with prescribing clinician: 5/2/2024   Last telemedicine visit with prescribing clinician: Visit date not found   Next office visit with prescribing clinician: 11/4/2024     Additional details provided by patient: WILL NEED NEW PRESCRIPTION AND PLEASE SEND IN FOR 90 DAYS SUPPLY.    Does the patient have less than a 3 day supply:  [] Yes  [x] No    Would you like a call back once the refill request has been completed: [] Yes [] No    If the office needs to give you a call back, can they leave a voicemail: [] Yes [] No    Destiny Rodriguez Rep   07/11/24 14:02 EDT            "

## 2024-07-12 RX ORDER — METOPROLOL SUCCINATE 50 MG/1
50 TABLET, EXTENDED RELEASE ORAL DAILY
Qty: 30 TABLET | Refills: 5 | Status: SHIPPED | OUTPATIENT
Start: 2024-07-12

## 2024-07-18 NOTE — PROGRESS NOTES
RM:________     PCP: Murphy Felix DO    : 1945  AGE: 78 y.o.  EST PATIENT     REASON FOR VISIT/  CC:        BP Readings from Last 3 Encounters:   24 164/72   24 118/62   24 128/78      Wt Readings from Last 3 Encounters:   24 89.6 kg (197 lb 9.6 oz)   24 90.7 kg (200 lb)   24 91.4 kg (201 lb 6.4 oz)        WT: ____________ BP: __________L __________R HR______    CHEST PAIN: _____________    SOA: _____________PALPS: _______________     LIGHTHEADED: ___________FATIGUE: ________________ EDEMA __________    ALLERGIES:Patient has no known allergies. SMOKING HISTORY:  Social History     Tobacco Use    Smoking status: Never    Smokeless tobacco: Never   Vaping Use    Vaping status: Never Used   Substance Use Topics    Alcohol use: No    Drug use: No     CAFFEINE USE_________________  ALCOHOL ______________________

## 2024-07-22 ENCOUNTER — OFFICE VISIT (OUTPATIENT)
Dept: CARDIOLOGY | Facility: CLINIC | Age: 79
End: 2024-07-22
Payer: MEDICARE

## 2024-07-22 VITALS
DIASTOLIC BLOOD PRESSURE: 88 MMHG | WEIGHT: 199.4 LBS | SYSTOLIC BLOOD PRESSURE: 130 MMHG | HEART RATE: 68 BPM | BODY MASS INDEX: 31.3 KG/M2 | HEIGHT: 67 IN

## 2024-07-22 DIAGNOSIS — I10 ESSENTIAL HYPERTENSION: ICD-10-CM

## 2024-07-22 DIAGNOSIS — G47.09 OTHER INSOMNIA: ICD-10-CM

## 2024-07-22 DIAGNOSIS — I48.92 ATRIAL FLUTTER, UNSPECIFIED TYPE: Primary | ICD-10-CM

## 2024-07-22 PROBLEM — I47.10 PAROXYSMAL SVT (SUPRAVENTRICULAR TACHYCARDIA): Status: RESOLVED | Noted: 2024-02-01 | Resolved: 2024-07-22

## 2024-07-22 PROCEDURE — 3079F DIAST BP 80-89 MM HG: CPT | Performed by: INTERNAL MEDICINE

## 2024-07-22 PROCEDURE — 3075F SYST BP GE 130 - 139MM HG: CPT | Performed by: INTERNAL MEDICINE

## 2024-07-22 PROCEDURE — 1160F RVW MEDS BY RX/DR IN RCRD: CPT | Performed by: INTERNAL MEDICINE

## 2024-07-22 PROCEDURE — 1159F MED LIST DOCD IN RCRD: CPT | Performed by: INTERNAL MEDICINE

## 2024-07-22 PROCEDURE — 99213 OFFICE O/P EST LOW 20 MIN: CPT | Performed by: INTERNAL MEDICINE

## 2024-07-22 RX ORDER — MIRTAZAPINE 30 MG/1
TABLET, FILM COATED ORAL
Qty: 90 TABLET | Refills: 0 | Status: SHIPPED | OUTPATIENT
Start: 2024-07-22

## 2024-07-22 NOTE — PROGRESS NOTES
Date of Office Visit: 24  Encounter Provider: Gagandeep Mcneill MD  Place of Service: [facility] CARDIOLOGY  Patient Name: Kp Hernandez  :1945    Chief Complaint   Patient presents with    Atrial Flutter   :     HPI:     Mr. Hernandez is 78 y.o. and presents today in follow up. I have reviewed prior notes and there are no changes except for any new updates described below. I have also reviewed any information entered into the medical record by the patient or by ancillary staff.     He presented in 2024 with acute necrotizing cholecystitis. We were consulted for atrial fibrillation, but review of telemetry showed NSR/PACs/nonsustained AT. He had a brief episode of atrial flutter during a period of agitation. I increased his metoprolol dose; I did not recommend OAC due to his severe illness. An echo was WNL. An outpatient rhythm monitor showed rare episodes of atrial ectopy lasting up to 17 beats. No true atrial arrhythmias were noted.      He feels well and has no cardiac complaints.      Past Medical History:   Diagnosis Date    Asbestosis     Atrial flutter 2024    Brief episode in the setting of acute illness 2024; no atrial flutter/fib on 14-day Holter monitor    BPH without urinary obstruction     Cholecystitis 2024    CKD (chronic kidney disease)     COPD (chronic obstructive pulmonary disease)     Depression     Dyslipidemia     Fall 2024    Ganglion cyst     GERD without esophagitis     Hearing loss     Hyperlipidemia     Hypertension     Hypokalemia 2024    Impacted cerumen 2016    Nonsustained supraventricular tachycardia     Osteoarthritis        Past Surgical History:   Procedure Laterality Date    CERVICAL CHIARI DECOMPRESSION POSTERIOR N/A     CHOLECYSTECTOMY N/A 2024    Procedure: CHOLECYSTECTOMY LAPAROSCOPIC WITH DAVINCI ROBOT;  Surgeon: David Lakhani MD;  Location: [facility];  Service: Robotics - DaVinci;   Laterality: N/A;    COLONOSCOPY  2000    ELBOW ARTHROPLASTY Left     ENDOSCOPY N/A 11/28/2022    Procedure: ESOPHAGOGASTRODUODENOSCOPY with balloon dilatation 18mm-20mm;  Surgeon: Ulysses Eugene MD;  Location: [facility] MAIN OR;  Service: Gastroenterology;  Laterality: N/A;  hiatal hernia, s. ring    HERNIA REPAIR      INGUINAL HERNIA REPAIR      KNEE ACL RECONSTRUCTION Bilateral     SPINE SURGERY      TONSILLECTOMY         Social History     Socioeconomic History    Marital status:    Tobacco Use    Smoking status: Never    Smokeless tobacco: Never   Vaping Use    Vaping status: Never Used   Substance and Sexual Activity    Alcohol use: No    Drug use: No    Sexual activity: Defer       Family History   Problem Relation Age of Onset    Hypertension Mother     Atrial fibrillation Mother     Alcohol abuse Father     Brain cancer Sister     GI problems Brother     COPD Brother     Malig Hyperthermia Neg Hx        Review of Systems   All other systems reviewed and are negative.      No Known Allergies      Current Outpatient Medications:     albuterol sulfate  (90 Base) MCG/ACT inhaler, Inhale 2 puffs Every 4 (Four) Hours As Needed for Wheezing. Indications: Asthma, Chronic Obstructive Lung Disease, Disp: 6.7 g, Rfl: 2    allopurinol (ZYLOPRIM) 100 MG tablet, Take 1 tablet by mouth twice daily, Disp: 180 tablet, Rfl: 0    clobetasol propionate (TEMOVATE) 0.05 % cream, APPLY CREAM TOPICALLY TO APPROPRIATE AREA OF HANDS TWICE DAILY., Disp: 60 g, Rfl: 2    clotrimazole (LOTRIMIN) 1 % cream, Apply 1 Application topically to the appropriate area as directed 2 (Two) Times a Day., Disp: 60 g, Rfl: 5    escitalopram (LEXAPRO) 20 MG tablet, Take 1 tablet by mouth Daily., Disp: 90 tablet, Rfl: 3    finasteride (PROSCAR) 5 MG tablet, TAKE 1 TABLET BY MOUTH ONCE DAILY AS DIRECTED (Patient taking differently: Take 1 tablet by mouth Daily.), Disp: 90 tablet, Rfl: 3    fluticasone (FLONASE) 50 MCG/ACT nasal spray, Use  "2 spray(s) in each nostril once daily, Disp: 48 g, Rfl: 0    gabapentin (NEURONTIN) 300 MG capsule, Take 1 capsule by mouth See Admin Instructions. 1 capsule in the AM (morning) 1 capsules at Lunch (noon) and 2 capsules in the PM, Disp: 360 capsule, Rfl: 1    hydroCHLOROthiazide 25 MG tablet, Take 1 tablet by mouth once daily, Disp: 90 tablet, Rfl: 1    HYDROcodone-acetaminophen (NORCO)  MG per tablet, Take 1 tablet by mouth Every 6 (Six) Hours As Needed for Severe Pain., Disp: 120 tablet, Rfl: 0    Ibuprofen 3 %, Gabapentin 10 %, Baclofen 2 %, lidocaine 4 %, Ketamine HCl 10 %, Apply 1-2 g topically to the appropriate area as directed 3 (Three) to 4 (Four) times daily., Disp: 90 g, Rfl: 5    methocarbamol (ROBAXIN) 750 MG tablet, Take 1 tablet by mouth 2 (Two) Times a Day As Needed for Muscle Spasms., Disp: 60 tablet, Rfl: 5    metoprolol succinate XL (TOPROL-XL) 50 MG 24 hr tablet, Take 1 tablet by mouth Daily., Disp: 30 tablet, Rfl: 5    mirtazapine (REMERON) 30 MG tablet, TAKE 1 TABLET BY MOUTH ONCE DAILY AT NIGHT, Disp: 90 tablet, Rfl: 0    multivitamin with minerals tablet tablet, Take 1 tablet by mouth Daily., Disp: , Rfl:     rosuvastatin (CRESTOR) 20 MG tablet, TAKE 1 TABLET BY MOUTH ONCE DAILY AT NIGHT, Disp: 90 tablet, Rfl: 1    SPIRIVA HANDIHALER 18 MCG per inhalation capsule, Place 1 capsule into inhaler and inhale Daily., Disp: , Rfl:     terbinafine (lamiSIL) 250 MG tablet, Take 1 tablet by mouth Daily., Disp: 14 tablet, Rfl: 0    urea (CARMOL) 40 % cream, Apply 1 application  topically to the appropriate area as directed Daily. To palms of both hands, Disp: , Rfl:     Zinc 50 MG capsule, Take 1 capsule by mouth Daily. 1 chewable daily, Disp: , Rfl:     dapagliflozin Propanediol (Farxiga) 10 MG tablet, Take 10 mg by mouth Daily., Disp: 90 tablet, Rfl: 1      Objective:     Vitals:    07/22/24 1345   BP: 130/88   Pulse: 68   Weight: 90.4 kg (199 lb 6.4 oz)   Height: 170.2 cm (67.01\")     Body mass " index is 31.22 kg/m².    Vitals reviewed.   Constitutional:       Appearance: Well-developed and not in distress.   Eyes:      Conjunctiva/sclera: Conjunctivae normal.   HENT:      Head: Normocephalic.      Nose: Nose normal.   Neck:      Thyroid: Thyroid normal.      Vascular: No JVD. JVD normal.      Lymphadenopathy: No cervical adenopathy.   Pulmonary:      Effort: Pulmonary effort is normal.      Breath sounds: Normal breath sounds.   Cardiovascular:      Normal rate. Regular rhythm.      Murmurs: There is no murmur.   Pulses:     Intact distal pulses.   Edema:     Peripheral edema absent.   Abdominal:      Palpations: Abdomen is soft.      Tenderness: There is no abdominal tenderness.   Musculoskeletal: Normal range of motion.      Cervical back: Normal range of motion. Skin:     General: Skin is warm and dry.   Neurological:      General: No focal deficit present.      Mental Status: Oriented to person, place, and time.      Cranial Nerves: No cranial nerve deficit.   Psychiatric:         Behavior: Behavior normal.         Thought Content: Thought content normal.         Judgment: Judgment normal.         Procedures      Assessment:       Diagnosis Plan   1. Atrial flutter, unspecified type        2. Essential hypertension               Plan:       Mr aTvera had some atrial ectopy (PACs, nonsustained AT, brief atrial flutter) in the setting of severe illness and agitation. He had a normal echo. He has no symptoms or limitations. A follow up monitor showed no sustained arrhythmia. He will remain on metoprolol; no further CV testing is needed at this time. He does not require OAC for this.     He can follow up with us as needed.    Sincerely,       Gagandeep Mcneill MD

## 2024-07-26 RX ORDER — ALLOPURINOL 100 MG/1
TABLET ORAL
Qty: 180 TABLET | Refills: 0 | Status: SHIPPED | OUTPATIENT
Start: 2024-07-26 | End: 2024-07-26

## 2024-07-26 RX ORDER — ALLOPURINOL 100 MG/1
TABLET ORAL
Qty: 180 TABLET | Refills: 0 | Status: SHIPPED | OUTPATIENT
Start: 2024-07-26

## 2024-07-31 DIAGNOSIS — M54.41 CHRONIC BILATERAL LOW BACK PAIN WITH BILATERAL SCIATICA: ICD-10-CM

## 2024-07-31 DIAGNOSIS — M79.604 PAIN IN BOTH LOWER EXTREMITIES: ICD-10-CM

## 2024-07-31 DIAGNOSIS — M54.42 CHRONIC BILATERAL LOW BACK PAIN WITH BILATERAL SCIATICA: ICD-10-CM

## 2024-07-31 DIAGNOSIS — G89.29 CHRONIC BILATERAL LOW BACK PAIN WITH BILATERAL SCIATICA: ICD-10-CM

## 2024-07-31 DIAGNOSIS — M79.605 PAIN IN BOTH LOWER EXTREMITIES: ICD-10-CM

## 2024-08-01 DIAGNOSIS — M54.41 CHRONIC BILATERAL LOW BACK PAIN WITH BILATERAL SCIATICA: ICD-10-CM

## 2024-08-01 DIAGNOSIS — M15.9 GENERALIZED OSTEOARTHRITIS OF MULTIPLE SITES: ICD-10-CM

## 2024-08-01 DIAGNOSIS — G89.29 CHRONIC BILATERAL LOW BACK PAIN WITH BILATERAL SCIATICA: ICD-10-CM

## 2024-08-01 DIAGNOSIS — M54.42 CHRONIC BILATERAL LOW BACK PAIN WITH BILATERAL SCIATICA: ICD-10-CM

## 2024-08-01 RX ORDER — GABAPENTIN 300 MG/1
CAPSULE ORAL
Qty: 360 CAPSULE | Refills: 0 | Status: SHIPPED | OUTPATIENT
Start: 2024-08-01

## 2024-08-01 RX ORDER — HYDROCODONE BITARTRATE AND ACETAMINOPHEN 10; 325 MG/1; MG/1
1 TABLET ORAL EVERY 6 HOURS PRN
Qty: 120 TABLET | Refills: 0 | Status: SHIPPED | OUTPATIENT
Start: 2024-08-01

## 2024-08-01 NOTE — TELEPHONE ENCOUNTER
"Caller: Kp Hernandez \"HEATH\"    Relationship: Self    Requested Prescriptions:   Requested Prescriptions     Pending Prescriptions Disp Refills    HYDROcodone-acetaminophen (NORCO)  MG per tablet 120 tablet 0     Sig: Take 1 tablet by mouth Every 6 (Six) Hours As Needed for Severe Pain.      Pharmacy where request should be sent: [facility] [address] [place] [address] ([facility], [place] - [address] [facility] [phone] [facility] [phone]      Last office visit with prescribing clinician: 5/2/2024   Last telemedicine visit with prescribing clinician: Visit date not found   Next office visit with prescribing clinician: 11/4/2024     Additional details provided by patient: PATIENT HAS 2 LEFT.       Does the patient have less than a 3 day supply:  [x] Yes  [] No    Would you like a call back once the refill request has been completed: [] Yes [x] No    If the office needs to give you a call back, can they leave a voicemail: [] Yes [x] No    Destiny Luna   08/01/24 11:22 EDT     "

## 2024-08-27 DIAGNOSIS — F41.1 GAD (GENERALIZED ANXIETY DISORDER): ICD-10-CM

## 2024-08-27 RX ORDER — ESCITALOPRAM OXALATE 20 MG/1
20 TABLET ORAL DAILY
Qty: 90 TABLET | Refills: 0 | Status: SHIPPED | OUTPATIENT
Start: 2024-08-27

## 2024-08-29 DIAGNOSIS — M54.41 CHRONIC BILATERAL LOW BACK PAIN WITH BILATERAL SCIATICA: ICD-10-CM

## 2024-08-29 DIAGNOSIS — G89.29 CHRONIC BILATERAL LOW BACK PAIN WITH BILATERAL SCIATICA: ICD-10-CM

## 2024-08-29 DIAGNOSIS — M54.42 CHRONIC BILATERAL LOW BACK PAIN WITH BILATERAL SCIATICA: ICD-10-CM

## 2024-08-29 DIAGNOSIS — M15.9 GENERALIZED OSTEOARTHRITIS OF MULTIPLE SITES: ICD-10-CM

## 2024-08-29 RX ORDER — HYDROCODONE BITARTRATE AND ACETAMINOPHEN 10; 325 MG/1; MG/1
1 TABLET ORAL EVERY 6 HOURS PRN
Qty: 120 TABLET | Refills: 0 | Status: CANCELLED | OUTPATIENT
Start: 2024-08-29

## 2024-08-29 RX ORDER — HYDROCODONE BITARTRATE AND ACETAMINOPHEN 10; 325 MG/1; MG/1
1 TABLET ORAL EVERY 6 HOURS PRN
Qty: 120 TABLET | Refills: 0 | Status: SHIPPED | OUTPATIENT
Start: 2024-08-29

## 2024-08-29 NOTE — TELEPHONE ENCOUNTER
"    Caller: DavidKp \"HEATH\"    Relationship: Self    Best call back number: [phone]     Requested Prescriptions:   Requested Prescriptions     Pending Prescriptions Disp Refills    Ibuprofen 3 %, Gabapentin 10 %, Baclofen 2 %, lidocaine 4 %, Ketamine HCl 10 % 90 g 5     Sig: Apply 1-2 g topically to the appropriate area as directed 3 (Three) to 4 (Four) times daily.        Pharmacy where request should be sent: [facility] [address] - [phone]  - [phone]      Last office visit with prescribing clinician: 5/2/2024   Last telemedicine visit with prescribing clinician: Visit date not found   Next office visit with prescribing clinician: 11/4/2024     Additional details provided by patient:     Does the patient have less than a 3 day supply:  [x] Yes  [] No    Would you like a call back once the refill request has been completed: [] Yes [x] No    If the office needs to give you a call back, can they leave a voicemail: [] Yes [x] No    Destiny Torres Rep   08/29/24 10:26 EDT         "

## 2024-08-29 NOTE — TELEPHONE ENCOUNTER
"    Caller: Kp Hernandez \"HEATH\"    Relationship: Self    Best call back number: [phone]     Requested Prescriptions:   Requested Prescriptions     Pending Prescriptions Disp Refills    HYDROcodone-acetaminophen (NORCO)  MG per tablet 120 tablet 0     Sig: Take 1 tablet by mouth Every 6 (Six) Hours As Needed for Severe Pain.        Pharmacy where request should be sent: [address] ([facility], [place] - [address] [facility] [phone] [facility] [phone]      Last office visit with prescribing clinician: 5/2/2024   Last telemedicine visit with prescribing clinician: Visit date not found   Next office visit with prescribing clinician: 11/4/2024     Additional details provided by patient:     Does the patient have less than a 3 day supply:  [x] Yes  [] No    Would you like a call back once the refill request has been completed: [] Yes [x] No    If the office needs to give you a call back, can they leave a voicemail: [] Yes [x] No    Destiny Torres Rep   08/29/24 10:23 EDT       "

## 2024-09-02 DIAGNOSIS — M54.42 CHRONIC BILATERAL LOW BACK PAIN WITH BILATERAL SCIATICA: ICD-10-CM

## 2024-09-02 DIAGNOSIS — G89.29 CHRONIC BILATERAL LOW BACK PAIN WITH BILATERAL SCIATICA: ICD-10-CM

## 2024-09-02 DIAGNOSIS — M54.41 CHRONIC BILATERAL LOW BACK PAIN WITH BILATERAL SCIATICA: ICD-10-CM

## 2024-09-02 DIAGNOSIS — M15.9 GENERALIZED OSTEOARTHRITIS OF MULTIPLE SITES: ICD-10-CM

## 2024-09-02 RX ORDER — HYDROCODONE BITARTRATE AND ACETAMINOPHEN 10; 325 MG/1; MG/1
1 TABLET ORAL EVERY 6 HOURS PRN
Qty: 120 TABLET | Refills: 0 | Status: CANCELLED | OUTPATIENT
Start: 2024-09-02

## 2024-09-03 DIAGNOSIS — M54.41 CHRONIC BILATERAL LOW BACK PAIN WITH BILATERAL SCIATICA: ICD-10-CM

## 2024-09-03 DIAGNOSIS — M15.9 GENERALIZED OSTEOARTHRITIS OF MULTIPLE SITES: ICD-10-CM

## 2024-09-03 DIAGNOSIS — G89.29 CHRONIC BILATERAL LOW BACK PAIN WITH BILATERAL SCIATICA: ICD-10-CM

## 2024-09-03 DIAGNOSIS — M54.42 CHRONIC BILATERAL LOW BACK PAIN WITH BILATERAL SCIATICA: ICD-10-CM

## 2024-09-03 RX ORDER — HYDROCODONE BITARTRATE AND ACETAMINOPHEN 10; 325 MG/1; MG/1
1 TABLET ORAL EVERY 6 HOURS PRN
Qty: 120 TABLET | Refills: 0 | Status: SHIPPED | OUTPATIENT
Start: 2024-09-03

## 2024-09-03 NOTE — TELEPHONE ENCOUNTER
"    Caller: Kp Hernandez \"HEATH\"    Relationship to patient: Self    Best call back number: [phone]    Patient is needing:     ASKING FOR PRESCRIPTION:     HYDROcodone-acetaminophen (NORCO)  MG per tablet       CHANGED TO GO TO:   [facility] [address] [place] [address] ([facility]), [place] - [address] [facility] [phone] [facility] [phone] FX      (SENT TO [facility])  "

## 2024-09-13 ENCOUNTER — TELEPHONE (OUTPATIENT)
Dept: FAMILY MEDICINE CLINIC | Facility: CLINIC | Age: 79
End: 2024-09-13

## 2024-09-13 ENCOUNTER — OFFICE VISIT (OUTPATIENT)
Dept: FAMILY MEDICINE CLINIC | Facility: CLINIC | Age: 79
End: 2024-09-13
Payer: MEDICARE

## 2024-09-13 VITALS
BODY MASS INDEX: 31.14 KG/M2 | DIASTOLIC BLOOD PRESSURE: 82 MMHG | HEART RATE: 78 BPM | HEIGHT: 67 IN | WEIGHT: 198.4 LBS | SYSTOLIC BLOOD PRESSURE: 128 MMHG | OXYGEN SATURATION: 98 %

## 2024-09-13 DIAGNOSIS — N48.1 EROSIVE BALANITIS: Primary | ICD-10-CM

## 2024-09-13 PROCEDURE — 1160F RVW MEDS BY RX/DR IN RCRD: CPT

## 2024-09-13 PROCEDURE — 3079F DIAST BP 80-89 MM HG: CPT

## 2024-09-13 PROCEDURE — 1125F AMNT PAIN NOTED PAIN PRSNT: CPT

## 2024-09-13 PROCEDURE — 1159F MED LIST DOCD IN RCRD: CPT

## 2024-09-13 PROCEDURE — 3074F SYST BP LT 130 MM HG: CPT

## 2024-09-13 PROCEDURE — 99213 OFFICE O/P EST LOW 20 MIN: CPT

## 2024-09-13 RX ORDER — TERBINAFINE HYDROCHLORIDE 250 MG/1
250 TABLET ORAL DAILY
Qty: 28 TABLET | Refills: 0 | Status: SHIPPED | OUTPATIENT
Start: 2024-09-13 | End: 2024-10-11

## 2024-09-13 RX ORDER — NYSTATIN AND TRIAMCINOLONE ACETONIDE 100000; 1 [USP'U]/G; MG/G
1 OINTMENT TOPICAL 2 TIMES DAILY
Qty: 56 G | Refills: 0 | Status: SHIPPED | OUTPATIENT
Start: 2024-09-13 | End: 2024-10-11

## 2024-09-13 NOTE — TELEPHONE ENCOUNTER
"  Caller: Kp Hernandez \"HEATH\"    Relationship: Self    Best call back number: [phone]     Who are you requesting to speak with (clinical staff, provider,  specific staff member): CLINICAL STAFF    What was the call regarding: PATIENT STATED THE INFECTION THAT HE PREVIOUSLY HAD HAS RETURNED ON HIS PENIS. PATIENT STATED THAT HE HAS NOT TAKEN FARXIGA SINCE JUNE. PATIENT STATED THAT HE HAS BEEN PUTTING THE OINTMENT THAT DR. GIBSON PRESCRIBED FOR BOUT 4-5 DAYS AND IT HAS NOT HELPED. PATIENT STATED THAT IT IS LIKE AN OPEN WOUND BEHIND THE HEAD OF THE PENIS. PATIENT HAS SCHEDULED AN APPOINTMENT FOR 6:15PM. PATIENT IS LOOKING FOR ANY RECOMMENDATIONS ON WHAT HE MAY NEED TO DO. PLEASE ADVISE.            "

## 2024-09-16 PROBLEM — E66.09 CLASS 1 OBESITY DUE TO EXCESS CALORIES WITH SERIOUS COMORBIDITY AND BODY MASS INDEX (BMI) OF 31.0 TO 31.9 IN ADULT: Status: ACTIVE | Noted: 2024-09-16

## 2024-09-16 PROBLEM — E66.811 CLASS 1 OBESITY DUE TO EXCESS CALORIES WITH SERIOUS COMORBIDITY AND BODY MASS INDEX (BMI) OF 31.0 TO 31.9 IN ADULT: Status: ACTIVE | Noted: 2024-09-16

## 2024-09-17 RX ORDER — FLUTICASONE PROPIONATE 50 MCG
SPRAY, SUSPENSION (ML) NASAL
Qty: 48 G | Refills: 0 | Status: SHIPPED | OUTPATIENT
Start: 2024-09-17

## 2024-09-23 ENCOUNTER — PATIENT OUTREACH (OUTPATIENT)
Age: 79
End: 2024-09-23
Payer: MEDICARE

## 2024-09-25 DIAGNOSIS — N48.1 EROSIVE BALANITIS: ICD-10-CM

## 2024-09-25 RX ORDER — NYSTATIN AND TRIAMCINOLONE ACETONIDE 100000; 1 [USP'U]/G; MG/G
OINTMENT TOPICAL
Qty: 60 G | Refills: 0 | Status: SHIPPED | OUTPATIENT
Start: 2024-09-25

## 2024-09-30 DIAGNOSIS — M54.41 CHRONIC BILATERAL LOW BACK PAIN WITH BILATERAL SCIATICA: ICD-10-CM

## 2024-09-30 DIAGNOSIS — G89.29 CHRONIC BILATERAL LOW BACK PAIN WITH BILATERAL SCIATICA: ICD-10-CM

## 2024-09-30 DIAGNOSIS — M54.42 CHRONIC BILATERAL LOW BACK PAIN WITH BILATERAL SCIATICA: ICD-10-CM

## 2024-09-30 DIAGNOSIS — M15.9 GENERALIZED OSTEOARTHRITIS OF MULTIPLE SITES: ICD-10-CM

## 2024-09-30 RX ORDER — HYDROCODONE BITARTRATE AND ACETAMINOPHEN 10; 325 MG/1; MG/1
1 TABLET ORAL EVERY 6 HOURS PRN
Qty: 120 TABLET | Refills: 0 | Status: SHIPPED | OUTPATIENT
Start: 2024-09-30

## 2024-09-30 NOTE — TELEPHONE ENCOUNTER
"Caller: Kp Hernandez \"HEATH\"    Relationship: Self    Best call back number: [phone]     Requested Prescriptions:   Requested Prescriptions     Pending Prescriptions Disp Refills    HYDROcodone-acetaminophen (NORCO)  MG per tablet 120 tablet 0     Sig: Take 1 tablet by mouth Every 6 (Six) Hours As Needed for Severe Pain.        Pharmacy where request should be sent: [address] ([facility], [place] - [address] [facility] [phone] [facility] [phone]      Last office visit with prescribing clinician: 5/2/2024   Last telemedicine visit with prescribing clinician: Visit date not found   Next office visit with prescribing clinician: 11/4/2024     Additional details provided by patient: PATIENT STATED HE HAS TWO DAYS REMAINING OF THIS MEDICATION.    Does the patient have less than a 3 day supply:  [x] Yes  [] No    Would you like a call back once the refill request has been completed: [] Yes [x] No    If the office needs to give you a call back, can they leave a voicemail: [] Yes [x] No    Destiny Costello Rep   09/30/24 16:01 EDT   "

## 2024-10-15 DIAGNOSIS — G47.09 OTHER INSOMNIA: ICD-10-CM

## 2024-10-15 RX ORDER — MIRTAZAPINE 30 MG/1
TABLET, FILM COATED ORAL
Qty: 90 TABLET | Refills: 1 | Status: SHIPPED | OUTPATIENT
Start: 2024-10-15

## 2024-10-18 ENCOUNTER — OFFICE VISIT (OUTPATIENT)
Dept: FAMILY MEDICINE CLINIC | Facility: CLINIC | Age: 79
End: 2024-10-18
Payer: MEDICARE

## 2024-10-18 VITALS
WEIGHT: 203 LBS | SYSTOLIC BLOOD PRESSURE: 128 MMHG | HEART RATE: 64 BPM | HEIGHT: 67 IN | DIASTOLIC BLOOD PRESSURE: 78 MMHG | OXYGEN SATURATION: 97 % | BODY MASS INDEX: 31.86 KG/M2

## 2024-10-18 DIAGNOSIS — N48.1 EROSIVE BALANITIS: Primary | ICD-10-CM

## 2024-10-18 PROCEDURE — 1125F AMNT PAIN NOTED PAIN PRSNT: CPT

## 2024-10-18 PROCEDURE — 1159F MED LIST DOCD IN RCRD: CPT

## 2024-10-18 PROCEDURE — 1160F RVW MEDS BY RX/DR IN RCRD: CPT

## 2024-10-18 PROCEDURE — 99213 OFFICE O/P EST LOW 20 MIN: CPT

## 2024-10-18 PROCEDURE — 3078F DIAST BP <80 MM HG: CPT

## 2024-10-18 PROCEDURE — 3074F SYST BP LT 130 MM HG: CPT

## 2024-10-18 NOTE — PROGRESS NOTES
Subjective   Kp Hernandez is a 79 y.o. male.     Chief Complaint   Patient presents with    Follow-up     For fungal infection     History of Present Illness  The patient is a 79-year-old male who presents for evaluation of erosive balanitis. He is accompanied by his wife    Erosive balanitis: He reports that his condition has improved and appears to be normal. The accompanying adult female confirms that the redness has significantly reduced. He has been using Terbinafine and various ointments, which he believes have contributed to the improvement. He no longer experiences discomfort. He has been taking his medication with pudding or yogurt, as suggested, to alleviate any potential stomach upset. He mentions that he did not use the oral medication for an extended period previously.     The following portions of the patient's history were reviewed and updated as appropriate: allergies, current medications, past family history, past medical history, past social history, past surgical history and problem list.    Results       Objective     Vitals:    10/18/24 1329   BP: 128/78   Pulse: 64   SpO2: 97%      Body mass index is 31.78 kg/m².    Physical Exam  Vitals reviewed.   Constitutional:       General: He is not in acute distress.     Appearance: Normal appearance. He is obese. He is not ill-appearing or toxic-appearing.   HENT:      Right Ear: External ear normal.      Left Ear: External ear normal.      Nose: No congestion or rhinorrhea.      Mouth/Throat:      Mouth: Mucous membranes are moist.      Pharynx: Oropharynx is clear.   Eyes:      Conjunctiva/sclera: Conjunctivae normal.   Cardiovascular:      Rate and Rhythm: Normal rate.   Pulmonary:      Effort: Pulmonary effort is normal. No respiratory distress.   Genitourinary:     Penis: Normal.       Comments: Pt showed provider picture instead of physical exam  Skin:     General: Skin is warm and dry.      Capillary Refill: Capillary refill takes less than 2  seconds.   Neurological:      General: No focal deficit present.      Mental Status: He is alert and oriented to person, place, and time.      Motor: No weakness.   Psychiatric:         Behavior: Behavior normal.       Assessment & Plan  1. Erosive balanitis.  The condition has shown significant improvement. He has completed the course of oral terbinafine and most of the ointment. A temporary cessation of topical creams is recommended to allow the skin to rest. If symptoms recur, a prolonged course of oral terbinafine will be considered. He is advised to take terbinafine with food, such as yogurt or pudding, to avoid gastrointestinal discomfort.    Discussed Care Gaps, ordered referrals and encouraged vaccination updates.       - Pt agrees with plan of care and denies further questions/concerns today  - This document is intended for medical expert use only. Persons  reading this document without medical staff guidance may result in misinterpretation and unintended morbidity     Go to the ER for any possible life-threatening symptoms such as chest pain or shortness of air.      Please allow 3-5 business days for recommendations based on new results      I personally spent time with this patient, preparing for the visit, reviewing tests, obtaining and/or reviewing a separately obtained history, performing a medically appropriate examination and/or evaluation, counseling and educating the patient/family/caregiver, ordering medications,  documenting information in the medical record and indepentently interpreting results.       Patient or patient representative verbalized consent for the use of Ambient Listening during the visit with  EZIO Santillan for chart documentation. 10/18/2024  13:58 EDT

## 2024-10-21 RX ORDER — ROSUVASTATIN CALCIUM 20 MG/1
TABLET, COATED ORAL
Qty: 90 TABLET | Refills: 3 | Status: SHIPPED | OUTPATIENT
Start: 2024-10-21

## 2024-10-26 DIAGNOSIS — G89.29 CHRONIC BILATERAL LOW BACK PAIN WITH BILATERAL SCIATICA: ICD-10-CM

## 2024-10-26 DIAGNOSIS — M79.604 PAIN IN BOTH LOWER EXTREMITIES: ICD-10-CM

## 2024-10-26 DIAGNOSIS — M54.42 CHRONIC BILATERAL LOW BACK PAIN WITH BILATERAL SCIATICA: ICD-10-CM

## 2024-10-26 DIAGNOSIS — M79.605 PAIN IN BOTH LOWER EXTREMITIES: ICD-10-CM

## 2024-10-26 DIAGNOSIS — M54.41 CHRONIC BILATERAL LOW BACK PAIN WITH BILATERAL SCIATICA: ICD-10-CM

## 2024-10-27 RX ORDER — GABAPENTIN 300 MG/1
CAPSULE ORAL
Qty: 360 CAPSULE | Refills: 0 | Status: SHIPPED | OUTPATIENT
Start: 2024-10-27

## 2024-10-30 DIAGNOSIS — M54.41 CHRONIC BILATERAL LOW BACK PAIN WITH BILATERAL SCIATICA: ICD-10-CM

## 2024-10-30 DIAGNOSIS — M54.42 CHRONIC BILATERAL LOW BACK PAIN WITH BILATERAL SCIATICA: ICD-10-CM

## 2024-10-30 DIAGNOSIS — G89.29 CHRONIC BILATERAL LOW BACK PAIN WITH BILATERAL SCIATICA: ICD-10-CM

## 2024-10-30 DIAGNOSIS — M15.9 GENERALIZED OSTEOARTHRITIS OF MULTIPLE SITES: ICD-10-CM

## 2024-10-30 RX ORDER — HYDROCODONE BITARTRATE AND ACETAMINOPHEN 10; 325 MG/1; MG/1
1 TABLET ORAL EVERY 6 HOURS PRN
Qty: 120 TABLET | Refills: 0 | Status: SHIPPED | OUTPATIENT
Start: 2024-10-30

## 2024-10-30 NOTE — TELEPHONE ENCOUNTER
"Caller: Kp Hernandez \"HEATH\"    Relationship: Self    Best call back number: [phone]     Requested Prescriptions:   Requested Prescriptions     Pending Prescriptions Disp Refills    HYDROcodone-acetaminophen (NORCO)  MG per tablet 120 tablet 0     Sig: Take 1 tablet by mouth Every 6 (Six) Hours As Needed for Severe Pain.        Pharmacy where request should be sent: [address] ([facility], [place] - [address] [facility] [phone] [facility] [phone]      Last office visit with prescribing clinician: 5/2/2024   Last telemedicine visit with prescribing clinician: Visit date not found   Next office visit with prescribing clinician: 11/4/2024     Additional details provided by patient: 2 DAYS LEFT    Does the patient have less than a 3 day supply:  [x] Yes  [] No    Would you like a call back once the refill request has been completed: [] Yes [x] No    If the office needs to give you a call back, can they leave a voicemail: [] Yes [x] No    Destiny Triplett Rep   10/30/24 09:06 EDT       "

## 2024-11-04 ENCOUNTER — OFFICE VISIT (OUTPATIENT)
Dept: FAMILY MEDICINE CLINIC | Facility: CLINIC | Age: 79
End: 2024-11-04
Payer: MEDICARE

## 2024-11-04 VITALS
WEIGHT: 201 LBS | DIASTOLIC BLOOD PRESSURE: 78 MMHG | SYSTOLIC BLOOD PRESSURE: 132 MMHG | HEIGHT: 67 IN | HEART RATE: 61 BPM | BODY MASS INDEX: 31.55 KG/M2 | OXYGEN SATURATION: 96 %

## 2024-11-04 DIAGNOSIS — N50.89 TESTICULAR MASS: ICD-10-CM

## 2024-11-04 DIAGNOSIS — D35.2 PITUITARY MICROADENOMA: ICD-10-CM

## 2024-11-04 DIAGNOSIS — I10 PRIMARY HYPERTENSION: Primary | ICD-10-CM

## 2024-11-04 DIAGNOSIS — N18.2 CKD (CHRONIC KIDNEY DISEASE) STAGE 2, GFR 60-89 ML/MIN: ICD-10-CM

## 2024-11-04 DIAGNOSIS — N45.1 EPIDIDYMITIS: ICD-10-CM

## 2024-11-04 DIAGNOSIS — M1A.09X0 IDIOPATHIC CHRONIC GOUT OF MULTIPLE SITES WITHOUT TOPHUS: ICD-10-CM

## 2024-11-04 DIAGNOSIS — M15.9 GENERALIZED OSTEOARTHRITIS OF MULTIPLE SITES: ICD-10-CM

## 2024-11-04 DIAGNOSIS — E22.1 HYPERPROLACTINEMIA: ICD-10-CM

## 2024-11-04 DIAGNOSIS — E66.9 OBESITY (BMI 30-39.9): ICD-10-CM

## 2024-11-04 DIAGNOSIS — E78.5 DYSLIPIDEMIA: ICD-10-CM

## 2024-11-04 DIAGNOSIS — R73.03 PREDIABETES: ICD-10-CM

## 2024-11-04 DIAGNOSIS — Z79.899 DRUG THERAPY: ICD-10-CM

## 2024-11-04 PROCEDURE — 3078F DIAST BP <80 MM HG: CPT | Performed by: FAMILY MEDICINE

## 2024-11-04 PROCEDURE — 1125F AMNT PAIN NOTED PAIN PRSNT: CPT | Performed by: FAMILY MEDICINE

## 2024-11-04 PROCEDURE — 3075F SYST BP GE 130 - 139MM HG: CPT | Performed by: FAMILY MEDICINE

## 2024-11-04 PROCEDURE — 99214 OFFICE O/P EST MOD 30 MIN: CPT | Performed by: FAMILY MEDICINE

## 2024-11-04 RX ORDER — FINASTERIDE 5 MG/1
TABLET, FILM COATED ORAL
Qty: 90 TABLET | Refills: 0 | Status: SHIPPED | OUTPATIENT
Start: 2024-11-04

## 2024-11-04 RX ORDER — DOXYCYCLINE 100 MG/1
100 CAPSULE ORAL 2 TIMES DAILY
Qty: 14 CAPSULE | Refills: 0 | Status: SHIPPED | OUTPATIENT
Start: 2024-11-04

## 2024-11-04 NOTE — PROGRESS NOTES
Subjective   Kp Hernandez is a 79 y.o. male. Presents today for   Chief Complaint   Patient presents with    Anxiety    Hypertension    Gout    Hyperlipidemia    Rash     Follow up        History of Present Illness  Patient 80 y/o male with OA (wide spread), htn, hld, chronic gout, pituitary adenoma here for f/u and noting has lump left testicle that is painful.  No urinary symptoms;  occly pain aroudn right cva area; no hematuria;  He had a rash over penis responded to lamisil and for most part resolved.   No cp/soa;  using compound crm in addition to his painmeds with relief of OA pain.    Review of Systems   Constitutional:  Negative for fever.   Respiratory:  Negative for shortness of breath.    Cardiovascular:  Negative for chest pain and palpitations.   Genitourinary:  Positive for flank pain and testicular pain. Negative for difficulty urinating, dysuria, penile discharge and penile pain.   Musculoskeletal:  Positive for arthralgias and back pain.       Patient Active Problem List   Diagnosis    Essential hypertension    Benign prostatic hypertrophy without urinary obstruction    CKD (chronic kidney disease) stage 2, GFR 60-89 ml/min    COPD mixed type    Depression with anxiety    Dyslipidemia    Ganglion    Gastroesophageal reflux disease without esophagitis    Hearing loss    Osteoarthritis    Pituitary microadenoma    History of gout    Esophageal dysphagia    Atrial flutter    Class 1 obesity due to excess calories with serious comorbidity and body mass index (BMI) of 31.0 to 31.9 in adult       Social History     Socioeconomic History    Marital status:    Tobacco Use    Smoking status: Never    Smokeless tobacco: Never   Vaping Use    Vaping status: Never Used   Substance and Sexual Activity    Alcohol use: No    Drug use: No    Sexual activity: Defer       No Known Allergies    Current Outpatient Medications on File Prior to Visit   Medication Sig Dispense Refill    albuterol sulfate   (90 Base) MCG/ACT inhaler Inhale 2 puffs Every 4 (Four) Hours As Needed for Wheezing. Indications: Asthma, Chronic Obstructive Lung Disease 6.7 g 2    allopurinol (ZYLOPRIM) 100 MG tablet Take 1 tablet by mouth twice daily 180 tablet 0    escitalopram (LEXAPRO) 20 MG tablet Take 1 tablet by mouth once daily 90 tablet 0    finasteride (PROSCAR) 5 MG tablet TAKE 1 TABLET BY MOUTH ONCE DAILY AS DIRECTED 90 tablet 0    fluticasone (FLONASE) 50 MCG/ACT nasal spray Use 2 spray(s) in each nostril once daily 48 g 0    gabapentin (NEURONTIN) 300 MG capsule TAKE 1 CAPSULE BY MOUTH IN THE MORNING AND AT LUNCH AND TAKE 2 CAPSULES IN THE EVENING 360 capsule 0    hydroCHLOROthiazide 25 MG tablet Take 1 tablet by mouth once daily 90 tablet 1    HYDROcodone-acetaminophen (NORCO)  MG per tablet Take 1 tablet by mouth Every 6 (Six) Hours As Needed for Severe Pain. 120 tablet 0    Ibuprofen 3 %, Gabapentin 10 %, Baclofen 2 %, lidocaine 4 %, Ketamine HCl 10 % Apply 1-2 g topically to the appropriate area as directed 3 (Three) to 4 (Four) times daily. 90 g 5    methocarbamol (ROBAXIN) 750 MG tablet Take 1 tablet by mouth 2 (Two) Times a Day As Needed for Muscle Spasms. 60 tablet 5    metoprolol succinate XL (TOPROL-XL) 50 MG 24 hr tablet Take 1 tablet by mouth Daily. 30 tablet 5    mirtazapine (REMERON) 30 MG tablet TAKE 1 TABLET BY MOUTH ONCE DAILY AT NIGHT 90 tablet 1    multivitamin with minerals tablet tablet Take 1 tablet by mouth Daily.      rosuvastatin (CRESTOR) 20 MG tablet TAKE 1 TABLET BY MOUTH ONCE DAILY AT NIGHT 90 tablet 3    SPIRIVA HANDIHALER 18 MCG per inhalation capsule Place 1 capsule into inhaler and inhale Daily.      urea (CARMOL) 40 % cream Apply 1 application  topically to the appropriate area as directed Daily. To palms of both hands      Zinc 50 MG capsule Take 1 capsule by mouth Daily. 1 chewable daily      [DISCONTINUED] clobetasol propionate (TEMOVATE) 0.05 % cream APPLY CREAM TOPICALLY TO APPROPRIATE  "AREA OF HANDS TWICE DAILY. (Patient not taking: Reported on 11/4/2024) 60 g 2    [DISCONTINUED] clotrimazole (LOTRIMIN) 1 % cream Apply 1 Application topically to the appropriate area as directed 2 (Two) Times a Day. (Patient not taking: Reported on 11/4/2024) 60 g 5    [DISCONTINUED] finasteride (PROSCAR) 5 MG tablet TAKE 1 TABLET BY MOUTH ONCE DAILY AS DIRECTED (Patient taking differently: Take 1 tablet by mouth Daily.) 90 tablet 3    [DISCONTINUED] nystatin-triamcinolone (MYCOLOG) 445694-9.1 UNIT/GM-% ointment APPLY 1  APPLICATION TOPICALLY TO THE APPROPRIATE AREA AS DIRECTED TWICE DAILY TO FOR 28 DAYS (Patient not taking: Reported on 11/4/2024) 60 g 0     No current facility-administered medications on file prior to visit.       Objective   Vitals:    11/04/24 1243   BP: 132/78   Pulse: 61   SpO2: 96%   Weight: 91.2 kg (201 lb)   Height: 170.2 cm (67\")     Body mass index is 31.48 kg/m².    Physical Exam  Vitals and nursing note reviewed.   Constitutional:       Appearance: He is well-developed.   HENT:      Head: Normocephalic and atraumatic.   Neck:      Thyroid: No thyromegaly.      Vascular: No JVD.   Cardiovascular:      Rate and Rhythm: Normal rate and regular rhythm.      Heart sounds: Normal heart sounds. No murmur heard.     No friction rub. No gallop.   Pulmonary:      Effort: Pulmonary effort is normal. No respiratory distress.      Breath sounds: Normal breath sounds. No wheezing or rales.   Abdominal:      General: Bowel sounds are normal. There is no distension.      Palpations: Abdomen is soft.      Tenderness: There is no abdominal tenderness. There is no guarding or rebound.   Genitourinary:     Penis: Circumcised. No tenderness, discharge, swelling or lesions.       Testes:         Right: Mass or tenderness not present.         Left: Mass (left pea sized, painful epididymus) and tenderness present.   Musculoskeletal:      Cervical back: Neck supple.   Skin:     General: Skin is warm and dry. "   Neurological:      Mental Status: He is alert.      Gait: Gait normal.   Psychiatric:         Behavior: Behavior normal.         Assessment & Plan   Diagnoses and all orders for this visit:    1. Primary hypertension (Primary)    2. KEI (generalized anxiety disorder)    3. CKD (chronic kidney disease) stage 2, GFR 60-89 ml/min  -     Comprehensive Metabolic Panel  -     CBC (No Diff)  -     Urinalysis With Microscopic - Urine, Clean Catch    4. Dyslipidemia  -     Comprehensive Metabolic Panel  -     Lipid Panel    5. Idiopathic chronic gout of multiple sites without tophus  -     Uric Acid    6. Drug therapy    7. Obesity (BMI 30-39.9)    8. Generalized osteoarthritis of multiple sites    9. Hyperprolactinemia  -     Prolactin    10. Pituitary microadenoma  -     TSH  -     Prolactin    11. Testicular mass - left  -     US Scrotum & Testicles with doppler; Future    12. Epididymitis - left  -     Urinalysis With Microscopic - Urine, Clean Catch    13. Prediabetes  -     Hemoglobin A1c    Painful teticle, will order us with doppler (no trauma) and start abx;   balanitis on exam resolved  Pituitary adenoma - check labs as due  Due checkuric acid and cbc, on allopurinol  Ckd II - due labs  -HLD - continue statin, recheck lipids.  -hypertension - controlled, continue medications             Body mass index is 31.48 kg/m².  BMI is >= 30 and <35. (Class 1 Obesity). The following options were offered after discussion;: weight loss educational material (shared in after visit summary)        -Follow up: 6 to 8 weeks and prn

## 2024-11-05 LAB
ALBUMIN SERPL-MCNC: 4.4 G/DL (ref 3.8–4.8)
ALP SERPL-CCNC: 74 IU/L (ref 44–121)
ALT SERPL-CCNC: 29 IU/L (ref 0–44)
APPEARANCE UR: CLEAR
AST SERPL-CCNC: 33 IU/L (ref 0–40)
BACTERIA #/AREA URNS HPF: NORMAL /[HPF]
BILIRUB SERPL-MCNC: 0.3 MG/DL (ref 0–1.2)
BILIRUB UR QL STRIP: NEGATIVE
BUN SERPL-MCNC: 18 MG/DL (ref 8–27)
BUN/CREAT SERPL: 16 (ref 10–24)
CALCIUM SERPL-MCNC: 9.5 MG/DL (ref 8.6–10.2)
CASTS URNS QL MICRO: NORMAL /LPF
CHLORIDE SERPL-SCNC: 101 MMOL/L (ref 96–106)
CHOLEST SERPL-MCNC: 133 MG/DL (ref 100–199)
CO2 SERPL-SCNC: 26 MMOL/L (ref 20–29)
COLOR UR: YELLOW
CREAT SERPL-MCNC: 1.16 MG/DL (ref 0.76–1.27)
EGFRCR SERPLBLD CKD-EPI 2021: 64 ML/MIN/1.73
EPI CELLS #/AREA URNS HPF: NORMAL /HPF (ref 0–10)
ERYTHROCYTE [DISTWIDTH] IN BLOOD BY AUTOMATED COUNT: 13 % (ref 11.6–15.4)
GLOBULIN SER CALC-MCNC: 2.6 G/DL (ref 1.5–4.5)
GLUCOSE SERPL-MCNC: 94 MG/DL (ref 70–99)
GLUCOSE UR QL STRIP: NEGATIVE
HBA1C MFR BLD: 5.8 % (ref 4.8–5.6)
HCT VFR BLD AUTO: 45.4 % (ref 37.5–51)
HDLC SERPL-MCNC: 45 MG/DL
HGB BLD-MCNC: 15.2 G/DL (ref 13–17.7)
HGB UR QL STRIP: NEGATIVE
KETONES UR QL STRIP: NEGATIVE
LDLC SERPL CALC-MCNC: 44 MG/DL (ref 0–99)
LEUKOCYTE ESTERASE UR QL STRIP: NEGATIVE
MCH RBC QN AUTO: 32.1 PG (ref 26.6–33)
MCHC RBC AUTO-ENTMCNC: 33.5 G/DL (ref 31.5–35.7)
MCV RBC AUTO: 96 FL (ref 79–97)
MICRO URNS: NORMAL
MICRO URNS: NORMAL
NITRITE UR QL STRIP: NEGATIVE
PH UR STRIP: 5.5 [PH] (ref 5–7.5)
PLATELET # BLD AUTO: 230 X10E3/UL (ref 150–450)
POTASSIUM SERPL-SCNC: 4.2 MMOL/L (ref 3.5–5.2)
PROLACTIN SERPL-MCNC: 36.9 NG/ML (ref 3.6–25.2)
PROT SERPL-MCNC: 7 G/DL (ref 6–8.5)
PROT UR QL STRIP: NEGATIVE
RBC # BLD AUTO: 4.74 X10E6/UL (ref 4.14–5.8)
RBC #/AREA URNS HPF: NORMAL /HPF (ref 0–2)
SODIUM SERPL-SCNC: 141 MMOL/L (ref 134–144)
SP GR UR STRIP: 1.01 (ref 1–1.03)
TRIGL SERPL-MCNC: 287 MG/DL (ref 0–149)
TSH SERPL DL<=0.005 MIU/L-ACNC: 1.2 UIU/ML (ref 0.45–4.5)
URATE SERPL-MCNC: 5.1 MG/DL (ref 3.8–8.4)
UROBILINOGEN UR STRIP-MCNC: 0.2 MG/DL (ref 0.2–1)
VLDLC SERPL CALC-MCNC: 44 MG/DL (ref 5–40)
WBC # BLD AUTO: 8.7 X10E3/UL (ref 3.4–10.8)
WBC #/AREA URNS HPF: NORMAL /HPF (ref 0–5)

## 2024-11-08 ENCOUNTER — TELEPHONE (OUTPATIENT)
Dept: FAMILY MEDICINE CLINIC | Facility: CLINIC | Age: 79
End: 2024-11-08
Payer: MEDICARE

## 2024-11-08 DIAGNOSIS — N50.89 TESTICULAR MASS: Primary | ICD-10-CM

## 2024-11-08 NOTE — TELEPHONE ENCOUNTER
If scheduling [id number] (US Scrotum and testicles) for pain the radiologists prefer to have an additional exam. [id number] US Testicle and [facility]) preformed at the same time. If this is acceptable to the ordering physician, please be sure to schedule this additional exam and obtain an order for this exam.    11/08/2024   PER RADIOLOGY PROTOCOL FOR PAIN. PLEASE ADD TO THE ORDER

## 2024-11-10 NOTE — PROGRESS NOTES
Mail copy of results to patient.  Triglycerides mildly elevated, work on diet  A1c prediabetes range, work on diet  Thyroid appropriate range  Prolactin mildly elevated, continue to monitor

## 2024-11-14 ENCOUNTER — HOSPITAL ENCOUNTER (OUTPATIENT)
Dept: ULTRASOUND IMAGING | Facility: HOSPITAL | Age: 79
Discharge: HOME | End: 2024-11-14
Admitting: FAMILY MEDICINE
Payer: MEDICARE

## 2024-11-14 DIAGNOSIS — N50.89 TESTICULAR MASS: ICD-10-CM

## 2024-11-14 PROCEDURE — 93976 VASCULAR STUDY: CPT

## 2024-11-14 PROCEDURE — 76870 US EXAM SCROTUM: CPT

## 2024-11-21 DIAGNOSIS — F41.1 GAD (GENERALIZED ANXIETY DISORDER): ICD-10-CM

## 2024-11-21 RX ORDER — ESCITALOPRAM OXALATE 20 MG/1
20 TABLET ORAL DAILY
Qty: 90 TABLET | Refills: 3 | Status: SHIPPED | OUTPATIENT
Start: 2024-11-21

## 2024-11-24 NOTE — PROGRESS NOTES
Call results to patient.  Testicular us noted small cyst that I felt on exam.   The appearance of testicles could represent signs of infection and unlikely cancer.   Radiology recommends repeat us in 3 to 4weeks to make sure clears and after done with antibiotics.  I put on recall to order again.

## 2024-11-27 DIAGNOSIS — M54.41 CHRONIC BILATERAL LOW BACK PAIN WITH BILATERAL SCIATICA: ICD-10-CM

## 2024-11-27 DIAGNOSIS — M15.9 GENERALIZED OSTEOARTHRITIS OF MULTIPLE SITES: ICD-10-CM

## 2024-11-27 DIAGNOSIS — M54.42 CHRONIC BILATERAL LOW BACK PAIN WITH BILATERAL SCIATICA: ICD-10-CM

## 2024-11-27 DIAGNOSIS — G89.29 CHRONIC BILATERAL LOW BACK PAIN WITH BILATERAL SCIATICA: ICD-10-CM

## 2024-11-27 NOTE — TELEPHONE ENCOUNTER
"    Caller: Kp Hernandez \"HEATH\"    Relationship: Self    Best call back number:     Requested Prescriptions:   Requested Prescriptions     Pending Prescriptions Disp Refills    HYDROcodone-acetaminophen (NORCO)  MG per tablet 120 tablet 0     Sig: Take 1 tablet by mouth Every 6 (Six) Hours As Needed for Severe Pain.        Pharmacy where request should be sent: [address] ([facility], [place] - [address] [facility] [phone] [facility] [phone]      Last office visit with prescribing clinician: 11/4/2024   Last telemedicine visit with prescribing clinician: Visit date not found   Next office visit with prescribing clinician: 12/30/2024     Additional details provided by patient:     Does the patient have less than a 3 day supply:  [x] Yes  [] No        Destiny Angel Rep   11/27/24 12:28 EST           "

## 2024-12-01 RX ORDER — HYDROCODONE BITARTRATE AND ACETAMINOPHEN 10; 325 MG/1; MG/1
1 TABLET ORAL EVERY 6 HOURS PRN
Qty: 120 TABLET | Refills: 0 | Status: SHIPPED | OUTPATIENT
Start: 2024-12-01

## 2024-12-15 ENCOUNTER — TELEPHONE (OUTPATIENT)
Dept: FAMILY MEDICINE CLINIC | Facility: CLINIC | Age: 79
End: 2024-12-15
Payer: MEDICARE

## 2024-12-15 DIAGNOSIS — N50.89 TESTICULAR MASS: ICD-10-CM

## 2024-12-15 DIAGNOSIS — N50.819 PAIN IN TESTICLE, UNSPECIFIED LATERALITY: Primary | ICD-10-CM

## 2024-12-15 NOTE — TELEPHONE ENCOUNTER
----- Message from Murphy Felix sent at 11/24/2024  6:57 PM EST -----  Regarding: FW: testicular us    ----- Message -----  From: Murphy Felix DO  Sent: 11/24/2024   1:12 PM EST  To: Murphy Felix DO  Subject: testicular us                                    F/u testicular us due since echotexture was abnormal (3 to 4 week f/u).

## 2024-12-23 RX ORDER — HYDROCHLOROTHIAZIDE 25 MG/1
TABLET ORAL
Qty: 90 TABLET | Refills: 0 | Status: SHIPPED | OUTPATIENT
Start: 2024-12-23

## 2024-12-26 ENCOUNTER — HOSPITAL ENCOUNTER (OUTPATIENT)
Dept: ULTRASOUND IMAGING | Facility: HOSPITAL | Age: 79
Discharge: HOME | End: 2024-12-26
Admitting: FAMILY MEDICINE
Payer: MEDICARE

## 2024-12-26 DIAGNOSIS — M54.41 CHRONIC BILATERAL LOW BACK PAIN WITH BILATERAL SCIATICA: ICD-10-CM

## 2024-12-26 DIAGNOSIS — G89.29 CHRONIC BILATERAL LOW BACK PAIN WITH BILATERAL SCIATICA: ICD-10-CM

## 2024-12-26 DIAGNOSIS — M54.42 CHRONIC BILATERAL LOW BACK PAIN WITH BILATERAL SCIATICA: ICD-10-CM

## 2024-12-26 DIAGNOSIS — N50.819 PAIN IN TESTICLE, UNSPECIFIED LATERALITY: ICD-10-CM

## 2024-12-26 DIAGNOSIS — N50.89 TESTICULAR MASS: ICD-10-CM

## 2024-12-26 PROCEDURE — 93976 VASCULAR STUDY: CPT

## 2024-12-26 PROCEDURE — 76870 US EXAM SCROTUM: CPT

## 2024-12-26 RX ORDER — METHOCARBAMOL 750 MG/1
750 TABLET, FILM COATED ORAL 2 TIMES DAILY PRN
Qty: 60 TABLET | Refills: 0 | Status: SHIPPED | OUTPATIENT
Start: 2024-12-26

## 2024-12-30 ENCOUNTER — OFFICE VISIT (OUTPATIENT)
Dept: FAMILY MEDICINE CLINIC | Facility: CLINIC | Age: 79
End: 2024-12-30
Payer: MEDICARE

## 2024-12-30 VITALS
HEIGHT: 67 IN | OXYGEN SATURATION: 93 % | WEIGHT: 204 LBS | HEART RATE: 104 BPM | BODY MASS INDEX: 32.02 KG/M2 | DIASTOLIC BLOOD PRESSURE: 72 MMHG | SYSTOLIC BLOOD PRESSURE: 130 MMHG

## 2024-12-30 DIAGNOSIS — J01.10 ACUTE NON-RECURRENT FRONTAL SINUSITIS: Primary | ICD-10-CM

## 2024-12-30 DIAGNOSIS — N50.819 PAIN IN TESTICLE, UNSPECIFIED LATERALITY: ICD-10-CM

## 2024-12-30 PROCEDURE — 3078F DIAST BP <80 MM HG: CPT | Performed by: FAMILY MEDICINE

## 2024-12-30 PROCEDURE — 3075F SYST BP GE 130 - 139MM HG: CPT | Performed by: FAMILY MEDICINE

## 2024-12-30 PROCEDURE — 1125F AMNT PAIN NOTED PAIN PRSNT: CPT | Performed by: FAMILY MEDICINE

## 2024-12-30 PROCEDURE — 99213 OFFICE O/P EST LOW 20 MIN: CPT | Performed by: FAMILY MEDICINE

## 2024-12-30 RX ORDER — AZITHROMYCIN 250 MG/1
TABLET, FILM COATED ORAL
Qty: 6 TABLET | Refills: 0 | Status: SHIPPED | OUTPATIENT
Start: 2024-12-30

## 2024-12-30 NOTE — PROGRESS NOTES
Subjective   Kp Hernandez is a 79 y.o. male. Presents today for   Chief Complaint   Patient presents with    Testicle Pain    Sinus Problem         follow up to ultrasound of  testicle  Onset was at an unknown time.   Symptoms include: joint pain, myalgias and neck pain.   Pertinent negative symptoms include no abdominal pain, no anorexia, no change in stool, no chest pain, no chills, no congestion, no cough, no diaphoresis, no fatigue, no fever, no headaches, no joint swelling, no nausea, no numbness, no rash, no sore throat, no swollen glands, no dysuria, no vertigo, no visual change, no vomiting and no weakness.   Testicle Pain  The patient's primary symptoms include testicular pain. Associated symptoms include joint pain. Pertinent negatives include no abdominal pain, anorexia, chest pain, chills, coughing, dysuria, fever, headaches, joint swelling, nausea, rash, sore throat or vomiting.   Sinus Problem  This is a new problem. The current episode started in the past 7 days. Associated symptoms include neck pain. Pertinent negatives include no chills, congestion, coughing, diaphoresis, headaches, sore throat or swollen glands.   Report testicular pain better, only if bumps wrong way does it hurt;  repeat us ok, unchanged from last one    Review of Systems   Constitutional:  Negative for chills, diaphoresis, fatigue and fever.   HENT:  Negative for congestion and sore throat.    Respiratory:  Negative for cough.    Cardiovascular:  Negative for chest pain.   Gastrointestinal:  Negative for abdominal pain, anorexia, nausea and vomiting.   Genitourinary:  Positive for testicular pain. Negative for dysuria.   Musculoskeletal:  Positive for joint pain, myalgias and neck pain.   Skin:  Negative for rash.   Neurological:  Negative for vertigo, weakness, numbness and headaches.       Patient Active Problem List   Diagnosis    Essential hypertension    Benign prostatic hypertrophy without urinary obstruction    CKD  (chronic kidney disease) stage 2, GFR 60-89 ml/min    COPD mixed type    Depression with anxiety    Dyslipidemia    Ganglion    Gastroesophageal reflux disease without esophagitis    Hearing loss    Osteoarthritis    Pituitary microadenoma    History of gout    Esophageal dysphagia    Atrial flutter    Class 1 obesity due to excess calories with serious comorbidity and body mass index (BMI) of 31.0 to 31.9 in adult       Social History     Socioeconomic History    Marital status:    Tobacco Use    Smoking status: Never     Passive exposure: Never    Smokeless tobacco: Never   Vaping Use    Vaping status: Never Used   Substance and Sexual Activity    Alcohol use: No    Drug use: No    Sexual activity: Defer       No Known Allergies    Current Outpatient Medications on File Prior to Visit   Medication Sig Dispense Refill    albuterol sulfate  (90 Base) MCG/ACT inhaler Inhale 2 puffs Every 4 (Four) Hours As Needed for Wheezing. Indications: Asthma, Chronic Obstructive Lung Disease 6.7 g 2    allopurinol (ZYLOPRIM) 100 MG tablet Take 1 tablet by mouth twice daily 180 tablet 0    doxycycline (VIBRAMYCIN) 100 MG capsule Take 1 capsule by mouth 2 (Two) Times a Day. 14 capsule 0    escitalopram (LEXAPRO) 20 MG tablet Take 1 tablet by mouth once daily 90 tablet 3    finasteride (PROSCAR) 5 MG tablet TAKE 1 TABLET BY MOUTH ONCE DAILY AS DIRECTED 90 tablet 0    fluticasone (FLONASE) 50 MCG/ACT nasal spray Use 2 spray(s) in each nostril once daily 48 g 0    gabapentin (NEURONTIN) 300 MG capsule TAKE 1 CAPSULE BY MOUTH IN THE MORNING AND AT LUNCH AND TAKE 2 CAPSULES IN THE EVENING 360 capsule 0    hydroCHLOROthiazide 25 MG tablet Take 1 tablet by mouth once daily 90 tablet 0    HYDROcodone-acetaminophen (NORCO)  MG per tablet Take 1 tablet by mouth Every 6 (Six) Hours As Needed for Severe Pain. 120 tablet 0    Ibuprofen 3 %, Gabapentin 10 %, Baclofen 2 %, lidocaine 4 %, Ketamine HCl 10 % Apply 1-2 g topically  "to the appropriate area as directed 3 (Three) to 4 (Four) times daily. 90 g 5    methocarbamol (ROBAXIN) 750 MG tablet TAKE 1 TABLET BY MOUTH TWICE DAILY AS NEEDED FOR MUSCLE SPASM 60 tablet 0    metoprolol succinate XL (TOPROL-XL) 50 MG 24 hr tablet Take 1 tablet by mouth Daily. 30 tablet 5    mirtazapine (REMERON) 30 MG tablet TAKE 1 TABLET BY MOUTH ONCE DAILY AT NIGHT 90 tablet 1    multivitamin with minerals tablet tablet Take 1 tablet by mouth Daily.      rosuvastatin (CRESTOR) 20 MG tablet TAKE 1 TABLET BY MOUTH ONCE DAILY AT NIGHT 90 tablet 3    SPIRIVA HANDIHALER 18 MCG per inhalation capsule Place 1 capsule into inhaler and inhale Daily.      urea (CARMOL) 40 % cream Apply 1 application  topically to the appropriate area as directed Daily. To palms of both hands      Zinc 50 MG capsule Take 1 capsule by mouth Daily. 1 chewable daily       No current facility-administered medications on file prior to visit.       Objective   Vitals:    12/30/24 1432   BP: 130/72   Pulse: 104   SpO2: 93%   Weight: 92.5 kg (204 lb)   Height: 170.2 cm (67\")     Body mass index is 31.95 kg/m².    Physical Exam  Vitals and nursing note reviewed.   Constitutional:       Appearance: He is well-developed.   HENT:      Head: Normocephalic and atraumatic.      Right Ear: Tympanic membrane normal.      Left Ear: Tympanic membrane normal.      Nose: Mucosal edema and congestion present.   Neck:      Thyroid: No thyromegaly.      Vascular: No JVD.   Cardiovascular:      Rate and Rhythm: Normal rate and regular rhythm.      Heart sounds: Normal heart sounds. No murmur heard.     No friction rub. No gallop.   Pulmonary:      Effort: Pulmonary effort is normal. No respiratory distress.      Breath sounds: Normal breath sounds. No wheezing or rales.   Abdominal:      General: Bowel sounds are normal. There is no distension.      Palpations: Abdomen is soft.      Tenderness: There is no abdominal tenderness. There is no guarding or rebound. "   Musculoskeletal:      Cervical back: Neck supple.   Skin:     General: Skin is warm and dry.   Neurological:      Mental Status: He is alert.      Gait: Gait normal.   Psychiatric:         Behavior: Behavior normal.       Study Result    Narrative & Impression   US SCROTUM AND TESTICLES-, US SCROTUM AND TESTICLES WITH DOPPLER-     DATE OF EXAM: 12/26/2024 2:36 PM     INDICATION: Testicular pain. Follow-up.     COMPARISON: 11/14/2024.     TECHNIQUE: Multiplanar grayscale and color flow imaging of the testicles  and scrotum was performed.     FINDINGS:  Stable diffusely heterogeneous echogenicity of both testicles, right  greater than left. No testicular mass is identified. The right testicle  measures 4.3 cm x 2.7 cm x 1.9 cm. The left testicle measures 4.8 cm x  2.7 cm x 2.5 cm. Symmetric vascular flow is seen in each testicle with  expected arterial and venous waveforms.     A simple appearing anechoic 0.8 cm cyst is seen in the left epididymal  head. The right epididymis is unremarkable in ultrasound appearance.     No significant hydrocele. No varicocele.     IMPRESSION:     1. Stable diffusely heterogeneous echogenicity of both testicles, right  greater than left, which could reflect seminiferous tubular atrophy and  sclerosis given the patient's age. No discrete testicular mass is  identified.  2. Simple appearing subcentimeter left epididymal head cyst.     This report was finalized on 12/30/2024 9:19 AM by Rambo Aguilar MD on  Workstation: [id number]        Assessment & Plan   Diagnoses and all orders for this visit:    1. Acute non-recurrent frontal sinusitis (Primary)  -     azithromycin (Zithromax Z-Balwinder) 250 MG tablet; Take 2 tablets by mouth on day 1, then 1 tablet daily on days 2-5  Dispense: 6 tablet; Refill: 0    2. Pain in testicle, unspecified laterality    Will ust monitor testicular pain for now;  us ok;     Zpack for sinuses                 -Follow up:

## 2024-12-30 NOTE — PROGRESS NOTES
Call results to patient.  Findings on ultrasound are stable and radiology thinks related to aging;  No discrete masses or changes of concern.

## 2024-12-31 DIAGNOSIS — G89.29 CHRONIC BILATERAL LOW BACK PAIN WITH BILATERAL SCIATICA: ICD-10-CM

## 2024-12-31 DIAGNOSIS — M54.41 CHRONIC BILATERAL LOW BACK PAIN WITH BILATERAL SCIATICA: ICD-10-CM

## 2024-12-31 DIAGNOSIS — M54.42 CHRONIC BILATERAL LOW BACK PAIN WITH BILATERAL SCIATICA: ICD-10-CM

## 2024-12-31 DIAGNOSIS — M15.9 GENERALIZED OSTEOARTHRITIS OF MULTIPLE SITES: ICD-10-CM

## 2024-12-31 RX ORDER — HYDROCODONE BITARTRATE AND ACETAMINOPHEN 10; 325 MG/1; MG/1
1 TABLET ORAL EVERY 6 HOURS PRN
Qty: 120 TABLET | Refills: 0 | Status: SHIPPED | OUTPATIENT
Start: 2024-12-31

## 2024-12-31 NOTE — TELEPHONE ENCOUNTER
"  Caller: Kp Hernandez \"HEATH\"    Relationship: Self    Best call back number: [phone]     Requested Prescriptions:   Requested Prescriptions     Pending Prescriptions Disp Refills    HYDROcodone-acetaminophen (NORCO)  MG per tablet 120 tablet 0     Sig: Take 1 tablet by mouth Every 6 (Six) Hours As Needed for Severe Pain.        Pharmacy where request should be sent: [address] ([facility], [place] - [address] [facility] [phone] [facility] [phone]      Last office visit with prescribing clinician: 12/30/2024   Last telemedicine visit with prescribing clinician: Visit date not found   Next office visit with prescribing clinician: 6/30/2025     Additional details provided by patient:     Does the patient have less than a 3 day supply:  [x] Yes  [] No    Would you like a call back once the refill request has been completed: [] Yes [x] No    If the office needs to give you a call back, can they leave a voicemail: [x] Yes [] No    Destiny Rojas Rep   12/31/24 09:04 EST         "

## 2025-01-09 ENCOUNTER — TELEPHONE (OUTPATIENT)
Dept: FAMILY MEDICINE CLINIC | Facility: CLINIC | Age: 80
End: 2025-01-09
Payer: MEDICARE

## 2025-01-09 RX ORDER — FLUTICASONE PROPIONATE 50 MCG
SPRAY, SUSPENSION (ML) NASAL
Qty: 48 G | Refills: 0 | Status: SHIPPED | OUTPATIENT
Start: 2025-01-09

## 2025-01-09 NOTE — TELEPHONE ENCOUNTER
OKAY FOR HUB TO RELAY.     ATTEMPTED TO CALL PATIENT, UNSUCCESSFUL. WE HAVE SPOKE TO [place] AT [facility] AND THEY STATE THAT PATIENT PICKED UP HIS HYDROCODONE ON JANUARY 1ST AT 10:01AM. HE NEEDS TO REACH OUT TO THE PHARMACY AND SPEAK WITH HIM.

## 2025-01-13 DIAGNOSIS — R53.83 OTHER FATIGUE: Primary | ICD-10-CM

## 2025-01-13 RX ORDER — ALLOPURINOL 100 MG/1
TABLET ORAL
Qty: 180 TABLET | Refills: 1 | Status: SHIPPED | OUTPATIENT
Start: 2025-01-13

## 2025-01-25 DIAGNOSIS — G89.29 CHRONIC BILATERAL LOW BACK PAIN WITH BILATERAL SCIATICA: ICD-10-CM

## 2025-01-25 DIAGNOSIS — M54.42 CHRONIC BILATERAL LOW BACK PAIN WITH BILATERAL SCIATICA: ICD-10-CM

## 2025-01-25 DIAGNOSIS — M54.41 CHRONIC BILATERAL LOW BACK PAIN WITH BILATERAL SCIATICA: ICD-10-CM

## 2025-01-25 DIAGNOSIS — M79.604 PAIN IN BOTH LOWER EXTREMITIES: ICD-10-CM

## 2025-01-25 DIAGNOSIS — M79.605 PAIN IN BOTH LOWER EXTREMITIES: ICD-10-CM

## 2025-01-25 RX ORDER — GABAPENTIN 300 MG/1
CAPSULE ORAL
Qty: 360 CAPSULE | Refills: 0 | Status: SHIPPED | OUTPATIENT
Start: 2025-01-25

## 2025-01-28 DIAGNOSIS — G89.29 CHRONIC BILATERAL LOW BACK PAIN WITH BILATERAL SCIATICA: ICD-10-CM

## 2025-01-28 DIAGNOSIS — M54.42 CHRONIC BILATERAL LOW BACK PAIN WITH BILATERAL SCIATICA: ICD-10-CM

## 2025-01-28 DIAGNOSIS — M54.41 CHRONIC BILATERAL LOW BACK PAIN WITH BILATERAL SCIATICA: ICD-10-CM

## 2025-01-28 DIAGNOSIS — M15.9 GENERALIZED OSTEOARTHRITIS OF MULTIPLE SITES: ICD-10-CM

## 2025-01-28 RX ORDER — HYDROCODONE BITARTRATE AND ACETAMINOPHEN 10; 325 MG/1; MG/1
1 TABLET ORAL EVERY 6 HOURS PRN
Qty: 120 TABLET | Refills: 0 | Status: SHIPPED | OUTPATIENT
Start: 2025-01-28

## 2025-01-28 NOTE — TELEPHONE ENCOUNTER
"    Caller: Kp Hernandez \"HEATH\"    Relationship: Self    Best call back number: [phone]    Requested Prescriptions:   Requested Prescriptions     Pending Prescriptions Disp Refills    HYDROcodone-acetaminophen (NORCO)  MG per tablet 120 tablet 0     Sig: Take 1 tablet by mouth Every 6 (Six) Hours As Needed for Severe Pain.        Pharmacy where request should be sent: [address], [place] - [address] [facility] [phone] [facility] [phone]      Last office visit with prescribing clinician: 12/30/2024   Last telemedicine visit with prescribing clinician: Visit date not found   Next office visit with prescribing clinician: 6/27/2025     Additional details provided by patient:     Does the patient have less than a 3 day supply:  [x] Yes  [] No    Would you like a call back once the refill request has been completed: [] Yes [x] No    If the office needs to give you a call back, can they leave a voicemail: [] Yes [x] No    Destiny Ellison Rep   01/28/25 09:51 EST       "

## 2025-02-02 RX ORDER — FINASTERIDE 5 MG/1
TABLET, FILM COATED ORAL
Qty: 90 TABLET | Refills: 1 | Status: SHIPPED | OUTPATIENT
Start: 2025-02-02

## 2025-02-27 DIAGNOSIS — G89.29 CHRONIC BILATERAL LOW BACK PAIN WITH BILATERAL SCIATICA: ICD-10-CM

## 2025-02-27 DIAGNOSIS — M54.41 CHRONIC BILATERAL LOW BACK PAIN WITH BILATERAL SCIATICA: ICD-10-CM

## 2025-02-27 DIAGNOSIS — M54.42 CHRONIC BILATERAL LOW BACK PAIN WITH BILATERAL SCIATICA: ICD-10-CM

## 2025-02-27 DIAGNOSIS — M15.9 GENERALIZED OSTEOARTHRITIS OF MULTIPLE SITES: ICD-10-CM

## 2025-02-27 NOTE — TELEPHONE ENCOUNTER
"  Caller: Kp Hernandez \"HEATH\"    Relationship: Self    Best call back number:     [phone] (Home)       Requested Prescriptions:   Requested Prescriptions     Pending Prescriptions Disp Refills    HYDROcodone-acetaminophen (NORCO)  MG per tablet 120 tablet 0     Sig: Take 1 tablet by mouth Every 6 (Six) Hours As Needed for Severe Pain.        Pharmacy where request should be sent: [address], [place] - [address] [facility] [phone] [facility] [phone]      Last office visit with prescribing clinician: 12/30/2024   Last telemedicine visit with prescribing clinician: Visit date not found   Next office visit with prescribing clinician: 6/27/2025     Additional details provided by patient: ONLY HAS TWO DAYS     Does the patient have less than a 3 day supply:  [x] Yes  [] No    Would you like a call back once the refill request has been completed: [] Yes [] No    If the office needs to give you a call back, can they leave a voicemail: [] Yes [x] No    Destiny Wasserman Rep   02/27/25 09:25 EST     "

## 2025-02-28 RX ORDER — HYDROCODONE BITARTRATE AND ACETAMINOPHEN 10; 325 MG/1; MG/1
1 TABLET ORAL EVERY 6 HOURS PRN
Qty: 120 TABLET | Refills: 0 | Status: SHIPPED | OUTPATIENT
Start: 2025-02-28

## 2025-03-03 DIAGNOSIS — G89.29 CHRONIC BILATERAL LOW BACK PAIN WITH BILATERAL SCIATICA: ICD-10-CM

## 2025-03-03 DIAGNOSIS — M54.42 CHRONIC BILATERAL LOW BACK PAIN WITH BILATERAL SCIATICA: ICD-10-CM

## 2025-03-03 DIAGNOSIS — M54.41 CHRONIC BILATERAL LOW BACK PAIN WITH BILATERAL SCIATICA: ICD-10-CM

## 2025-03-04 RX ORDER — METHOCARBAMOL 750 MG/1
750 TABLET, FILM COATED ORAL 2 TIMES DAILY PRN
Qty: 60 TABLET | Refills: 0 | Status: SHIPPED | OUTPATIENT
Start: 2025-03-04

## 2025-03-13 ENCOUNTER — OFFICE VISIT (OUTPATIENT)
Dept: SLEEP MEDICINE | Facility: HOSPITAL | Age: 80
End: 2025-03-13
Payer: MEDICARE

## 2025-03-13 VITALS — WEIGHT: 200 LBS | HEART RATE: 73 BPM | BODY MASS INDEX: 31.39 KG/M2 | OXYGEN SATURATION: 94 % | HEIGHT: 67 IN

## 2025-03-13 DIAGNOSIS — R53.83 OTHER FATIGUE: ICD-10-CM

## 2025-03-13 DIAGNOSIS — G47.30 SLEEP-DISORDERED BREATHING: Primary | ICD-10-CM

## 2025-03-13 PROCEDURE — G0463 HOSPITAL OUTPT CLINIC VISIT: HCPCS

## 2025-03-13 PROCEDURE — 1160F RVW MEDS BY RX/DR IN RCRD: CPT | Performed by: INTERNAL MEDICINE

## 2025-03-13 PROCEDURE — 99204 OFFICE O/P NEW MOD 45 MIN: CPT | Performed by: INTERNAL MEDICINE

## 2025-03-13 PROCEDURE — 1159F MED LIST DOCD IN RCRD: CPT | Performed by: INTERNAL MEDICINE

## 2025-03-13 NOTE — PROGRESS NOTES
[facility]  Sleep Disorders Center New Patient/Consultation       Reason for Consultation: ANDREA    Patient Care Team:  Murphy Felix DO as PCP - General  Ulysses Eugene MD as Consulting Physician (Gastroenterology)  Gagandeep Mcneill MD as Cardiologist (Cardiology)  Kendy Harrell MD (Dermatology)  Tan Bermeo MD as Consulting Physician (Pulmonary Disease)  Yonis Glover MD as Consulting Physician (Ophthalmology)  Miguel Whalen MD as Consulting Physician (Endocrinology)  David Guerrero MD as Consulting Physician (Neurosurgery)  Michael Dutta MD as Consulting Physician (Sleep Medicine)    Chief complaint: Tired    History of present illness:    Thank you for asking me to see your patient.  The patient is a 79 y.o. male who is here with his wife.  He has complaints of being tired during the daytime.  He goes to bed 11 PM and gets out of bed between 6 and 7 AM.  He falls asleep within 10 to 15 minutes.  He is tired upon arising.  He will take 1 nap daily.  The patient's wife sleeps in a different room.  He states he is better since he is using an incline pillow at 20-25 degrees; approximately 7 inches.  He reportedly has gained 15 pounds in the last several years.  He has a dry mouth in the morning.  If he has problems falling asleep is due to knee and shoulder pain.  He will use the restroom once during the nighttime.  Despite complaints of hypersomnolence, [place] Sleepiness Scale is normal at 3.    Review of Systems:    A complete review of systems was done and all were negative with the exception of the above    History:  Past Medical History:   Diagnosis Date    Asbestosis     Atrial flutter 01/22/2024    Brief episode in the setting of acute illness 1/2024; no atrial flutter/fib on 14-day Holter monitor    BPH without urinary obstruction     Cholecystitis 01/19/2024    CKD (chronic kidney disease)     COPD (chronic obstructive pulmonary disease)   "   Depression     Dyslipidemia     Fall 01/19/2024    Ganglion cyst     GERD without esophagitis     Hearing loss     Hyperlipidemia     Hypertension     Hypokalemia 01/19/2024    Impacted cerumen 01/19/2016    Nonsustained supraventricular tachycardia     Osteoarthritis    ,   Past Surgical History:   Procedure Laterality Date    CERVICAL CHIARI DECOMPRESSION POSTERIOR N/A     CHOLECYSTECTOMY N/A 1/19/2024    Procedure: CHOLECYSTECTOMY LAPAROSCOPIC WITH DAVINCI ROBOT;  Surgeon: David Lakhani MD;  Location: [facility] MAIN OR;  Service: Robotics - DaVinci;  Laterality: N/A;    COLONOSCOPY  2000    ELBOW ARTHROPLASTY Left     ENDOSCOPY N/A 11/28/2022    Procedure: ESOPHAGOGASTRODUODENOSCOPY with balloon dilatation 18mm-20mm;  Surgeon: Ulysses Eugene MD;  Location: [facility] MAIN OR;  Service: Gastroenterology;  Laterality: N/A;  hiatal hernia, s. ring    HERNIA REPAIR      INGUINAL HERNIA REPAIR      KNEE ACL RECONSTRUCTION Bilateral     SPINE SURGERY      TONSILLECTOMY     ,   Family History   Problem Relation Age of Onset    Hypertension Mother     Atrial fibrillation Mother     Alcohol abuse Father     Brain cancer Sister     GI problems Brother     COPD Brother     Malig Hyperthermia Neg Hx    , and   Social History     Socioeconomic History    Marital status:    Tobacco Use    Smoking status: Never     Passive exposure: Never    Smokeless tobacco: Never   Vaping Use    Vaping status: Never Used   Substance and Sexual Activity    Alcohol use: No    Drug use: No    Sexual activity: Defer     E-cigarette/Vaping    E-cigarette/Vaping Use Never User      E-cigarette/Vaping Substances     E-cigarette/Vaping Devices      Social History: He is retired from the railroad.    Allergies:  Patient has no known allergies.     Medication Review: Reviewed    Vital Signs:    Vitals:    03/13/25 0939   Pulse: 73   SpO2: 94%   Weight: 90.7 kg (200 lb)   Height: 170.2 cm (67\")      Body mass index is 31.32 kg/m².  Neck " Circumference: 17.5 inches      Physical Exam:    Constitutional:  Well developed 79 y.o. male that appears in no apparent distress.  Awake & oriented times 3.  Normal mood with normal recent and remote memory and normal judgement.  Eyes:  Conjunctivae normal.  Oropharynx: Moist mucous membranes without exudate and a large tongue and class III Mallampati airway.    Neck: Trachea midline  Respiratory: Effort is not labored  Cardiovascular: Radial pulse regular  Musculoskeletal: Gait appears normal, no digital clubbing evident, no pre-tibial edema    Impression:   The patient has complaints of hypersomnolence.  There are some reports of snoring.  However, since sleeping on an incline pillow, he reports symptoms are better (?).    STOP BANG scored 5-6 out of 8 suggesting high risk for having obstructive sleep apnea.    Plan:  Good sleep hygiene measures should be maintained.  Weight loss would be beneficial in this patient who obese by Body mass index is 31.32 kg/m².    Pathophysiology of ANDREA described to the patient.  Cardiovascular complications of untreated ANDREA also reviewed.  I also described how untreated ANDREA can affect high blood pressure.    After reviewing all with the patient and his wife, I would recommend obtaining a home sleep study.  I described the procedure to them.  I answered all of their questions.  Home sleep study will be scheduled and further recommendations will be made once those results are known.  Due to the high risk of having ANDREA by STOP-BANG, I also described auto CPAP therapy.    Thank you for requesting me to assist in this patient's care.    Michael Dutta MD  Sleep Medicine  03/16/25  10:56 EDT

## 2025-03-16 PROBLEM — G47.10 HYPERSOMNIA, UNSPECIFIED: Status: ACTIVE | Noted: 2025-03-16

## 2025-03-16 PROBLEM — G47.30 SLEEP-DISORDERED BREATHING: Status: ACTIVE | Noted: 2025-03-16

## 2025-03-19 RX ORDER — HYDROCHLOROTHIAZIDE 25 MG/1
25 TABLET ORAL DAILY
Qty: 90 TABLET | Refills: 1 | Status: SHIPPED | OUTPATIENT
Start: 2025-03-19

## 2025-03-20 ENCOUNTER — HOSPITAL ENCOUNTER (OUTPATIENT)
Dept: SLEEP MEDICINE | Facility: HOSPITAL | Age: 80
Discharge: HOME | End: 2025-03-20
Admitting: INTERNAL MEDICINE
Payer: MEDICARE

## 2025-03-20 DIAGNOSIS — G47.30 SLEEP-DISORDERED BREATHING: ICD-10-CM

## 2025-03-20 PROCEDURE — G0399 HOME SLEEP TEST/TYPE 3 PORTA: HCPCS

## 2025-03-22 PROCEDURE — 95806 SLEEP STUDY UNATT&RESP EFFT: CPT | Performed by: INTERNAL MEDICINE

## 2025-03-25 ENCOUNTER — TELEPHONE (OUTPATIENT)
Dept: SLEEP MEDICINE | Facility: HOSPITAL | Age: 80
End: 2025-03-25
Payer: MEDICARE

## 2025-03-31 ENCOUNTER — TELEPHONE (OUTPATIENT)
Dept: FAMILY MEDICINE CLINIC | Facility: CLINIC | Age: 80
End: 2025-03-31
Payer: MEDICARE

## 2025-03-31 DIAGNOSIS — M15.9 GENERALIZED OSTEOARTHRITIS OF MULTIPLE SITES: ICD-10-CM

## 2025-03-31 DIAGNOSIS — M54.42 CHRONIC BILATERAL LOW BACK PAIN WITH BILATERAL SCIATICA: ICD-10-CM

## 2025-03-31 DIAGNOSIS — G89.29 CHRONIC BILATERAL LOW BACK PAIN WITH BILATERAL SCIATICA: ICD-10-CM

## 2025-03-31 DIAGNOSIS — M54.41 CHRONIC BILATERAL LOW BACK PAIN WITH BILATERAL SCIATICA: ICD-10-CM

## 2025-03-31 RX ORDER — HYDROCODONE BITARTRATE AND ACETAMINOPHEN 10; 325 MG/1; MG/1
1 TABLET ORAL EVERY 6 HOURS PRN
Qty: 120 TABLET | Refills: 0 | Status: SHIPPED | OUTPATIENT
Start: 2025-03-31

## 2025-03-31 NOTE — TELEPHONE ENCOUNTER
"  Caller: Kp Hernandez \"HEATH\"    Relationship: Self    Best call back number: [phone]     Requested Prescriptions:   Requested Prescriptions     Pending Prescriptions Disp Refills    HYDROcodone-acetaminophen (NORCO)  MG per tablet 120 tablet 0     Sig: Take 1 tablet by mouth Every 6 (Six) Hours As Needed for Severe Pain.        Pharmacy where request should be sent: [address] ([facility]), [place] - [address] [facility] [phone] [facility] [phone]      Last office visit with prescribing clinician: 12/30/2024   Last telemedicine visit with prescribing clinician: Visit date not found   Next office visit with prescribing clinician: 6/27/2025       Does the patient have less than a 3 day supply:  [x] Yes  [] No    "

## 2025-04-03 DIAGNOSIS — G47.09 OTHER INSOMNIA: ICD-10-CM

## 2025-04-03 RX ORDER — MIRTAZAPINE 30 MG/1
30 TABLET, FILM COATED ORAL NIGHTLY
Qty: 90 TABLET | Refills: 0 | Status: SHIPPED | OUTPATIENT
Start: 2025-04-03

## 2025-04-28 DIAGNOSIS — M79.604 PAIN IN BOTH LOWER EXTREMITIES: ICD-10-CM

## 2025-04-28 DIAGNOSIS — M54.41 CHRONIC BILATERAL LOW BACK PAIN WITH BILATERAL SCIATICA: ICD-10-CM

## 2025-04-28 DIAGNOSIS — G89.29 CHRONIC BILATERAL LOW BACK PAIN WITH BILATERAL SCIATICA: ICD-10-CM

## 2025-04-28 DIAGNOSIS — M79.605 PAIN IN BOTH LOWER EXTREMITIES: ICD-10-CM

## 2025-04-28 DIAGNOSIS — M15.9 GENERALIZED OSTEOARTHRITIS OF MULTIPLE SITES: ICD-10-CM

## 2025-04-28 DIAGNOSIS — M54.42 CHRONIC BILATERAL LOW BACK PAIN WITH BILATERAL SCIATICA: ICD-10-CM

## 2025-04-28 RX ORDER — HYDROCODONE BITARTRATE AND ACETAMINOPHEN 10; 325 MG/1; MG/1
1 TABLET ORAL EVERY 6 HOURS PRN
Qty: 120 TABLET | Refills: 0 | Status: SHIPPED | OUTPATIENT
Start: 2025-04-28

## 2025-04-28 RX ORDER — GABAPENTIN 300 MG/1
CAPSULE ORAL
Qty: 360 CAPSULE | Refills: 1 | Status: SHIPPED | OUTPATIENT
Start: 2025-04-28

## 2025-04-28 NOTE — TELEPHONE ENCOUNTER
"Caller: Kp Hernandez \"HEATH\"    Relationship: Self    Best call back number: [phone]     Requested Prescriptions:   Requested Prescriptions     Pending Prescriptions Disp Refills    HYDROcodone-acetaminophen (NORCO)  MG per tablet 120 tablet 0     Sig: Take 1 tablet by mouth Every 6 (Six) Hours As Needed for Severe Pain.    gabapentin (NEURONTIN) 300 MG capsule 360 capsule 0        Pharmacy where request should be sent: [address], [place] - [address] [facility] [phone] [facility] [phone]      Last office visit with prescribing clinician: 12/30/2024   Last telemedicine visit with prescribing clinician: Visit date not found   Next office visit with prescribing clinician: 6/27/2025     Additional details provided by patient: PATIENT STATED HE WILL BE OUT OF GABAPENTIN ON 04-.    PATIENT STATED HE HAS ABOUT 4 DAYS REMAINING OF HYDROCODONE    Does the patient have less than a 3 day supply:  [x] Yes  [] No    Would you like a call back once the refill request has been completed: [] Yes [x] No    If the office needs to give you a call back, can they leave a voicemail: [] Yes [x] No    Destiny Costello   04/28/25 09:07 EDT   "

## 2025-05-02 DIAGNOSIS — R13.14 PHARYNGOESOPHAGEAL DYSPHAGIA: Primary | ICD-10-CM

## 2025-05-21 ENCOUNTER — TELEPHONE (OUTPATIENT)
Dept: SLEEP MEDICINE | Facility: HOSPITAL | Age: 80
End: 2025-05-21
Payer: MEDICARE

## 2025-05-21 ENCOUNTER — OFFICE VISIT (OUTPATIENT)
Dept: SLEEP MEDICINE | Facility: HOSPITAL | Age: 80
End: 2025-05-21
Payer: MEDICARE

## 2025-05-21 VITALS — HEIGHT: 67 IN | WEIGHT: 203 LBS | HEART RATE: 82 BPM | BODY MASS INDEX: 31.86 KG/M2 | OXYGEN SATURATION: 94 %

## 2025-05-21 DIAGNOSIS — G47.10 HYPERSOMNIA, UNSPECIFIED: ICD-10-CM

## 2025-05-21 DIAGNOSIS — G47.33 OSA ON CPAP: Primary | ICD-10-CM

## 2025-05-21 DIAGNOSIS — G47.36 HYPOXEMIA ASSOCIATED WITH SLEEP: ICD-10-CM

## 2025-05-21 PROCEDURE — G0463 HOSPITAL OUTPT CLINIC VISIT: HCPCS

## 2025-05-21 NOTE — PROGRESS NOTES
"[facility]  Follow Up Sleep Disorders Center Note     Chief Complaint:  ANDREA     Primary Care Physician: Murphy Felix DO    Interval History:   The patient is a 79 y.o. male who I last saw 3/13/2025 and that note was reviewed.  Home sleep study performed 3/20/2025.  Severe obstructive sleep apnea with AHI 65.1 events per hour noted.  Low oxygen saturation 77% and sleep-related hypoxia present 427.8 minutes.  Auto CPAP initiated the patient is here today for follow-up.  The patient is here with his wife.  The patient is improved some.  Patient goes to bed 11 PM gets out of bed at 6:30 AM.  He is using the restroom during the nighttime much less.    Review of Systems:    A complete review of systems was done and all were negative with the exception of occasional cough    Social History:    Social History     Socioeconomic History    Marital status:    Tobacco Use    Smoking status: Never     Passive exposure: Never    Smokeless tobacco: Never   Vaping Use    Vaping status: Never Used   Substance and Sexual Activity    Alcohol use: No    Drug use: No    Sexual activity: Defer       Allergies:  Patient has no known allergies.     Medication Review:  Reviewed.      Vital Signs:    Vitals:    05/21/25 1305   Pulse: 82   SpO2: 94%   Weight: 92.1 kg (203 lb)   Height: 170.2 cm (67\")     Body mass index is 31.79 kg/m².    Physical Exam:    Constitutional:  Well developed 79 y.o. male that appears in no apparent distress.  Awake & oriented times 3.  Normal mood with normal recent and remote memory and normal judgement.  Eyes:  Conjunctivae normal.  Oropharynx: Previously, moist mucous membranes without exudate and a large tongue and class III Mallampati airway    Self-administered [place] Sleepiness Scale test results: 1, previously 3  0-5 Lower normal daytime sleepiness  6-10 Higher normal daytime sleepiness  11-12 Mild, 13-15 Moderate, & 16-24 Severe excessive daytime sleepiness     Downloaded PAP " Data Evaluated For Therapeutic Response and Compliance:  DME is [facility] and the patient uses a [employer] full facemask.  Downloads between 4/9 and 5/19/2025 compliance 100%.  Average usage is 5 hours and 32 minutes.  Average AHI is normal without a significant leak.  Average auto CPAP pressure is 16.6 and his ResMed auto CPAP is 8-20    Impression:   Severe obstructive sleep apnea with sleep-related hypoxia by home sleep study 3/20/2025, weight 200 pounds, adequately treated with ResMed auto CPAP.  Downloads demonstrate the patient to be at goal with good compliance and usage.  The patient has improvement in complaints of hypersomnolence.    Plan:  Good sleep hygiene measures should be maintained.  Weight loss would be beneficial in this patient who is obese by Body mass index is 31.79 kg/m².      After evaluating the patient and assessing results available, the patient is benefiting from the treatment being provided.     The patient will continue ResMed auto CPAP.  Potential side effects of not using PAP therapy reviewed and addressed as needed.  After clinical evaluation and review of downloads, I recommend changes to the patient's pressures.  The patient does complain of pressure being too low at the beginning of auto CPAP and downloads suggest this also.  Therefore, auto CPAP will be changed to 10-20 cm water pressure.  A new prescription will be sent to the patient's DME to follow-up overnight oximetry on room air on auto CPAP.    The patient did have complaints about the sleep  not answering when he called?  The Sleep Disorder Center reached out to Ksenia from [facility] who called back and stated that they had called Pt.   4/8/25  Pt was setup   4/10/25 sleep  called M   4/11/25 sleep  called [facility]   4/12 Sleep coached called Pt and Pt answered said hello then hung up. They called back and Pt did not answer and the [facility]   4/29/25 sleep  called and [facility]   5/12 sleep  emailed Pt   I gave them  Pt's wife's number so hopefully they will be able to get through and help Pt.  In the future, will probably be better at the DME/sleep  discussed with the patient's wife.    I answered all of the patient's questions.  The patient will call the Sleep Disorder Center for any problems and will follow up in 5-6 months.      Michael Dutta MD  Sleep Medicine  05/21/25  13:14 EDT

## 2025-05-22 ENCOUNTER — TELEPHONE (OUTPATIENT)
Dept: SLEEP MEDICINE | Facility: HOSPITAL | Age: 80
End: 2025-05-22
Payer: MEDICARE

## 2025-05-22 PROBLEM — G47.30 SLEEP-DISORDERED BREATHING: Status: RESOLVED | Noted: 2025-03-16 | Resolved: 2025-05-22

## 2025-05-22 PROBLEM — G47.33 OSA ON CPAP: Status: ACTIVE | Noted: 2025-03-20

## 2025-05-29 DIAGNOSIS — M15.9 GENERALIZED OSTEOARTHRITIS OF MULTIPLE SITES: ICD-10-CM

## 2025-05-29 DIAGNOSIS — M54.42 CHRONIC BILATERAL LOW BACK PAIN WITH BILATERAL SCIATICA: ICD-10-CM

## 2025-05-29 DIAGNOSIS — G89.29 CHRONIC BILATERAL LOW BACK PAIN WITH BILATERAL SCIATICA: ICD-10-CM

## 2025-05-29 DIAGNOSIS — M54.41 CHRONIC BILATERAL LOW BACK PAIN WITH BILATERAL SCIATICA: ICD-10-CM

## 2025-05-29 RX ORDER — HYDROCODONE BITARTRATE AND ACETAMINOPHEN 10; 325 MG/1; MG/1
1 TABLET ORAL EVERY 6 HOURS PRN
Qty: 120 TABLET | Refills: 0 | Status: SHIPPED | OUTPATIENT
Start: 2025-05-29

## 2025-05-29 NOTE — TELEPHONE ENCOUNTER
"Caller: Kp Hernandez \"HEATH\"    Relationship: Self    Best call back number: [phone]     Requested Prescriptions:   Requested Prescriptions     Pending Prescriptions Disp Refills    HYDROcodone-acetaminophen (NORCO)  MG per tablet 120 tablet 0     Sig: Take 1 tablet by mouth Every 6 (Six) Hours As Needed for Severe Pain.        Pharmacy where request should be sent: [address] ([facility], [place] - [address] [facility] [phone] [facility] [phone]      Last office visit with prescribing clinician: 12/30/2024   Last telemedicine visit with prescribing clinician: Visit date not found   Next office visit with prescribing clinician: 6/27/2025     Additional details provided by patient: 1 DAY SUPPLY ON HAND    Does the patient have less than a 3 day supply:  [x] Yes  [] No    Would you like a call back once the refill request has been completed: [] Yes [] No    If the office needs to give you a call back, can they leave a voicemail: [] Yes [] No    Destiny Tellez Rep   05/29/25 15:46 EDT         "

## 2025-06-03 ENCOUNTER — TELEPHONE (OUTPATIENT)
Dept: SLEEP MEDICINE | Facility: HOSPITAL | Age: 80
End: 2025-06-03
Payer: MEDICARE

## 2025-06-03 ENCOUNTER — DOCUMENTATION (OUTPATIENT)
Dept: SLEEP MEDICINE | Facility: HOSPITAL | Age: 80
End: 2025-06-03
Payer: MEDICARE

## 2025-06-03 NOTE — PROGRESS NOTES
I last saw the patient 5/21/2025. Home sleep study performed 3/20/2025. Severe obstructive sleep apnea with AHI 65.1 events per hour noted. Low oxygen saturation 77% and sleep-related hypoxia present 427.8 minutes. Auto CPAP initiated.    Oxygen saturation on CPAP on room air not performed as requested.    I did receive overnight oximetry results dated 5/28/2025.  Duration 8 hours and 14 minutes.  Low oxygen saturation 70% and sleep-related hypoxia present for 33.59 minutes.    Corresponding downloads for that day demonstrated approximately 5 hours of usage.  Between approximately 430 and 7:15 AM, CPAP not used and sleep-related hypoxia must worse during that period of time.    I will see the patient 10/29/2025 as scheduled.

## 2025-06-03 NOTE — TELEPHONE ENCOUNTER
I called Pts wife and let her know that O2 was good while on cpap machine and low when Pt was off amd to continue to se cpap machine and f/u in OCTOBER

## 2025-06-09 DIAGNOSIS — M15.9 GENERALIZED OSTEOARTHRITIS OF MULTIPLE SITES: ICD-10-CM

## 2025-06-09 NOTE — TELEPHONE ENCOUNTER
"     Caller: DavidKp day \"HEATH\"    Relationship: Self    Best call back number:[phone]       Requested Prescriptions:   Requested Prescriptions     Pending Prescriptions Disp Refills    Ibuprofen 3 %, Gabapentin 10 %, Baclofen 2 %, lidocaine 4 %, Ketamine HCl 10 % 90 g 5     Sig: Apply 1-2 g topically to the appropriate area as directed 3 (Three) to 4 (Four) times daily.        Pharmacy where request should be sent: [place], [place] - [address] - [phone]  - [phone]     Last office visit with prescribing clinician: 12/30/2024   Last telemedicine visit with prescribing clinician: Visit date not found   Next office visit with prescribing clinician: 6/27/2025     Additional details provided by patient:      Does the patient have less than a 3 day supply:  [] Yes  [] No    Would you like a call back once the refill request has been completed: [] Yes [] No    If the office needs to give you a call back, can they leave a voicemail: [] Yes [] No    Destiny Espinosa Rep   06/09/25 13:52 EDT        "

## 2025-06-12 DIAGNOSIS — M54.42 CHRONIC BILATERAL LOW BACK PAIN WITH BILATERAL SCIATICA: ICD-10-CM

## 2025-06-12 DIAGNOSIS — G89.29 CHRONIC BILATERAL LOW BACK PAIN WITH BILATERAL SCIATICA: ICD-10-CM

## 2025-06-12 DIAGNOSIS — M54.41 CHRONIC BILATERAL LOW BACK PAIN WITH BILATERAL SCIATICA: ICD-10-CM

## 2025-06-12 RX ORDER — METHOCARBAMOL 750 MG/1
750 TABLET, FILM COATED ORAL 2 TIMES DAILY PRN
Qty: 60 TABLET | Refills: 0 | Status: SHIPPED | OUTPATIENT
Start: 2025-06-12

## 2025-06-19 NOTE — PROGRESS NOTES
"Chief Complaint   Patient presents with    Difficulty Swallowing    Heartburn         History of Present Illness  Patient is a 79-year-old male who presents today for follow-up.  He is a new patient to me.  He has a history of dysphagia and food impactions of the esophagus. He had an EGD in 2022 that showed a 2 cm hiatal hernia and a Schatzki's ring that was dilated.    Patient presents today for follow-up with concerns about recurrent dysphagia.  Over the last several months he has been having issues with dysphagia again, typically around twice per month.  At times the food will pass down but other times he does have to regurgitate it.  He feels the food gets stuck in his upper chest.    He has been taking omeprazole 20 mg daily in the morning but has been having persistent breakthrough heartburn at night.    Denies any nausea, vomiting, or abdominal pain.  Denies any bowel complaints or blood in the stool.     Result Review :       UPPER GI ENDOSCOPY (11/28/2022 10:59)    Office Visit with Ulysses Eugene MD (09/27/2022)    Vital Signs:   /84   Pulse 73   Temp 97.4 °F (36.3 °C)   Ht 170.2 cm (67\")   Wt 93 kg (205 lb)   SpO2 95%   BMI 32.11 kg/m²     Body mass index is 32.11 kg/m².     Physical Exam  Vitals reviewed.   Constitutional:       General: He is not in acute distress.     Appearance: He is well-developed.   HENT:      Head: Normocephalic and atraumatic.   Pulmonary:      Effort: Pulmonary effort is normal. No respiratory distress.   Skin:     General: Skin is dry.      Coloration: Skin is not pale.   Neurological:      Mental Status: He is alert and oriented to person, place, and time.   Psychiatric:         Thought Content: Thought content normal.           Assessment and Plan    Diagnoses and all orders for this visit:    1. Esophageal dysphagia (Primary)    2. Gastroesophageal reflux disease, unspecified whether esophagitis present    3. Schatzki ring of distal esophagus    Other " orders  -     omeprazole (priLOSEC) 40 MG capsule; Take 1 capsule by mouth Daily.  Dispense: 90 capsule; Refill: 3         Discussion  Patient presents today for follow-up with concerns about recurrent dysphagia and persistent heartburn.  Recommended updated EGD for further evaluation.  Suspect Schatzki's ring likely needs to be redilated and we will plan on a dilation at time of EGD to address this.  For GERD, will increase omeprazole to 40 mg daily and reviewed dietary modifications to help with reflux at today's office visit.          Follow Up   Return for Follow up to review results after testing complete.    Patient Instructions   Schedule EGD for further evaluation of symptoms.     For GERD, follow antireflux precautions.  Recommend avoiding eating within 3 to 4 hours of bedtime.  Avoid foods that can trigger symptoms which may include citrus fruits, spicy foods, tomatoes and red sauces, chocolate, coffee/tea, caffeinated or carbonated beverages, alcohol.     For GERD, start omeprazole 40 mg daily.

## 2025-06-20 ENCOUNTER — OFFICE VISIT (OUTPATIENT)
Dept: GASTROENTEROLOGY | Facility: CLINIC | Age: 80
End: 2025-06-20
Payer: MEDICARE

## 2025-06-20 ENCOUNTER — PREP FOR SURGERY (OUTPATIENT)
Dept: SURGERY | Facility: SURGERY CENTER | Age: 80
End: 2025-06-20
Payer: MEDICARE

## 2025-06-20 VITALS
SYSTOLIC BLOOD PRESSURE: 124 MMHG | DIASTOLIC BLOOD PRESSURE: 84 MMHG | WEIGHT: 205 LBS | OXYGEN SATURATION: 95 % | BODY MASS INDEX: 32.18 KG/M2 | HEART RATE: 73 BPM | TEMPERATURE: 97.4 F | HEIGHT: 67 IN

## 2025-06-20 DIAGNOSIS — K21.9 GASTROESOPHAGEAL REFLUX DISEASE, UNSPECIFIED WHETHER ESOPHAGITIS PRESENT: ICD-10-CM

## 2025-06-20 DIAGNOSIS — R13.19 ESOPHAGEAL DYSPHAGIA: Primary | ICD-10-CM

## 2025-06-20 DIAGNOSIS — K22.2 SCHATZKI RING OF DISTAL ESOPHAGUS: ICD-10-CM

## 2025-06-20 PROCEDURE — 99214 OFFICE O/P EST MOD 30 MIN: CPT | Performed by: NURSE PRACTITIONER

## 2025-06-20 PROCEDURE — 1160F RVW MEDS BY RX/DR IN RCRD: CPT | Performed by: NURSE PRACTITIONER

## 2025-06-20 PROCEDURE — 3079F DIAST BP 80-89 MM HG: CPT | Performed by: NURSE PRACTITIONER

## 2025-06-20 PROCEDURE — 3074F SYST BP LT 130 MM HG: CPT | Performed by: NURSE PRACTITIONER

## 2025-06-20 PROCEDURE — 1159F MED LIST DOCD IN RCRD: CPT | Performed by: NURSE PRACTITIONER

## 2025-06-20 RX ORDER — DILTIAZEM HYDROCHLORIDE 60 MG/1
2 TABLET, FILM COATED ORAL 2 TIMES DAILY
COMMUNITY

## 2025-06-20 RX ORDER — SODIUM CHLORIDE, SODIUM LACTATE, POTASSIUM CHLORIDE, CALCIUM CHLORIDE 600; 310; 30; 20 MG/100ML; MG/100ML; MG/100ML; MG/100ML
30 INJECTION, SOLUTION INTRAVENOUS CONTINUOUS PRN
OUTPATIENT
Start: 2025-06-20 | End: 2025-06-22

## 2025-06-20 RX ORDER — SODIUM CHLORIDE 0.9 % (FLUSH) 0.9 %
3 SYRINGE (ML) INJECTION EVERY 12 HOURS SCHEDULED
OUTPATIENT
Start: 2025-06-20

## 2025-06-20 RX ORDER — OMEPRAZOLE 40 MG/1
40 CAPSULE, DELAYED RELEASE ORAL DAILY
Qty: 90 CAPSULE | Refills: 3 | Status: SHIPPED | OUTPATIENT
Start: 2025-06-20

## 2025-06-20 RX ORDER — SODIUM CHLORIDE 0.9 % (FLUSH) 0.9 %
10 SYRINGE (ML) INJECTION AS NEEDED
OUTPATIENT
Start: 2025-06-20

## 2025-06-20 NOTE — PATIENT INSTRUCTIONS
Schedule EGD for further evaluation of symptoms.     For GERD, follow antireflux precautions.  Recommend avoiding eating within 3 to 4 hours of bedtime.  Avoid foods that can trigger symptoms which may include citrus fruits, spicy foods, tomatoes and red sauces, chocolate, coffee/tea, caffeinated or carbonated beverages, alcohol.     For GERD, start omeprazole 40 mg daily.

## 2025-06-27 ENCOUNTER — OFFICE VISIT (OUTPATIENT)
Dept: FAMILY MEDICINE CLINIC | Facility: CLINIC | Age: 80
End: 2025-06-27
Payer: MEDICARE

## 2025-06-27 VITALS
BODY MASS INDEX: 32.02 KG/M2 | OXYGEN SATURATION: 96 % | HEIGHT: 67 IN | SYSTOLIC BLOOD PRESSURE: 126 MMHG | HEART RATE: 65 BPM | WEIGHT: 204 LBS | DIASTOLIC BLOOD PRESSURE: 76 MMHG

## 2025-06-27 DIAGNOSIS — E22.1 HYPERPROLACTINEMIA: ICD-10-CM

## 2025-06-27 DIAGNOSIS — E78.5 DYSLIPIDEMIA: ICD-10-CM

## 2025-06-27 DIAGNOSIS — E79.0 HYPERURICEMIA: ICD-10-CM

## 2025-06-27 DIAGNOSIS — G47.09 OTHER INSOMNIA: ICD-10-CM

## 2025-06-27 DIAGNOSIS — Z00.00 MEDICARE ANNUAL WELLNESS VISIT, SUBSEQUENT: Primary | ICD-10-CM

## 2025-06-27 DIAGNOSIS — R73.03 PREDIABETES: ICD-10-CM

## 2025-06-27 DIAGNOSIS — I10 PRIMARY HYPERTENSION: ICD-10-CM

## 2025-06-27 DIAGNOSIS — M15.9 GENERALIZED OSTEOARTHRITIS OF MULTIPLE SITES: ICD-10-CM

## 2025-06-27 DIAGNOSIS — Z79.899 DRUG THERAPY: ICD-10-CM

## 2025-06-27 RX ORDER — MIRTAZAPINE 30 MG/1
30 TABLET, FILM COATED ORAL NIGHTLY
Qty: 90 TABLET | Refills: 0 | Status: SHIPPED | OUTPATIENT
Start: 2025-06-27

## 2025-06-27 NOTE — PATIENT INSTRUCTIONS
Advance Care Planning and Advance Directives     You make decisions on a daily basis - decisions about where you want to live, your career, your home, your life. Perhaps one of the most important decisions you face is your choice for future medical care. Take time to talk with your family and your healthcare team and start planning today.  Advance Care Planning is a process that can help you:  Understand possible future healthcare decisions in light of your own experiences  Reflect on those decision in light of your goals and values  Discuss your decisions with those closest to you and the healthcare professionals that care for you  Make a plan by creating a document that reflects your wishes    Surrogate Decision Maker  In the event of a medical emergency, which has left you unable to communicate or to make your own decisions, you would need someone to make decisions for you.  It is important to discuss your preferences for medical treatment with this person while you are in good health.     Qualities of a surrogate decision maker:  Willing to take on this role and responsibility  Knows what you want for future medical care  Willing to follow your wishes even if they don't agree with them  Able to make difficult medical decisions under stressful circumstances    Advance Directives  These are legal documents you can create that will guide your healthcare team and decision maker(s) when needed. These documents can be stored in the electronic medical record.    Living Will - a legal document to guide your care if you have a terminal condition or a serious illness and are unable to communicate. States vary by statute in document names/types, but most forms may include one or more of the following:        -  Directions regarding life-prolonging treatments        -  Directions regarding artificially provided nutrition/hydration        -  Choosing a healthcare decision maker        -  Direction regarding organ/tissue  donation    Durable Power of  for Healthcare - this document names an -in-fact to make medical decisions for you, but it may also allow this person to make personal and financial decisions for you. Please seek the advice of an  if you need this type of document.    **Advance Directives are not required and no one may discriminate against you if you do not sign one.    Medical Orders  Many states allow specific forms/orders signed by your physician to record your wishes for medical treatment in your current state of health. This form, signed in personal communication with your physician, addresses resuscitation and other medical interventions that you may or may not want.      For more information or to schedule a time with a [facility] Advance Care Planning Facilitator contact: [email or web address] or call [phone] and someone will contact you directly.Calorie Counting for Weight Loss  Calories are units of energy. Your body needs a certain number of calories from food to keep going throughout the day. When you eat or drink more calories than your body needs, your body stores the extra calories mostly as fat. When you eat or drink fewer calories than your body needs, your body burns fat to get the energy it needs.  Calorie counting means keeping track of how many calories you eat and drink each day. Calorie counting can be helpful if you need to lose weight. If you eat fewer calories than your body needs, you should lose weight. Ask your health care provider what a healthy weight is for you.  For calorie counting to work, you will need to eat the right number of calories each day to lose a healthy amount of weight per week. A dietitian can help you figure out how many calories you need in a day and will suggest ways to reach your calorie goal.  A healthy amount of weight to lose each week is usually 1-2 lb (0.5-0.9 kg). This usually means that your daily calorie intake should be  reduced by 500-750 calories.  Eating 1,200-1,500 calories a day can help most women lose weight.  Eating 1,500-1,800 calories a day can help most men lose weight.  What do I need to know about calorie counting?  Work with your health care provider or dietitian to determine how many calories you should get each day. To meet your daily calorie goal, you will need to:  Find out how many calories are in each food that you would like to eat. Try to do this before you eat.  Decide how much of the food you plan to eat.  Keep a food log. Do this by writing down what you ate and how many calories it had.  To successfully lose weight, it is important to balance calorie counting with a healthy lifestyle that includes regular activity.  Where do I find calorie information?  The number of calories in a food can be found on a Nutrition Facts label. If a food does not have a Nutrition Facts label, try to look up the calories online or ask your dietitian for help.  Remember that calories are listed per serving. If you choose to have more than one serving of a food, you will have to multiply the calories per serving by the number of servings you plan to eat. For example, the label on a package of bread might say that a serving size is 1 slice and that there are 90 calories in a serving. If you eat 1 slice, you will have eaten 90 calories. If you eat 2 slices, you will have eaten 180 calories.  How do I keep a food log?  After each time that you eat, record the following in your food log as soon as possible:  What you ate. Be sure to include toppings, sauces, and other extras on the food.  How much you ate. This can be measured in cups, ounces, or number of items.  How many calories were in each food and drink.  The total number of calories in the food you ate.  Keep your food log near you, such as in a pocket-sized notebook or on an sharad or website on your mobile phone. Some programs will calculate calories for you and show you how  many calories you have left to meet your daily goal.  What are some portion-control tips?  Know how many calories are in a serving. This will help you know how many servings you can have of a certain food.  Use a measuring cup to measure serving sizes. You could also try weighing out portions on a kitchen scale. With time, you will be able to estimate serving sizes for some foods.  Take time to put servings of different foods on your favorite plates or in your favorite bowls and cups so you know what a serving looks like.  Try not to eat straight from a food's packaging, such as from a bag or box. Eating straight from the package makes it hard to see how much you are eating and can lead to overeating. Put the amount you would like to eat in a cup or on a plate to make sure you are eating the right portion.  Use smaller plates, glasses, and bowls for smaller portions and to prevent overeating.  Try not to multitask. For example, avoid watching TV or using your computer while eating. If it is time to eat, sit down at a table and enjoy your food. This will help you recognize when you are full. It will also help you be more mindful of what and how much you are eating.  What are tips for following this plan?  Reading food labels  Check the calorie count compared with the serving size. The serving size may be smaller than what you are used to eating.  Check the source of the calories. Try to choose foods that are high in protein, fiber, and vitamins, and low in saturated fat, trans fat, and sodium.  Shopping  Read nutrition labels while you shop. This will help you make healthy decisions about which foods to buy.  Pay attention to nutrition labels for low-fat or fat-free foods. These foods sometimes have the same number of calories or more calories than the full-fat versions. They also often have added sugar, starch, or salt to make up for flavor that was removed with the fat.  Make a grocery list of lower-calorie foods  and stick to it.  Cooking  Try to cook your favorite foods in a healthier way. For example, try baking instead of frying.  Use low-fat dairy products.  Meal planning  Use more fruits and vegetables. One-half of your plate should be fruits and vegetables.  Include lean proteins, such as chicken, turkey, and fish.  Lifestyle  Each week, aim to do one of the followin minutes of moderate exercise, such as walking.  75 minutes of vigorous exercise, such as running.  General information  Know how many calories are in the foods you eat most often. This will help you calculate calorie counts faster.  Find a way of tracking calories that works for you. Get creative. Try different apps or programs if writing down calories does not work for you.  What foods should I eat?    Eat nutritious foods. It is better to have a nutritious, high-calorie food, such as an avocado, than a food with few nutrients, such as a bag of potato chips.  Use your calories on foods and drinks that will fill you up and will not leave you hungry soon after eating.  Examples of foods that fill you up are nuts and nut butters, vegetables, lean proteins, and high-fiber foods such as whole grains. High-fiber foods are foods with more than 5 g of fiber per serving.  Pay attention to calories in drinks. Low-calorie drinks include water and unsweetened drinks.  The items listed above may not be a complete list of foods and beverages you can eat. Contact a dietitian for more information.  What foods should I limit?  Limit foods or drinks that are not good sources of vitamins, minerals, or protein or that are high in unhealthy fats. These include:  Candy.  Other sweets.  Sodas, specialty coffee drinks, alcohol, and juice.  The items listed above may not be a complete list of foods and beverages you should avoid. Contact a dietitian for more information.  How do I count calories when eating out?  Pay attention to portions. Often, portions are much larger  when eating out. Try these tips to keep portions smaller:  Consider sharing a meal instead of getting your own.  If you get your own meal, eat only half of it. Before you start eating, ask for a container and put half of your meal into it.  When available, consider ordering smaller portions from the menu instead of full portions.  Pay attention to your food and drink choices. Knowing the way food is cooked and what is included with the meal can help you eat fewer calories.  If calories are listed on the menu, choose the lower-calorie options.  Choose dishes that include vegetables, fruits, whole grains, low-fat dairy products, and lean proteins.  Choose items that are boiled, broiled, grilled, or steamed. Avoid items that are buttered, battered, fried, or served with cream sauce. Items labeled as crispy are usually fried, unless stated otherwise.  Choose water, low-fat milk, unsweetened iced tea, or other drinks without added sugar. If you want an alcoholic beverage, choose a lower-calorie option, such as a glass of wine or light beer.  Ask for dressings, sauces, and syrups on the side. These are usually high in calories, so you should limit the amount you eat.  If you want a salad, choose a garden salad and ask for grilled meats. Avoid extra toppings such as teresa, cheese, or fried items. Ask for the dressing on the side, or ask for olive oil and vinegar or lemon to use as dressing.  Estimate how many servings of a food you are given. Knowing serving sizes will help you be aware of how much food you are eating at restaurants.  Where to find more information  Centers for Disease Control and Prevention: www.cdc.gov  [place] Department of Agriculture: myplate.gov  Summary  Calorie counting means keeping track of how many calories you eat and drink each day. If you eat fewer calories than your body needs, you should lose weight.  A healthy amount of weight to lose per week is usually 1-2 lb (0.5-0.9 kg). This usually  means reducing your daily calorie intake by 500-750 calories.  The number of calories in a food can be found on a Nutrition Facts label. If a food does not have a Nutrition Facts label, try to look up the calories online or ask your dietitian for help.  Use smaller plates, glasses, and bowls for smaller portions and to prevent overeating.  Use your calories on foods and drinks that will fill you up and not leave you hungry shortly after a meal.  This information is not intended to replace advice given to you by your health care provider. Make sure you discuss any questions you have with your health care provider.  Document Revised: 01/28/2021 Document Reviewed: 01/28/2021  [employer] Patient Education © 2022 [employer]    Exercising to Lose Weight  Getting regular exercise is important for everyone. It is especially important if you are overweight. Being overweight increases your risk of heart disease, stroke, diabetes, high blood pressure, and several types of cancer. Exercising, and reducing the calories you consume, can help you lose weight and improve fitness and health.  Exercise can be moderate or vigorous intensity. To lose weight, most people need to do a certain amount of moderate or vigorous-intensity exercise each week.  How can exercise affect me?  You lose weight when you exercise enough to burn more calories than you eat. Exercise also reduces body fat and builds muscle. The more muscle you have, the more calories you burn. Exercise also:  Improves mood.  Reduces stress and tension.  Improves your overall fitness, flexibility, and endurance.  Increases bone strength.  Moderate-intensity exercise  Moderate-intensity exercise is any activity that gets you moving enough to burn at least three times more energy (calories) than if you were sitting.  Examples of moderate exercise include:  Walking a mile in 15 minutes.  Doing light yard work.  Biking at an easy pace.  Most people should get at least 150  minutes of moderate-intensity exercise a week to maintain their body weight.  Vigorous-intensity exercise  Vigorous-intensity exercise is any activity that gets you moving enough to burn at least six times more calories than if you were sitting. When you exercise at this intensity, you should be working hard enough that you are not able to carry on a conversation.  Examples of vigorous exercise include:  Running.  Playing a team sport, such as football, basketball, and soccer.  Jumping rope.  Most people should get at least 75 minutes a week of vigorous exercise to maintain their body weight.  What actions can I take to lose weight?  The amount of exercise you need to lose weight depends on:  Your age.  The type of exercise.  Any health conditions you have.  Your overall physical ability.  Talk to your health care provider about how much exercise you need and what types of activities are safe for you.  Nutrition    Make changes to your diet as told by your health care provider or diet and nutrition specialist (dietitian). This may include:  Eating fewer calories.  Eating more protein.  Eating less unhealthy fats.  Eating a diet that includes fresh fruits and vegetables, whole grains, low-fat dairy products, and lean protein.  Avoiding foods with added fat, salt, and sugar.  Drink plenty of water while you exercise to prevent dehydration or heat stroke.  Activity  Choose an activity that you enjoy and set realistic goals. Your health care provider can help you make an exercise plan that works for you.  Exercise at a moderate or vigorous intensity most days of the week.  The intensity of exercise may vary from person to person. You can tell how intense a workout is for you by paying attention to your breathing and heartbeat. Most people will notice their breathing and heartbeat get faster with more intense exercise.  Do resistance training twice each week, such as:  Push-ups.  Sit-ups.  Lifting weights.  Using  resistance bands.  Getting short amounts of exercise can be just as helpful as long, structured periods of exercise. If you have trouble finding time to exercise, try doing these things as part of your daily routine:  Get up, stretch, and walk around every 30 minutes throughout the day.  Go for a walk during your lunch break.  Park your car farther away from your destination.  If you take public transportation, get off one stop early and walk the rest of the way.  Make phone calls while standing up and walking around.  Take the stairs instead of elevators or escalators.  Wear comfortable clothes and shoes with good support.  Do not exercise so much that you hurt yourself, feel dizzy, or get very short of breath.  Where to find more information  [place] Department of Health and Human Services: www.hhs.gov  Centers for Disease Control and Prevention: www.cdc.gov  Contact a health care provider:  Before starting a new exercise program.  If you have questions or concerns about your weight.  If you have a medical problem that keeps you from exercising.  Get help right away if:  You have any of the following while exercising:  Injury.  Dizziness.  Difficulty breathing or shortness of breath that does not go away when you stop exercising.  Chest pain.  Rapid heartbeat.  These symptoms may represent a serious problem that is an emergency. Do not wait to see if the symptoms will go away. Get medical help right away. Call your local emergency services (911 in the [place] Do not drive yourself to the hospital.  Summary  Getting regular exercise is especially important if you are overweight.  Being overweight increases your risk of heart disease, stroke, diabetes, high blood pressure, and several types of cancer.  Losing weight happens when you burn more calories than you eat.  Reducing the amount of calories you eat, and getting regular moderate or vigorous exercise each week, helps you lose weight.  This information is not  intended to replace advice given to you by your health care provider. Make sure you discuss any questions you have with your health care provider.  Document Revised: 02/13/2022 Document Reviewed: 02/13/2022  [employer] Patient Education © 2022 [employer]

## 2025-06-27 NOTE — PROGRESS NOTES
QUICK REFERENCE INFORMATION:  The ABCs of the Annual Wellness Visit    Subsequent Medicare Wellness Visit    HEALTH RISK ASSESSMENT    1945  Kp Hernandez is a 79 y.o. male who presents for an subsequent Wellness Visit.    The following portions of the patient's history were reviewed and   updated as appropriate: allergies, current medications, past family history, past medical history, past social history, past surgical history, and problem list.    Compared to one year ago, the patient feels his physical   health is the same.    Compared to one year ago, the patient feels his mental   health is the same.    Recent Hospitalizations:  He was not admitted to the hospital during the last year.     Current Medical Providers:  Patient Care Team:  Murphy Felix DO as PCP - General  Ulysses Eugene MD as Consulting Physician (Gastroenterology)  Gagandeep Mcneill MD as Cardiologist (Cardiology)  Kendy Harrell MD (Dermatology)  Tan Bermeo MD as Consulting Physician (Pulmonary Disease)  Yonis Glover MD as Consulting Physician (Ophthalmology)  Miguel Whalen MD as Consulting Physician (Endocrinology)  David Guerrero MD as Consulting Physician (Neurosurgery)  Carmen Mcguire APRN as Nurse Practitioner (Nurse Practitioner)    I reviewed list of current Medical providers and suppliers with patient and are listed above to the best of the patient's and my knowledge.    Smoking Status:  Social History     Tobacco Use   Smoking Status Never    Passive exposure: Never   Smokeless Tobacco Never       Alcohol Consumption:  Social History     Substance and Sexual Activity   Alcohol Use No       Depression Screen:       2025    11:00 AM   PHQ-2/PHQ-9 Depression Screening   Little interest or pleasure in doing things Not at all   Feeling down, depressed, or hopeless Not at all       Health Habits and Functional and Cognitive Screenin/27/2025    11:00 AM   Functional &  Cognitive Status   Do you have difficulty preparing food and eating? No   Do you have difficulty bathing yourself, getting dressed or grooming yourself? No   Do you have difficulty using the toilet? No   Do you have difficulty moving around from place to place? No   Do you have trouble with steps or getting out of a bed or a chair? No   Current Diet Well Balanced Diet   Dental Exam Up to date   Eye Exam Up to date   Exercise (times per week) 3 times per week   Current Exercises Include Walking   Do you need help using the phone?  No   Are you deaf or do you have serious difficulty hearing?  No   Do you need help to go to places out of walking distance? No   Do you need help shopping? No   Do you need help preparing meals?  No   Do you need help with housework?  No   Do you need help with laundry? No   Do you need help taking your medications? No   Do you need help managing money? No   Do you ever drive or ride in a car without wearing a seat belt? No   Have you felt unusual fatigue (could be tiredness), stress, anger or loneliness in the last month? No   Who do you live with? Spouse   If you need help, do you have trouble finding someone available to you? No   Have you been bothered in the last four weeks by sexual problems? No   Do you have difficulty concentrating, remembering or making decisions? No       Does the patient have evidence of cognitive impairment? No    Aspirin use counseling: Does not need ASA (and currently is not on it)    Recent Lab Results:  CMP:  Lab Results   Component Value Date    Glucose 94 11/04/2024    Glucose 110 (H) 03/05/2024    Glucose 137 (H) 01/20/2024    Glucose, UA Negative 11/04/2024    Glucose, UA >=1000 mg/dL (3+) (A) 01/13/2024    BUN 18 11/04/2024    BUN 15 03/05/2024    BUN/Creatinine Ratio 16 11/04/2024    BUN/Creatinine Ratio 13.0 03/05/2024    Creatinine 1.16 11/04/2024    Creatinine 1.15 03/05/2024    Creatinine 1.40 (H) 12/12/2023    Creatinine, Urine 70.5 01/29/2024     "Ketones Negative 11/04/2024    Ketones, UA Negative 01/13/2024    CO2 25.8 03/05/2024    Total CO2 26 11/04/2024    Calcium 9.5 11/04/2024    Calcium 9.4 03/05/2024    Albumin 4.4 11/04/2024    Albumin 4.1 03/05/2024    AST (SGOT) 33 11/04/2024    AST (SGOT) 31 03/05/2024    ALT (SGPT) 29 11/04/2024    ALT (SGPT) 29 03/05/2024     Lipid Panel:  Lab Results   Component Value Date    Total Cholesterol 133 11/04/2024    Triglycerides 287 (H) 11/04/2024    HDL Cholesterol 45 11/04/2024    VLDL Cholesterol CANCELED 07/30/2020    VLDL Cholesterol Tio 44 (H) 11/04/2024     HbA1c:  No components found for: \"HGBA1C\"    Visual Acuity:  No results found.    Age-appropriate Screening Schedule:  Refer to the list below for future screening recommendations based on patient's age, sex and/or medical conditions. Orders for these recommended tests are listed in the plan section. The patient has been provided with a written plan.    Health Maintenance   Topic Date Due    INFLUENZA VACCINE  07/01/2025    ANNUAL WELLNESS VISIT  06/27/2026    TDAP/TD VACCINES (7 - Td or Tdap) 08/03/2032    COVID-19 Vaccine  Completed    RSV Vaccine - Adults  Completed    Pneumococcal Vaccine 50+  Completed    HEPATITIS C SCREENING  Addressed    COLORECTAL CANCER SCREENING  Discontinued          Outpatient Medications Prior to Visit   Medication Sig Dispense Refill    albuterol sulfate  (90 Base) MCG/ACT inhaler Inhale 2 puffs Every 4 (Four) Hours As Needed for Wheezing. Indications: Asthma, Chronic Obstructive Lung Disease 6.7 g 2    allopurinol (ZYLOPRIM) 100 MG tablet Take 1 tablet by mouth twice daily 180 tablet 1    escitalopram (LEXAPRO) 20 MG tablet Take 1 tablet by mouth once daily 90 tablet 3    finasteride (PROSCAR) 5 MG tablet TAKE 1 TABLET BY MOUTH ONCE DAILY AS DIRECTED 90 tablet 1    fluticasone (FLONASE) 50 MCG/ACT nasal spray Use 2 spray(s) in each nostril once daily 48 g 0    gabapentin (NEURONTIN) 300 MG capsule TAKE 1 CAPSULE BY " MOUTH IN THE MORNING AND AT LUNCH AND TAKE 2 CAPSULES IN THE EVENING 360 capsule 1    hydroCHLOROthiazide 25 MG tablet Take 1 tablet by mouth once daily 90 tablet 1    HYDROcodone-acetaminophen (NORCO)  MG per tablet Take 1 tablet by mouth Every 6 (Six) Hours As Needed for Severe Pain. 120 tablet 0    Ibuprofen 3 %, Gabapentin 10 %, Baclofen 2 %, lidocaine 4 %, Ketamine HCl 10 % Apply 1-2 g topically to the appropriate area as directed 3 (Three) to 4 (Four) times daily. 90 g 5    methocarbamol (ROBAXIN) 750 MG tablet TAKE 1 TABLET BY MOUTH TWICE DAILY AS NEEDED FOR MUSCLE SPASM 60 tablet 0    metoprolol succinate XL (TOPROL-XL) 50 MG 24 hr tablet Take 1 tablet by mouth Daily. 30 tablet 5    mirtazapine (REMERON) 30 MG tablet TAKE 1 TABLET BY MOUTH ONCE DAILY AT NIGHT 90 tablet 0    multivitamin with minerals tablet tablet Take 1 tablet by mouth Daily.      omeprazole (priLOSEC) 40 MG capsule Take 1 capsule by mouth Daily. 90 capsule 3    rosuvastatin (CRESTOR) 20 MG tablet TAKE 1 TABLET BY MOUTH ONCE DAILY AT NIGHT 90 tablet 3    SPIRIVA HANDIHALER 18 MCG per inhalation capsule Place 1 capsule into inhaler and inhale Daily.      Symbicort 80-4.5 MCG/ACT inhaler 2 puffs 2 (Two) Times a Day.      urea (CARMOL) 40 % cream Apply 1 application  topically to the appropriate area as directed Daily. To palms of both hands      Zinc 50 MG capsule Take 1 capsule by mouth Daily. 1 chewable daily      doxycycline (VIBRAMYCIN) 100 MG capsule Take 1 capsule by mouth 2 (Two) Times a Day. (Patient not taking: Reported on 6/27/2025) 14 capsule 0     No facility-administered medications prior to visit.       Patient Active Problem List   Diagnosis    Essential hypertension    Benign prostatic hypertrophy without urinary obstruction    CKD (chronic kidney disease) stage 2, GFR 60-89 ml/min    COPD mixed type    Depression with anxiety    Dyslipidemia    Ganglion    Gastroesophageal reflux disease without esophagitis    Hearing  loss    Osteoarthritis    Pituitary microadenoma    History of gout    Esophageal dysphagia    Atrial flutter    Class 1 obesity due to excess calories with serious comorbidity and body mass index (BMI) of 31.0 to 31.9 in adult    Hypersomnia, unspecified    ANDREA on CPAP    Hypoxemia associated with sleep    Gastroesophageal reflux disease    Schatzki ring of distal esophagus       Advance Care Planning:  ACP discussion was held with the patient during this visit. Patient does not have an advance directive, information provided.    Identification of Risk Factors:  Risk factors include: Obesity/Overweight .    Review of Systems   Respiratory:  Positive for shortness of breath.    Cardiovascular:  Negative for chest pain and palpitations.   Gastrointestinal:  Negative for abdominal pain.   Musculoskeletal:  Positive for arthralgias.       Objective     Physical Exam  Vitals and nursing note reviewed.   Constitutional:       Appearance: He is well-developed.   HENT:      Head: Normocephalic and atraumatic.   Neck:      Thyroid: No thyromegaly.      Vascular: No JVD.   Cardiovascular:      Rate and Rhythm: Normal rate and regular rhythm.      Heart sounds: Normal heart sounds. No murmur heard.     No friction rub. No gallop.   Pulmonary:      Effort: Pulmonary effort is normal. No respiratory distress.      Breath sounds: Normal breath sounds. No wheezing or rales.   Abdominal:      General: Bowel sounds are normal. There is no distension.      Palpations: Abdomen is soft.      Tenderness: There is no abdominal tenderness. There is no guarding or rebound.   Musculoskeletal:      Cervical back: Neck supple.   Skin:     General: Skin is warm and dry.   Neurological:      Mental Status: He is alert.      Gait: Gait normal.   Psychiatric:         Behavior: Behavior normal.       Physical Exam  General: No acute distress.  Neurological: Alert and oriented.  Respiratory: Clear to auscultation, no wheezing, rales or  "rhonchi.  Cardiovascular: Regular rate and rhythm, no murmurs, rubs, or gallops.  Gastrointestinal: Soft, no tenderness, no distention, no masses.  Vitals:    06/27/25 1121   BP: 126/76   Pulse: 65   SpO2: 96%   Weight: 92.5 kg (204 lb)   Height: 170.2 cm (67\")   PainSc: 0-No pain     Body mass index is 31.95 kg/m².           Assessment & Plan   Patient Self-Management and Personalized Health Advice  The patient has been provided with information about: diet and exercise and preventive services including:   Annual Wellness Visit (AWV).    Visit Diagnoses:  No diagnosis found.    No orders of the defined types were placed in this encounter.      Outpatient Encounter Medications as of 6/27/2025   Medication Sig Dispense Refill    albuterol sulfate  (90 Base) MCG/ACT inhaler Inhale 2 puffs Every 4 (Four) Hours As Needed for Wheezing. Indications: Asthma, Chronic Obstructive Lung Disease 6.7 g 2    allopurinol (ZYLOPRIM) 100 MG tablet Take 1 tablet by mouth twice daily 180 tablet 1    escitalopram (LEXAPRO) 20 MG tablet Take 1 tablet by mouth once daily 90 tablet 3    finasteride (PROSCAR) 5 MG tablet TAKE 1 TABLET BY MOUTH ONCE DAILY AS DIRECTED 90 tablet 1    fluticasone (FLONASE) 50 MCG/ACT nasal spray Use 2 spray(s) in each nostril once daily 48 g 0    gabapentin (NEURONTIN) 300 MG capsule TAKE 1 CAPSULE BY MOUTH IN THE MORNING AND AT LUNCH AND TAKE 2 CAPSULES IN THE EVENING 360 capsule 1    hydroCHLOROthiazide 25 MG tablet Take 1 tablet by mouth once daily 90 tablet 1    HYDROcodone-acetaminophen (NORCO)  MG per tablet Take 1 tablet by mouth Every 6 (Six) Hours As Needed for Severe Pain. 120 tablet 0    Ibuprofen 3 %, Gabapentin 10 %, Baclofen 2 %, lidocaine 4 %, Ketamine HCl 10 % Apply 1-2 g topically to the appropriate area as directed 3 (Three) to 4 (Four) times daily. 90 g 5    methocarbamol (ROBAXIN) 750 MG tablet TAKE 1 TABLET BY MOUTH TWICE DAILY AS NEEDED FOR MUSCLE SPASM 60 tablet 0    " metoprolol succinate XL (TOPROL-XL) 50 MG 24 hr tablet Take 1 tablet by mouth Daily. 30 tablet 5    mirtazapine (REMERON) 30 MG tablet TAKE 1 TABLET BY MOUTH ONCE DAILY AT NIGHT 90 tablet 0    multivitamin with minerals tablet tablet Take 1 tablet by mouth Daily.      omeprazole (priLOSEC) 40 MG capsule Take 1 capsule by mouth Daily. 90 capsule 3    rosuvastatin (CRESTOR) 20 MG tablet TAKE 1 TABLET BY MOUTH ONCE DAILY AT NIGHT 90 tablet 3    SPIRIVA HANDIHALER 18 MCG per inhalation capsule Place 1 capsule into inhaler and inhale Daily.      Symbicort 80-4.5 MCG/ACT inhaler 2 puffs 2 (Two) Times a Day.      urea (CARMOL) 40 % cream Apply 1 application  topically to the appropriate area as directed Daily. To palms of both hands      Zinc 50 MG capsule Take 1 capsule by mouth Daily. 1 chewable daily      [DISCONTINUED] doxycycline (VIBRAMYCIN) 100 MG capsule Take 1 capsule by mouth 2 (Two) Times a Day. (Patient not taking: Reported on 6/27/2025) 14 capsule 0    [DISCONTINUED] mirtazapine (REMERON) 30 MG tablet TAKE 1 TABLET BY MOUTH ONCE DAILY AT NIGHT 90 tablet 0     No facility-administered encounter medications on file as of 6/27/2025.       Reviewed use of high risk medication in the elderly: yes  Reviewed for potential of harmful drug interactions in the elderly: yes  No opioid medication identified on active medication list. I have reviewed chart for other potential  high risk medication/s and harmful drug interactions in the elderly.    Social History     Socioeconomic History    Marital status:    Tobacco Use    Smoking status: Never     Passive exposure: Never    Smokeless tobacco: Never   Vaping Use    Vaping status: Never Used   Substance and Sexual Activity    Alcohol use: No    Drug use: No    Sexual activity: Not Currently     Partners: Female     Birth control/protection: Abstinence, Post-menopausal, Surgical     Patient was screened for OUD and GELY risk factors.  Kp Hernandez's pain level was  assessed as well today.  I included a review current recommendations on pain management, best use practices, current CDC guidelines, alternatives to opioids including OTC & Rx topicals, non-opioid oral medications (eg nsaids, acetominophen) and life style interventions such as yoga, della chi, stretching, regular exercise, PT/OT and referral to specialty care like Pain Management that specialize in pain treatment when appropriate.   I also included a review of serious risks of opioid use and substance use disorder.  I have included all of this in the after visit summary along with other risk factors identified that are pertinent to the patient today.      Follow Up:  No follow-ups on file.     An After Visit Summary and PPPS with all of these plans were given to the patient.           ++++++++++++++++++++++++++++++++++++++++++++++++++++++++++++++++++   Additional E&M Note during same encounter follows:  Patient has multiple medical problems which are significant and separately identifiable that require additional work above and beyond the Medicare Wellness Visit.   Chief Complaint   Patient presents with    Hypertension    Medicare Wellness-subsequent    Gout    Hyperlipidemia    Anxiety       HPI  History of Present Illness  The patient is a 79-year-old male with hypertension, hyperlipidemia, hyperuricemia, chronic back pain, and hyperprolactinemia, which is being monitored. He avoids NSAIDs due to kidney function concerns and has extensive arthritis. He is due for full blood work.    He reports an improvement in his condition, attributing it to the addition of Symbicort to his treatment regimen. He notes that the generic version of Symbicort is more affordable. He has been using Spiriva and Symbicort.    He is also using a CPAP machine, which he believes has increased his energy levels and reduced his need for daytime naps. He has become proficient in adjusting the machine as needed. His pulmonologist, Dr. Dutta,  "recently increased the pressure settings on his CPAP machine, which he feels has further boosted his energy levels. He is now able to engage in physical activity for 45 minutes to an hour daily. An overnight oximetry study showed satisfactory results, with his oxygen levels remaining above 90 percent. He also reports an improvement in his cognitive function since starting CPAP therapy. He has not experienced any episodes of sundowning since initiating CPAP therapy. He has a scheduled appointment with his pulmonologist on Monday.    He experienced some soreness this morning after resuming his exercise routine, which includes sit-ups on a large ball, after a hiatus of 2 to 3 months.    Review of Systems   Cardiovascular:  Negative for chest pain and palpitations.   Respiratory:  Positive for shortness of breath.    Musculoskeletal:  Positive for arthralgias.   Gastrointestinal:  Negative for abdominal pain.       Social History     Tobacco Use    Smoking status: Never     Passive exposure: Never    Smokeless tobacco: Never   Substance Use Topics    Alcohol use: No     O:   Vitals:    06/27/25 1121   BP: 126/76   Pulse: 65   SpO2: 96%   Weight: 92.5 kg (204 lb)   Height: 170.2 cm (67\")   PainSc: 0-No pain     Body mass index is 31.95 kg/m².  Vitals and nursing note reviewed.   Constitutional:       Appearance: Well-developed.   HENT:      Head: Normocephalic and atraumatic.   Neck:      Thyroid: No thyromegaly.      Vascular: No JVD.   Pulmonary:      Effort: Pulmonary effort is normal. No respiratory distress.      Breath sounds: Normal breath sounds. No wheezing. No rales.   Cardiovascular:      Normal rate. Regular rhythm. Normal heart sounds.      No gallop.  No friction rub.   Abdominal:      General: Bowel sounds are normal. There is no distension.      Palpations: Abdomen is soft.      Tenderness: There is no abdominal tenderness. There is no guarding or rebound.   Musculoskeletal:      Cervical back: Neck " supple. Skin:     General: Skin is warm and dry.   Neurological:      Mental Status: Alert.      Gait: Gait normal.   Psychiatric:         Behavior: Behavior normal.       Results  Diagnostic Testing   - Overnight oximetry study: Good results     There are no diagnoses linked to this encounter.  Assessment & Plan  1. Hypertension.  - Due for full blood work to monitor his condition.  - Blood pressure management will be evaluated based on lab results.  - Discussed the importance of regular monitoring and adherence to prescribed medications.  - Continue current antihypertensive therapy.    2. Hyperlipidemia.  - Due for full blood work to monitor lipid levels.  - Physical exam and lab results will guide the effectiveness of current treatment.  - Discussed lifestyle modifications and dietary changes to manage cholesterol.  - Continue current lipid-lowering therapy.    3. Hyperuricemia.  - Due for full blood work to monitor uric acid levels.  - Evaluating the effectiveness of current medication regimen.  - Discussed the importance of dietary modifications to manage uric acid levels.  - Continue current treatment plan.    4. Chronic back pain.  - Continues to avoid NSAIDs due to previous kidney function concerns.  - Physical exam findings and patient feedback will guide pain management.  - Discussed alternative pain management strategies and the importance of avoiding NSAIDs.  - Continue current pain management regimen.    5. Hyperprolactinemia.  - This condition is being monitored.  - Due for full blood work to assess prolactin levels.  - Review of previous records and lab results to evaluate the condition.  - Continue current management and monitoring plan.    6. Chronic obstructive pulmonary disease (COPD).  - Reports that Symbicort has been helping his breathing.  - Evaluating the effectiveness of Symbicort and Spiriva through patient feedback and pulmonary function tests.  - Discussed the benefits of continued use of  Symbicort and Spiriva.  - Continue using Symbicort and Spiriva as prescribed. Follow-up with Dr. Bermeo, pulmonologist, on Monday.    7. Obstructive sleep apnea.  - Doing great on his CPAP machine, which has improved his energy levels and cognitive function.  - Evaluating the effectiveness of CPAP therapy through patient feedback and overnight oximetry study.  - Discussed the importance of continued use of CPAP and its benefits.  - Continue using the CPAP machine as instructed.    Follow-up  - Follow up in 6 months.  No follow-ups on file.      _____________________________________  Murphy Felix DO, MS    Current Outpatient Medications on File Prior to Visit   Medication Sig Dispense Refill    albuterol sulfate  (90 Base) MCG/ACT inhaler Inhale 2 puffs Every 4 (Four) Hours As Needed for Wheezing. Indications: Asthma, Chronic Obstructive Lung Disease 6.7 g 2    allopurinol (ZYLOPRIM) 100 MG tablet Take 1 tablet by mouth twice daily 180 tablet 1    escitalopram (LEXAPRO) 20 MG tablet Take 1 tablet by mouth once daily 90 tablet 3    finasteride (PROSCAR) 5 MG tablet TAKE 1 TABLET BY MOUTH ONCE DAILY AS DIRECTED 90 tablet 1    fluticasone (FLONASE) 50 MCG/ACT nasal spray Use 2 spray(s) in each nostril once daily 48 g 0    gabapentin (NEURONTIN) 300 MG capsule TAKE 1 CAPSULE BY MOUTH IN THE MORNING AND AT LUNCH AND TAKE 2 CAPSULES IN THE EVENING 360 capsule 1    hydroCHLOROthiazide 25 MG tablet Take 1 tablet by mouth once daily 90 tablet 1    HYDROcodone-acetaminophen (NORCO)  MG per tablet Take 1 tablet by mouth Every 6 (Six) Hours As Needed for Severe Pain. 120 tablet 0    Ibuprofen 3 %, Gabapentin 10 %, Baclofen 2 %, lidocaine 4 %, Ketamine HCl 10 % Apply 1-2 g topically to the appropriate area as directed 3 (Three) to 4 (Four) times daily. 90 g 5    methocarbamol (ROBAXIN) 750 MG tablet TAKE 1 TABLET BY MOUTH TWICE DAILY AS NEEDED FOR MUSCLE SPASM 60 tablet 0    metoprolol succinate XL (TOPROL-XL)  50 MG 24 hr tablet Take 1 tablet by mouth Daily. 30 tablet 5    multivitamin with minerals tablet tablet Take 1 tablet by mouth Daily.      omeprazole (priLOSEC) 40 MG capsule Take 1 capsule by mouth Daily. 90 capsule 3    rosuvastatin (CRESTOR) 20 MG tablet TAKE 1 TABLET BY MOUTH ONCE DAILY AT NIGHT 90 tablet 3    SPIRIVA HANDIHALER 18 MCG per inhalation capsule Place 1 capsule into inhaler and inhale Daily.      Symbicort 80-4.5 MCG/ACT inhaler 2 puffs 2 (Two) Times a Day.      urea (CARMOL) 40 % cream Apply 1 application  topically to the appropriate area as directed Daily. To palms of both hands      Zinc 50 MG capsule Take 1 capsule by mouth Daily. 1 chewable daily      [DISCONTINUED] doxycycline (VIBRAMYCIN) 100 MG capsule Take 1 capsule by mouth 2 (Two) Times a Day. (Patient not taking: Reported on 6/27/2025) 14 capsule 0    [DISCONTINUED] mirtazapine (REMERON) 30 MG tablet TAKE 1 TABLET BY MOUTH ONCE DAILY AT NIGHT 90 tablet 0     No current facility-administered medications on file prior to visit.

## 2025-06-30 DIAGNOSIS — M54.42 CHRONIC BILATERAL LOW BACK PAIN WITH BILATERAL SCIATICA: ICD-10-CM

## 2025-06-30 DIAGNOSIS — M15.9 GENERALIZED OSTEOARTHRITIS OF MULTIPLE SITES: ICD-10-CM

## 2025-06-30 DIAGNOSIS — G89.29 CHRONIC BILATERAL LOW BACK PAIN WITH BILATERAL SCIATICA: ICD-10-CM

## 2025-06-30 DIAGNOSIS — M54.41 CHRONIC BILATERAL LOW BACK PAIN WITH BILATERAL SCIATICA: ICD-10-CM

## 2025-06-30 LAB
ALBUMIN SERPL-MCNC: 4.4 G/DL (ref 3.8–4.8)
ALP SERPL-CCNC: 83 IU/L (ref 44–121)
ALT SERPL-CCNC: 33 IU/L (ref 0–44)
AST SERPL-CCNC: 36 IU/L (ref 0–40)
BILIRUB SERPL-MCNC: 0.4 MG/DL (ref 0–1.2)
BUN SERPL-MCNC: 18 MG/DL (ref 8–27)
BUN/CREAT SERPL: 15 (ref 10–24)
CALCIUM SERPL-MCNC: 9.5 MG/DL (ref 8.6–10.2)
CHLORIDE SERPL-SCNC: 100 MMOL/L (ref 96–106)
CHOLEST SERPL-MCNC: 134 MG/DL (ref 100–199)
CO2 SERPL-SCNC: 26 MMOL/L (ref 20–29)
CREAT SERPL-MCNC: 1.23 MG/DL (ref 0.76–1.27)
EGFRCR SERPLBLD CKD-EPI 2021: 60 ML/MIN/1.73
ERYTHROCYTE [DISTWIDTH] IN BLOOD BY AUTOMATED COUNT: 13.4 % (ref 11.6–15.4)
GLOBULIN SER CALC-MCNC: 2.5 G/DL (ref 1.5–4.5)
GLUCOSE SERPL-MCNC: 79 MG/DL (ref 70–99)
HBA1C MFR BLD: 5.6 % (ref 4.8–5.6)
HCT VFR BLD AUTO: 43.8 % (ref 37.5–51)
HDLC SERPL-MCNC: 48 MG/DL
HGB BLD-MCNC: 14.3 G/DL (ref 13–17.7)
LDLC SERPL CALC-MCNC: 51 MG/DL (ref 0–99)
MCH RBC QN AUTO: 31.6 PG (ref 26.6–33)
MCHC RBC AUTO-ENTMCNC: 32.6 G/DL (ref 31.5–35.7)
MCV RBC AUTO: 97 FL (ref 79–97)
PLATELET # BLD AUTO: 223 X10E3/UL (ref 150–450)
POTASSIUM SERPL-SCNC: 4.1 MMOL/L (ref 3.5–5.2)
PROLACTIN SERPL-MCNC: 37.6 NG/ML (ref 3.6–25.2)
PROT SERPL-MCNC: 6.9 G/DL (ref 6–8.5)
RBC # BLD AUTO: 4.52 X10E6/UL (ref 4.14–5.8)
REQUEST PROBLEM: NORMAL
SODIUM SERPL-SCNC: 141 MMOL/L (ref 134–144)
TRIGL SERPL-MCNC: 220 MG/DL (ref 0–149)
URATE SERPL-MCNC: 5.5 MG/DL (ref 3.8–8.4)
VLDLC SERPL CALC-MCNC: 35 MG/DL (ref 5–40)
WBC # BLD AUTO: 9 X10E3/UL (ref 3.4–10.8)

## 2025-06-30 RX ORDER — HYDROCODONE BITARTRATE AND ACETAMINOPHEN 10; 325 MG/1; MG/1
1 TABLET ORAL EVERY 6 HOURS PRN
Qty: 120 TABLET | Refills: 0 | Status: SHIPPED | OUTPATIENT
Start: 2025-06-30

## 2025-06-30 NOTE — TELEPHONE ENCOUNTER
"  Caller: Kp Hernandez \"HEATH\"    Relationship: Self    Best call back number: [phone]    Requested Prescriptions:   Requested Prescriptions     Pending Prescriptions Disp Refills    HYDROcodone-acetaminophen (NORCO)  MG per tablet 120 tablet 0     Sig: Take 1 tablet by mouth Every 6 (Six) Hours As Needed for Severe Pain.        Pharmacy where request should be sent: [address] ([facility], [place] - [address] [facility] [place] [phone] [facility] [phone]      Last office visit with prescribing clinician: 6/27/2025   Last telemedicine visit with prescribing clinician: Visit date not found   Next office visit with prescribing clinician: 12/30/2025     Additional details provided by patient: WILL  BE OUT OF MEDICATION AFTER TODAY    Does the patient have less than a 3 day supply:  [x] Yes  [] No    Would you like a call back once the refill request has been completed: [] Yes [x] No    If the office needs to give you a call back, can they leave a voicemail: [] Yes [x] No    [place]   06/30/25 08:16 EDT   "

## 2025-07-03 ENCOUNTER — RESULTS FOLLOW-UP (OUTPATIENT)
Dept: FAMILY MEDICINE CLINIC | Facility: CLINIC | Age: 80
End: 2025-07-03
Payer: MEDICARE

## 2025-07-03 NOTE — LETTER
Kp SAENZ David  [address] [facility] [address]    July 3, 2025     Dear Mr. Hernandez:    Below are the results from your recent visit:    Kidney function stable  A1C normalized, good job  Blood counts normal  Prolactin level mildly elevated, just need to monitor           Resulted Orders   Comprehensive Metabolic Panel   Result Value Ref Range    Glucose 79 70 - 99 mg/dL    BUN 18 8 - 27 mg/dL    Creatinine 1.23 0.76 - 1.27 mg/dL    EGFR Result 60 >59 mL/min/1.73    BUN/Creatinine Ratio 15 10 - 24    Sodium 141 134 - 144 mmol/L    Potassium 4.1 3.5 - 5.2 mmol/L    Chloride 100 96 - 106 mmol/L    Total CO2 26 20 - 29 mmol/L    Calcium 9.5 8.6 - 10.2 mg/dL    Total Protein 6.9 6.0 - 8.5 g/dL    Albumin 4.4 3.8 - 4.8 g/dL    Globulin 2.5 1.5 - 4.5 g/dL    Total Bilirubin 0.4 0.0 - 1.2 mg/dL    Alkaline Phosphatase 83 44 - 121 IU/L    AST (SGOT) 36 0 - 40 IU/L    ALT (SGPT) 33 0 - 44 IU/L   Lipid Panel   Result Value Ref Range    Total Cholesterol 134 100 - 199 mg/dL    Triglycerides 220 (H) 0 - 149 mg/dL    HDL Cholesterol 48 >39 mg/dL    VLDL Cholesterol Tio 35 5 - 40 mg/dL    LDL Chol Calc (NIH) 51 0 - 99 mg/dL   Hemoglobin A1c   Result Value Ref Range    Hemoglobin A1C 5.6 4.8 - 5.6 %      Comment:               Prediabetes: 5.7 - 6.4           Diabetes: >6.4           Glycemic control for adults with diabetes: <7.0     CBC (No Diff)   Result Value Ref Range    WBC 9.0 3.4 - 10.8 x10E3/uL    RBC 4.52 4.14 - 5.80 x10E6/uL    Hemoglobin 14.3 13.0 - 17.7 g/dL    Hematocrit 43.8 37.5 - 51.0 %    MCV 97 79 - 97 fL    MCH 31.6 26.6 - 33.0 pg    MCHC 32.6 31.5 - 35.7 g/dL    RDW 13.4 11.6 - 15.4 %    Platelets 223 150 - 450 x10E3/uL   Uric Acid   Result Value Ref Range    Uric Acid 5.5 3.8 - 8.4 mg/dL      Comment:                 Therapeutic target for gout patients: <6.0   Prolactin   Result Value Ref Range    Prolactin 37.6 (H) 3.6 - 25.2 ng/mL   Request Problem   Result Value Ref Range    Request Problem CANCELED        Comment:      Test not performed. No urine specimen received.        TEST:  [id number]  [employer] Select 13 (MW)    Result canceled by the ancillary.           If you have any questions or concerns, please don't hesitate to call.         Sincerely,        Murphy Felix, DO

## 2025-07-03 NOTE — PROGRESS NOTES
Mail copy of results to patient.  Kidney function stable  A1C normalized, good job  Blood counts normal  Prolactin level mildly elevated, just need to monitor

## 2025-07-06 RX ORDER — ALLOPURINOL 100 MG/1
100 TABLET ORAL 2 TIMES DAILY
Qty: 180 TABLET | Refills: 1 | Status: SHIPPED | OUTPATIENT
Start: 2025-07-06

## 2025-07-16 ENCOUNTER — TELEPHONE (OUTPATIENT)
Dept: FAMILY MEDICINE CLINIC | Facility: CLINIC | Age: 80
End: 2025-07-16
Payer: MEDICARE

## 2025-07-16 NOTE — TELEPHONE ENCOUNTER
Okay For [facility] [employer]    Spoke to [employer] 07/15/2025. I told them we have received the paperwork but will not go any further until we speak to the patient.

## 2025-07-16 NOTE — TELEPHONE ENCOUNTER
Caller: [facility]    Relationship to patient:     Best call back number:[phone]    Patient is needing: THEY FAXED OVER A FORM 3 TIMES ALREADY AND WOULD LIKE TO KNOW IF YOU'VE RECEIVED IT AND THE TURN AROUND TIME.

## 2025-07-21 ENCOUNTER — TELEPHONE (OUTPATIENT)
Dept: FAMILY MEDICINE CLINIC | Facility: CLINIC | Age: 80
End: 2025-07-21
Payer: MEDICARE

## 2025-07-21 NOTE — TELEPHONE ENCOUNTER
Caller: [facility]    Relationship: Other    Best call back number: [phone]     What was the call regarding:     CALLING TO FOLLOW UP ON A FAX THAT WAS SENT ON 7/18/2025.  THIS FAX WAS PERTAINING TO ORDERING KNEE BRACES.  THIS REQUEST WAS ORDERED BY THE PATIENT.    PLEASE RETURN HIS CALL

## 2025-07-21 NOTE — TELEPHONE ENCOUNTER
Caller: [email or web address]    Relationship:     Best call back number:     [phone]       What is the best time to reach you: ANYTIME    Who are you requesting to speak with (clinical staff, provider,  specific staff member): CLINICAL     What was the call regarding: ELIN STATES SHE IS CALLING FOR A SECOND TIME ABOUT THE FAX THEY SENT AND NEEDING AN UPDATE ON THE STATUS OF IT. THEY HAVE RECEIVED AN ANSWER YET AND ITS IMPORTANT TO THE PATIENT SHE STATES. THE  PAPERWORK THAT WAS FAXED WAS FOR GENETIC TESTING AND MEDICAL EQUIPMENT FOR PATIENT.     THEY NEED DOCTORS SIGNATURE AND MOST RECENT OFFICE NOTE ASAP PLEASE CALL     Is it okay if the provider responds through MyChart: NO

## 2025-07-21 NOTE — TELEPHONE ENCOUNTER
I left ms for pt to call me and let me know if he wants this or not. I spoke to Herbie and he is aware of this also.

## 2025-07-21 NOTE — TELEPHONE ENCOUNTER
I left message for pt letting him know I am refusing orders until pt lets me know he is requesting this. Insurance more than likely won't pay for the genetic testing. I talked to rep at [facility] and informed them [facility] will not sign off on this until pt lets us know he wants done.

## 2025-07-30 DIAGNOSIS — M15.9 GENERALIZED OSTEOARTHRITIS OF MULTIPLE SITES: ICD-10-CM

## 2025-07-30 DIAGNOSIS — G89.29 CHRONIC BILATERAL LOW BACK PAIN WITH BILATERAL SCIATICA: ICD-10-CM

## 2025-07-30 DIAGNOSIS — M54.41 CHRONIC BILATERAL LOW BACK PAIN WITH BILATERAL SCIATICA: ICD-10-CM

## 2025-07-30 DIAGNOSIS — M54.42 CHRONIC BILATERAL LOW BACK PAIN WITH BILATERAL SCIATICA: ICD-10-CM

## 2025-07-31 RX ORDER — HYDROCODONE BITARTRATE AND ACETAMINOPHEN 10; 325 MG/1; MG/1
1 TABLET ORAL EVERY 6 HOURS PRN
Qty: 120 TABLET | Refills: 0 | Status: SHIPPED | OUTPATIENT
Start: 2025-07-31

## 2025-08-03 RX ORDER — FINASTERIDE 5 MG/1
5 TABLET, FILM COATED ORAL DAILY
Qty: 90 TABLET | Refills: 1 | Status: SHIPPED | OUTPATIENT
Start: 2025-08-03

## 2025-08-05 ENCOUNTER — HOSPITAL ENCOUNTER (OUTPATIENT)
Facility: SURGERY CENTER | Age: 80
Setting detail: HOSPITAL OUTPATIENT SURGERY
Discharge: HOME | End: 2025-08-05
Attending: INTERNAL MEDICINE | Admitting: INTERNAL MEDICINE
Payer: MEDICARE

## 2025-08-05 ENCOUNTER — ANESTHESIA (OUTPATIENT)
Dept: SURGERY | Facility: SURGERY CENTER | Age: 80
End: 2025-08-05
Payer: MEDICARE

## 2025-08-05 ENCOUNTER — ANESTHESIA EVENT (OUTPATIENT)
Dept: SURGERY | Facility: SURGERY CENTER | Age: 80
End: 2025-08-05
Payer: MEDICARE

## 2025-08-05 VITALS
OXYGEN SATURATION: 95 % | DIASTOLIC BLOOD PRESSURE: 77 MMHG | SYSTOLIC BLOOD PRESSURE: 130 MMHG | BODY MASS INDEX: 30.92 KG/M2 | HEART RATE: 60 BPM | RESPIRATION RATE: 16 BRPM | HEIGHT: 68 IN | WEIGHT: 204 LBS | TEMPERATURE: 97.3 F

## 2025-08-05 DIAGNOSIS — K21.9 GASTROESOPHAGEAL REFLUX DISEASE, UNSPECIFIED WHETHER ESOPHAGITIS PRESENT: ICD-10-CM

## 2025-08-05 DIAGNOSIS — K22.2 SCHATZKI RING OF DISTAL ESOPHAGUS: ICD-10-CM

## 2025-08-05 DIAGNOSIS — R13.19 ESOPHAGEAL DYSPHAGIA: ICD-10-CM

## 2025-08-05 PROCEDURE — 25010000002 PROPOFOL 10 MG/ML EMULSION: Performed by: ANESTHESIOLOGY

## 2025-08-05 PROCEDURE — 25010000002 LIDOCAINE 2% SOLUTION: Performed by: ANESTHESIOLOGY

## 2025-08-05 PROCEDURE — C1726 CATH, BAL DIL, NON-VASCULAR: HCPCS | Performed by: INTERNAL MEDICINE

## 2025-08-05 PROCEDURE — 43249 ESOPH EGD DILATION <30 MM: CPT | Performed by: INTERNAL MEDICINE

## 2025-08-05 PROCEDURE — 25810000003 LACTATED RINGERS PER 1000 ML: Performed by: ANESTHESIOLOGY

## 2025-08-05 PROCEDURE — 25810000003 LACTATED RINGERS PER 1000 ML: Performed by: NURSE PRACTITIONER

## 2025-08-05 PROCEDURE — 25010000002 PROPOFOL 1000 MG/100ML EMULSION: Performed by: ANESTHESIOLOGY

## 2025-08-05 RX ORDER — SODIUM CHLORIDE 0.9 % (FLUSH) 0.9 %
10 SYRINGE (ML) INJECTION AS NEEDED
Status: DISCONTINUED | OUTPATIENT
Start: 2025-08-05 | End: 2025-08-05 | Stop reason: HOSPADM

## 2025-08-05 RX ORDER — LIDOCAINE HYDROCHLORIDE 20 MG/ML
INJECTION, SOLUTION INFILTRATION; PERINEURAL AS NEEDED
Status: DISCONTINUED | OUTPATIENT
Start: 2025-08-05 | End: 2025-08-05 | Stop reason: SURG

## 2025-08-05 RX ORDER — PROPOFOL 10 MG/ML
INJECTION, EMULSION INTRAVENOUS CONTINUOUS PRN
Status: DISCONTINUED | OUTPATIENT
Start: 2025-08-05 | End: 2025-08-05 | Stop reason: SURG

## 2025-08-05 RX ORDER — SODIUM CHLORIDE, SODIUM LACTATE, POTASSIUM CHLORIDE, CALCIUM CHLORIDE 600; 310; 30; 20 MG/100ML; MG/100ML; MG/100ML; MG/100ML
30 INJECTION, SOLUTION INTRAVENOUS CONTINUOUS PRN
Status: DISCONTINUED | OUTPATIENT
Start: 2025-08-05 | End: 2025-08-05 | Stop reason: HOSPADM

## 2025-08-05 RX ORDER — PROPOFOL 10 MG/ML
VIAL (ML) INTRAVENOUS AS NEEDED
Status: DISCONTINUED | OUTPATIENT
Start: 2025-08-05 | End: 2025-08-05 | Stop reason: SURG

## 2025-08-05 RX ORDER — SODIUM CHLORIDE, SODIUM LACTATE, POTASSIUM CHLORIDE, CALCIUM CHLORIDE 600; 310; 30; 20 MG/100ML; MG/100ML; MG/100ML; MG/100ML
INJECTION, SOLUTION INTRAVENOUS CONTINUOUS PRN
Status: DISCONTINUED | OUTPATIENT
Start: 2025-08-05 | End: 2025-08-05 | Stop reason: SURG

## 2025-08-05 RX ORDER — SODIUM CHLORIDE 0.9 % (FLUSH) 0.9 %
3 SYRINGE (ML) INJECTION EVERY 12 HOURS SCHEDULED
Status: DISCONTINUED | OUTPATIENT
Start: 2025-08-05 | End: 2025-08-05 | Stop reason: HOSPADM

## 2025-08-05 RX ADMIN — PROPOFOL 160 MCG/KG/MIN: 10 INJECTION, EMULSION INTRAVENOUS at 11:52

## 2025-08-05 RX ADMIN — SODIUM CHLORIDE, POTASSIUM CHLORIDE, SODIUM LACTATE AND CALCIUM CHLORIDE 30 ML/HR: 600; 310; 30; 20 INJECTION, SOLUTION INTRAVENOUS at 10:43

## 2025-08-05 RX ADMIN — SODIUM CHLORIDE, POTASSIUM CHLORIDE, SODIUM LACTATE AND CALCIUM CHLORIDE: 600; 310; 30; 20 INJECTION, SOLUTION INTRAVENOUS at 11:43

## 2025-08-05 RX ADMIN — PROPOFOL 100 MG: 10 INJECTION, EMULSION INTRAVENOUS at 11:46

## 2025-08-05 RX ADMIN — LIDOCAINE HYDROCHLORIDE 90 MG: 20 INJECTION, SOLUTION INFILTRATION; PERINEURAL at 11:46

## 2025-08-24 RX ORDER — METOPROLOL SUCCINATE 50 MG/1
50 TABLET, EXTENDED RELEASE ORAL DAILY
Qty: 90 TABLET | Refills: 3 | Status: SHIPPED | OUTPATIENT
Start: 2025-08-24

## 2025-08-26 RX ORDER — FLUTICASONE PROPIONATE 50 MCG
2 SPRAY, SUSPENSION (ML) NASAL DAILY
Qty: 48 G | Refills: 2 | Status: SHIPPED | OUTPATIENT
Start: 2025-08-26

## 2025-08-28 ENCOUNTER — TELEPHONE (OUTPATIENT)
Dept: FAMILY MEDICINE CLINIC | Facility: CLINIC | Age: 80
End: 2025-08-28
Payer: MEDICARE

## 2025-08-28 DIAGNOSIS — M54.42 CHRONIC BILATERAL LOW BACK PAIN WITH BILATERAL SCIATICA: ICD-10-CM

## 2025-08-28 DIAGNOSIS — G89.29 CHRONIC BILATERAL LOW BACK PAIN WITH BILATERAL SCIATICA: ICD-10-CM

## 2025-08-28 DIAGNOSIS — L20.82 FLEXURAL ECZEMA: ICD-10-CM

## 2025-08-28 DIAGNOSIS — M54.41 CHRONIC BILATERAL LOW BACK PAIN WITH BILATERAL SCIATICA: ICD-10-CM

## 2025-08-28 DIAGNOSIS — M15.9 GENERALIZED OSTEOARTHRITIS OF MULTIPLE SITES: ICD-10-CM

## 2025-08-28 RX ORDER — METHOCARBAMOL 750 MG/1
750 TABLET, FILM COATED ORAL 2 TIMES DAILY PRN
Qty: 60 TABLET | Refills: 0 | Status: SHIPPED | OUTPATIENT
Start: 2025-08-28

## 2025-08-28 RX ORDER — CLOBETASOL PROPIONATE 0.5 MG/G
1 CREAM TOPICAL 2 TIMES DAILY
Qty: 30 G | Refills: 2 | Status: SHIPPED | OUTPATIENT
Start: 2025-08-28

## 2025-08-28 RX ORDER — HYDROCODONE BITARTRATE AND ACETAMINOPHEN 10; 325 MG/1; MG/1
1 TABLET ORAL EVERY 6 HOURS PRN
Qty: 120 TABLET | Refills: 0 | Status: SHIPPED | OUTPATIENT
Start: 2025-08-28

## 2025-08-28 RX ORDER — CLOBETASOL PROPIONATE 0.5 MG/G
CREAM TOPICAL
Qty: 60 G | Refills: 0 | OUTPATIENT
Start: 2025-08-28

## (undated) DEVICE — Device

## (undated) DEVICE — PK LAP CHOLE 40

## (undated) DEVICE — KT ORCA ORCAPOD DISP STRL

## (undated) DEVICE — ADHS SKIN SURG TISS VISC PREMIERPRO EXOFIN HI/VISC FAST/DRY

## (undated) DEVICE — APPL HEMO SURG ARISTA/AH/FLEXITIP XL 38CM

## (undated) DEVICE — RETRV BG SPECI MONARCH UNIV

## (undated) DEVICE — OBT BLADLES ENDOWRIST DAVINCI/X/XI 8MM DISP

## (undated) DEVICE — APPL CHLORAPREP HI/LITE 26ML ORNG

## (undated) DEVICE — SUT VIC 0 CT2 27IN J270H

## (undated) DEVICE — SYS VISABILITY LAP/SCPE LAPAROVUE CLN WARM 2/PRT 3TO12MM

## (undated) DEVICE — CVR HNDL LIGHT HARMONYAIR SURG STRL

## (undated) DEVICE — SEAL UNIV DAVINCI/X/XI 5TO12MM

## (undated) DEVICE — ESOPHAGEAL BALLOON DILATATION CATHETER: Brand: CRE FIXED WIRE

## (undated) DEVICE — SINGLE-USE BIOPSY FORCEPS: Brand: RADIAL JAW 4

## (undated) DEVICE — VIAL FORMLN CAP 10PCT 20ML

## (undated) DEVICE — DEV INFL ALLIANCE2 SYS

## (undated) DEVICE — DRP C/ARM 41X74IN

## (undated) DEVICE — STPCK 3WY D201 DISCOFIX

## (undated) DEVICE — DEV SUT GRSPR CLOSUR 15CM 14G

## (undated) DEVICE — MSK ENDO PORT O2 POM ELITE CURAPLEX A/

## (undated) DEVICE — GOWN ,SIRUS,NONREINFORCED 3XL: Brand: MEDLINE

## (undated) DEVICE — CATH IV INSYTE AUTOGARD 14G 1 1/2IN ORNG

## (undated) DEVICE — CONTN URINE STRL SURG

## (undated) DEVICE — TROC BLADLES AIRSEAL/OPTI THRD 8X120MM 1P/U

## (undated) DEVICE — DRP SURG 3/4 REINF 53X77IN STRL

## (undated) DEVICE — CATH URETRL SOF/FLEX OPN/END 4F 70CM

## (undated) DEVICE — GOWN SURG SIRUS NONREINF W/SLV XL STRL

## (undated) DEVICE — DRP ARM DAVINCI/X/XI DISP BX20

## (undated) DEVICE — GOWN,SIRUS,NONRNF,3XL,18/CS: Brand: MEDLINE

## (undated) DEVICE — ST TBG AIRSEAL BIF FLTR W/ACT/CHARCOAL/FLTR

## (undated) DEVICE — SUT MNCRYL PLS ANTIB UD 4/0 PS2 18IN

## (undated) DEVICE — GLV SURG PREMIERPRO ORTHO LTX PF SZ7.5 BRN

## (undated) DEVICE — TBG EXT STD/BORE LL 30IN LF

## (undated) DEVICE — ADAPT CLN SCPE ENDO PORPOISE BX/50 DISP

## (undated) DEVICE — BLCK/BITE BLOX W/DENTL/RIM W/STRAP 54F

## (undated) DEVICE — BITEBLOCK OMNI BLOC

## (undated) DEVICE — NDL VERRES PNEUMO 120MM PN120 LF

## (undated) DEVICE — IRR SXN ENDOWRIST DAVINCI/X/XI 8MM 1P/U